# Patient Record
Sex: FEMALE | Race: WHITE | NOT HISPANIC OR LATINO | Employment: OTHER | URBAN - METROPOLITAN AREA
[De-identification: names, ages, dates, MRNs, and addresses within clinical notes are randomized per-mention and may not be internally consistent; named-entity substitution may affect disease eponyms.]

---

## 2017-02-06 ENCOUNTER — HOSPITAL ENCOUNTER (OUTPATIENT)
Dept: RADIOLOGY | Facility: MEDICAL CENTER | Age: 62
End: 2017-02-06
Attending: PHYSICAL MEDICINE & REHABILITATION
Payer: MEDICARE

## 2017-02-06 DIAGNOSIS — M25.512 LEFT SHOULDER PAIN, UNSPECIFIED CHRONICITY: ICD-10-CM

## 2017-02-06 PROCEDURE — 73221 MRI JOINT UPR EXTREM W/O DYE: CPT | Mod: LT

## 2017-02-07 ENCOUNTER — HOSPITAL ENCOUNTER (OUTPATIENT)
Dept: LAB | Facility: MEDICAL CENTER | Age: 62
End: 2017-02-07
Attending: FAMILY MEDICINE
Payer: MEDICARE

## 2017-02-07 DIAGNOSIS — R45.86 MOOD CHANGE: ICD-10-CM

## 2017-02-07 DIAGNOSIS — D72.829 ELEVATED WBC COUNT: ICD-10-CM

## 2017-02-07 DIAGNOSIS — R53.83 DECREASED ENERGY: ICD-10-CM

## 2017-02-07 LAB
BASOPHILS # BLD AUTO: 0.09 K/UL (ref 0–0.12)
BASOPHILS NFR BLD AUTO: 0.9 % (ref 0–1.8)
EOSINOPHIL # BLD: 0.27 K/UL (ref 0–0.51)
EOSINOPHIL NFR BLD AUTO: 2.8 % (ref 0–6.9)
ERYTHROCYTE [DISTWIDTH] IN BLOOD BY AUTOMATED COUNT: 43.7 FL (ref 35.9–50)
HCT VFR BLD AUTO: 40.7 % (ref 37–47)
HGB BLD-MCNC: 12.7 G/DL (ref 12–16)
IMM GRANULOCYTES # BLD AUTO: 0.02 K/UL (ref 0–0.11)
IMM GRANULOCYTES NFR BLD AUTO: 0.2 % (ref 0–0.9)
LYMPHOCYTES # BLD: 2.22 K/UL (ref 1–4.8)
LYMPHOCYTES NFR BLD AUTO: 23.2 % (ref 22–41)
MCH RBC QN AUTO: 25.8 PG (ref 27–33)
MCHC RBC AUTO-ENTMCNC: 31.2 G/DL (ref 33.6–35)
MCV RBC AUTO: 82.6 FL (ref 81.4–97.8)
MONOCYTES # BLD: 0.49 K/UL (ref 0–0.85)
MONOCYTES NFR BLD AUTO: 5.1 % (ref 0–13.4)
NEUTROPHILS # BLD: 6.47 K/UL (ref 2–7.15)
NEUTROPHILS NFR BLD AUTO: 67.8 % (ref 44–72)
NRBC # BLD AUTO: 0 K/UL
NRBC BLD-RTO: 0 /100 WBC
PLATELET # BLD AUTO: 367 K/UL (ref 164–446)
PMV BLD AUTO: 10.4 FL (ref 9–12.9)
RBC # BLD AUTO: 4.93 M/UL (ref 4.2–5.4)
T4 FREE SERPL-MCNC: 0.7 NG/DL (ref 0.53–1.43)
TSH SERPL DL<=0.005 MIU/L-ACNC: 1.62 UIU/ML (ref 0.3–3.7)
WBC # BLD AUTO: 9.6 K/UL (ref 4.8–10.8)

## 2017-02-07 PROCEDURE — 36415 COLL VENOUS BLD VENIPUNCTURE: CPT

## 2017-02-07 PROCEDURE — 85025 COMPLETE CBC W/AUTO DIFF WBC: CPT

## 2017-02-07 PROCEDURE — 84439 ASSAY OF FREE THYROXINE: CPT

## 2017-02-07 PROCEDURE — 84443 ASSAY THYROID STIM HORMONE: CPT

## 2017-02-09 ENCOUNTER — OFFICE VISIT (OUTPATIENT)
Dept: MEDICAL GROUP | Age: 62
End: 2017-02-09
Payer: MEDICARE

## 2017-02-09 VITALS
OXYGEN SATURATION: 96 % | DIASTOLIC BLOOD PRESSURE: 78 MMHG | SYSTOLIC BLOOD PRESSURE: 124 MMHG | TEMPERATURE: 98.2 F | WEIGHT: 147.8 LBS | HEART RATE: 93 BPM | HEIGHT: 67 IN | BODY MASS INDEX: 23.2 KG/M2

## 2017-02-09 DIAGNOSIS — E11.8 CONTROLLED TYPE 2 DIABETES MELLITUS WITH COMPLICATION, WITHOUT LONG-TERM CURRENT USE OF INSULIN (HCC): ICD-10-CM

## 2017-02-09 DIAGNOSIS — E78.2 MIXED HYPERLIPIDEMIA: ICD-10-CM

## 2017-02-09 DIAGNOSIS — G89.4 CHRONIC PAIN SYNDROME: ICD-10-CM

## 2017-02-09 DIAGNOSIS — F17.200 TOBACCO USE DISORDER: ICD-10-CM

## 2017-02-09 DIAGNOSIS — J44.9 CHRONIC OBSTRUCTIVE PULMONARY DISEASE, UNSPECIFIED COPD TYPE (HCC): ICD-10-CM

## 2017-02-09 DIAGNOSIS — I10 ESSENTIAL HYPERTENSION: ICD-10-CM

## 2017-02-09 LAB
HBA1C MFR BLD: 6.2 % (ref ?–5.8)
INT CON NEG: NEGATIVE
INT CON POS: POSITIVE

## 2017-02-09 PROCEDURE — 4004F PT TOBACCO SCREEN RCVD TLK: CPT | Performed by: FAMILY MEDICINE

## 2017-02-09 PROCEDURE — G8598 ASA/ANTIPLAT THER USED: HCPCS | Performed by: FAMILY MEDICINE

## 2017-02-09 PROCEDURE — 3014F SCREEN MAMMO DOC REV: CPT | Mod: 8P | Performed by: FAMILY MEDICINE

## 2017-02-09 PROCEDURE — G8420 CALC BMI NORM PARAMETERS: HCPCS | Performed by: FAMILY MEDICINE

## 2017-02-09 PROCEDURE — 83036 HEMOGLOBIN GLYCOSYLATED A1C: CPT | Performed by: FAMILY MEDICINE

## 2017-02-09 PROCEDURE — G8484 FLU IMMUNIZE NO ADMIN: HCPCS | Performed by: FAMILY MEDICINE

## 2017-02-09 PROCEDURE — 99214 OFFICE O/P EST MOD 30 MIN: CPT | Performed by: FAMILY MEDICINE

## 2017-02-09 PROCEDURE — 3017F COLORECTAL CA SCREEN DOC REV: CPT | Performed by: FAMILY MEDICINE

## 2017-02-09 PROCEDURE — 3044F HG A1C LEVEL LT 7.0%: CPT | Performed by: FAMILY MEDICINE

## 2017-02-09 PROCEDURE — G8432 DEP SCR NOT DOC, RNG: HCPCS | Performed by: FAMILY MEDICINE

## 2017-02-09 ASSESSMENT — PATIENT HEALTH QUESTIONNAIRE - PHQ9: CLINICAL INTERPRETATION OF PHQ2 SCORE: 3

## 2017-02-09 NOTE — PROGRESS NOTES
SUBJECTIVE:        Chief Complaint   Patient presents with   • Results     Blood work done on 02/07/2017   • Sinusitis   • Diabetes Mellitus       HPI:     Heidi Navas is a 61 y.o. female here for follow up of labs and blood work.     Diabetes- she is not checking her glucose regularly. Usually around 130s fasting, sometimes lower. She is not on medications currently. Appears no new blood work for glucose and lipids on recent labs. Will need new lab order.     Hyperlipidemia- not on medication therapy. Last labs stable. She will need new blood work.    Hypertension-has had issues with elevated blood pressure in the past but has been well-controlled off medication therapy. She was previously on lisinopril therapy.    States she was sick for a few weeks with cough and green mucous but is now better.     She will be having shoulder surgery.     Smoking- states wellbutrin did not help. She has cut down from a carton a week to  3 packs a week.    COPD- has been otherwise stable. Not on medication currently. No unusual shortness of breath or chest pains.    Chronic pain-multiple areas. She does see pain management for her chronic pain issues. Has been otherwise stable.    Diabetes Health Maintenance  On aspirin: on 325 mg daily  Foot exam: due- will be completed at next visit.   Eye exam: due- recommend routine annual screening.   Vaccinations:                Influenza: recommend annually-last done at [facility]; Pneumonia: PPSV 230 2013; Td/Adacel: 2014  Mammogram- she will have done after her shoulder surgery.      Ref. Range 2/7/2017 14:33   WBC Latest Ref Range: 4.8-10.8 K/uL 9.6   RBC Latest Ref Range: 4.20-5.40 M/uL 4.93   Hemoglobin Latest Ref Range: 12.0-16.0 g/dL 12.7   Hematocrit Latest Ref Range: 37.0-47.0 % 40.7   MCV Latest Ref Range: 81.4-97.8 fL 82.6   MCH Latest Ref Range: 27.0-33.0 pg 25.8 (L)   MCHC Latest Ref Range: 33.6-35.0 g/dL 31.2 (L)   RDW Latest Ref Range: 35.9-50.0 fL 43.7   Platelet Count  Latest Ref Range: 164-446 K/uL 367   MPV Latest Ref Range: 9.0-12.9 fL 10.4   Neutrophils-Polys Latest Ref Range: 44.00-72.00 % 67.80   Neutrophils (Absolute) Latest Ref Range: 2.00-7.15 K/uL 6.47   Lymphocytes Latest Ref Range: 22.00-41.00 % 23.20   Lymphs (Absolute) Latest Ref Range: 1.00-4.80 K/uL 2.22   Monocytes Latest Ref Range: 0.00-13.40 % 5.10   Monos (Absolute) Latest Ref Range: 0.00-0.85 K/uL 0.49   Eosinophils Latest Ref Range: 0.00-6.90 % 2.80   Eos (Absolute) Latest Ref Range: 0.00-0.51 K/uL 0.27   Basophils Latest Ref Range: 0.00-1.80 % 0.90   Baso (Absolute) Latest Ref Range: 0.00-0.12 K/uL 0.09   Immature Granulocytes Latest Ref Range: 0.00-0.90 % 0.20   Immature Granulocytes (abs) Latest Ref Range: 0.00-0.11 K/uL 0.02   Nucleated RBC Latest Units: /100 WBC 0.00   NRBC (Absolute) Latest Units: K/uL 0.00   TSH Latest Ref Range: 0.300-3.700 uIU/mL 1.620   Free T-4 Latest Ref Range: 0.53-1.43 ng/dL 0.70         ROS:  Denies any recent fevers or chills. No nausea or vomiting. No diarrhea. No chest pains or shortness of breath. No lower extremity edema.    Current Outpatient Prescriptions on File Prior to Visit   Medication Sig Dispense Refill   • aspirin (ASA) 325 MG TABS Take 325 mg by mouth every 6 hours as needed (3 tabs).     • ondansetron (ZOFRAN ODT) 4 MG TBDP Take 4 mg by mouth every 8 hours as needed for Nausea/Vomiting.     • morphine (MS IR) 15 MG tablet Take 15 mg by mouth every 12 hours.     • oxycodone immediate release (ROXICODONE) 10 MG immediate release tablet Take 1 Tab by mouth every 6 hours as needed (severe pain). 15 Tab 0   • meloxicam (MOBIC) 7.5 MG Tab TAKE ONE ABLET BY MOUTH ONCE DAILY (Patient not taking: Reported on 2/9/2017) 30 Tab 3   • buPROPion (WELLBUTRIN) 75 MG Tab Take 1 Tab by mouth 2 times a day. (Patient not taking: Reported on 2/9/2017) 60 Tab 3   • promethazine (PHENERGAN) 25 MG TABS Take 1 Tab by mouth every 6 hours as needed for Nausea/Vomiting. (Patient not  "taking: Reported on 2/9/2017) 30 Tab 1   • nicotine (NICODERM) 7 MG/24HR PT24 Apply 1 Patch to skin as directed every 24 hours. For tobacco dependence: 305.1 (Patient not taking: Reported on 8/26/2015) 30 Patch 1   • multivitamin (THERAGRAN) TABS Take 1 Tab by mouth every day. (Patient not taking: Reported on 2/9/2017) 30 Tab 0     No current facility-administered medications on file prior to visit.       Allergies   Allergen Reactions   • Aspartame Nausea     headache   • Augmentin Vomiting   • Latex Rash and Itching   • Lipitor [Atorvastatin Calcium] Swelling     Lip swelling   • Other Misc      Bug spray   • Saccharin Nausea     Nausea and headache   • Sucralose Nausea     Nausea and headache       Past Medical History   Diagnosis Date   • Pancreatitis    • Pap smear 4/2006   • Screening mammogram never   • History of colonoscopy   2005   • MI (myocardial infarction) (CMS-HCC) 4/29/2007   • Diverticulosis 5/10      colonoscopy   • S/P colonoscopy 5/2010     will need repeat in 5 years   • COPD (chronic obstructive pulmonary disease) (CMS-HCC)    • Arthritis      OA and  not RA per rheumatology   • Unspecified urinary incontinence    • Anemia    • Type II or unspecified type diabetes mellitus without mention of complication, uncontrolled 7/21/2009     oral meds   • Other specified disorder of intestines      constipation   • Dental disorder      dentures   • Pain      shoulders, neck   • Depression      depression   • Shingles    • Pulmonary nodule    • Renal stone    • Renal lesion    • Colon polyps 2015   • Convulsions (CMS-HCC) 7/21/2009      ICD-10 transition   • Symptomatic menopausal or female climacteric states 7/21/2009   • Hemorrhoid 7/29/2009   • Lymphocytosis (symptomatic) 7/21/2009   • Leukocytosis 3/30/2014       OBJECTIVE:   /78 mmHg  Pulse 93  Temp(Src) 36.8 °C (98.2 °F)  Ht 1.714 m (5' 7.48\")  Wt 67.042 kg (147 lb 12.8 oz)  BMI 22.82 kg/m2  SpO2 96%  LMP 01/01/2007  General: " Well-developed well-nourished female, no acute distress  Neck: supple, no lymphadenopathy- cervical or supraclavicular, no thyromegaly  Cardiovascular: regular rate and rhythm, no murmurs, gallops, rubs  Lungs: clear to auscultation bilaterally, no wheezes, crackles, or rhonchi  Abdomen: +bowel sounds, soft, nontender, nondistended, no rebound, no guarding, no hepatosplenomegaly  Extremities: no cyanosis, clubbing, edema  Skin: Warm and dry  Psych: appropriate mood and affect    POC hemoglobin A1c: 6.2%     ASSESSMENT/PLAN:    This is a 61-year-old female.    1. Controlled type 2 diabetes mellitus with complication, without long-term current use of insulin (CMS-[facility])- controlled. Not on medication therapy.  COMP METABOLIC PANEL    POCT  A1C   2. Mixed hyperlipidemia- stable. Needs new lab work. Not on medication therapy.  COMP METABOLIC PANEL    LIPID PROFILE   3. Essential hypertension-controlled. Not a medication therapy. Has been on medication therapy in past. Will further consider repeat trial of low-dose ACE inhibitor as tolerated in future.     4. Tobacco use disorder- has tried Wellbutrin which did not help. She has cut down on her tobacco use. Encouraged and counseled on smoking cessation.     5. Chronic obstructive pulmonary disease, unspecified COPD type (CMS-HCC)-stable. Not on medication therapy. Monitor.     6. Chronic pain syndrome -stable. Continue to follow up with pain specialist. Continue current medication.           Return in about 6 months (around 8/9/2017) for Long.

## 2017-02-09 NOTE — MR AVS SNAPSHOT
"        Heidi Santosne   2017 3:00 PM   Office Visit   MRN: [id number]    Department:  [address] [place] [address]   Dept Phone:  [phone]    Description:  Female : 1955   Provider:  Viviana Thorne M.D.           Reason for Visit     Results Blood work done on 2017    Sinusitis     Diabetes Mellitus           Allergies as of 2017     Allergen Noted Reactions    Aspartame 2014   Nausea    headache    Augmentin 2009   Vomiting    Latex 2014   Rash, Itching    Lipitor [Atorvastatin Calcium] 2012   Swelling    Lip swelling    Other Misc 2014       Bug spray    Saccharin 09/10/2014   Nausea    Nausea and headache    Sucralose 09/10/2014   Nausea    Nausea and headache      You were diagnosed with     Mixed hyperlipidemia   [272.2.ICD-9-CM]       Controlled type 2 diabetes mellitus with complication, without long-term current use of insulin (CMS-HCC)   [5217925]       Tobacco use disorder   [305.1.ICD-9-CM]       Essential hypertension   [4684274]       Chronic obstructive pulmonary disease, unspecified COPD type (CMS-HCC)   [2920304]         Vital Signs     Blood Pressure Pulse Temperature Height Weight Body Mass Index    124/78 mmHg 93 36.8 °C (98.2 °F) 1.714 m (5' 7.48\") 67.042 kg (147 lb 12.8 oz) 22.82 kg/m2    Oxygen Saturation Last Menstrual Period Smoking Status             96% 2007 Current Every Day Smoker         Basic Information     Date Of Birth Sex Race Ethnicity Preferred Language    1955 Female White Non- English      Your appointments     Aug 10, 2017  3:00 PM   Established Patient with Viviana Thorne M.D.   [address])    [address] [place] [id number] [phone]           You will be receiving a confirmation call a few days before your appointment from our automated call confirmation system.              Problem List              ICD-10-CM Priority Class Noted - Resolved    Swelling, mass, or " lump in head and neck R22.0, R22.1   7/21/2009 - Present    Tobacco use disorder F17.200   7/21/2009 - Present    Chronic ischemic heart disease I25.9   7/21/2009 - Present    Esophageal reflux K21.9   7/21/2009 - Present    Chronic airway obstruction, not elsewhere classified (CMS-HCC) J44.9   7/21/2009 - Present    Allergic rhinitis J30.9   7/21/2009 - Present    Type II or unspecified type diabetes mellitus without mention of complication, not stated as uncontrolled E11.9   7/21/2009 - Present    Mixed hyperlipidemia E78.2   7/21/2009 - Present    Other atopic dermatitis and related conditions L20.89   7/21/2009 - Present    Deficiency anemia D53.9   7/21/2009 - Present    Fibromyalgia M79.7   10/9/2009 - Present    Hypertension I10   3/22/2010 - Present    Vitamin D insufficiency E55.9   4/28/2011 - Present    Anxiety F41.9   1/18/2012 - Present    Chronic pain G89.29   2/21/2012 - Present    Arthritis M19.90   12/26/2013 - Present    HTN (hypertension) I10   3/29/2014 - Present    COPD (chronic obstructive pulmonary disease) (CMS-HCC) J44.9   3/30/2014 - Present    History of total hip arthroplasty Z96.649   7/22/2014 - Present    Brachial neuritis or radiculitis M54.12   9/10/2014 - Present    Diabetes mellitus, type 2 (CMS-HCC) E11.9   10/3/2014 - Present    Nausea & vomiting R11.2   7/13/2015 - Present    Opiate withdrawal (CMS-HCC) F11.23   7/13/2015 - Present    Chest pain R07.9   7/16/2015 - Present    Pulmonary nodule R91.1   12/1/2015 - Present    Renal stone N20.0   12/1/2015 - Present    Diverticulosis K57.90   12/1/2015 - Present    COLD (chronic obstructive lung disease) (CMS-HCC) J44.9   12/1/2015 - Present    CVD (cardiovascular disease) I25.10   12/4/2015 - Present      Health Maintenance        Date Due Completion Dates    PAP SMEAR 7/29/2012 7/29/2009    IMM ZOSTER VACCINE 4/29/2015 ---    MAMMOGRAM 7/1/2015 7/1/2014    RETINAL SCREENING 1/22/2016 1/22/2015    FASTING LIPID PROFILE 7/16/2016  7/16/2015, 12/3/2014, 6/7/2014, 3/30/2014, 5/16/2012, 10/29/2011, 4/21/2011, 12/7/2010, 6/17/2010, 4/9/2009, 4/29/2008    DIABETES MONOFILAMENT / LE EXAM 8/30/2016 8/30/2015, 6/9/2014 (Done)    Override on 6/9/2014: Done    IMM INFLUENZA (1) 9/1/2016 12/1/2015, 11/3/2014, 12/24/2013    A1C SCREENING 12/7/2016 6/7/2016, 7/16/2015, 12/3/2014, 6/7/2014, 3/30/2014, 12/24/2013, 5/16/2012, 12/7/2010, 6/17/2010, 4/9/2009, 4/29/2008    URINE ACR / MICROALBUMIN 6/7/2017 6/7/2016, 6/7/2014, 5/16/2012, 6/17/2010, 4/9/2009    SERUM CREATININE 6/7/2017 6/7/2016, 8/25/2015, 7/16/2015, 7/16/2015, 7/14/2015, 7/13/2015, 12/3/2014, 9/23/2014, 9/22/2014, 9/20/2014, 9/16/2014, 9/9/2014, 7/25/2014, 7/24/2014, 7/22/2014, 7/20/2014, 6/7/2014, 5/5/2014, 4/1/2014, 3/31/2014, 3/30/2014, 3/29/2014, 12/5/2013, 1/10/2012, 4/21/2011, 12/7/2010, 6/17/2010, 4/9/2009, 4/29/2008    IMM DTaP/Tdap/Td Vaccine (2 - Td) 6/23/2024 6/23/2014    COLONOSCOPY 11/13/2025 11/13/2015            Current Immunizations     Influenza Vaccine Adult HD 12/24/2013    Influenza Vaccine Quad Inj (Pf) 12/1/2015    Influenza Vaccine Quad Inj (Preserved) 11/3/2014    Pneumococcal Vaccine (UF)Historical Data 12/22/2008    Pneumococcal polysaccharide vaccine (PPSV-23) 12/24/2013    Tdap Vaccine 6/23/2014  2:32 PM      Below and/or attached are the medications your provider expects you to take. Review all of your home medications and newly ordered medications with your provider and/or pharmacist. Follow medication instructions as directed by your provider and/or pharmacist. Please keep your medication list with you and share with your provider. Update the information when medications are discontinued, doses are changed, or new medications (including over-the-counter products) are added; and carry medication information at all times in the event of emergency situations     Allergies:  ASPARTAME - Nausea     AUGMENTIN - Vomiting     LATEX - Rash,Itching     LIPITOR - Swelling      OTHER MISC - (reactions not documented)     SACCHARIN - Nausea     SUCRALOSE - Nausea               Medications  Valid as of: February 09, 2017 -  3:59 PM    Generic Name Brand Name Tablet Size Instructions for use    Aspirin (Tab)  MG Take 325 mg by mouth every 6 hours as needed (3 tabs).        BuPROPion HCl (Tab) WELLBUTRIN 75 MG Take 1 Tab by mouth 2 times a day.        Meloxicam (Tab) MOBIC 7.5 MG TAKE ONE ABLET BY MOUTH ONCE DAILY        Morphine Sulfate (Tab) MS IR 15 MG Take 15 mg by mouth every 12 hours.        Multiple Vitamin (Tab) THERAGRAN  Take 1 Tab by mouth every day.        Nicotine (PATCH 24 HR) NICODERM 7 MG/24HR Apply 1 Patch to skin as directed every 24 hours. For tobacco dependence: 305.1        Ondansetron (TABLET DISPERSIBLE) ZOFRAN ODT 4 MG Take 4 mg by mouth every 8 hours as needed for Nausea/Vomiting.        OxyCODONE HCl (Tab) ROXICODONE 10 MG Take 1 Tab by mouth every 6 hours as needed (severe pain).        Promethazine HCl (Tab) PHENERGAN 25 MG Take 1 Tab by mouth every 6 hours as needed for Nausea/Vomiting.        .                 Medicines prescribed today were sent to:     [facility] [address] [place] [id number]    Phone: [phone] Fax: [phone]    Open 24 Hours?: No      Medication refill instructions:       If your prescription bottle indicates you have medication refills left, it is not necessary to call your provider’s office. Please contact your pharmacy and they will refill your medication.    If your prescription bottle indicates you do not have any refills left, you may request refills at any time through one of the following ways: The online IQcard system (except Urgent Care), by calling your provider’s office, or by asking your pharmacy to contact your provider’s office with a refill request. Medication refills are processed only during regular business hours and may not be available until the  next business day. Your provider may request additional information or to have a follow-up visit with you prior to refilling your medication.   *Please Note: Medication refills are assigned a new Rx number when refilled electronically. Your pharmacy may indicate that no refills were authorized even though a new prescription for the same medication is available at the pharmacy. Please request the medicine by name with the pharmacy before contacting your provider for a refill.        Your To Do List     Future Labs/Procedures Complete By Expires    COMP METABOLIC PANEL  As directed 8/9/2017    LIPID PROFILE  As directed 8/9/2017      Other Notes About Your Plan     The patient is refusing mammogram due to cost.            GeekChicDaily Access Code: [email or web address]  Expires: 2/28/2017 10:20 AM    GeekChicDaily  A secure, online tool to manage your health information     [employer]’s GeekChicDaily® is a secure, online tool that connects you to your personalized health information from the privacy of your home -- day or night - making it very easy for you to manage your healthcare. Once the activation process is completed, you can even access your medical information using the GeekChicDaily ema, which is available for free in the Apple Ema store or Google Play store.     GeekChicDaily provides the following levels of access (as shown below):   My Chart Features   [facility] Doctor [facility]  Specialists [facility]  Urgent  Care Non-[facility]  Primary Care  Doctor   Email your healthcare team securely and privately 24/7 X X X    Manage appointments: schedule your next appointment; view details of past/upcoming appointments X      Request prescription refills. X      View recent personal medical records, including lab and immunizations X X X X   View health record, including health history, allergies, medications X X X X   Read reports about your outpatient visits, procedures, consult and ER notes X X X X   See your discharge summary, which is a  recap of your hospital and/or ER visit that includes your diagnosis, lab results, and care plan. X X       How to register for Pikhub:  1. Go to  [email or web address].  2. Click on the Sign Up Now box, which takes you to the New Member Sign Up page. You will need to provide the following information:  a. Enter your Pikhub Access Code exactly as it appears at the top of this page. (You will not need to use this code after you’ve completed the sign-up process. If you do not sign up before the expiration date, you must request a new code.)   b. Enter your date of birth.   c. Enter your home email address.   d. Click Submit, and follow the next screen’s instructions.  3. Create a Pikhub ID. This will be your Pikhub login ID and cannot be changed, so think of one that is secure and easy to remember.  4. Create a Pikhub password. You can change your password at any time.  5. Enter your Password Reset Question and Answer. This can be used at a later time if you forget your password.   6. Enter your e-mail address. This allows you to receive e-mail notifications when new information is available in Pikhub.  7. Click Sign Up. You can now view your health information.    For assistance activating your Pikhub account, call [phone]

## 2017-03-31 ENCOUNTER — PATIENT OUTREACH (OUTPATIENT)
Dept: HEALTH INFORMATION MANAGEMENT | Facility: OTHER | Age: 62
End: 2017-03-31

## 2017-03-31 DIAGNOSIS — M25.511 BILATERAL SHOULDER PAIN, UNSPECIFIED CHRONICITY: ICD-10-CM

## 2017-03-31 DIAGNOSIS — M25.512 BILATERAL SHOULDER PAIN, UNSPECIFIED CHRONICITY: ICD-10-CM

## 2017-03-31 RX ORDER — MELOXICAM 7.5 MG/1
TABLET ORAL
Qty: 30 TAB | Refills: 1 | Status: ON HOLD | OUTPATIENT
Start: 2017-03-31 | End: 2017-07-12

## 2017-03-31 NOTE — TELEPHONE ENCOUNTER
Was the patient seen in the last year in this department? Yes     Does patient have an active prescription for medications requested? No     Received Request Via: Patient     Pt states:  She knows she told you that she wasn't taking the Meloxicam any longer, but her shoulders are hurting again.

## 2017-04-14 NOTE — PROGRESS NOTES
Attempt #:2 -- Patient is having surgery on her shoulder soon and requested to hold off on this appointment until that is done.    Verify PCP: yes    Communication Preference Obtained: no     Review Care Team: yes    Annual Wellness Visit Scheduling  1. Scheduling Status:Not Scheduled. Patient states they have too many other visits    Care Gap Scheduling      Health Maintenance Due   Topic Date Due   • PFT SCREENING-FEV1 AND FEV/FVC RATIO / SPIROMETRY SHOULD BE PERFORMED ANNUALLY  NOT DISCUSSED DURING THIS CALL   • PAP SMEAR  PATIENT WILL CALL BACK TO SCHEDULE   • IMM ZOSTER VACCINE  PATIENT WILL CALL BACK TO SCHEDULE   • MAMMOGRAM  PATIENT WILL CALL BACK TO SCHEDULE   • RETINAL SCREENING  DR. BURGOS ADDED TO PT'S CHART, RECORDS REQUESTED.   • FASTING LIPID PROFILE  PATIENT WILL CALL BACK TO SCHEDULE   • DIABETES MONOFILAMENT / LE EXAM  PATIENT WILL CALL BACK TO SCHEDULE   • Annual Wellness Visit  DECLINED AT THIS TIME DUE TO UPCOMING SURGERY.         Rende[employer]oo Activation: PENDING  R[employer] Ema: no  Virtual Visits: no  Opt In to Text Messages: no

## 2017-07-06 DIAGNOSIS — Z01.812 PRE-OPERATIVE LABORATORY EXAMINATION: ICD-10-CM

## 2017-07-06 DIAGNOSIS — Z01.810 PRE-OPERATIVE CARDIOVASCULAR EXAMINATION: ICD-10-CM

## 2017-07-06 LAB
ANION GAP SERPL CALC-SCNC: 11 MMOL/L (ref 0–11.9)
BUN SERPL-MCNC: 12 MG/DL (ref 8–22)
CALCIUM SERPL-MCNC: 8.3 MG/DL (ref 8.5–10.5)
CHLORIDE SERPL-SCNC: 98 MMOL/L (ref 96–112)
CO2 SERPL-SCNC: 27 MMOL/L (ref 20–33)
CREAT SERPL-MCNC: 0.84 MG/DL (ref 0.5–1.4)
ERYTHROCYTE [DISTWIDTH] IN BLOOD BY AUTOMATED COUNT: 43.8 FL (ref 35.9–50)
GFR SERPL CREATININE-BSD FRML MDRD: >60 ML/MIN/1.73 M 2
GLUCOSE SERPL-MCNC: 114 MG/DL (ref 65–99)
HCT VFR BLD AUTO: 30.1 % (ref 37–47)
HGB BLD-MCNC: 9.8 G/DL (ref 12–16)
MCH RBC QN AUTO: 25.8 PG (ref 27–33)
MCHC RBC AUTO-ENTMCNC: 32.6 G/DL (ref 33.6–35)
MCV RBC AUTO: 79.2 FL (ref 81.4–97.8)
PLATELET # BLD AUTO: 331 K/UL (ref 164–446)
PMV BLD AUTO: 9.8 FL (ref 9–12.9)
POTASSIUM SERPL-SCNC: 3.5 MMOL/L (ref 3.6–5.5)
RBC # BLD AUTO: 3.8 M/UL (ref 4.2–5.4)
SCCMEC + MECA PNL NOSE NAA+PROBE: NEGATIVE
SCCMEC + MECA PNL NOSE NAA+PROBE: POSITIVE
SODIUM SERPL-SCNC: 136 MMOL/L (ref 135–145)
WBC # BLD AUTO: 10.1 K/UL (ref 4.8–10.8)

## 2017-07-06 PROCEDURE — 80048 BASIC METABOLIC PNL TOTAL CA: CPT

## 2017-07-06 PROCEDURE — 85027 COMPLETE CBC AUTOMATED: CPT

## 2017-07-06 PROCEDURE — 87641 MR-STAPH DNA AMP PROBE: CPT

## 2017-07-06 PROCEDURE — 36415 COLL VENOUS BLD VENIPUNCTURE: CPT

## 2017-07-06 PROCEDURE — 93005 ELECTROCARDIOGRAM TRACING: CPT

## 2017-07-06 PROCEDURE — 93010 ELECTROCARDIOGRAM REPORT: CPT | Performed by: INTERNAL MEDICINE

## 2017-07-06 PROCEDURE — 87640 STAPH A DNA AMP PROBE: CPT

## 2017-07-06 RX ORDER — DIPHENHYDRAMINE HCL 25 MG
25 TABLET ORAL EVERY 6 HOURS PRN
COMMUNITY
End: 2019-05-03

## 2017-07-07 LAB — EKG IMPRESSION: NORMAL

## 2017-07-12 ENCOUNTER — HOSPITAL ENCOUNTER (INPATIENT)
Facility: MEDICAL CENTER | Age: 62
LOS: 1 days | DRG: 483 | End: 2017-07-13
Attending: SURGERY | Admitting: SURGERY
Payer: MEDICARE

## 2017-07-12 ENCOUNTER — APPOINTMENT (OUTPATIENT)
Dept: RADIOLOGY | Facility: MEDICAL CENTER | Age: 62
DRG: 483 | End: 2017-07-12
Attending: SURGERY
Payer: MEDICARE

## 2017-07-12 PROBLEM — M19.012 ARTHRITIS OF LEFT SHOULDER REGION: Status: ACTIVE | Noted: 2017-07-12

## 2017-07-12 LAB — GLUCOSE BLD-MCNC: 112 MG/DL (ref 65–99)

## 2017-07-12 PROCEDURE — 700102 HCHG RX REV CODE 250 W/ 637 OVERRIDE(OP): Performed by: PHYSICIAN ASSISTANT

## 2017-07-12 PROCEDURE — 502240 HCHG MISC OR SUPPLY RC 0272: Performed by: SURGERY

## 2017-07-12 PROCEDURE — 501486 HCHG STRYKER IRRIG SET HC W/TUBING: Performed by: SURGERY

## 2017-07-12 PROCEDURE — 500088 HCHG BLADE, SAGITTAL: Performed by: SURGERY

## 2017-07-12 PROCEDURE — 160048 HCHG OR STATISTICAL LEVEL 1-5: Performed by: SURGERY

## 2017-07-12 PROCEDURE — 700101 HCHG RX REV CODE 250

## 2017-07-12 PROCEDURE — A9270 NON-COVERED ITEM OR SERVICE: HCPCS | Performed by: SURGERY

## 2017-07-12 PROCEDURE — 770006 HCHG ROOM/CARE - MED/SURG/GYN SEMI*

## 2017-07-12 PROCEDURE — 160022 HCHG BLOCK: Performed by: SURGERY

## 2017-07-12 PROCEDURE — 501487 HCHG STRYKER TIP: Performed by: SURGERY

## 2017-07-12 PROCEDURE — 73030 X-RAY EXAM OF SHOULDER: CPT | Mod: LT

## 2017-07-12 PROCEDURE — 501838 HCHG SUTURE GENERAL: Performed by: SURGERY

## 2017-07-12 PROCEDURE — A9270 NON-COVERED ITEM OR SERVICE: HCPCS

## 2017-07-12 PROCEDURE — 160029 HCHG SURGERY MINUTES - 1ST 30 MINS LEVEL 4: Performed by: SURGERY

## 2017-07-12 PROCEDURE — 502000 HCHG MISC OR IMPLANTS RC 0278: Performed by: SURGERY

## 2017-07-12 PROCEDURE — 502578 HCHG PACK, TOTAL HIP: Performed by: SURGERY

## 2017-07-12 PROCEDURE — 160041 HCHG SURGERY MINUTES - EA ADDL 1 MIN LEVEL 4: Performed by: SURGERY

## 2017-07-12 PROCEDURE — 160036 HCHG PACU - EA ADDL 30 MINS PHASE I: Performed by: SURGERY

## 2017-07-12 PROCEDURE — 82962 GLUCOSE BLOOD TEST: CPT

## 2017-07-12 PROCEDURE — 500367 HCHG DRAIN KIT, HEMOVAC: Performed by: SURGERY

## 2017-07-12 PROCEDURE — A9270 NON-COVERED ITEM OR SERVICE: HCPCS | Performed by: PHYSICIAN ASSISTANT

## 2017-07-12 PROCEDURE — 700102 HCHG RX REV CODE 250 W/ 637 OVERRIDE(OP): Performed by: SURGERY

## 2017-07-12 PROCEDURE — 160035 HCHG PACU - 1ST 60 MINS PHASE I: Performed by: SURGERY

## 2017-07-12 PROCEDURE — 160002 HCHG RECOVERY MINUTES (STAT): Performed by: SURGERY

## 2017-07-12 PROCEDURE — 0RRK00Z REPLACEMENT OF LEFT SHOULDER JOINT WITH REVERSE BALL AND SOCKET SYNTHETIC SUBSTITUTE, OPEN APPROACH: ICD-10-PCS | Performed by: SURGERY

## 2017-07-12 PROCEDURE — 160009 HCHG ANES TIME/MIN: Performed by: SURGERY

## 2017-07-12 PROCEDURE — 700111 HCHG RX REV CODE 636 W/ 250 OVERRIDE (IP)

## 2017-07-12 PROCEDURE — 700102 HCHG RX REV CODE 250 W/ 637 OVERRIDE(OP)

## 2017-07-12 DEVICE — IMPLANTABLE DEVICE: Type: IMPLANTABLE DEVICE | Status: FUNCTIONAL

## 2017-07-12 RX ORDER — LIDOCAINE HYDROCHLORIDE 10 MG/ML
0.5 INJECTION, SOLUTION INFILTRATION; PERINEURAL
Status: DISCONTINUED | OUTPATIENT
Start: 2017-07-12 | End: 2017-07-13 | Stop reason: HOSPADM

## 2017-07-12 RX ORDER — SODIUM CHLORIDE 9 MG/ML
INJECTION, SOLUTION INTRAVENOUS CONTINUOUS
Status: DISCONTINUED | OUTPATIENT
Start: 2017-07-12 | End: 2017-07-13 | Stop reason: HOSPADM

## 2017-07-12 RX ORDER — ACETAMINOPHEN 500 MG
1000 TABLET ORAL EVERY 6 HOURS
Status: DISCONTINUED | OUTPATIENT
Start: 2017-07-12 | End: 2017-07-13 | Stop reason: HOSPADM

## 2017-07-12 RX ORDER — ONDANSETRON 4 MG/1
4 TABLET, ORALLY DISINTEGRATING ORAL EVERY 8 HOURS PRN
Status: DISCONTINUED | OUTPATIENT
Start: 2017-07-12 | End: 2017-07-13 | Stop reason: HOSPADM

## 2017-07-12 RX ORDER — CELECOXIB 200 MG/1
200 CAPSULE ORAL 2 TIMES DAILY WITH MEALS
Status: DISCONTINUED | OUTPATIENT
Start: 2017-07-12 | End: 2017-07-13 | Stop reason: HOSPADM

## 2017-07-12 RX ORDER — MELOXICAM 7.5 MG/1
7.5 TABLET ORAL DAILY
Status: DISCONTINUED | OUTPATIENT
Start: 2017-07-12 | End: 2017-07-12

## 2017-07-12 RX ORDER — MELOXICAM 7.5 MG/1
7.5 TABLET ORAL DAILY
COMMUNITY
End: 2019-05-03

## 2017-07-12 RX ORDER — GABAPENTIN 300 MG/1
CAPSULE ORAL
Status: COMPLETED
Start: 2017-07-12 | End: 2017-07-12

## 2017-07-12 RX ORDER — PROMETHAZINE HYDROCHLORIDE 25 MG/1
25 TABLET ORAL EVERY 6 HOURS PRN
Status: DISCONTINUED | OUTPATIENT
Start: 2017-07-12 | End: 2017-07-13 | Stop reason: HOSPADM

## 2017-07-12 RX ORDER — DIPHENHYDRAMINE HCL 25 MG
25 TABLET ORAL EVERY 6 HOURS PRN
Status: DISCONTINUED | OUTPATIENT
Start: 2017-07-12 | End: 2017-07-13 | Stop reason: HOSPADM

## 2017-07-12 RX ORDER — OXYCODONE HYDROCHLORIDE 5 MG/1
5 TABLET ORAL
Status: DISCONTINUED | OUTPATIENT
Start: 2017-07-12 | End: 2017-07-13 | Stop reason: HOSPADM

## 2017-07-12 RX ORDER — OXYCODONE HYDROCHLORIDE 10 MG/1
10 TABLET ORAL
Status: DISCONTINUED | OUTPATIENT
Start: 2017-07-12 | End: 2017-07-13 | Stop reason: HOSPADM

## 2017-07-12 RX ORDER — CELECOXIB 200 MG/1
CAPSULE ORAL
Status: COMPLETED
Start: 2017-07-12 | End: 2017-07-12

## 2017-07-12 RX ORDER — LIDOCAINE HYDROCHLORIDE 10 MG/ML
INJECTION, SOLUTION INFILTRATION; PERINEURAL
Status: COMPLETED
Start: 2017-07-12 | End: 2017-07-12

## 2017-07-12 RX ORDER — MORPHINE SULFATE 30 MG/1
30 TABLET, FILM COATED, EXTENDED RELEASE ORAL EVERY 12 HOURS
Status: DISCONTINUED | OUTPATIENT
Start: 2017-07-12 | End: 2017-07-13 | Stop reason: HOSPADM

## 2017-07-12 RX ORDER — ASPIRIN 325 MG
975 TABLET ORAL EVERY 6 HOURS PRN
Status: DISCONTINUED | OUTPATIENT
Start: 2017-07-12 | End: 2017-07-12

## 2017-07-12 RX ORDER — LIDOCAINE AND PRILOCAINE 25; 25 MG/G; MG/G
1 CREAM TOPICAL
Status: DISCONTINUED | OUTPATIENT
Start: 2017-07-12 | End: 2017-07-13 | Stop reason: HOSPADM

## 2017-07-12 RX ORDER — ACETAMINOPHEN 500 MG
TABLET ORAL
Status: COMPLETED
Start: 2017-07-12 | End: 2017-07-12

## 2017-07-12 RX ORDER — VANCOMYCIN HYDROCHLORIDE 500 MG/10ML
1000 INJECTION, POWDER, LYOPHILIZED, FOR SOLUTION INTRAVENOUS
Status: COMPLETED | OUTPATIENT
Start: 2017-07-12 | End: 2017-07-12

## 2017-07-12 RX ADMIN — GABAPENTIN 300 MG: 300 CAPSULE ORAL at 13:30

## 2017-07-12 RX ADMIN — VANCOMYCIN HYDROCHLORIDE 1000 MG: 500 INJECTION, POWDER, LYOPHILIZED, FOR SOLUTION INTRAVENOUS at 13:29

## 2017-07-12 RX ADMIN — MORPHINE SULFATE 30 MG: 30 TABLET, EXTENDED RELEASE ORAL at 22:00

## 2017-07-12 RX ADMIN — CELECOXIB 400 MG: 200 CAPSULE ORAL at 13:30

## 2017-07-12 RX ADMIN — LIDOCAINE HYDROCHLORIDE: 10 INJECTION, SOLUTION INFILTRATION; PERINEURAL at 12:45

## 2017-07-12 RX ADMIN — ACETAMINOPHEN 1000 MG: 500 TABLET ORAL at 21:08

## 2017-07-12 RX ADMIN — THERA TABS 1 TABLET: TAB at 21:09

## 2017-07-12 RX ADMIN — SODIUM CHLORIDE: 9 INJECTION, SOLUTION INTRAVENOUS at 13:30

## 2017-07-12 RX ADMIN — CELECOXIB 200 MG: 200 CAPSULE ORAL at 21:09

## 2017-07-12 RX ADMIN — ACETAMINOPHEN 1000 MG: 500 TABLET, FILM COATED ORAL at 13:30

## 2017-07-12 ASSESSMENT — PAIN SCALES - GENERAL
PAINLEVEL_OUTOF10: 0
PAINLEVEL_OUTOF10: 4
PAINLEVEL_OUTOF10: 0

## 2017-07-12 NOTE — IP AVS SNAPSHOT
" Home Care Instructions                                                                                                                  Name:Heidi Navas  Medical Record Number:[id number]  CSN: [id number]    YOB: 1955   Age: 62 y.o.  Sex: female  HT:1.727 m (5' 7.99\") WT: 61.7 kg (136 lb 0.4 oz)          Admit Date: 7/12/2017     Discharge Date:   Today's Date: 7/13/2017  Attending Doctor:  KITTY Saldivar*                  Allergies:  Aspartame; Augmentin; Latex; Lipitor; Other misc; and Saccharin            Discharge Instructions       Discharge Instructions    Discharged to home by car with relative. Discharged via wheelchair, hospital escort: Yes.  Special equipment needed: Sling    Be sure to schedule a follow-up appointment with your primary care doctor or any specialists as instructed.     Discharge Plan:   Diet Plan: Discussed  Activity Level: Discussed  Smoking Cessation Offered: Patient Refused  Confirmed Follow up Appointment: Patient to Call and Schedule Appointment  Confirmed Symptoms Management: Discussed  Medication Reconciliation Updated: Yes  Influenza Vaccine Indication: Patient Refuses    I understand that a diet low in cholesterol, fat, and sodium is recommended for good health. Unless I have been given specific instructions below for another diet, I accept this instruction as my diet prescription.   Other diet: Regular as tolerated    Special Instructions: None    · Is patient discharged on Warfarin / Coumadin?   No     · Is patient Post Blood Transfusion?  No        Depression / Suicide Risk    As you are discharged from this [facility] facility, it is important to learn how to keep safe from harming yourself.    Recognize the warning signs:  · Abrupt changes in personality, positive or negative- including increase in energy   · Giving away possessions  · Change in eating patterns- significant weight changes-  positive or negative  · Change in sleeping patterns- " unable to sleep or sleeping all the time   · Unwillingness or inability to communicate  · Depression  · Unusual sadness, discouragement and loneliness  · Talk of wanting to die  · Neglect of personal appearance   · Rebelliousness- reckless behavior  · Withdrawal from people/activities they love  · Confusion- inability to concentrate     If you or a loved one observes any of these behaviors or has concerns about self-harm, here's what you can do:  · Talk about it- your feelings and reasons for harming yourself  · Remove any means that you might use to hurt yourself (examples: pills, rope, extension cords, firearm)  · Get professional help from the community (Mental Health, Substance Abuse, psychological counseling)  · Do not be alone:Call your Safe Contact- someone whom you trust who will be there for you.  · Call your local CRISIS HOTLINE 467-3332 or 904-808-6915  · Call your local Children's Mobile Crisis Response Team [place] (911) 144-2158 or www.Insider Pages  · Call the toll free National Suicide Prevention Hotlines   · National Suicide Prevention Lifeline 244-411-IWSH (2685)                    National Hope Line Network 800-SUICIDE (215-8556)    DISCHARGE INSTRUCTIONS:    Keep Sling on at all times until clinic followup except for gentle elbow motion and hygeine. Sedentary use only of hand, no shoulder motion until then. Okay to remove bandage in 4 days and cover incision with bandaids and get wet.     No driving while on narcotic pain medications. Contact auto-insurance carrier for clearance to begin driving again; wait at least 6 weeks.     Call MD or come to ER for any major concerns.    Shoulder Arthroscopy, Care After  Refer to this sheet in the next few weeks. These instructions provide you with information on caring for yourself after your procedure. Your health care provider may also give you more specific instructions. Your treatment has been planned according to current medical practices,  but problems sometimes occur. Call your health care provider if you have any problems or questions after your procedure.   WHAT TO EXPECT AFTER THE PROCEDURE  After your procedure, it is typical to have the following:  · Pain at the site of the surgical cuts (incisions).  · Stiffness in your shoulder. This should gradually decrease over time. Your health care provider may recommend physical therapy to help improve this.  · Nausea, vomiting, or constipation. These symptoms can result from taking pain medicine after surgery.  · Clear or red drainage from the incision sites. This is normal for a few days after surgery.  · Fatigue.  HOME CARE INSTRUCTIONS  · Take medicines only as directed by your health care provider.  · Use a sling as directed.  · There are many different ways to close and cover an incision, including stitches, skin glue, and adhesive strips. Follow your health care provider's instructions on:  · Incision care.  · Bandage (dressing) changes and removal.  · Incision closure removal.  · Apply ice to the injured area:  · Put ice in a plastic bag.  · Place a towel between your skin and the bag.  · Leave the ice on for 20 minutes, 2-3 times a day.  · If physical therapy and exercises are prescribed by your health care provider, do them as directed.  · Keep all follow-up visits as directed by your health care provider. This is important.  SEEK MEDICAL CARE IF:  You have a fever.  SEEK IMMEDIATE MEDICAL CARE IF:  · You have drainage, redness, swelling, or increasing pain at the incision site.  · You notice a bad smell coming from the incision site or dressing.  · Your incision site breaks open after your stitches or tape has been removed.     This information is not intended to replace advice given to you by your health care provider. Make sure you discuss any questions you have with your health care provider.     Document Released: 07/15/2015 Document Reviewed: 07/15/2015  [employer] Interactive Patient  "Education ©2016 [employer]    How to Use a Sling  A sling is a type of hanging bandage that is worn around your neck to protect an injured arm, shoulder, or other body part. You may need to wear a sling to keep you from moving (immobilize) the injured body part while it heals. Keeping the injured part of your body still reduces pain and speeds up healing. Your health care provider may recommend using a sling if you have:   · A broken arm.  · A broken collarbone.  · A shoulder injury.  · Surgery.  RISKS AND COMPLICATIONS  Wearing a sling the wrong way can:  · Make your injury worse.  · Cause stiffness or numbness.  · Affect blood circulation in your arm and hand. This can cause tingling or numbness in your fingers or hands.  HOW TO USE A SLING  The way that you should use a sling depends on your injury. It is important that you follow all of your health care provider's instructions for your injury. Also follow these general guidelines:  · Wear the sling so that your arm bends 90 degrees at the elbow. That is like a right angle or the shape of a capital letter \"L.\" The sling should also support your wrist and your hand.  · Try to avoid moving your arm.  · Do not lie down flat on your back while wearing a sling. Sleep in a recliner or use pillows to raise your upper body in bed.  · Do not twist, raise, or move your arm in a way that could make your injury worse.  · Do not lean on your arm while wearing a sling.  · Do not lift anything while wearing a sling.  SEEK MEDICAL CARE IF:  · You have bruising, swelling, or pain that is getting worse.  · Your pain medicine is not helping.  · You have a fever.  SEEK IMMEDIATE MEDICAL CARE IF:  · Your fingers are numb or tingling.  · Your fingers turn blue or feel cold to the touch.  · You cannot control the bleeding from your injury.  · You are short of breath.     This information is not intended to replace advice given to you by your health care provider. Make sure you " discuss any questions you have with your health care provider.     Document Released: 08/01/2005 Document Revised: 01/08/2016 Document Reviewed: 10/21/2015  Edvisor.io Interactive Patient Education ©2016 [employer]    Incision Care  An incision is when a surgeon cuts into your body. After surgery, the incision needs to be cared for properly to prevent infection.   HOW TO CARE FOR YOUR INCISION  · Take medicines only as directed by your health care provider.  · There are many different ways to close and cover an incision, including stitches, skin glue, and adhesive strips. Follow your health care provider's instructions on:  · Incision care.  · Bandage (dressing) changes and removal.  · Incision closure removal.  · Do not take baths, swim, or use a hot tub until your health care provider approves. You may shower as directed by your health care provider.  · Resume your normal diet and activities as directed.  · Use anti-itch medicine (such as an antihistamine) as directed by your health care provider. The incision may itch while it is healing. Do not pick or scratch at the incision.  · Drink enough fluid to keep your urine clear or pale yellow.  SEEK MEDICAL CARE IF:   · You have drainage, redness, swelling, or pain at your incision site.  · You have muscle aches, chills, or a general ill feeling.  · You notice a bad smell coming from the incision or dressing.  · Your incision edges separate after the sutures, staples, or skin adhesive strips have been removed.  · You have persistent nausea or vomiting.  · You have a fever.  · You are dizzy.  SEEK IMMEDIATE MEDICAL CARE IF:   · You have a rash.  · You faint.  · You have difficulty breathing.  MAKE SURE YOU:   · Understand these instructions.  · Will watch your condition.  · Will get help right away if you are not doing well or get worse.     This information is not intended to replace advice given to you by your health care provider. Make sure you discuss any  questions you have with your health care provider.     Document Released: 07/07/2006 Document Revised: 01/08/2016 Document Reviewed: 02/11/2015  Apieron Interactive Patient Education ©2016 [employer]    Pain Medicine Instructions  HOW CAN PAIN MEDICINE AFFECT ME?  You were given a prescription for pain medicine. This medicine may make you tired or drowsy and may affect your ability to think clearly. Pain medicine may also affect your ability to drive or perform certain physical activities. It may not be possible to make all of your pain go away, but you should be comfortable enough to move, breathe, and take care of yourself.  HOW OFTEN SHOULD I TAKE PAIN MEDICINE AND HOW MUCH SHOULD I TAKE?  · Take pain medicine only as directed by your health care provider and only as needed for pain.  · You do not need to take pain medicine if you are not having pain, unless directed by your health care provider.  · You can take less than the prescribed dose if you find that a smaller amount of medicine controls your pain.  WHAT RESTRICTIONS DO I HAVE WHILE TAKING PAIN MEDICINE?  Follow these instructions after you start taking pain medicine, while you are taking the medicine, and for 8 hours after you stop taking the medicine:  · Do not drive.  · Do not operate machinery.  · Do not operate power tools.  · Do not sign legal documents.  · Do not drink alcohol.  · Do not take sleeping pills.  · Do not supervise children by yourself.  · Do not participate in activities that require climbing or being in high places.  · Do not enter a body of water--such as a lake, river, ocean, spa, or swimming pool--without an adult nearby who can monitor and help you.  HOW CAN I KEEP OTHERS SAFE WHILE I AM TAKING PAIN MEDICINE?  · Store your pain medicine as directed by your health care provider. Make sure that it is placed where children and pets cannot reach it.  · Never share your pain medicine with anyone.  · Do not save any leftover pills.  If you have any leftover pain medicine, get rid of it or destroy it as directed by your health care provider.  WHAT ELSE DO I NEED TO KNOW ABOUT TAKING PAIN MEDICINE?  · Use a stool softener if you become constipated from your pain medicine. Increasing your intake of fruits and vegetables will also help with constipation.  · Write down the times when you take your pain medicine. Look at the times before you take your next dose of medicine. It is easy to become confused while on pain medicine. Recording the times helps you to avoid an overdose.  · If your pain is severe, do not try to treat it yourself by taking more pills than instructed on your prescription. Contact your health care provider for help.  · You may have been prescribed a pain medicine that contains acetaminophen. Do not take any other acetaminophen while taking this medicine. An overdose of acetaminophen can result in severe liver damage. Acetaminophen is found in many over-the-counter (OTC) and prescription medicines. If you are taking any medicines in addition to your pain medicine, check the active ingredients on those medicines to see if acetaminophen is listed.  WHEN SHOULD I CALL MY HEALTH CARE PROVIDER?  · Your medicine is not helping to make the pain go away.  · You vomit or have diarrhea shortly after taking the medicine.  · You develop new pain in areas that did not hurt before.  · You have an allergic reaction to your medicine. This may include:  · Itchiness.  · Swelling.  · Dizziness.  · Developing a new rash.  WHEN SHOULD I CALL 911 OR GO TO THE EMERGENCY ROOM?  · You feel dizzy or you faint.  · You are very confused or disoriented.  · You repeatedly vomit.  · Your skin or lips turn pale or bluish in color.  · You have shortness of breath or you are breathing much more slowly than usual.  · You have a severe allergic reaction to your medicine. This includes:  ¨ Developing tongue swelling.  ¨ Having difficulty breathing.     This  information is not intended to replace advice given to you by your health care provider. Make sure you discuss any questions you have with your health care provider.     Document Released: 03/26/2002 Document Revised: 05/03/2016 Document Reviewed: 10/22/2015  SEAT 4a Interactive Patient Education ©2016 Elsevier Inc.              Your appointments     Aug 10, 2017  3:00 PM   Established Patient with Viviana Thorne M.D.   [facility] ([facility])    [address]  [facility] [address] [phone]           You will be receiving a confirmation call a few days before your appointment from our automated call confirmation system.              Follow-up Information     1. Follow up with Viviana Thorne M.D. In 1 week.    Specialties:  Family Medicine, Sports Medicine    Why:  For follow up     Contact information    [address]  [place] [address] [phone]          2. Follow up with David Villalta M.D. In 10 days.    Specialty:  Orthopaedics    Why:  For follow up     Contact information    [address]  [address]  [place] [address]  [phone]           Discharge Medication Instructions:    Below are the medications your physician expects you to take upon discharge:    Review all your home medications and newly ordered medications with your doctor and/or pharmacist. Follow medication instructions as directed by your doctor and/or pharmacist.    Please keep your medication list with you and share with your physician.               Medication List      CHANGE how you take these medications        Instructions    Morning Afternoon Evening Bedtime    * oxycodone immediate release 10 MG immediate release tablet   What changed:  Another medication with the same name was added. Make sure you understand how and when to take each.   Last time this was given:  10 mg on 7/13/2017  1:45 PM   Commonly known as:  ROXICODONE        Take 1 Tab by mouth every 6 hours as needed (severe pain).   Dose:   10 mg                        * oxycodone immediate-release 5 MG Tabs   What changed:  You were already taking a medication with the same name, and this prescription was added. Make sure you understand how and when to take each.   Last time this was given:  10 mg on 7/13/2017  1:45 PM   Commonly known as:  ROXICODONE        Take 1-3 Tabs by mouth every 3 hours as needed (Moderate Pain (NRS Pain Scale 4-6; CPOT Pain Scale 3-5)).   Dose:  5-15 mg                        * Notice:  This list has 2 medication(s) that are the same as other medications prescribed for you. Read the directions carefully, and ask your doctor or other care provider to review them with you.      CONTINUE taking these medications        Instructions    Morning Afternoon Evening Bedtime    aspirin 325 MG Tabs   Commonly known as:  ASA        Take 975 mg by mouth every 6 hours as needed (3 tabs).   Dose:  975 mg                        diphenhydrAMINE 25 MG Tabs   Last time this was given:  25 mg on 7/13/2017  3:41 AM   Commonly known as:  BENADRYL        Take 25 mg by mouth every 6 hours as needed. Indications: Hayfever   Dose:  25 mg                        meloxicam 7.5 MG Tabs   Commonly known as:  MOBIC        Take 7.5 mg by mouth every day.   Dose:  7.5 mg                        morphine 15 MG tablet   Commonly known as:  MS IR        Take 15 mg by mouth every 12 hours.   Dose:  15 mg                        multivitamin Tabs   Last time this was given:  1 Tab on 7/13/2017  8:49 AM        Take 1 Tab by mouth every day.   Dose:  1 Tab                        mupirocin 2 % Oint   Commonly known as:  BACTROBAN        Apply 1 Application to affected area(s) Pre-Op Once. Pt started on 7/11/2017   Dose:  1 Application                        ondansetron 4 MG Tbdp   Commonly known as:  ZOFRAN ODT        Take 4 mg by mouth every 8 hours as needed for Nausea/Vomiting.   Dose:  4 mg                        promethazine 25 MG Tabs   Commonly known as:   PHENERGAN        Take 1 Tab by mouth every 6 hours as needed for Nausea/Vomiting.   Dose:  25 mg                             Where to Get Your Medications      Information about where to get these medications is not yet available     ! Ask your nurse or doctor about these medications    - oxycodone immediate-release 5 MG Tabs            Instructions           Diet / Nutrition:    Follow any diet instructions given to you by your doctor or the dietician, including how much salt (sodium) you are allowed each day.    If you are overweight, talk to your doctor about a weight reduction plan.    Activity:    Remain physically active following your doctor's instructions about exercise and activity.    Rest often.     Any time you become even a little tired or short of breath, SIT DOWN and rest.    Worsening Symptoms:    Report any of the following signs and symptoms to the doctor's office immediately:    *Pain of jaw, arm, or neck  *Chest pain not relieved by medication                               *Dizziness or loss of consciousness  *Difficulty breathing even when at rest   *More tired than usual                                       *Bleeding drainage or swelling of surgical site  *Swelling of feet, ankles, legs or stomach                 *Fever (>100ºF)  *Pink or blood tinged sputum  *Weight gain (3lbs/day or 5lbs /week)           *Shock from internal defibrillator (if applicable)  *Palpitations or irregular heartbeats                *Cool and/or numb extremities    Stroke Awareness    Common Risk Factors for Stroke include:    Age  Atrial Fibrillation  Carotid Artery Stenosis  Diabetes Mellitus  Excessive alcohol consumption  High blood pressure  Overweight   Physical inactivity  Smoking    Warning signs and symptoms of a stroke include:    *Sudden numbness or weakness of the face, arm or leg (especially on one side of the body).  *Sudden confusion, trouble speaking or understanding.  *Sudden trouble seeing in one or  both eyes.  *Sudden trouble walking, dizziness, loss of balance or coordination.Sudden severe headache with no known cause.    It is very important to get treatment quickly when a stroke occurs. If you experience any of the above warning signs, call 911 immediately.                   Disclaimer         Quit Smoking / Tobacco Use:    I understand the use of any tobacco products increases my chance of suffering from future heart disease or stroke and could cause other illnesses which may shorten my life. Quitting the use of tobacco products is the single most important thing I can do to improve my health. For further information on smoking / tobacco cessation call a Toll Free Quit Line at 1-155.411.2083 (*National Cancer [place]) or 1-480.646.3467 (American Lung Association) or you can access the web based program at www.lungNurien Software.org.    [place] Tobacco Users Help Line:  (416) 437-6284       Toll Free: 1-749.509.9257  Quit Tobacco Program [facility] (903)690-8976    Crisis Hotline:    [place] Crisis Hotline:  8-605-CMXQHAR or 1-633.228.9458    [place] Crisis Hotline:    1-106.412.9743 or 414-640-4523    Discharge Survey:   Thank you for choosing [facility]. We hope we did everything we could to make your hospital stay a pleasant one. You may be receiving a phone survey and we would appreciate your time and participation in answering the questions. Your input is very valuable to us in our efforts to improve our service to our patients and their families.        My signature on this form indicates that:    1. I have reviewed and understand the above information.  2. My questions regarding this information have been answered to my satisfaction.  3. I have formulated a plan with my discharge nurse to obtain my prescribed medications for home.                  Disclaimer         __________________________________                     __________       ________                       Patient Signature                                                  Date                    Time

## 2017-07-12 NOTE — IP AVS SNAPSHOT
7/13/2017    Heidi Navas  [address] [place] [address]    Dear Heidi:    Critical access hospital wants to ensure your discharge home is safe and you or your loved ones have had all of your questions answered regarding your care after you leave the hospital.    Below is a list of resources and contact information should you have any questions regarding your hospital stay, follow-up instructions, or active medical symptoms.    Questions or Concerns Regarding… Contact   Medical Questions Related to Your Discharge  (7 days a week, 8am-5pm) Contact a Nurse Care Coordinator   [phone]   Medical Questions Not Related to Your Discharge  (24 hours a day / 7 days a week)  Contact the Nurse Health Line   [phone]    Medications or Discharge Instructions Refer to your discharge packet   or contact your [facility] Primary Care Provider   [phone]   Follow-up Appointment(s) Schedule your appointment via [facility]   or contact Scheduling [phone]   Billing Review your statement via [employer]  or contact Billing [phone]   Medical Records Review your records via [employer]   or contact Medical Records [phone]     You may receive a telephone call within two days of discharge. This call is to make certain you understand your discharge instructions and have the opportunity to have any questions answered. You can also easily access your medical information, test results and upcoming appointments via the [employer] free online health management tool. You can learn more and sign up at [email or web address]. For assistance setting up your [employer] account, please call [phone].    Once again, we want to ensure your discharge home is safe and that you have a clear understanding of any next steps in your care. If you have any questions or concerns, please do not hesitate to contact us, we are here for you. Thank you for choosing [facility] for your healthcare needs.    Sincerely,    Your [facility] Healthcare Team

## 2017-07-12 NOTE — IP AVS SNAPSHOT
Alloptic Access Code: [email or web address]  Expires: 7/29/2017  4:10 AM    Alloptic  A secure, online tool to manage your health information     [employer] Alloptic® is a secure, online tool that connects you to your personalized health information from the privacy of your home -- day or night - making it very easy for you to manage your healthcare. Once the activation process is completed, you can even access your medical information using the Alloptic ema, which is available for free in the Apple Ema store or Google Play store.     Alloptic provides the following levels of access (as shown below):   My Chart Features   [facility] Primary Care Doctor [facility]  Specialists [facility]  Urgent  Care Non-[facility]  Primary Care  Doctor   Email your healthcare team securely and privately 24/7 X X X X   Manage appointments: schedule your next appointment; view details of past/upcoming appointments X      Request prescription refills. X      View recent personal medical records, including lab and immunizations X X X X   View health record, including health history, allergies, medications X X X X   Read reports about your outpatient visits, procedures, consult and ER notes X X X X   See your discharge summary, which is a recap of your hospital and/or ER visit that includes your diagnosis, lab results, and care plan. X X       How to register for Alloptic:  1. Go to  [email or web address].  2. Click on the Sign Up Now box, which takes you to the New Member Sign Up page. You will need to provide the following information:  a. Enter your Alloptic Access Code exactly as it appears at the top of this page. (You will not need to use this code after you’ve completed the sign-up process. If you do not sign up before the expiration date, you must request a new code.)   b. Enter your date of birth.   c. Enter your home email address.   d. Click Submit, and follow the next screen’s instructions.  3. Create a Alloptic ID. This will be your Alloptic  login ID and cannot be changed, so think of one that is secure and easy to remember.  4. Create a [email or web address] password. You can change your password at any time.  5. Enter your Password Reset Question and Answer. This can be used at a later time if you forget your password.   6. Enter your e-mail address. This allows you to receive e-mail notifications when new information is available in [email or web address].  7. Click Sign Up. You can now view your health information.    For assistance activating your [email or web address] account, call [phone]

## 2017-07-12 NOTE — IP AVS SNAPSHOT
" <p align=\"LEFT\"><IMG SRC=\"//[email or web address]$/[email or web address]\" alt=\"Image\" WIDTH=\"50%\" HEIGHT=\"200\" BORDER=\"\"></p>                   Name:Heidi Navas  Medical Record Number:[id number]  CSN: [id number]    YOB: 1955   Age: 62 y.o.  Sex: female  HT:1.727 m (5' 7.99\") WT: 61.7 kg (136 lb 0.4 oz)          Admit Date: 7/12/2017     Discharge Date:   Today's Date: 7/13/2017  Attending Doctor:  KITTY Saldivar*                  Allergies:  Aspartame; Augmentin; Latex; Lipitor; Other misc; and Saccharin          Your appointments     Aug 10, 2017  3:00 PM   Established Patient with Viviana Thorne M.D.   [address] [address] [phone]           You will be receiving a confirmation call a few days before your appointment from our automated call confirmation system.              Follow-up Information     1. Follow up with Viviana Thorne M.D. In 1 week.    Specialties:  Family Medicine, Sports Medicine    Why:  For follow up     Contact information    [address] [place] [address]   [address]  [facility] [address] [phone]          2. Follow up with David Villalta M.D. In 10 days.    Specialty:  Orthopaedics    Why:  For follow up     Contact information    [address]  [address]  [facility] [address] [phone]           Medication List      Take these Medications        Instructions    aspirin 325 MG Tabs   Commonly known as:  ASA    Take 975 mg by mouth every 6 hours as needed (3 tabs).   Dose:  975 mg       diphenhydrAMINE 25 MG Tabs   Commonly known as:  BENADRYL    Take 25 mg by mouth every 6 hours as needed. Indications: Hayfever   Dose:  25 mg       meloxicam 7.5 MG Tabs   Commonly known as:  MOBIC    Take 7.5 mg by mouth every day.   Dose:  7.5 mg       morphine 15 MG tablet   Commonly known as:  MS IR    Take 15 mg by mouth every 12 hours.   Dose:  15 mg       multivitamin Tabs    Take 1 Tab by mouth every day.   Dose:  1 Tab       mupirocin 2 % " Oint   Commonly known as:  BACTROBAN    Apply 1 Application to affected area(s) Pre-Op Once. Pt started on 7/11/2017   Dose:  1 Application       ondansetron 4 MG Tbdp   Commonly known as:  ZOFRAN ODT    Take 4 mg by mouth every 8 hours as needed for Nausea/Vomiting.   Dose:  4 mg       * oxycodone immediate release 10 MG immediate release tablet   What changed:  Another medication with the same name was added. Make sure you understand how and when to take each.   Commonly known as:  ROXICODONE    Take 1 Tab by mouth every 6 hours as needed (severe pain).   Dose:  10 mg       * oxycodone immediate-release 5 MG Tabs   What changed:  You were already taking a medication with the same name, and this prescription was added. Make sure you understand how and when to take each.   Commonly known as:  ROXICODONE    Take 1-3 Tabs by mouth every 3 hours as needed (Moderate Pain (NRS Pain Scale 4-6; CPOT Pain Scale 3-5)).   Dose:  5-15 mg       promethazine 25 MG Tabs   Commonly known as:  PHENERGAN    Take 1 Tab by mouth every 6 hours as needed for Nausea/Vomiting.   Dose:  25 mg       * Notice:  This list has 2 medication(s) that are the same as other medications prescribed for you. Read the directions carefully, and ask your doctor or other care provider to review them with you.

## 2017-07-13 VITALS
OXYGEN SATURATION: 96 % | WEIGHT: 136.02 LBS | BODY MASS INDEX: 20.62 KG/M2 | DIASTOLIC BLOOD PRESSURE: 71 MMHG | HEIGHT: 68 IN | SYSTOLIC BLOOD PRESSURE: 115 MMHG | RESPIRATION RATE: 17 BRPM | HEART RATE: 51 BPM | TEMPERATURE: 97.5 F

## 2017-07-13 PROCEDURE — 700102 HCHG RX REV CODE 250 W/ 637 OVERRIDE(OP): Performed by: SURGERY

## 2017-07-13 PROCEDURE — A9270 NON-COVERED ITEM OR SERVICE: HCPCS | Performed by: SURGERY

## 2017-07-13 PROCEDURE — A9270 NON-COVERED ITEM OR SERVICE: HCPCS | Performed by: PHYSICIAN ASSISTANT

## 2017-07-13 PROCEDURE — 700102 HCHG RX REV CODE 250 W/ 637 OVERRIDE(OP): Performed by: PHYSICIAN ASSISTANT

## 2017-07-13 RX ORDER — OXYCODONE HYDROCHLORIDE 5 MG/1
5-15 TABLET ORAL
Qty: 60 TAB | Refills: 0 | Status: SHIPPED | OUTPATIENT
Start: 2017-07-13 | End: 2019-05-03

## 2017-07-13 RX ADMIN — CELECOXIB 200 MG: 200 CAPSULE ORAL at 08:48

## 2017-07-13 RX ADMIN — DIPHENHYDRAMINE HCL 25 MG: 25 TABLET ORAL at 03:41

## 2017-07-13 RX ADMIN — MORPHINE SULFATE 30 MG: 30 TABLET, EXTENDED RELEASE ORAL at 08:49

## 2017-07-13 RX ADMIN — OXYCODONE HYDROCHLORIDE 5 MG: 5 TABLET ORAL at 10:36

## 2017-07-13 RX ADMIN — ACETAMINOPHEN 1000 MG: 500 TABLET ORAL at 11:44

## 2017-07-13 RX ADMIN — THERA TABS 1 TABLET: TAB at 08:49

## 2017-07-13 RX ADMIN — OXYCODONE HYDROCHLORIDE 10 MG: 10 TABLET ORAL at 13:45

## 2017-07-13 RX ADMIN — ACETAMINOPHEN 1000 MG: 500 TABLET ORAL at 06:20

## 2017-07-13 ASSESSMENT — PAIN SCALES - GENERAL
PAINLEVEL_OUTOF10: 8
PAINLEVEL_OUTOF10: 4
PAINLEVEL_OUTOF10: 5
PAINLEVEL_OUTOF10: 4
PAINLEVEL_OUTOF10: 5
PAINLEVEL_OUTOF10: ASSUMED PAIN PRESENT
PAINLEVEL_OUTOF10: 0

## 2017-07-13 ASSESSMENT — LIFESTYLE VARIABLES: EVER_SMOKED: YES

## 2017-07-13 NOTE — CARE PLAN
Problem: Pain Management  Goal: Pain level will decrease to patient’s comfort goal  Intervention: Follow pain managment plan developed in collaboration with patient and Interdisciplinary Team  Encouraged pt to call for pain medication as needed.   Pain assessment Q4H.      Problem: Respiratory:  Goal: Respiratory status will improve  Intervention: Educate and encourage coughing and deep breathing  Encouraged DB and C exercises with incentive spirometer Q1H while awake.

## 2017-07-13 NOTE — DISCHARGE PLANNING
Care Transition Team Assessment    Met with pt at bedside, pt states she will discharge home. Pt lives alone but has support that comes over throughout the week. Pt drives.    Information Source  Orientation : Oriented x 4  Information Given By: Patient  Informant's Name: Heidi Navas  Who is responsible for making decisions for patient? : Patient    Readmission Evaluation  Is this a readmission?: No    Elopement Risk  Legal Hold: No  Ambulatory or Self Mobile in Wheelchair: Yes  Disoriented: No  Psychiatric Symptoms: None  History of Wandering: No  Elopement this Admit: No  Vocalizing Wanting to Leave: No  Displays Behaviors, Body Language Wanting to Leave: No-Not at Risk for Elopement  Elopement Risk: Not at Risk for Elopement         Discharge Preparedness  What is your plan after discharge?: Home with help  What are your discharge supports?: Child, Other (comment) (Friend)  Prior Functional Level: Ambulatory, Drives Self, Independent with Activities of Daily Living, Independent with Medication Management  Difficulity with ADLs: None  Difficulity with IADLs: None    Functional Assesment  Prior Functional Level: Ambulatory, Drives Self, Independent with Activities of Daily Living, Independent with Medication Management    Finances  Financial Barriers to Discharge: No  Prescription Coverage: Yes (Pharmacy: [facility], [address])    Vision / Hearing Impairment  Right Eye Vision: Wears Glasses  Left Eye Vision: Wears Glasses    Values / Beliefs / Concerns  Spiritual Requests During Hospitalization: No    Advance Directive  Advance Directive?: None  Advance Directive offered?: AD Booklet refused    Domestic Abuse  Have you ever been the victim of abuse or violence?: No  Physical Abuse or Sexual Abuse: No  Verbal Abuse or Emotional Abuse: No  Possible Abuse Reported to:: Not Applicable    Psychological Assessment  History of Substance Abuse: None  History of Psychiatric Problems: No  Non-compliant with Treatment:  No  Newly Diagnosed Illness: No    Discharge Risks or Barriers  Discharge risks or barriers?: No    Anticipated Discharge Information  Anticipated discharge disposition: Home  Discharge Address: [address] [place] [address]  Discharge Contact Phone Number: [phone]

## 2017-07-13 NOTE — PROGRESS NOTES
Received pt awake sitting in chair at bedside. Alert and oriented x 3. VSS.   Lungs clear bilaterally. No SOB or cough.   Abdomen soft and non-distended. BS present x 4. Pt states passing flatus and denies nausea.   IVF infusing well.  Surgical incision to left should with dressing clean and intact. Hemovac insitu and charged draining small sanguinous fluid.   Pt states left shoulder pain 5/10.   Morning medication administered as per MAR. No voiced concerns at this time. Call bell in reach.

## 2017-07-13 NOTE — OR SURGEON
PreOp Diagnosis: left shoulder arthritis    PostOp Diagnosis: same    Procedure(s):  SHOULDER ARTHROPLASTY TOTAL- REVERSE - Wound Class: Clean with Drain    Surgeon(s):  David Villalta M.D.    Anesthesiologist/Type of Anesthesia:  Anesthesiologist: Mike Bear M.D./General    Surgical Staff:  Circulator: Sulma Hodges R.N.  Scrub Person: Fawad Saravia R.N.  First Assist: Raymond Castellanos PA-C    Specimens:  none    Estimated Blood Loss: 150ml    Findings: see dictation    Complications: none noted.         7/12/2017 5:27 PM David Villalta

## 2017-07-13 NOTE — PROGRESS NOTES
A&OX4. Explained the pt about the importance of using the call light when wanting to ambulate. Pt was educated on the use of IS at home.

## 2017-07-13 NOTE — PROGRESS NOTES
Pt arrived to T315-2 via gurney with transport tech @ 1910. VSS, surgical dressing to L shoulder CDI, hemovac present, compressed. Surgical nerve block in effect, numbness to entire L arm, pt has no c/o pain at this time. PIV to R BRITT Danielle. Pt oriented to call light, room and floor routine. Call light and belongings within reach, hourly rounding and post-op vitals in progress.    2 RN skin check performed with YOLIS Rojas. No breakdown identified.

## 2017-07-13 NOTE — PROGRESS NOTES
"Patient seen and examined  Pain tolerable; no new concerns per patient/RN    Blood pressure 115/71, pulse 51, temperature 36.4 °C (97.5 °F), resp. rate 17, height 1.727 m (5' 7.99\"), weight 61.7 kg (136 lb 0.4 oz), last menstrual period 01/01/2007, SpO2 96 %.          No acute distress  Dressing clean dry and intact  Neurovascularly intact    POD#1 L Reverse TSA, doing well.     Plan:  Drain out  D/c home today          "

## 2017-07-13 NOTE — PROGRESS NOTES
Discharge papers signed and given to pt. Prescription given to pt.   Teaching done regarding diet, activity, arthroscopy after care, incision care and follow up appointments. Pt going home with sling. Information provided.   No voiced concerns or questions.

## 2017-07-13 NOTE — RESPIRATORY CARE
COPD EDUCATION by COPD CLINICAL EDUCATOR  7/13/2017 at 7:02 AM by Annabelle Flores     Patient reviewed by COPD education team. Patient does not qualify for COPD program.

## 2017-07-13 NOTE — DISCHARGE INSTRUCTIONS
Discharge Instructions    Discharged to home by car with relative. Discharged via wheelchair, hospital escort: Yes.  Special equipment needed: Sling    Be sure to schedule a follow-up appointment with your primary care doctor or any specialists as instructed.     Discharge Plan:   Diet Plan: Discussed  Activity Level: Discussed  Smoking Cessation Offered: Patient Refused  Confirmed Follow up Appointment: Patient to Call and Schedule Appointment  Confirmed Symptoms Management: Discussed  Medication Reconciliation Updated: Yes  Influenza Vaccine Indication: Patient Refuses    I understand that a diet low in cholesterol, fat, and sodium is recommended for good health. Unless I have been given specific instructions below for another diet, I accept this instruction as my diet prescription.   Other diet: Regular as tolerated    Special Instructions: None    · Is patient discharged on Warfarin / Coumadin?   No     · Is patient Post Blood Transfusion?  No        Depression / Suicide Risk    As you are discharged from this [facility] facility, it is important to learn how to keep safe from harming yourself.    Recognize the warning signs:  · Abrupt changes in personality, positive or negative- including increase in energy   · Giving away possessions  · Change in eating patterns- significant weight changes-  positive or negative  · Change in sleeping patterns- unable to sleep or sleeping all the time   · Unwillingness or inability to communicate  · Depression  · Unusual sadness, discouragement and loneliness  · Talk of wanting to die  · Neglect of personal appearance   · Rebelliousness- reckless behavior  · Withdrawal from people/activities they love  · Confusion- inability to concentrate     If you or a loved one observes any of these behaviors or has concerns about self-harm, here's what you can do:  · Talk about it- your feelings and reasons for harming yourself  · Remove any means that you might use to hurt yourself  (examples: pills, rope, extension cords, firearm)  · Get professional help from the community (Mental Health, Substance Abuse, psychological counseling)  · Do not be alone:Call your Safe Contact- someone whom you trust who will be there for you.  · Call your local CRISIS HOTLINE 429-2002 or 034-646-0898  · Call your local Children's Mobile Crisis Response Team [place] (369) 270-6137 or www.[facility]  · Call the toll free National Suicide Prevention Hotlines   · National Suicide Prevention Lifeline 760-422-OWYU (1432)                    National Hope Line Network 800-SUICIDE (166-4397)    DISCHARGE INSTRUCTIONS:    Keep Sling on at all times until clinic followup except for gentle elbow motion and hygeine. Sedentary use only of hand, no shoulder motion until then. Okay to remove bandage in 4 days and cover incision with bandaids and get wet.     No driving while on narcotic pain medications. Contact auto-insurance carrier for clearance to begin driving again; wait at least 6 weeks.     Call MD or come to ER for any major concerns.    Shoulder Arthroscopy, Care After  Refer to this sheet in the next few weeks. These instructions provide you with information on caring for yourself after your procedure. Your health care provider may also give you more specific instructions. Your treatment has been planned according to current medical practices, but problems sometimes occur. Call your health care provider if you have any problems or questions after your procedure.   WHAT TO EXPECT AFTER THE PROCEDURE  After your procedure, it is typical to have the following:  · Pain at the site of the surgical cuts (incisions).  · Stiffness in your shoulder. This should gradually decrease over time. Your health care provider may recommend physical therapy to help improve this.  · Nausea, vomiting, or constipation. These symptoms can result from taking pain medicine after surgery.  · Clear or red drainage from the incision  sites. This is normal for a few days after surgery.  · Fatigue.  HOME CARE INSTRUCTIONS  · Take medicines only as directed by your health care provider.  · Use a sling as directed.  · There are many different ways to close and cover an incision, including stitches, skin glue, and adhesive strips. Follow your health care provider's instructions on:  · Incision care.  · Bandage (dressing) changes and removal.  · Incision closure removal.  · Apply ice to the injured area:  · Put ice in a plastic bag.  · Place a towel between your skin and the bag.  · Leave the ice on for 20 minutes, 2-3 times a day.  · If physical therapy and exercises are prescribed by your health care provider, do them as directed.  · Keep all follow-up visits as directed by your health care provider. This is important.  SEEK MEDICAL CARE IF:  You have a fever.  SEEK IMMEDIATE MEDICAL CARE IF:  · You have drainage, redness, swelling, or increasing pain at the incision site.  · You notice a bad smell coming from the incision site or dressing.  · Your incision site breaks open after your stitches or tape has been removed.     This information is not intended to replace advice given to you by your health care provider. Make sure you discuss any questions you have with your health care provider.     Document Released: 07/15/2015 Document Reviewed: 07/15/2015  [employer] Interactive Patient Education ©2016 [employer]    How to Use a Sling  A sling is a type of hanging bandage that is worn around your neck to protect an injured arm, shoulder, or other body part. You may need to wear a sling to keep you from moving (immobilize) the injured body part while it heals. Keeping the injured part of your body still reduces pain and speeds up healing. Your health care provider may recommend using a sling if you have:   · A broken arm.  · A broken collarbone.  · A shoulder injury.  · Surgery.  RISKS AND COMPLICATIONS  Wearing a sling the wrong way can:  · Make your  "injury worse.  · Cause stiffness or numbness.  · Affect blood circulation in your arm and hand. This can cause tingling or numbness in your fingers or hands.  HOW TO USE A SLING  The way that you should use a sling depends on your injury. It is important that you follow all of your health care provider's instructions for your injury. Also follow these general guidelines:  · Wear the sling so that your arm bends 90 degrees at the elbow. That is like a right angle or the shape of a capital letter \"L.\" The sling should also support your wrist and your hand.  · Try to avoid moving your arm.  · Do not lie down flat on your back while wearing a sling. Sleep in a recliner or use pillows to raise your upper body in bed.  · Do not twist, raise, or move your arm in a way that could make your injury worse.  · Do not lean on your arm while wearing a sling.  · Do not lift anything while wearing a sling.  SEEK MEDICAL CARE IF:  · You have bruising, swelling, or pain that is getting worse.  · Your pain medicine is not helping.  · You have a fever.  SEEK IMMEDIATE MEDICAL CARE IF:  · Your fingers are numb or tingling.  · Your fingers turn blue or feel cold to the touch.  · You cannot control the bleeding from your injury.  · You are short of breath.     This information is not intended to replace advice given to you by your health care provider. Make sure you discuss any questions you have with your health care provider.     Document Released: 08/01/2005 Document Revised: 01/08/2016 Document Reviewed: 10/21/2015  [employer] Interactive Patient Education ©2016 [employer]    Incision Care  An incision is when a surgeon cuts into your body. After surgery, the incision needs to be cared for properly to prevent infection.   HOW TO CARE FOR YOUR INCISION  · Take medicines only as directed by your health care provider.  · There are many different ways to close and cover an incision, including stitches, skin glue, and adhesive strips. " Follow your health care provider's instructions on:  · Incision care.  · Bandage (dressing) changes and removal.  · Incision closure removal.  · Do not take baths, swim, or use a hot tub until your health care provider approves. You may shower as directed by your health care provider.  · Resume your normal diet and activities as directed.  · Use anti-itch medicine (such as an antihistamine) as directed by your health care provider. The incision may itch while it is healing. Do not pick or scratch at the incision.  · Drink enough fluid to keep your urine clear or pale yellow.  SEEK MEDICAL CARE IF:   · You have drainage, redness, swelling, or pain at your incision site.  · You have muscle aches, chills, or a general ill feeling.  · You notice a bad smell coming from the incision or dressing.  · Your incision edges separate after the sutures, staples, or skin adhesive strips have been removed.  · You have persistent nausea or vomiting.  · You have a fever.  · You are dizzy.  SEEK IMMEDIATE MEDICAL CARE IF:   · You have a rash.  · You faint.  · You have difficulty breathing.  MAKE SURE YOU:   · Understand these instructions.  · Will watch your condition.  · Will get help right away if you are not doing well or get worse.     This information is not intended to replace advice given to you by your health care provider. Make sure you discuss any questions you have with your health care provider.     Document Released: 07/07/2006 Document Revised: 01/08/2016 Document Reviewed: 02/11/2015  [employer] Interactive Patient Education ©2016 [employer]    Pain Medicine Instructions  HOW CAN PAIN MEDICINE AFFECT ME?  You were given a prescription for pain medicine. This medicine may make you tired or drowsy and may affect your ability to think clearly. Pain medicine may also affect your ability to drive or perform certain physical activities. It may not be possible to make all of your pain go away, but you should be comfortable  enough to move, breathe, and take care of yourself.  HOW OFTEN SHOULD I TAKE PAIN MEDICINE AND HOW MUCH SHOULD I TAKE?  · Take pain medicine only as directed by your health care provider and only as needed for pain.  · You do not need to take pain medicine if you are not having pain, unless directed by your health care provider.  · You can take less than the prescribed dose if you find that a smaller amount of medicine controls your pain.  WHAT RESTRICTIONS DO I HAVE WHILE TAKING PAIN MEDICINE?  Follow these instructions after you start taking pain medicine, while you are taking the medicine, and for 8 hours after you stop taking the medicine:  · Do not drive.  · Do not operate machinery.  · Do not operate power tools.  · Do not sign legal documents.  · Do not drink alcohol.  · Do not take sleeping pills.  · Do not supervise children by yourself.  · Do not participate in activities that require climbing or being in high places.  · Do not enter a body of water--such as a lake, river, ocean, spa, or swimming pool--without an adult nearby who can monitor and help you.  HOW CAN I KEEP OTHERS SAFE WHILE I AM TAKING PAIN MEDICINE?  · Store your pain medicine as directed by your health care provider. Make sure that it is placed where children and pets cannot reach it.  · Never share your pain medicine with anyone.  · Do not save any leftover pills. If you have any leftover pain medicine, get rid of it or destroy it as directed by your health care provider.  WHAT ELSE DO I NEED TO KNOW ABOUT TAKING PAIN MEDICINE?  · Use a stool softener if you become constipated from your pain medicine. Increasing your intake of fruits and vegetables will also help with constipation.  · Write down the times when you take your pain medicine. Look at the times before you take your next dose of medicine. It is easy to become confused while on pain medicine. Recording the times helps you to avoid an overdose.  · If your pain is severe, do not  try to treat it yourself by taking more pills than instructed on your prescription. Contact your health care provider for help.  · You may have been prescribed a pain medicine that contains acetaminophen. Do not take any other acetaminophen while taking this medicine. An overdose of acetaminophen can result in severe liver damage. Acetaminophen is found in many over-the-counter (OTC) and prescription medicines. If you are taking any medicines in addition to your pain medicine, check the active ingredients on those medicines to see if acetaminophen is listed.  WHEN SHOULD I CALL MY HEALTH CARE PROVIDER?  · Your medicine is not helping to make the pain go away.  · You vomit or have diarrhea shortly after taking the medicine.  · You develop new pain in areas that did not hurt before.  · You have an allergic reaction to your medicine. This may include:  · Itchiness.  · Swelling.  · Dizziness.  · Developing a new rash.  WHEN SHOULD I CALL 911 OR GO TO THE EMERGENCY ROOM?  · You feel dizzy or you faint.  · You are very confused or disoriented.  · You repeatedly vomit.  · Your skin or lips turn pale or bluish in color.  · You have shortness of breath or you are breathing much more slowly than usual.  · You have a severe allergic reaction to your medicine. This includes:  ¨ Developing tongue swelling.  ¨ Having difficulty breathing.     This information is not intended to replace advice given to you by your health care provider. Make sure you discuss any questions you have with your health care provider.     Document Released: 03/26/2002 Document Revised: 05/03/2016 Document Reviewed: 10/22/2015  [employer] Interactive Patient Education ©2016 [employer]

## 2017-07-14 ENCOUNTER — PATIENT OUTREACH (OUTPATIENT)
Dept: HEALTH INFORMATION MANAGEMENT | Facility: OTHER | Age: 62
End: 2017-07-14

## 2017-08-08 ENCOUNTER — HOSPITAL ENCOUNTER (OUTPATIENT)
Dept: LAB | Facility: MEDICAL CENTER | Age: 62
End: 2017-08-08
Attending: FAMILY MEDICINE
Payer: MEDICARE

## 2017-08-08 LAB
ALBUMIN SERPL BCP-MCNC: 3.6 G/DL (ref 3.2–4.9)
ALBUMIN/GLOB SERPL: 0.9 G/DL
ALP SERPL-CCNC: 95 U/L (ref 30–99)
ALT SERPL-CCNC: 10 U/L (ref 2–50)
ANION GAP SERPL CALC-SCNC: 7 MMOL/L (ref 0–11.9)
AST SERPL-CCNC: 14 U/L (ref 12–45)
BILIRUB SERPL-MCNC: 0.2 MG/DL (ref 0.1–1.5)
BUN SERPL-MCNC: 8 MG/DL (ref 8–22)
CALCIUM SERPL-MCNC: 9.2 MG/DL (ref 8.5–10.5)
CHLORIDE SERPL-SCNC: 102 MMOL/L (ref 96–112)
CHOLEST SERPL-MCNC: 153 MG/DL (ref 100–199)
CO2 SERPL-SCNC: 27 MMOL/L (ref 20–33)
CREAT SERPL-MCNC: 0.77 MG/DL (ref 0.5–1.4)
GFR SERPL CREATININE-BSD FRML MDRD: >60 ML/MIN/1.73 M 2
GLOBULIN SER CALC-MCNC: 3.8 G/DL (ref 1.9–3.5)
GLUCOSE SERPL-MCNC: 96 MG/DL (ref 65–99)
HDLC SERPL-MCNC: 32 MG/DL
LDLC SERPL CALC-MCNC: 101 MG/DL
POTASSIUM SERPL-SCNC: 4.1 MMOL/L (ref 3.6–5.5)
PROT SERPL-MCNC: 7.4 G/DL (ref 6–8.2)
SODIUM SERPL-SCNC: 136 MMOL/L (ref 135–145)
TRIGL SERPL-MCNC: 102 MG/DL (ref 0–149)

## 2017-08-08 PROCEDURE — 36415 COLL VENOUS BLD VENIPUNCTURE: CPT

## 2017-08-08 PROCEDURE — 80061 LIPID PANEL: CPT

## 2017-08-08 PROCEDURE — 80053 COMPREHEN METABOLIC PANEL: CPT

## 2017-08-09 ENCOUNTER — TELEPHONE (OUTPATIENT)
Dept: MEDICAL GROUP | Age: 62
End: 2017-08-09

## 2017-08-09 NOTE — TELEPHONE ENCOUNTER
ESTABLISHED PATIENT PRE-VISIT PLANNING     Note: Patient will not be contacted if there is no indication to call.     1.  Reviewed notes from the last few office visits within the medical group: Yes    2.  If any orders were placed at last visit or intended to be done for this visit (i.e. 6 mos follow-up), do we have Results/Consult Notes?        •  Labs - Labs ordered, completed and results are in chart.       •  Imaging - Imaging was not ordered at last office visit.       •  Referrals - No referrals were ordered at last office visit.    3. Is this appointment scheduled as a Hospital Follow-Up? No    4.  Immunizations were updated in Epic using WebIZ?: Epic matches WebIZ       •  Web Iz Recommendations: FLU, TD and ZOSTAVAX (Shingles)    5.  Patient is due for the following Health Maintenance Topics:   Health Maintenance Due   Topic Date Due   • PFT SCREENING-FEV1 AND FEV/FVC RATIO / SPIROMETRY SHOULD BE PERFORMED ANNUALLY  04/29/1973   • PAP SMEAR  07/29/2012   • IMM ZOSTER VACCINE  04/29/2015   • MAMMOGRAM  07/01/2015   • RETINAL SCREENING  01/22/2016   • DIABETES MONOFILAMENT / LE EXAM  08/30/2016   • Annual Wellness Visit  12/01/2016   • URINE ACR / MICROALBUMIN  06/07/2017   • A1C SCREENING  08/09/2017           6.  Patient was NOT informed to arrive 15 min prior to their scheduled appointment and bring in their medication bottles.

## 2017-08-10 ENCOUNTER — OFFICE VISIT (OUTPATIENT)
Dept: MEDICAL GROUP | Age: 62
End: 2017-08-10
Payer: MEDICARE

## 2017-08-10 VITALS
OXYGEN SATURATION: 98 % | BODY MASS INDEX: 22.97 KG/M2 | HEART RATE: 77 BPM | HEIGHT: 68 IN | WEIGHT: 151.6 LBS | TEMPERATURE: 98.1 F | DIASTOLIC BLOOD PRESSURE: 68 MMHG | SYSTOLIC BLOOD PRESSURE: 120 MMHG

## 2017-08-10 DIAGNOSIS — F17.200 TOBACCO USE DISORDER: ICD-10-CM

## 2017-08-10 DIAGNOSIS — F51.01 PRIMARY INSOMNIA: ICD-10-CM

## 2017-08-10 DIAGNOSIS — G89.4 CHRONIC PAIN SYNDROME: ICD-10-CM

## 2017-08-10 DIAGNOSIS — M19.90 ARTHRITIS: ICD-10-CM

## 2017-08-10 DIAGNOSIS — E11.9 CONTROLLED TYPE 2 DIABETES MELLITUS WITHOUT COMPLICATION, WITHOUT LONG-TERM CURRENT USE OF INSULIN (HCC): ICD-10-CM

## 2017-08-10 DIAGNOSIS — E78.2 MIXED HYPERLIPIDEMIA: ICD-10-CM

## 2017-08-10 DIAGNOSIS — J44.9 CHRONIC OBSTRUCTIVE PULMONARY DISEASE, UNSPECIFIED COPD TYPE (HCC): ICD-10-CM

## 2017-08-10 DIAGNOSIS — I10 ESSENTIAL HYPERTENSION: ICD-10-CM

## 2017-08-10 LAB
HBA1C MFR BLD: 6.5 % (ref ?–5.8)
INT CON NEG: NEGATIVE
INT CON POS: POSITIVE

## 2017-08-10 PROCEDURE — 99214 OFFICE O/P EST MOD 30 MIN: CPT | Performed by: FAMILY MEDICINE

## 2017-08-10 PROCEDURE — 83036 HEMOGLOBIN GLYCOSYLATED A1C: CPT | Performed by: FAMILY MEDICINE

## 2017-08-10 RX ORDER — TRAZODONE HYDROCHLORIDE 50 MG/1
50 TABLET ORAL NIGHTLY PRN
Qty: 30 TAB | Refills: 1 | Status: SHIPPED | OUTPATIENT
Start: 2017-08-10 | End: 2017-12-13 | Stop reason: SDUPTHER

## 2017-08-10 NOTE — PROGRESS NOTES
SUBJECTIVE:      Chief Complaint   Patient presents with   • Insomnia   • Surgery     follow up       HPI:     Heidi Navas is a 62 y.o. female here for insomnia.     Insomnia- sometimes trouble with sleeping. Was on ambien in past.   Recently has surgery- left shoulder surgery July 12, Dr. Horton. Had shoulder arthroplasty.     Diabetes- she is not checking her glucose regularly. Glucose is usually around 130s fasting, sometimes lower. She is not on medications currently. Was on medication in past.  Appears no new blood work for glucose and lipids on recent labs. Will need new lab orders.     Hyperlipidemia- not on medication therapy currently.     Hypertension-occasionally checks BP.     Smoking- does not desire to quit. Has cut down.     COPD- has been otherwise stable. Not on medication currently. No unusual shortness of breath or chest pains.    Chronic pain-multiple areas of arhtiritis. Chronic pain management through pain specialist. She is on MSIR 15 mg twice daily and oxycodone as needed.       Diabetes Health Maintenance  On aspirin: on 325 mg daily  Foot exam: due   Eye exam: due- she will plan to complete at next visit.   Vaccinations:                Influenza: recommend annuallyt; Pneumonia: PPSV 23- 2013; Td/Adacel: 2014  Mammogram- she will have done after her shoulder surgery.   She is due for her gynecological visit and will schedule. Previously unable to complete due to severe hip OA and then had surgery.       ROS:  Denies any recent fevers or chills. No nausea or vomiting. No diarrhea. No chest pains or shortness of breath. No lower extremity edema.    Current Outpatient Prescriptions on File Prior to Visit   Medication Sig Dispense Refill   • oxycodone immediate-release (ROXICODONE) 5 MG Tab Take 1-3 Tabs by mouth every 3 hours as needed (Moderate Pain (NRS Pain Scale 4-6; CPOT Pain Scale 3-5)). 60 Tab 0   • meloxicam (MOBIC) 7.5 MG Tab Take 7.5 mg by mouth every day.     •  mupirocin (BACTROBAN) 2 % Ointment Apply 1 Application to affected area(s) Pre-Op Once. Pt started on 7/11/2017     • diphenhydrAMINE (BENADRYL) 25 MG Tab Take 25 mg by mouth every 6 hours as needed. Indications: Hayfever     • aspirin (ASA) 325 MG TABS Take 975 mg by mouth every 6 hours as needed (3 tabs).     • ondansetron (ZOFRAN ODT) 4 MG TBDP Take 4 mg by mouth every 8 hours as needed for Nausea/Vomiting.     • promethazine (PHENERGAN) 25 MG TABS Take 1 Tab by mouth every 6 hours as needed for Nausea/Vomiting. 30 Tab 1   • morphine (MS IR) 15 MG tablet Take 15 mg by mouth every 12 hours.     • oxycodone immediate release (ROXICODONE) 10 MG immediate release tablet Take 1 Tab by mouth every 6 hours as needed (severe pain). 15 Tab 0   • multivitamin (THERAGRAN) TABS Take 1 Tab by mouth every day. 30 Tab 0     No current facility-administered medications on file prior to visit.       Allergies   Allergen Reactions   • Aspartame Nausea     headache   • Augmentin Vomiting   • Latex Rash and Itching   • Lipitor [Atorvastatin Calcium] Swelling     Lip swelling   • Other Misc Vomiting     Bug spray   • Saccharin Nausea     Nausea and headache       Patient Active Problem List    Diagnosis Date Noted   • Arthritis of left shoulder region 07/12/2017   • CVD (cardiovascular disease) 12/04/2015   • Pulmonary nodule 12/01/2015   • Renal stone 12/01/2015   • Diverticulosis 12/01/2015   • Chest pain 07/16/2015   • Nausea & vomiting 07/13/2015   • Opiate withdrawal (CMS-HCC) 07/13/2015   • Diabetes mellitus, type 2 (CMS-HCC) 10/03/2014   • Brachial neuritis or radiculitis 09/10/2014   • History of total hip arthroplasty 07/22/2014   • COPD (chronic obstructive pulmonary disease) (CMS-HCC) 03/30/2014   • Arthritis 12/26/2013   • Chronic pain 02/21/2012   • Anxiety 01/18/2012   • Vitamin D insufficiency 04/28/2011   • Hypertension 03/22/2010   • Fibromyalgia 10/09/2009   • Swelling, mass, or lump in head and neck 07/21/2009   •  "Tobacco use disorder 07/21/2009   • Chronic ischemic heart disease 07/21/2009   • Esophageal reflux 07/21/2009   • Chronic airway obstruction, not elsewhere classified 07/21/2009   • Allergic rhinitis 07/21/2009   • Type II or unspecified type diabetes mellitus without mention of complication, not stated as uncontrolled 07/21/2009   • Mixed hyperlipidemia 07/21/2009   • Other atopic dermatitis and related conditions 07/21/2009   • Deficiency anemia 07/21/2009       Past Medical History   Diagnosis Date   • Pancreatitis    • Pap smear 4/2006   • Screening mammogram never   • History of colonoscopy   2005   • MI (myocardial infarction) (CMS-HCC) 4/29/2007   • Diverticulosis 5/10      colonoscopy   • S/P colonoscopy 5/2010     will need repeat in 5 years   • COPD (chronic obstructive pulmonary disease) (CMS-HCC)    • Arthritis      OA and  not RA per rheumatology   • Unspecified urinary incontinence    • Anemia    • Other specified disorder of intestines      constipation   • Shingles    • Pulmonary nodule    • Renal stone    • Renal lesion    • Colon polyps 2015   • Convulsions (CMS-HCC) 7/21/2009      ICD-10 transition   • Symptomatic menopausal or female climacteric states 7/21/2009   • Hemorrhoid 7/29/2009   • Lymphocytosis (symptomatic) 7/21/2009   • Leukocytosis 3/30/2014   • Bronchitis 2008   • Pneumonia 2013   • Pain      shoulders, neck, lower back   • Depression      depression, anxiety    • Breath shortness      oxygen PRN    • Type II or unspecified type diabetes mellitus without mention of complication, uncontrolled 7/21/2009     diet controlled    • Seizure (CMS-HCC)      x1 in 2008   • Dental disorder      full dentures         OBJECTIVE:   /68 mmHg  Pulse 77  Temp(Src) 36.7 °C (98.1 °F)  Ht 1.715 m (5' 7.5\")  Wt 68.765 kg (151 lb 9.6 oz)  BMI 23.38 kg/m2  SpO2 98%  LMP 01/01/2007  General: Well-developed well-nourished female, no acute distress  Neck: supple, no lymphadenopathy- cervical or " supraclavicular, no thyromegaly  Cardiovascular: regular rate and rhythm, no murmurs, gallops, rubs  Lungs: clear to auscultation bilaterally, no wheezes, crackles, or rhonchi  Abdomen: +bowel sounds, soft, nontender, nondistended, no rebound, no guarding, no hepatosplenomegaly  Extremities: no cyanosis, clubbing, edema. Left shoulder in sling.   Skin: Warm and dry  Psych: appropriate mood and affect  Diabetic foot exam: 2+ pedal pulses, no lesions noted, sensation intact with 10 out of 10 monofilament test.    POC: hgba1c: 6.5 %     Ref. Range 8/8/2017 13:56   Sodium Latest Ref Range: 135-145 mmol/L 136   Potassium Latest Ref Range: 3.6-5.5 mmol/L 4.1   Chloride Latest Ref Range:  mmol/L 102   Co2 Latest Ref Range: 20-33 mmol/L 27   Anion Gap Latest Ref Range: 0.0-11.9  7.0   Glucose Latest Ref Range: 65-99 mg/dL 96   Bun Latest Ref Range: 8-22 mg/dL 8   Creatinine Latest Ref Range: 0.50-1.40 mg/dL 0.77   GFR If  Latest Ref Range: >60 mL/min/1.73 m 2 >60   GFR If Non  Latest Ref Range: >60 mL/min/1.73 m 2 >60   Calcium Latest Ref Range: 8.5-10.5 mg/dL 9.2   AST(SGOT) Latest Ref Range: 12-45 U/L 14   ALT(SGPT) Latest Ref Range: 2-50 U/L 10   Alkaline Phosphatase Latest Ref Range: 30-99 U/L 95   Total Bilirubin Latest Ref Range: 0.1-1.5 mg/dL 0.2   Albumin Latest Ref Range: 3.2-4.9 g/dL 3.6   Total Protein Latest Ref Range: 6.0-8.2 g/dL 7.4   Globulin Latest Ref Range: 1.9-3.5 g/dL 3.8 (H)   A-G Ratio Latest Units: g/dL 0.9   Cholesterol,Tot Latest Ref Range: 100-199 mg/dL 153   Triglycerides Latest Ref Range: 0-149 mg/dL 102   HDL Latest Ref Range: >=40 mg/dL 32 (A)   LDL Latest Ref Range: <100 mg/dL 101 (H)       ASSESSMENT/PLAN:    62 y.o.female with multiple medical issues.     1. Primary insomnia  -Sleep hygiene. trazodone (DESYREL) 50 MG Tab   2. Controlled type 2 diabetes mellitus without complication, without long-term current use of insulin (CMS-[facility])-  diet controlled.  Stable. Continue diabetic diet and routine exercise as tolerated.  POCT  A1C    MICROALBUMIN CREAT RATIO URINE    Diabetic Monofilament Lower Extremity Exam   3. Mixed hyperlipidemia- stable. Low-cholesterol, high-fiber diet. Monitor.     4. Essential hypertension-controlled. Had prior medications which she has discontinued. Has been on lisinopril past.     5. Chronic obstructive pulmonary disease, unspecified COPD type (CMS-HCC)-stable. Currently not on any inhaled medication therapy.     6. Tobacco use disorder-recommend smoking cessation.     7. Chronic pain syndrome-stable. Followed by pain specialist.     8. Arthritis -stable as above.         Return in about 2 months (around 10/10/2017) for Long- gyn.    This medical record contains text that has been entered with the assistance of computer voice recognition and dictation software.  Therefore, it may contain unintended errors in text, spelling, punctuation, or grammar.

## 2017-08-10 NOTE — MR AVS SNAPSHOT
"        Heidi Santosne   8/10/2017 3:00 PM   Office Visit   MRN: [id number]    Department:  [address] [place] [address]   Dept Phone:  [phone]    Description:  Female : 1955   Provider:  Viviana Thorne M.D.           Reason for Visit     Insomnia     Surgery follow up      Allergies as of 8/10/2017     Allergen Noted Reactions    Aspartame 2014   Nausea    headache    Augmentin 2009   Vomiting    Latex 2014   Rash, Itching    Lipitor [Atorvastatin Calcium] 2012   Swelling    Lip swelling    Other Misc 2014   Vomiting    Bug spray    Saccharin 09/10/2014   Nausea    Nausea and headache      You were diagnosed with     Primary insomnia   [303214]       Controlled type 2 diabetes mellitus without complication, without long-term current use of insulin (CMS-[facility])   [8069622]         Vital Signs     Blood Pressure Pulse Temperature Height Weight Body Mass Index    120/68 mmHg 77 36.7 °C (98.1 °F) 1.715 m (5' 7.5\") 68.765 kg (151 lb 9.6 oz) 23.38 kg/m2    Oxygen Saturation Last Menstrual Period Smoking Status             98% 2007 Current Every Day Smoker         Basic Information     Date Of Birth Sex Race Ethnicity Preferred Language    1955 Female White Non- English      Your appointments     Oct 12, 2017  3:40 PM   Established Patient with Viviana Thorne M.D.   [address] [id number] [phone]           You will be receiving a confirmation call a few days before your appointment from our automated call confirmation system.              Problem List              ICD-10-CM Priority Class Noted - Resolved    Swelling, mass, or lump in head and neck R22.0, R22.1   2009 - Present    Tobacco use disorder F17.200   2009 - Present    Chronic ischemic heart disease I25.9   2009 - Present    Esophageal reflux K21.9   2009 - Present    Chronic airway obstruction, not elsewhere classified " J44.9   7/21/2009 - Present    Allergic rhinitis J30.9   7/21/2009 - Present    Type II or unspecified type diabetes mellitus without mention of complication, not stated as uncontrolled E11.9   7/21/2009 - Present    Mixed hyperlipidemia E78.2   7/21/2009 - Present    Other atopic dermatitis and related conditions L20.89   7/21/2009 - Present    Deficiency anemia D53.9   7/21/2009 - Present    Fibromyalgia M79.7   10/9/2009 - Present    Hypertension I10   3/22/2010 - Present    Vitamin D insufficiency E55.9   4/28/2011 - Present    Anxiety F41.9   1/18/2012 - Present    Chronic pain G89.29   2/21/2012 - Present    Arthritis M19.90   12/26/2013 - Present    COPD (chronic obstructive pulmonary disease) (CMS-HCC) J44.9   3/30/2014 - Present    History of total hip arthroplasty Z96.649   7/22/2014 - Present    Brachial neuritis or radiculitis M54.12   9/10/2014 - Present    Diabetes mellitus, type 2 (CMS-HCC) E11.9   10/3/2014 - Present    Nausea & vomiting R11.2   7/13/2015 - Present    Opiate withdrawal (CMS-HCC) F11.23   7/13/2015 - Present    Chest pain R07.9   7/16/2015 - Present    Pulmonary nodule R91.1   12/1/2015 - Present    Renal stone N20.0   12/1/2015 - Present    Diverticulosis K57.90   12/1/2015 - Present    COLD (chronic obstructive lung disease) (CMS-HCC) J44.9   12/1/2015 - Present    CVD (cardiovascular disease) I25.10   12/4/2015 - Present    Arthritis of left shoulder region M19.012   7/12/2017 - Present      Health Maintenance        Date Due Completion Dates    PAP SMEAR 7/29/2012 7/29/2009    IMM ZOSTER VACCINE 4/29/2015 ---    MAMMOGRAM 7/1/2015 7/1/2014    RETINAL SCREENING 1/22/2016 1/22/2015    DIABETES MONOFILAMENT / LE EXAM 8/30/2016 8/30/2015, 6/9/2014 (Done)    Override on 6/9/2014: Done    URINE ACR / MICROALBUMIN 6/7/2017 6/7/2016, 6/7/2014, 5/16/2012, 6/17/2010, 4/9/2009    A1C SCREENING 8/9/2017 2/9/2017, 6/7/2016, 7/16/2015, 12/3/2014, 6/7/2014, 3/30/2014, 12/24/2013, 5/16/2012,  12/7/2010, 6/17/2010, 4/9/2009, 4/29/2008    IMM INFLUENZA (1) 9/1/2017 12/1/2015, 11/3/2014, 12/24/2013    FASTING LIPID PROFILE 8/8/2018 8/8/2017, 7/16/2015, 12/3/2014, 6/7/2014, 3/30/2014, 5/16/2012, 10/29/2011, 4/21/2011, 12/7/2010, 6/17/2010, 4/9/2009, 4/29/2008    SERUM CREATININE 8/8/2018 8/8/2017, 7/6/2017, 6/7/2016, 8/25/2015, 7/16/2015, 7/16/2015, 7/14/2015, 7/13/2015, 12/3/2014, 9/23/2014, 9/22/2014, 9/20/2014, 9/16/2014, 9/9/2014, 7/25/2014, 7/24/2014, 7/22/2014, 7/20/2014, 6/7/2014, 5/5/2014, 4/1/2014, 3/31/2014, 3/30/2014, 3/29/2014, 12/5/2013, 1/10/2012, 4/21/2011, 12/7/2010, 6/17/2010, 4/9/2009, 4/29/2008    IMM DTaP/Tdap/Td Vaccine (2 - Td) 6/23/2024 6/23/2014    COLONOSCOPY 11/13/2025 11/13/2015            Results     POCT  A1C      Component    Glycohemoglobin    6.5    Internal Control Negative    Negative    Internal Control Positive    Positive                        Current Immunizations     Influenza Vaccine Adult HD 12/24/2013    Influenza Vaccine Quad Inj (Pf) 12/1/2015    Influenza Vaccine Quad Inj (Preserved) 11/3/2014    Pneumococcal Vaccine (UF)Historical Data 12/22/2008    Pneumococcal polysaccharide vaccine (PPSV-23) 12/24/2013    Tdap Vaccine 6/23/2014  2:32 PM      Below and/or attached are the medications your provider expects you to take. Review all of your home medications and newly ordered medications with your provider and/or pharmacist. Follow medication instructions as directed by your provider and/or pharmacist. Please keep your medication list with you and share with your provider. Update the information when medications are discontinued, doses are changed, or new medications (including over-the-counter products) are added; and carry medication information at all times in the event of emergency situations     Allergies:  ASPARTAME - Nausea     AUGMENTIN - Vomiting     LATEX - Rash,Itching     LIPITOR - Swelling     OTHER MISC - Vomiting     SACCHARIN - Nausea                  Medications  Valid as of: August 10, 2017 -  3:48 PM    Generic Name Brand Name Tablet Size Instructions for use    Aspirin (Tab)  MG Take 975 mg by mouth every 6 hours as needed (3 tabs).        DiphenhydrAMINE HCl (Tab) BENADRYL 25 MG Take 25 mg by mouth every 6 hours as needed. Indications: Hayfever        Meloxicam (Tab) MOBIC 7.5 MG Take 7.5 mg by mouth every day.        Morphine Sulfate (Tab) MS IR 15 MG Take 15 mg by mouth every 12 hours.        Multiple Vitamin (Tab) THERAGRAN  Take 1 Tab by mouth every day.        Mupirocin (Ointment) BACTROBAN 2 % Apply 1 Application to affected area(s) Pre-Op Once. Pt started on 7/11/2017        Ondansetron (TABLET DISPERSIBLE) ZOFRAN ODT 4 MG Take 4 mg by mouth every 8 hours as needed for Nausea/Vomiting.        OxyCODONE HCl (Tab) ROXICODONE 10 MG Take 1 Tab by mouth every 6 hours as needed (severe pain).        OxyCODONE HCl (Tab) ROXICODONE 5 MG Take 1-3 Tabs by mouth every 3 hours as needed (Moderate Pain (NRS Pain Scale 4-6; CPOT Pain Scale 3-5)).        Promethazine HCl (Tab) PHENERGAN 25 MG Take 1 Tab by mouth every 6 hours as needed for Nausea/Vomiting.        TraZODone HCl (Tab) DESYREL 50 MG Take 1 Tab by mouth at bedtime as needed for Sleep.        .                 Medicines prescribed today were sent to:     [facility] [address] [place] [id number]    Phone: [phone] Fax: [phone]    Open 24 Hours?: No      Medication refill instructions:       If your prescription bottle indicates you have medication refills left, it is not necessary to call your provider’s office. Please contact your pharmacy and they will refill your medication.    If your prescription bottle indicates you do not have any refills left, you may request refills at any time through one of the following ways: The online [employer] system (except Urgent Care), by calling your provider’s office, or by asking your  pharmacy to contact your provider’s office with a refill request. Medication refills are processed only during regular business hours and may not be available until the next business day. Your provider may request additional information or to have a follow-up visit with you prior to refilling your medication.   *Please Note: Medication refills are assigned a new Rx number when refilled electronically. Your pharmacy may indicate that no refills were authorized even though a new prescription for the same medication is available at the pharmacy. Please request the medicine by name with the pharmacy before contacting your provider for a refill.        Your To Do List     Future Labs/Procedures Complete By Expires    MICROALBUMIN CREAT RATIO URINE  As directed 2/8/2018      Other Notes About Your Plan     The patient is refusing mammogram due to cost.            MyChart Status: Patient Declined        Quit Tobacco Information     Do you want to quit using tobacco?    Quitting tobacco decreases risks of cancer, heart and lung disease, increases life expectancy, improves sense of taste and smell, and increases spending money, among other benefits.    If you are thinking about quitting, we can help.  • [facility] Quit Tobacco Program: [phone]  o Program occurs weekly for four weeks and includes pharmacist consultation on products to support quitting smoking or chewing tobacco. A provider referral is needed for pharmacist consultation.  • Tobacco Users Help Hotline: 2-520-QUIT-NOW (056-1106) or https://[place].quitlogix.org/  o Free, confidential telephone and online coaching for [place] residents. Sessions are designed on a schedule that is convenient for you. Eligible clients receive free nicotine replacement therapy.  • Nationally: www.smokefree.gov  o Information and professional assistance to support both immediate and long-term needs as you become, and remain, a non-smoker. Smokefree.gov allows you to choose the help  that best fits your needs.

## 2017-08-17 RX ORDER — MELOXICAM 7.5 MG/1
TABLET ORAL
Qty: 30 TAB | Refills: 0 | Status: SHIPPED | OUTPATIENT
Start: 2017-08-17 | End: 2019-05-03

## 2017-08-21 ENCOUNTER — PATIENT OUTREACH (OUTPATIENT)
Dept: HEALTH INFORMATION MANAGEMENT | Facility: OTHER | Age: 62
End: 2017-08-21

## 2017-08-21 NOTE — PROGRESS NOTES
Patient Heidi Navas discharged on 7/13/2017. [facility] Patient Advocate assisted with multiple discharge needs including 2 appointments, 1 follow-up with her surgeon and 1 primary care physician follow-up. [facility] confirmed the patient received her arm sling post-discharge. The patient has one more follow-up with her surgeon Dr. Villalta at [facility] scheduled for 8/22/2017, which the plans to keep.

## 2017-10-09 ENCOUNTER — TELEPHONE (OUTPATIENT)
Dept: MEDICAL GROUP | Age: 62
End: 2017-10-09

## 2017-10-09 DIAGNOSIS — Z11.59 NEED FOR HEPATITIS C SCREENING TEST: ICD-10-CM

## 2017-10-10 NOTE — TELEPHONE ENCOUNTER
Phone Number Called: [phone] (home)     Message: Pt has rescheduled her appointment that was this Thursday. But she would like a Hep C blood test ordered before her next appointment on 11/8/17.      Left Message for patient to call back: no

## 2017-10-10 NOTE — TELEPHONE ENCOUNTER
Phone Number Called: [phone] (home)     Message: Pt informed that lab was ordered.    Left Message for patient to call back: yes

## 2017-11-08 ENCOUNTER — HOSPITAL ENCOUNTER (OUTPATIENT)
Facility: MEDICAL CENTER | Age: 62
End: 2017-11-08
Attending: FAMILY MEDICINE
Payer: MEDICARE

## 2017-11-08 ENCOUNTER — OFFICE VISIT (OUTPATIENT)
Dept: MEDICAL GROUP | Age: 62
End: 2017-11-08
Payer: MEDICARE

## 2017-11-08 VITALS
HEIGHT: 68 IN | BODY MASS INDEX: 22.31 KG/M2 | DIASTOLIC BLOOD PRESSURE: 82 MMHG | WEIGHT: 147.2 LBS | TEMPERATURE: 98.3 F | OXYGEN SATURATION: 96 % | HEART RATE: 100 BPM | SYSTOLIC BLOOD PRESSURE: 120 MMHG

## 2017-11-08 DIAGNOSIS — Z12.39 SCREENING FOR BREAST CANCER: ICD-10-CM

## 2017-11-08 DIAGNOSIS — I10 ESSENTIAL HYPERTENSION: ICD-10-CM

## 2017-11-08 DIAGNOSIS — Z11.59 NEED FOR HEPATITIS C SCREENING TEST: ICD-10-CM

## 2017-11-08 DIAGNOSIS — E11.9 CONTROLLED TYPE 2 DIABETES MELLITUS WITHOUT COMPLICATION, WITHOUT LONG-TERM CURRENT USE OF INSULIN (HCC): ICD-10-CM

## 2017-11-08 DIAGNOSIS — L81.9 HYPOPIGMENTATION: ICD-10-CM

## 2017-11-08 DIAGNOSIS — N90.9: ICD-10-CM

## 2017-11-08 DIAGNOSIS — Z23 NEED FOR INFLUENZA VACCINATION: ICD-10-CM

## 2017-11-08 DIAGNOSIS — R30.0 DYSURIA: ICD-10-CM

## 2017-11-08 DIAGNOSIS — Z78.0 POSTMENOPAUSAL: ICD-10-CM

## 2017-11-08 DIAGNOSIS — Z12.4 SCREENING FOR CERVICAL CANCER: ICD-10-CM

## 2017-11-08 DIAGNOSIS — Z01.419 WELL WOMAN EXAM WITH ROUTINE GYNECOLOGICAL EXAM: ICD-10-CM

## 2017-11-08 DIAGNOSIS — J44.9 CHRONIC OBSTRUCTIVE PULMONARY DISEASE, UNSPECIFIED COPD TYPE (HCC): ICD-10-CM

## 2017-11-08 DIAGNOSIS — E78.2 MIXED HYPERLIPIDEMIA: ICD-10-CM

## 2017-11-08 PROCEDURE — 92250 FUNDUS PHOTOGRAPHY W/I&R: CPT | Mod: TC | Performed by: FAMILY MEDICINE

## 2017-11-08 PROCEDURE — 88175 CYTOPATH C/V AUTO FLUID REDO: CPT

## 2017-11-08 PROCEDURE — G0101 CA SCREEN;PELVIC/BREAST EXAM: HCPCS | Mod: 25 | Performed by: FAMILY MEDICINE

## 2017-11-08 PROCEDURE — 87624 HPV HI-RISK TYP POOLED RSLT: CPT

## 2017-11-08 RX ORDER — SULFAMETHOXAZOLE AND TRIMETHOPRIM 800; 160 MG/1; MG/1
1 TABLET ORAL 2 TIMES DAILY
Qty: 14 TAB | Refills: 0 | Status: SHIPPED
Start: 2017-11-08 | End: 2019-05-03

## 2017-11-08 RX ORDER — SULFAMETHOXAZOLE AND TRIMETHOPRIM 800; 160 MG/1; MG/1
1 TABLET ORAL 2 TIMES DAILY
Qty: 4 TAB | Refills: 0 | Status: SHIPPED
Start: 2017-11-08 | End: 2017-11-08 | Stop reason: SDUPTHER

## 2017-11-09 ENCOUNTER — HOSPITAL ENCOUNTER (OUTPATIENT)
Facility: MEDICAL CENTER | Age: 62
End: 2017-11-09
Attending: FAMILY MEDICINE
Payer: MEDICARE

## 2017-11-09 DIAGNOSIS — R30.0 DYSURIA: ICD-10-CM

## 2017-11-09 DIAGNOSIS — Z12.4 SCREENING FOR CERVICAL CANCER: ICD-10-CM

## 2017-11-09 LAB
APPEARANCE UR: CLEAR
BACTERIA #/AREA URNS HPF: NEGATIVE /HPF
BILIRUB UR QL STRIP.AUTO: NEGATIVE
COLOR UR: ABNORMAL
CULTURE IF INDICATED INDCX: YES UA CULTURE
EPI CELLS #/AREA URNS HPF: NORMAL /HPF
GLUCOSE UR STRIP.AUTO-MCNC: NEGATIVE MG/DL
HYALINE CASTS #/AREA URNS LPF: NORMAL /LPF
KETONES UR STRIP.AUTO-MCNC: NEGATIVE MG/DL
LEUKOCYTE ESTERASE UR QL STRIP.AUTO: NEGATIVE
MICRO URNS: ABNORMAL
NITRITE UR QL STRIP.AUTO: POSITIVE
PH UR STRIP.AUTO: 6 [PH]
PROT UR QL STRIP: 30 MG/DL
RBC # URNS HPF: NORMAL /HPF
RBC UR QL AUTO: ABNORMAL
SP GR UR STRIP.AUTO: 1.02
UROBILINOGEN UR STRIP.AUTO-MCNC: 1 MG/DL
WBC #/AREA URNS HPF: NORMAL /HPF

## 2017-11-09 PROCEDURE — 81001 URINALYSIS AUTO W/SCOPE: CPT

## 2017-11-09 PROCEDURE — 87086 URINE CULTURE/COLONY COUNT: CPT

## 2017-11-09 PROCEDURE — G0008 ADMIN INFLUENZA VIRUS VAC: HCPCS | Performed by: FAMILY MEDICINE

## 2017-11-09 PROCEDURE — 90686 IIV4 VACC NO PRSV 0.5 ML IM: CPT | Performed by: FAMILY MEDICINE

## 2017-11-09 NOTE — PROGRESS NOTES
Chief Complaint   Patient presents with   • Gynecologic Exam   • Dysuria     X 1 week       <SUBJECTIVE>  Heidi Navas is a 62 y.o. female for routine annual evaluation.   Patient's last menstrual period was 2007. postmenopausal     Obstetric History       T0      L0     SAB0   TAB0   Ectopic0   Molar0   Multiple0   Live Births0      No abnormal bleeding or discharge.   47 yo menopause.   Her child's father had hepatis C as he used IV drugs, thus she would like to get checked.   Diabetes- has been stable. Will need future labs.   COPD- has been stable. Not on medication therapy.   Hypertension- BP has been stable. Not on medication therapy.   Hyperlipidemia- stable, not on medication therapy.   Notes have some dysuria, no blood in urine. No fevers/chills. No back pain.     Health Maintenance:  Last pap: 15 years ago, negative. Was unable to get gyn exam due to severe OA in her hips.   History of abnormal pap: negative  Diet: healthy  Calcium: none  Exercise: not regular- conditioning exercise  Immunizations: Tdap-; zostavax- will due in future, had shingles in past;  Flu vaccine - due, shingles vaccine- due, reviewed recommendations  Mammogram: due   Colonoscopy: 2015  Dexa Scan: due  Eye exam: due      Current Outpatient Prescriptions   Medication Sig Dispense Refill   • sulfamethoxazole-trimethoprim (BACTRIM DS) 800-160 MG tablet Take 1 Tab by mouth 2 times a day. 14 Tab 0   • meloxicam (MOBIC) 7.5 MG Tab TAKE ONE TABLET BY MOUTH ONCE DAILY 30 Tab 0   • trazodone (DESYREL) 50 MG Tab Take 1 Tab by mouth at bedtime as needed for Sleep. 30 Tab 1   • diphenhydrAMINE (BENADRYL) 25 MG Tab Take 25 mg by mouth every 6 hours as needed. Indications: Hayfever     • aspirin (ASA) 325 MG TABS Take 975 mg by mouth every 6 hours as needed (3 tabs).     • ondansetron (ZOFRAN ODT) 4 MG TBDP Take 4 mg by mouth every 8 hours as needed for Nausea/Vomiting.     • promethazine (PHENERGAN) 25 MG TABS  Take 1 Tab by mouth every 6 hours as needed for Nausea/Vomiting. 30 Tab 1   • morphine (MS IR) 15 MG tablet Take 15 mg by mouth every 12 hours.     • oxycodone immediate release (ROXICODONE) 10 MG immediate release tablet Take 1 Tab by mouth every 6 hours as needed (severe pain). 15 Tab 0   • multivitamin (THERAGRAN) TABS Take 1 Tab by mouth every day. 30 Tab 0   • oxycodone immediate-release (ROXICODONE) 5 MG Tab Take 1-3 Tabs by mouth every 3 hours as needed (Moderate Pain (NRS Pain Scale 4-6; CPOT Pain Scale 3-5)). (Patient not taking: Reported on 11/8/2017) 60 Tab 0   • meloxicam (MOBIC) 7.5 MG Tab Take 7.5 mg by mouth every day.     • mupirocin (BACTROBAN) 2 % Ointment Apply 1 Application to affected area(s) Pre-Op Once. Pt started on 7/11/2017       No current facility-administered medications for this visit.      Drug allergies: Aspartame; Augmentin; Latex; Lipitor [atorvastatin calcium]; Other misc; and Saccharin    Past Medical History:   Diagnosis Date   • Anemia    • Arthritis     OA and  not RA per rheumatology   • Breath shortness     oxygen PRN    • Bronchitis 2008   • Colon polyps 2015   • Convulsions (CMS-[facility]) 7/21/2009     ICD-10 transition   • COPD (chronic obstructive pulmonary disease) (CMS-[facility])    • Dental disorder     full dentures   • Depression     depression, anxiety    • Diverticulosis 5/10     colonoscopy   • Hemorrhoid 7/29/2009   • History of colonoscopy   2005   • Leukocytosis 3/30/2014   • Lymphocytosis (symptomatic) 7/21/2009   • MI (myocardial infarction) 4/29/2007   • Other specified disorder of intestines     constipation   • Pain     shoulders, neck, lower back   • Pancreatitis    • Pap smear 4/2006   • Pneumonia 2013   • Pulmonary nodule    • Renal lesion    • Renal stone    • S/P colonoscopy 5/2010    will need repeat in 5 years   • Screening mammogram never   • Seizure (CMS-[facility])     x1 in 2008   • Shingles    • Symptomatic menopausal or female climacteric states 7/21/2009   •  "Type II or unspecified type diabetes mellitus without mention of complication, uncontrolled 7/21/2009    diet controlled    • Unspecified urinary incontinence        Past Surgical History:   Procedure Laterality Date   • SHOULDER ARTHROPLASTY TOTAL Left 7/12/2017    Procedure: SHOULDER ARTHROPLASTY TOTAL- REVERSE;  Surgeon: David Villalta M.D.;  Location: SURGERY [facility];  Service:    • CERVICAL FUSION POSTERIOR  9/10/2014    Performed by Abhilash Bishop M.D. at SURGERY [facility]   • HIP ARTHROPLASTY MIS TOTAL  7/16/14    rt and lt   • OTHER CARDIAC SURGERY  2007    angioplasty   • GYN SURGERY  1992    c section   • EYE SURGERY  laser   • OTHER ORTHOPEDIC SURGERY     • TONSILLECTOMY         Family History   Problem Relation Age of Onset   • Diabetes Mother    • Cancer Mother      bladder cancer   • Diabetes Brother    • Cancer Brother      bladder cancer   • Diabetes Brother    • Cancer Sister      bladder cancer       Social History     Social History   • Marital status: Single     Spouse name: N/A   • Number of children: N/A   • Years of education: N/A     Occupational History   • disability for OA [employer]     Social History Main Topics   • Smoking status: Current Every Day Smoker     Packs/day: 0.25     Years: 50.00     Types: Cigarettes   • Smokeless tobacco: Never Used   • Alcohol use No   • Drug use:       Comment: MARIJUANA- 2 times per week    • Sexual activity: No     Other Topics Concern   • Not on file     Social History Narrative    Has care provider.          ROS:  No fevers/chills. No TIA's or unusual headaches, no dysphagia. No prolonged cough. No dyspnea or chest pain on exertion.  No abdominal pain, change in bowel habits, black or bloody stools.  On self exam, has noted no new or enlarging breast lumps, nipple discharge or breast pain. Gyn: No abnormal discharge or bleeding.       <OBJECTIVE>  /82   Pulse 100   Temp 36.8 °C (98.3 °F)   Ht 1.715 m (5' 7.5\")   Wt 66.8 kg " (147 lb 3.2 oz)   LMP 01/01/2007   SpO2 96%   BMI 22.71 kg/m²   HEAD AND NECK:  Ears normal.  Throat, oral cavity and tongue normal.  Neck supple. No adenopathy or masses in the neck or supraclavicular regions.  No carotid bruits. No thyromegaly.  NEURO: Cranial nerves are normal. Neck supple. DTR's normal and symmetric.  CHEST:  Clear, good air entry, no wheezes, rhonchi or rales.  HEART:  S1 and S2 normal, no murmurs, clicks, gallops or rubs. Regular rate and rhythm.  No edema.  ABDOMEN:  Soft without tenderness, guarding, mass or organomegaly. No CVA tenderness or inguinal adenopathy.  EXTREMITIES:  Extremities, reflexes and peripheral pulses are normal.  SKIN:  No rashes or suspicious skin lesions noted.  BREAST EXAM: Inspection negative. No nipple discharge or bleeding. No masses or nodularity palpable. No axillary PARTHA.   PELVIC EXAM: External genitalia- vulvar region with hypopigmentation of skin noted throughout, otherwise vagina normal. Normal appearing cervix. No abnormal discharge. No cervical motion tenderness. Bimanual exam without adnexal masses or tenderness to palpation.   Rectal Exam: normal sphincter tone, no masses. Stool guaiac negative.       Urine dip: trace protein, moderate blood.       ASSESSMENT/PLAN:    62 year old female.     1. Well woman exam with routine gynecological exam   -Discussed calcium intake, healthy diet, and routine exercise.     2. Screening for cervical cancer  THINPREP PAP WITH HPV   3. Screening for breast cancer -obtain mammogram ordered.     4. Postmenopausal - obtain dexa.  DS-BONE DENSITY STUDY (DEXA)   5. Controlled type 2 diabetes mellitus without complication, without long-term current use of insulin (CMS-[facility]) - diet controlled, not on medication therapy. Diabetic diet, routine exercise. Monitor labs.  MICROALBUMIN CREAT RATIO URINE    LIPID PROFILE    COMP METABOLIC PANEL    POCT Retinal Eye Exam    HEMOGLOBIN A1C   6. Chronic obstructive pulmonary disease,  unspecified COPD type (CMS-HCC)- stable. Not on medication therapy. Obtain PFT.   PULMONARY FUNCTION TESTS Test requested: Complete Pulmonary Function Test   7. Essential hypertension- stable, not on medication therapy. Monitor.     8. Mixed hyperlipidemia - stable. Not on medication therapy. Monitor.     9. Dysuria- possible UTI, will send urine to lab.  sulfamethoxazole-trimethoprim (BACTRIM DS) 800-160 MG tablet    URINALYSIS,CULTURE IF INDICATED       10. Vulvar disease- hypopigmentation of vulvar region noted.   REFERRAL TO GYNECOLOGY   11. Hypopigmentation- as above.   REFERRAL TO GYNECOLOGY   12. Need for hepatitis C screening test  HEP C VIRUS ANTIBODY   13. Need for influenza vaccination  Flu Quad Inj >3 Year Pre-Filled PF     Return in about 3 months (around 2/8/2018).   Follow up for routine annual exam.

## 2017-11-10 LAB
CYTOLOGY REG CYTOL: NORMAL
HPV HR 12 DNA CVX QL NAA+PROBE: NEGATIVE
HPV16 DNA SPEC QL NAA+PROBE: NEGATIVE
HPV18 DNA SPEC QL NAA+PROBE: NEGATIVE
SPECIMEN SOURCE: NORMAL

## 2017-11-11 LAB
BACTERIA UR CULT: NORMAL
SIGNIFICANT IND 70042: NORMAL
SOURCE SOURCE: NORMAL

## 2017-11-20 DIAGNOSIS — R82.90 ABNORMAL URINE: ICD-10-CM

## 2017-11-21 ENCOUNTER — TELEPHONE (OUTPATIENT)
Dept: MEDICAL GROUP | Age: 62
End: 2017-11-21

## 2017-11-21 NOTE — TELEPHONE ENCOUNTER
----- Message from Viviana Thorne M.D. sent at 11/20/2017  9:01 AM PST -----  Please advise final urine culture was negative for infection. I would recommend pt repeat urine in a couple weeks, lab order in system.

## 2017-11-21 NOTE — TELEPHONE ENCOUNTER
----- Message from Viviana Thorne M.D. sent at 11/20/2017  2:35 PM PST -----  Please let pt know pap smear was negative, high risk hpv was negative. Recommend consider repeat routine pap in 3-5 years.

## 2017-11-21 NOTE — TELEPHONE ENCOUNTER
Phone Number Called: [phone] (home)     Message: Left message for the patient to call us back regarding the note below.    Left Message for patient to call back: yes

## 2017-11-28 NOTE — TELEPHONE ENCOUNTER
Phone Number Called: [phone] (home)     Message: called pt notified of messages below.     Left Message for patient to call back: no

## 2017-12-07 ENCOUNTER — APPOINTMENT (OUTPATIENT)
Dept: RADIOLOGY | Facility: MEDICAL CENTER | Age: 62
End: 2017-12-07
Attending: FAMILY MEDICINE
Payer: MEDICARE

## 2017-12-13 DIAGNOSIS — F51.01 PRIMARY INSOMNIA: ICD-10-CM

## 2017-12-14 RX ORDER — TRAZODONE HYDROCHLORIDE 50 MG/1
TABLET ORAL
Qty: 30 TAB | Refills: 0 | Status: SHIPPED
Start: 2017-12-14 | End: 2018-02-12

## 2018-10-01 DIAGNOSIS — F51.01 PRIMARY INSOMNIA: ICD-10-CM

## 2018-10-04 RX ORDER — TRAZODONE HYDROCHLORIDE 50 MG/1
TABLET ORAL
Refills: 0 | OUTPATIENT
Start: 2018-10-04

## 2018-10-04 NOTE — TELEPHONE ENCOUNTER
Spoke to pt to notify her she is due for follow up. Unsure if she's going to join Dr. Thorne at [facility]. Would like information mailed to her home address. Pt states she has enough trazodone for now. Advised pt to call to schedule appointment with new provider soon if she chooses not to follow Dr. Thorne. Notified [facility] pharmacy of denial.

## 2019-05-03 ENCOUNTER — OFFICE VISIT (OUTPATIENT)
Dept: MEDICAL GROUP | Facility: IMAGING CENTER | Age: 64
End: 2019-05-03
Payer: MEDICARE

## 2019-05-03 VITALS
SYSTOLIC BLOOD PRESSURE: 140 MMHG | BODY MASS INDEX: 27.12 KG/M2 | HEIGHT: 67 IN | TEMPERATURE: 98.1 F | WEIGHT: 172.8 LBS | DIASTOLIC BLOOD PRESSURE: 80 MMHG | HEART RATE: 99 BPM | OXYGEN SATURATION: 97 % | RESPIRATION RATE: 16 BRPM

## 2019-05-03 DIAGNOSIS — J44.9 CHRONIC OBSTRUCTIVE PULMONARY DISEASE, UNSPECIFIED COPD TYPE (HCC): ICD-10-CM

## 2019-05-03 DIAGNOSIS — E78.2 MIXED HYPERLIPIDEMIA: ICD-10-CM

## 2019-05-03 DIAGNOSIS — Z12.11 SCREEN FOR COLON CANCER: ICD-10-CM

## 2019-05-03 DIAGNOSIS — Z12.39 SCREENING FOR BREAST CANCER: ICD-10-CM

## 2019-05-03 DIAGNOSIS — R45.89 FEELING ANXIOUS: ICD-10-CM

## 2019-05-03 DIAGNOSIS — Z78.0 POSTMENOPAUSAL: ICD-10-CM

## 2019-05-03 DIAGNOSIS — M19.90 ARTHRITIS: ICD-10-CM

## 2019-05-03 DIAGNOSIS — D64.9 ANEMIA, UNSPECIFIED TYPE: ICD-10-CM

## 2019-05-03 DIAGNOSIS — G89.4 CHRONIC PAIN SYNDROME: ICD-10-CM

## 2019-05-03 DIAGNOSIS — E11.9 CONTROLLED TYPE 2 DIABETES MELLITUS WITHOUT COMPLICATION, WITHOUT LONG-TERM CURRENT USE OF INSULIN (HCC): ICD-10-CM

## 2019-05-03 DIAGNOSIS — I10 ESSENTIAL HYPERTENSION: ICD-10-CM

## 2019-05-03 PROCEDURE — 99215 OFFICE O/P EST HI 40 MIN: CPT | Performed by: FAMILY MEDICINE

## 2019-05-03 RX ORDER — IBUPROFEN 800 MG/1
TABLET ORAL
Refills: 1 | COMMUNITY
Start: 2019-04-09 | End: 2020-01-03

## 2019-05-03 RX ORDER — PREDNISONE 10 MG/1
TABLET ORAL
Refills: 0 | COMMUNITY
Start: 2019-04-26 | End: 2020-01-03

## 2019-05-03 RX ORDER — TIZANIDINE 2 MG/1
2 TABLET ORAL 3 TIMES DAILY PRN
Refills: 1 | COMMUNITY
End: 2020-01-23

## 2019-05-03 RX ORDER — TRAMADOL HYDROCHLORIDE 50 MG/1
50 TABLET ORAL
Refills: 0 | COMMUNITY
Start: 2019-04-26 | End: 2020-01-03

## 2019-05-03 ASSESSMENT — PATIENT HEALTH QUESTIONNAIRE - PHQ9
SUM OF ALL RESPONSES TO PHQ QUESTIONS 1-9: 14
5. POOR APPETITE OR OVEREATING: 0 - NOT AT ALL
CLINICAL INTERPRETATION OF PHQ2 SCORE: 6

## 2019-05-03 ASSESSMENT — PAIN SCALES - GENERAL: PAINLEVEL: 5=MODERATE PAIN

## 2019-05-03 NOTE — PROGRESS NOTES
Chief Complaint   Patient presents with   • Establish Care   • Results     would like to go over recent labs from [facility]    • Shoulder Pain     right    • Insomnia       HPI:  64 y.o. female with DM type 2, hypertension, hyperlipidemia, copd, arthritis and chronic pain last seen in 2017. She is here with her caretaker.     Diabetes mellitus type 2- not on medication therapy.   Notes having some blood work per [facility], followed by the pain specialist there.   Shoulder replacement July 2017. State he has numbness along C8.  Left shoulder replaced by Dr. Gonzales and feels neck is tilted to right.   Currently with right shoulder pain- but does not want to go through replacement surgery.   Had shoulder injection which helped. Pain is 5/10.   Has been on corticosteroid pills for pain.   Hx of bilateral hip replacement.   States her blood work completed after injection therapy.   Blood work completed 416/19/2019- at [facility], see media.     Anemia- low H/H, 9.3/30.3 on recent lab work. Notes she has hemorrhoids. Anemia noted after last surgery 2017.   WBC slightly elevated 10.9, slightly elevated absolute neutrophils.   Glucose 129, ESR 63, RF 14, CRP 51.9.  Glucose tends to be around 120-150s.     Mixed hyperlipidemia- not on medication therapy. Needs lab work completed.     Hypertension - stable, not on medication therapy.   Occasionally feels anxious.     COPD- not on medication therapy. Stable.     Health Maintenance  Last pap: 11/2017-negative, hpv negative  Immunizations: Td/Tdap 2014;  Influenza vaccine -recommend complete annually; Shingles vaccine- recommend complete; Hepatitis B vaccination- recommend complete.  PCV 13 2013; PPSV 23 2013  Mammogram: due  Colonoscopy: 2015-polyp- will never have another one per patient.   Dexa Scan: recommend complete  Eye exam: due- has an eye doctor     Lifestyle:  Diet: varied  Supplements: multivitamin  Exercise: no regular exercise, may do PT for legs  Activities:  color, sew  Stressors: pain issues  Social Support: lives alone, but has regular caretaker      Review of Systems   Constitutional: Negative for fever, chills and malaise/fatigue.   HENT: Negative for congestion.    Eyes: Negative for pain or vision changes.   Respiratory: Negative for cough and shortness of breath.    Cardiovascular: Negative for leg swelling. No chest pain.   Gastrointestinal: Negative for nausea, vomiting, abdominal pain and diarrhea.   Genitourinary: Negative for dysuria and hematuria.   Skin: Negative for rash.   Neurological: Negative for dizziness, focal weakness and headaches.   Endo/Heme/Allergies: Does not bruise/bleed easily.   Psychiatric/Behavioral: Negative for depression.        Current Outpatient Prescriptions:   •  ibuprofen (MOTRIN) 800 MG Tab, , Disp: , Rfl: 1  •  PredniSONE 10 MG (21) Tablet Therapy Pack, , Disp: , Rfl: 0  •  tizanidine (ZANAFLEX) 2 MG tablet, , Disp: , Rfl: 1  •  tramadol (ULTRAM) 50 MG Tab, Take 50 mg by mouth., Disp: , Rfl: 0  •  aspirin (ASA) 325 MG TABS, Take 975 mg by mouth every 6 hours as needed (3 tabs)., Disp: , Rfl:   •  multivitamin (THERAGRAN) TABS, Take 1 Tab by mouth every day., Disp: 30 Tab, Rfl: 0  •  traZODone (DESYREL) 50 MG Tab, TAKE ONE TABLET BY MOUTH AT BEDTIME AS NEEDED FOR SLEEP (Patient not taking: Reported on 5/3/2019), Disp: 90 Tab, Rfl: 0    Allergies   Allergen Reactions   • Aspartame Nausea     headache   • Augmentin Vomiting   • Latex Rash and Itching   • Lipitor [Atorvastatin Calcium] Swelling     Lip swelling   • Other Misc Vomiting     Bug spray   • Saccharin Nausea     Nausea and headache       Past Medical History:   Diagnosis Date   • Anemia    • Arthritis     OA and  not RA per rheumatology   • Breath shortness     oxygen PRN    • Bronchitis 2008   • Colon polyps 2015   • Convulsions (HCC) 7/21/2009     ICD-10 transition   • COPD (chronic obstructive pulmonary disease) ([facility])    • Dental disorder     full dentures   •  Depression     depression, anxiety    • Diverticulosis 5/10     colonoscopy   • Hemorrhoid 7/29/2009   • History of colonoscopy   2005   • Leukocytosis 3/30/2014   • Lymphocytosis (symptomatic) 7/21/2009   • MI (myocardial infarction) (HCC) 4/29/2007   • Other specified disorder of intestines     constipation   • Pain     shoulders, neck, lower back   • Pancreatitis    • Pap smear 4/2006   • Pneumonia 2013   • Pulmonary nodule    • Renal lesion    • Renal stone    • S/P colonoscopy 5/2010    will need repeat in 5 years   • Screening mammogram never   • Seizure (HCC)     x1 in 2008   • Shingles    • Symptomatic menopausal or female climacteric states 7/21/2009   • Type II or unspecified type diabetes mellitus without mention of complication, uncontrolled 7/21/2009    diet controlled    • Unspecified urinary incontinence        Past Surgical History:   Procedure Laterality Date   • SHOULDER ARTHROPLASTY TOTAL Left 7/12/2017    Procedure: SHOULDER ARTHROPLASTY TOTAL- REVERSE;  Surgeon: David Villalta M.D.;  Location: [facility];  Service:    • CERVICAL FUSION POSTERIOR  9/10/2014    Performed by Abhilash Bishop M.D. at SUR[facility]ERY [facility]   • HIP ARTHROPLASTY MIS TOTAL  7/16/14    rt and lt   • OTHER CARDIAC SURGERY  2007    angioplasty   • GYN SURGERY  1992    c section   • EYE SURGERY  laser   • OTHER ORTHOPEDIC SURGERY     • TONSILLECTOMY         Family History   Problem Relation Age of Onset   • Diabetes Mother    • Cancer Mother         bladder cancer   • Diabetes Brother    • Cancer Brother         bladder cancer   • Diabetes Brother    • Cancer Sister         bladder cancer       Social History     Social History   • Marital status: Single     Spouse name: N/A   • Number of children: N/A   • Years of education: N/A     Occupational History   • disability for OA [employer]     Social History Main Topics   • Smoking status: Current Every Day Smoker     Packs/day: 0.25     Years: 50.00     Types:  "Cigarettes   • Smokeless tobacco: Never Used   • Alcohol use No   • Drug use: Yes      Comment: MARIJUANA- 2 times per week    • Sexual activity: No     Other Topics Concern   • Not on file     Social History Narrative    Has care provider.          PHYSICAL EXAM:  /80 (BP Location: Left arm, Patient Position: Sitting, BP Cuff Size: Adult)   Pulse 99   Temp 36.7 °C (98.1 °F) (Temporal)   Resp 16   Ht 1.702 m (5' 7\")   Wt 78.4 kg (172 lb 12.8 oz)   LMP 01/01/2007   SpO2 97%   BMI 27.06 kg/m²   Constitutional: She appears well-developed and well-nourished. She appears not diaphoretic. No distress.   HENT: Right Ear: External ear normal. Left Ear: External ear normal. Tympanic membranes clear and intact.   Nose: Nose normal.   Mouth/Throat: Oropharynx is clear and moist. No oropharyngeal exudate.     Eyes: Conjunctivae and extraocular motions are normal. Pupils are equal, round, and reactive to light. No scleral icterus.   Neck: some limited range of motion. Neck supple. No thyromegaly present. Head appears to lean to right.   Cardiovascular: Normal rate, regular rhythm, normal heart sounds and intact distal pulses.  Exam reveals no gallop and no friction rub.  No murmur heard. No carotid bruits.   Pulmonary/Chest: Effort normal and breath sounds normal. No respiratory distress. She has no wheezes. She has no rales.   Abdominal: Soft. Bowel sounds are normal. She exhibits no distension and no mass. No tenderness. She has no rebound and no guarding.   Lymphadenopathy:  She has no cervical adenopathy.   Neurological: She is alert. She has normal reflexes. No cranial nerve deficit. She exhibits normal muscle tone.   Skin: Skin is warm and dry. No rash noted. She is not diaphoretic. No erythema.   Psychiatric: She has a normal mood and affect. Her behavior is normal.   Musculoskeletal: She exhibits no edema. Full strength throughout. 2+ DTR throughout. Some limited ROM of joints. "       ASSESSMENT/PLAN:    This is a 64 y.o. Female with diabetes mellitus type 2, hyperlipidemia, hypertension, anemia, chronic pain and arthritis.     1. Controlled type 2 diabetes mellitus without complication, without long-term current use of insulin (HCC) - stable, not on medication therapy. Monitor. Obtain labs. Recommend diabetic diet and routine exercise.  Lipid Profile    MICROALB/CREAT RATIO RAND. UR    HEMOGLOBIN A1C    Blood Glucose   2. Mixed hyperlipidemia- not on medication therapy. Obtain labs.   Lipid  TSH WITH REFLEX TO FT4   3. Essential hypertension-stable, not on medication therapy. Monitor.      4. Anemia, unspecified type- hx of prior surgery and shoulder replacement which may have contributed.   CBC WITH DIFFERENTIAL    IRON/TOTAL IRON BIND    FERRITIN   5. Chronic obstructive pulmonary disease, unspecified COPD type (HCC)- stable, not on medication therapy. Monitor.  Consider further PFT.     6. Postmenopausal   -Recommend complete dexa scan.  DS-BONE DENSITY STUDY (DEXA)   7. Feeling anxious- intermittent.   -Recommend consider magnesium supplements, breathing exercises and meditation therapy.       8. Screening for breast cancer  MA-SCREEN MAMMO W/CAD-BILAT   9. Screen for colon cancer  OCCULT BLOOD FECES IMMUNOASSAY   10.    Chronic pain syndrome-stable.            -Continue current medication and routine follow up with pain specialist. Recommend regular exercise therapy as tolerated. May benefit from anti- inflammatory diet. Consider acupuncture therapy to supplement current therapy. Monitor.    11.    Arthritis- as above.   Return in about 1 month (around 6/3/2019).      This medical record contains text that has been entered with the assistance of computer voice recognition and dictation software.  Therefore, it may contain unintended errors in text, spelling, punctuation, or grammar.       > 40 minutes face to face time spent with this patient of which > 30  minutes spent on counseling  and coordination of care as above, excluding any time for procedures.

## 2019-05-19 ENCOUNTER — TELEPHONE (OUTPATIENT)
Dept: MEDICAL GROUP | Facility: IMAGING CENTER | Age: 64
End: 2019-05-19

## 2019-05-19 DIAGNOSIS — N28.9 RENAL LESION: ICD-10-CM

## 2019-05-19 DIAGNOSIS — R91.1 PULMONARY NODULE: ICD-10-CM

## 2019-05-19 PROBLEM — I51.7 LVH (LEFT VENTRICULAR HYPERTROPHY): Status: ACTIVE | Noted: 2019-05-19

## 2019-05-19 PROBLEM — I70.90 ATHEROSCLEROSIS: Status: ACTIVE | Noted: 2019-05-19

## 2019-05-19 PROBLEM — D64.9 ANEMIA: Status: ACTIVE | Noted: 2019-05-19

## 2019-05-19 NOTE — TELEPHONE ENCOUNTER
Please let pt know that further review of her chart notes prior kidney lesion and pulmonary nodule on prior imaging during a hospital stay.   Please see if patient has had any repeat imagine in the last couple years for abdomina/renal CT or MRI and chest CT.   If not, please see if she is agreeable with having repeat imaging completed per recommendations.

## 2019-10-16 ENCOUNTER — HOSPITAL ENCOUNTER (OUTPATIENT)
Dept: LAB | Facility: MEDICAL CENTER | Age: 64
End: 2019-10-16
Attending: INTERNAL MEDICINE
Payer: MEDICARE

## 2019-10-16 ENCOUNTER — HOSPITAL ENCOUNTER (OUTPATIENT)
Dept: LAB | Facility: MEDICAL CENTER | Age: 64
End: 2019-10-16
Attending: FAMILY MEDICINE
Payer: MEDICARE

## 2019-10-16 DIAGNOSIS — E78.2 MIXED HYPERLIPIDEMIA: ICD-10-CM

## 2019-10-16 DIAGNOSIS — E11.9 CONTROLLED TYPE 2 DIABETES MELLITUS WITHOUT COMPLICATION, WITHOUT LONG-TERM CURRENT USE OF INSULIN (HCC): ICD-10-CM

## 2019-10-16 DIAGNOSIS — D64.9 ANEMIA, UNSPECIFIED TYPE: ICD-10-CM

## 2019-10-16 LAB
ALBUMIN SERPL BCP-MCNC: 4 G/DL (ref 3.2–4.9)
ALT SERPL-CCNC: 7 U/L (ref 2–50)
ANISOCYTOSIS BLD QL SMEAR: ABNORMAL
ANISOCYTOSIS BLD QL SMEAR: ABNORMAL
APPEARANCE UR: CLEAR
AST SERPL-CCNC: 11 U/L (ref 12–45)
BASOPHILS # BLD AUTO: 0.7 % (ref 0–1.8)
BASOPHILS # BLD AUTO: 0.8 % (ref 0–1.8)
BASOPHILS # BLD: 0.09 K/UL (ref 0–0.12)
BASOPHILS # BLD: 0.1 K/UL (ref 0–0.12)
BILIRUB UR QL STRIP.AUTO: NEGATIVE
CHOLEST SERPL-MCNC: 142 MG/DL (ref 100–199)
COLOR UR: YELLOW
COMMENT 1642: NORMAL
COMMENT 1642: NORMAL
CREAT SERPL-MCNC: 0.9 MG/DL (ref 0.5–1.4)
CREAT UR-MCNC: 61.8 MG/DL
CREAT UR-MCNC: 61.9 MG/DL
CRP SERPL HS-MCNC: 11.42 MG/DL (ref 0–0.75)
EOSINOPHIL # BLD AUTO: 0.18 K/UL (ref 0–0.51)
EOSINOPHIL # BLD AUTO: 0.18 K/UL (ref 0–0.51)
EOSINOPHIL NFR BLD: 1.4 % (ref 0–6.9)
EOSINOPHIL NFR BLD: 1.4 % (ref 0–6.9)
ERYTHROCYTE [DISTWIDTH] IN BLOOD BY AUTOMATED COUNT: 49.3 FL (ref 35.9–50)
ERYTHROCYTE [DISTWIDTH] IN BLOOD BY AUTOMATED COUNT: 50.4 FL (ref 35.9–50)
ERYTHROCYTE [SEDIMENTATION RATE] IN BLOOD BY WESTERGREN METHOD: 104 MM/HOUR (ref 0–30)
FERRITIN SERPL-MCNC: 24.7 NG/ML (ref 10–291)
GLUCOSE SERPL-MCNC: 81 MG/DL (ref 65–99)
GLUCOSE UR STRIP.AUTO-MCNC: NEGATIVE MG/DL
HCT VFR BLD AUTO: 30.7 % (ref 37–47)
HCT VFR BLD AUTO: 31.8 % (ref 37–47)
HDLC SERPL-MCNC: 46 MG/DL
HGB BLD-MCNC: 8.6 G/DL (ref 12–16)
HGB BLD-MCNC: 8.6 G/DL (ref 12–16)
HYPOCHROMIA BLD QL SMEAR: ABNORMAL
HYPOCHROMIA BLD QL SMEAR: ABNORMAL
IMM GRANULOCYTES # BLD AUTO: 0.04 K/UL (ref 0–0.11)
IMM GRANULOCYTES # BLD AUTO: 0.06 K/UL (ref 0–0.11)
IMM GRANULOCYTES NFR BLD AUTO: 0.3 % (ref 0–0.9)
IMM GRANULOCYTES NFR BLD AUTO: 0.5 % (ref 0–0.9)
IRON SATN MFR SERPL: 2 % (ref 15–55)
IRON SERPL-MCNC: 10 UG/DL (ref 40–170)
KETONES UR STRIP.AUTO-MCNC: NEGATIVE MG/DL
LDLC SERPL CALC-MCNC: 77 MG/DL
LEUKOCYTE ESTERASE UR QL STRIP.AUTO: NEGATIVE
LG PLATELETS BLD QL SMEAR: NORMAL
LYMPHOCYTES # BLD AUTO: 2.33 K/UL (ref 1–4.8)
LYMPHOCYTES # BLD AUTO: 2.39 K/UL (ref 1–4.8)
LYMPHOCYTES NFR BLD: 18 % (ref 22–41)
LYMPHOCYTES NFR BLD: 18.2 % (ref 22–41)
MCH RBC QN AUTO: 19.3 PG (ref 27–33)
MCH RBC QN AUTO: 19.8 PG (ref 27–33)
MCHC RBC AUTO-ENTMCNC: 27 G/DL (ref 33.6–35)
MCHC RBC AUTO-ENTMCNC: 28 G/DL (ref 33.6–35)
MCV RBC AUTO: 70.6 FL (ref 81.4–97.8)
MCV RBC AUTO: 71.3 FL (ref 81.4–97.8)
MICRO URNS: NORMAL
MICROALBUMIN UR-MCNC: 2.5 MG/DL
MICROALBUMIN/CREAT UR: 40 MG/G (ref 0–30)
MICROCYTES BLD QL SMEAR: ABNORMAL
MICROCYTES BLD QL SMEAR: ABNORMAL
MONOCYTES # BLD AUTO: 0.38 K/UL (ref 0–0.85)
MONOCYTES # BLD AUTO: 0.42 K/UL (ref 0–0.85)
MONOCYTES NFR BLD AUTO: 2.9 % (ref 0–13.4)
MONOCYTES NFR BLD AUTO: 3.2 % (ref 0–13.4)
MORPHOLOGY BLD-IMP: NORMAL
MORPHOLOGY BLD-IMP: NORMAL
NEUTROPHILS # BLD AUTO: 10.03 K/UL (ref 2–7.15)
NEUTROPHILS # BLD AUTO: 9.9 K/UL (ref 2–7.15)
NEUTROPHILS NFR BLD: 76.2 % (ref 44–72)
NEUTROPHILS NFR BLD: 76.4 % (ref 44–72)
NITRITE UR QL STRIP.AUTO: NEGATIVE
NRBC # BLD AUTO: 0 K/UL
NRBC # BLD AUTO: 0 K/UL
NRBC BLD-RTO: 0 /100 WBC
NRBC BLD-RTO: 0 /100 WBC
PH UR STRIP.AUTO: 7 [PH] (ref 5–8)
PLATELET # BLD AUTO: 646 K/UL (ref 164–446)
PLATELET # BLD AUTO: 660 K/UL (ref 164–446)
PLATELET BLD QL SMEAR: NORMAL
PLATELET BLD QL SMEAR: NORMAL
PMV BLD AUTO: 9.6 FL (ref 9–12.9)
PMV BLD AUTO: 9.7 FL (ref 9–12.9)
POLYCHROMASIA BLD QL SMEAR: NORMAL
POLYCHROMASIA BLD QL SMEAR: NORMAL
PROT UR QL STRIP: NEGATIVE MG/DL
PROT UR-MCNC: 11.7 MG/DL (ref 0–15)
PROT/CREAT UR: 189 MG/G (ref 10–107)
RBC # BLD AUTO: 4.35 M/UL (ref 4.2–5.4)
RBC # BLD AUTO: 4.46 M/UL (ref 4.2–5.4)
RBC BLD AUTO: PRESENT
RBC BLD AUTO: PRESENT
RBC UR QL AUTO: NEGATIVE
RHEUMATOID FACT SER IA-ACNC: 18 IU/ML (ref 0–14)
SP GR UR STRIP.AUTO: 1.01
TARGETS BLD QL SMEAR: NORMAL
TIBC SERPL-MCNC: 462 UG/DL (ref 250–450)
TRIGL SERPL-MCNC: 93 MG/DL (ref 0–149)
TSH SERPL DL<=0.005 MIU/L-ACNC: 1.97 UIU/ML (ref 0.38–5.33)
URATE SERPL-MCNC: 6.7 MG/DL (ref 1.9–8.2)
UROBILINOGEN UR STRIP.AUTO-MCNC: 1 MG/DL
WBC # BLD AUTO: 13 K/UL (ref 4.8–10.8)
WBC # BLD AUTO: 13.1 K/UL (ref 4.8–10.8)

## 2019-10-16 PROCEDURE — 86038 ANTINUCLEAR ANTIBODIES: CPT

## 2019-10-16 PROCEDURE — 85025 COMPLETE CBC W/AUTO DIFF WBC: CPT

## 2019-10-16 PROCEDURE — 86431 RHEUMATOID FACTOR QUANT: CPT

## 2019-10-16 PROCEDURE — 82164 ANGIOTENSIN I ENZYME TEST: CPT

## 2019-10-16 PROCEDURE — 86200 CCP ANTIBODY: CPT

## 2019-10-16 PROCEDURE — 82728 ASSAY OF FERRITIN: CPT

## 2019-10-16 PROCEDURE — 83550 IRON BINDING TEST: CPT

## 2019-10-16 PROCEDURE — 82565 ASSAY OF CREATININE: CPT

## 2019-10-16 PROCEDURE — 84443 ASSAY THYROID STIM HORMONE: CPT

## 2019-10-16 PROCEDURE — 86140 C-REACTIVE PROTEIN: CPT

## 2019-10-16 PROCEDURE — 84450 TRANSFERASE (AST) (SGOT): CPT

## 2019-10-16 PROCEDURE — 85025 COMPLETE CBC W/AUTO DIFF WBC: CPT | Mod: 91

## 2019-10-16 PROCEDURE — 80074 ACUTE HEPATITIS PANEL: CPT | Mod: GA

## 2019-10-16 PROCEDURE — 84460 ALANINE AMINO (ALT) (SGPT): CPT

## 2019-10-16 PROCEDURE — 82043 UR ALBUMIN QUANTITATIVE: CPT

## 2019-10-16 PROCEDURE — 81003 URINALYSIS AUTO W/O SCOPE: CPT

## 2019-10-16 PROCEDURE — 36415 COLL VENOUS BLD VENIPUNCTURE: CPT

## 2019-10-16 PROCEDURE — 86235 NUCLEAR ANTIGEN ANTIBODY: CPT | Mod: 91

## 2019-10-16 PROCEDURE — 84550 ASSAY OF BLOOD/URIC ACID: CPT

## 2019-10-16 PROCEDURE — 80061 LIPID PANEL: CPT

## 2019-10-16 PROCEDURE — 82570 ASSAY OF URINE CREATININE: CPT

## 2019-10-16 PROCEDURE — 82040 ASSAY OF SERUM ALBUMIN: CPT

## 2019-10-16 PROCEDURE — 86480 TB TEST CELL IMMUN MEASURE: CPT

## 2019-10-16 PROCEDURE — 82947 ASSAY GLUCOSE BLOOD QUANT: CPT

## 2019-10-16 PROCEDURE — 85652 RBC SED RATE AUTOMATED: CPT

## 2019-10-16 PROCEDURE — 86812 HLA TYPING A B OR C: CPT | Mod: GA

## 2019-10-16 PROCEDURE — 83036 HEMOGLOBIN GLYCOSYLATED A1C: CPT | Mod: GA

## 2019-10-16 PROCEDURE — 83540 ASSAY OF IRON: CPT

## 2019-10-17 ENCOUNTER — HOSPITAL ENCOUNTER (OUTPATIENT)
Facility: MEDICAL CENTER | Age: 64
End: 2019-10-17
Attending: FAMILY MEDICINE
Payer: MEDICARE

## 2019-10-17 LAB
EST. AVERAGE GLUCOSE BLD GHB EST-MCNC: 163 MG/DL
HBA1C MFR BLD: 7.3 % (ref 0–5.6)

## 2019-10-17 PROCEDURE — 82274 ASSAY TEST FOR BLOOD FECAL: CPT

## 2019-10-18 LAB
ACE SERPL-CCNC: 38 U/L (ref 9–67)
CCP IGG SERPL-ACNC: 3 UNITS (ref 0–19)
ENA SS-B IGG SER IA-ACNC: 1 AU/ML (ref 0–40)
GAMMA INTERFERON BACKGROUND BLD IA-ACNC: 0.04 IU/ML
HAV IGM SERPL QL IA: NEGATIVE
HBV CORE IGM SER QL: NEGATIVE
HBV SURFACE AG SER QL: NEGATIVE
HCV AB SER QL: NEGATIVE
HLA-B27 QL FC: NEGATIVE
M TB IFN-G BLD-IMP: NEGATIVE
M TB IFN-G CD4+ BCKGRND COR BLD-ACNC: -0.01 IU/ML
MITOGEN IGNF BCKGRD COR BLD-ACNC: >10 IU/ML
NUCLEAR IGG SER QL IA: NORMAL
QFT TB2 - NIL TBQ2: -0.01 IU/ML
SSA52 R0ENA AB IGG Q0420: 1 AU/ML (ref 0–40)
SSA60 R0ENA AB IGG Q0419: 0 AU/ML (ref 0–40)

## 2019-10-22 DIAGNOSIS — Z12.11 SCREEN FOR COLON CANCER: ICD-10-CM

## 2019-10-24 ENCOUNTER — TELEPHONE (OUTPATIENT)
Dept: MEDICAL GROUP | Facility: IMAGING CENTER | Age: 64
End: 2019-10-24

## 2019-10-24 LAB — HEMOCCULT STL QL IA: NEGATIVE

## 2019-10-24 NOTE — TELEPHONE ENCOUNTER
----- Message from Viviana Thorne M.D. sent at 10/21/2019  3:51 PM PDT -----  Please advise pt to schedule visit to review labs.

## 2020-01-03 ENCOUNTER — HOSPITAL ENCOUNTER (INPATIENT)
Facility: MEDICAL CENTER | Age: 65
LOS: 7 days | DRG: 377 | End: 2020-01-10
Attending: EMERGENCY MEDICINE | Admitting: INTERNAL MEDICINE
Payer: MEDICARE

## 2020-01-03 DIAGNOSIS — G89.29 OTHER CHRONIC PAIN: ICD-10-CM

## 2020-01-03 DIAGNOSIS — K92.2 GASTROINTESTINAL HEMORRHAGE, UNSPECIFIED GASTROINTESTINAL HEMORRHAGE TYPE: ICD-10-CM

## 2020-01-03 DIAGNOSIS — D64.9 ANEMIA, UNSPECIFIED TYPE: ICD-10-CM

## 2020-01-03 PROBLEM — J96.21 ACUTE ON CHRONIC RESPIRATORY FAILURE WITH HYPOXIA (HCC): Status: ACTIVE | Noted: 2020-01-03

## 2020-01-03 LAB
ABO GROUP BLD: NORMAL
ALBUMIN SERPL BCP-MCNC: 2.9 G/DL (ref 3.2–4.9)
ALBUMIN/GLOB SERPL: 0.9 G/DL
ALP SERPL-CCNC: 73 U/L (ref 30–99)
ALT SERPL-CCNC: 6 U/L (ref 2–50)
ANION GAP SERPL CALC-SCNC: 15 MMOL/L (ref 0–11.9)
ANISOCYTOSIS BLD QL SMEAR: ABNORMAL
AST SERPL-CCNC: 11 U/L (ref 12–45)
BARCODED ABORH UBTYP: 1700
BARCODED ABORH UBTYP: 2800
BARCODED ABORH UBTYP: 8400
BARCODED PRD CODE UBPRD: NORMAL
BARCODED UNIT NUM UBUNT: NORMAL
BASOPHILS # BLD AUTO: 0.1 % (ref 0–1.8)
BASOPHILS # BLD: 0.01 K/UL (ref 0–0.12)
BILIRUB SERPL-MCNC: 0.2 MG/DL (ref 0.1–1.5)
BLD GP AB SCN SERPL QL: NORMAL
BUN SERPL-MCNC: 15 MG/DL (ref 8–22)
CALCIUM SERPL-MCNC: 7.8 MG/DL (ref 8.5–10.5)
CHLORIDE SERPL-SCNC: 109 MMOL/L (ref 96–112)
CO2 SERPL-SCNC: 16 MMOL/L (ref 20–33)
COMMENT 1642: NORMAL
COMPONENT R 8504R: NORMAL
CREAT SERPL-MCNC: 0.86 MG/DL (ref 0.5–1.4)
EKG IMPRESSION: NORMAL
EOSINOPHIL # BLD AUTO: 0.19 K/UL (ref 0–0.51)
EOSINOPHIL NFR BLD: 2.1 % (ref 0–6.9)
ERYTHROCYTE [DISTWIDTH] IN BLOOD BY AUTOMATED COUNT: 46.3 FL (ref 35.9–50)
GLOBULIN SER CALC-MCNC: 3.2 G/DL (ref 1.9–3.5)
GLUCOSE SERPL-MCNC: 172 MG/DL (ref 65–99)
HCT VFR BLD AUTO: 12.9 % (ref 37–47)
HCT VFR BLD AUTO: 15 % (ref 37–47)
HGB BLD-MCNC: 3.7 G/DL (ref 12–16)
HGB BLD-MCNC: 3.7 G/DL (ref 12–16)
HGB BLD-MCNC: 4.5 G/DL (ref 12–16)
HYPOCHROMIA BLD QL SMEAR: ABNORMAL
IMM GRANULOCYTES # BLD AUTO: 0.05 K/UL (ref 0–0.11)
IMM GRANULOCYTES NFR BLD AUTO: 0.6 % (ref 0–0.9)
LIPASE SERPL-CCNC: 101 U/L (ref 11–82)
LYMPHOCYTES # BLD AUTO: 2.25 K/UL (ref 1–4.8)
LYMPHOCYTES NFR BLD: 25.1 % (ref 22–41)
MCH RBC QN AUTO: 18.2 PG (ref 27–33)
MCHC RBC AUTO-ENTMCNC: 27.7 G/DL (ref 33.6–35)
MCV RBC AUTO: 65.7 FL (ref 81.4–97.8)
MICROCYTES BLD QL SMEAR: ABNORMAL
MONOCYTES # BLD AUTO: 0.33 K/UL (ref 0–0.85)
MONOCYTES NFR BLD AUTO: 3.7 % (ref 0–13.4)
MORPHOLOGY BLD-IMP: NORMAL
NEUTROPHILS # BLD AUTO: 6.12 K/UL (ref 2–7.15)
NEUTROPHILS NFR BLD: 68.4 % (ref 44–72)
NRBC # BLD AUTO: 0.03 K/UL
NRBC BLD-RTO: 0.3 /100 WBC
PLATELET # BLD AUTO: 573 K/UL (ref 164–446)
PLATELET BLD QL SMEAR: NORMAL
PMV BLD AUTO: 8.9 FL (ref 9–12.9)
POTASSIUM SERPL-SCNC: 3.1 MMOL/L (ref 3.6–5.5)
PRODUCT TYPE UPROD: NORMAL
PROT SERPL-MCNC: 6.1 G/DL (ref 6–8.2)
RBC # BLD AUTO: 1.98 M/UL (ref 4.2–5.4)
RBC BLD AUTO: PRESENT
RH BLD: NORMAL
SODIUM SERPL-SCNC: 140 MMOL/L (ref 135–145)
UNIT STATUS USTAT: NORMAL
WBC # BLD AUTO: 9 K/UL (ref 4.8–10.8)

## 2020-01-03 PROCEDURE — A9270 NON-COVERED ITEM OR SERVICE: HCPCS | Performed by: INTERNAL MEDICINE

## 2020-01-03 PROCEDURE — C9113 INJ PANTOPRAZOLE SODIUM, VIA: HCPCS | Performed by: EMERGENCY MEDICINE

## 2020-01-03 PROCEDURE — 700105 HCHG RX REV CODE 258: Performed by: EMERGENCY MEDICINE

## 2020-01-03 PROCEDURE — 99406 BEHAV CHNG SMOKING 3-10 MIN: CPT | Performed by: INTERNAL MEDICINE

## 2020-01-03 PROCEDURE — A9270 NON-COVERED ITEM OR SERVICE: HCPCS | Performed by: EMERGENCY MEDICINE

## 2020-01-03 PROCEDURE — 86850 RBC ANTIBODY SCREEN: CPT

## 2020-01-03 PROCEDURE — 96375 TX/PRO/DX INJ NEW DRUG ADDON: CPT

## 2020-01-03 PROCEDURE — 85014 HEMATOCRIT: CPT

## 2020-01-03 PROCEDURE — 304561 HCHG STAT O2

## 2020-01-03 PROCEDURE — 96365 THER/PROPH/DIAG IV INF INIT: CPT

## 2020-01-03 PROCEDURE — 770020 HCHG ROOM/CARE - TELE (206)

## 2020-01-03 PROCEDURE — P9016 RBC LEUKOCYTES REDUCED: HCPCS

## 2020-01-03 PROCEDURE — 93005 ELECTROCARDIOGRAM TRACING: CPT | Performed by: EMERGENCY MEDICINE

## 2020-01-03 PROCEDURE — 86923 COMPATIBILITY TEST ELECTRIC: CPT

## 2020-01-03 PROCEDURE — 86901 BLOOD TYPING SEROLOGIC RH(D): CPT

## 2020-01-03 PROCEDURE — 99285 EMERGENCY DEPT VISIT HI MDM: CPT

## 2020-01-03 PROCEDURE — 700111 HCHG RX REV CODE 636 W/ 250 OVERRIDE (IP): Performed by: INTERNAL MEDICINE

## 2020-01-03 PROCEDURE — 99223 1ST HOSP IP/OBS HIGH 75: CPT | Mod: AI,25 | Performed by: INTERNAL MEDICINE

## 2020-01-03 PROCEDURE — 85018 HEMOGLOBIN: CPT | Mod: 91

## 2020-01-03 PROCEDURE — 86900 BLOOD TYPING SEROLOGIC ABO: CPT

## 2020-01-03 PROCEDURE — 700102 HCHG RX REV CODE 250 W/ 637 OVERRIDE(OP): Performed by: EMERGENCY MEDICINE

## 2020-01-03 PROCEDURE — 80053 COMPREHEN METABOLIC PANEL: CPT

## 2020-01-03 PROCEDURE — 700111 HCHG RX REV CODE 636 W/ 250 OVERRIDE (IP): Performed by: EMERGENCY MEDICINE

## 2020-01-03 PROCEDURE — 30233N1 TRANSFUSION OF NONAUTOLOGOUS RED BLOOD CELLS INTO PERIPHERAL VEIN, PERCUTANEOUS APPROACH: ICD-10-PCS | Performed by: EMERGENCY MEDICINE

## 2020-01-03 PROCEDURE — 93005 ELECTROCARDIOGRAM TRACING: CPT

## 2020-01-03 PROCEDURE — 36430 TRANSFUSION BLD/BLD COMPNT: CPT

## 2020-01-03 PROCEDURE — 700105 HCHG RX REV CODE 258: Performed by: INTERNAL MEDICINE

## 2020-01-03 PROCEDURE — 83690 ASSAY OF LIPASE: CPT

## 2020-01-03 PROCEDURE — C9113 INJ PANTOPRAZOLE SODIUM, VIA: HCPCS | Performed by: INTERNAL MEDICINE

## 2020-01-03 PROCEDURE — 85025 COMPLETE CBC W/AUTO DIFF WBC: CPT

## 2020-01-03 PROCEDURE — 96366 THER/PROPH/DIAG IV INF ADDON: CPT

## 2020-01-03 PROCEDURE — 700102 HCHG RX REV CODE 250 W/ 637 OVERRIDE(OP): Performed by: INTERNAL MEDICINE

## 2020-01-03 RX ORDER — PROCHLORPERAZINE EDISYLATE 5 MG/ML
5-10 INJECTION INTRAMUSCULAR; INTRAVENOUS EVERY 4 HOURS PRN
Status: DISCONTINUED | OUTPATIENT
Start: 2020-01-03 | End: 2020-01-10 | Stop reason: HOSPADM

## 2020-01-03 RX ORDER — MORPHINE SULFATE 4 MG/ML
4 INJECTION, SOLUTION INTRAMUSCULAR; INTRAVENOUS
Status: DISCONTINUED | OUTPATIENT
Start: 2020-01-03 | End: 2020-01-06

## 2020-01-03 RX ORDER — IBUPROFEN 200 MG
600-800 TABLET ORAL EVERY 6 HOURS PRN
Status: ON HOLD | COMMUNITY
End: 2020-01-10

## 2020-01-03 RX ORDER — DIPHENHYDRAMINE HCL 25 MG
25 TABLET ORAL 2 TIMES DAILY PRN
COMMUNITY
End: 2022-06-04

## 2020-01-03 RX ORDER — POTASSIUM CHLORIDE 20 MEQ/1
40 TABLET, EXTENDED RELEASE ORAL ONCE
Status: COMPLETED | OUTPATIENT
Start: 2020-01-03 | End: 2020-01-03

## 2020-01-03 RX ORDER — ACETAMINOPHEN 325 MG/1
650 TABLET ORAL EVERY 6 HOURS PRN
Status: DISCONTINUED | OUTPATIENT
Start: 2020-01-03 | End: 2020-01-10 | Stop reason: HOSPADM

## 2020-01-03 RX ORDER — OXYCODONE HYDROCHLORIDE 10 MG/1
10 TABLET ORAL
Status: DISCONTINUED | OUTPATIENT
Start: 2020-01-03 | End: 2020-01-06

## 2020-01-03 RX ORDER — OXYCODONE HYDROCHLORIDE 5 MG/1
5 TABLET ORAL
Status: DISCONTINUED | OUTPATIENT
Start: 2020-01-03 | End: 2020-01-06

## 2020-01-03 RX ORDER — SODIUM CHLORIDE, SODIUM LACTATE, POTASSIUM CHLORIDE, CALCIUM CHLORIDE 600; 310; 30; 20 MG/100ML; MG/100ML; MG/100ML; MG/100ML
1000 INJECTION, SOLUTION INTRAVENOUS ONCE
Status: COMPLETED | OUTPATIENT
Start: 2020-01-03 | End: 2020-01-03

## 2020-01-03 RX ORDER — BISACODYL 10 MG
10 SUPPOSITORY, RECTAL RECTAL
Status: DISCONTINUED | OUTPATIENT
Start: 2020-01-03 | End: 2020-01-04

## 2020-01-03 RX ORDER — ACETAMINOPHEN 325 MG/1
650 TABLET ORAL ONCE
Status: COMPLETED | OUTPATIENT
Start: 2020-01-03 | End: 2020-01-03

## 2020-01-03 RX ORDER — SODIUM CHLORIDE AND POTASSIUM CHLORIDE 150; 900 MG/100ML; MG/100ML
INJECTION, SOLUTION INTRAVENOUS CONTINUOUS
Status: DISCONTINUED | OUTPATIENT
Start: 2020-01-03 | End: 2020-01-04

## 2020-01-03 RX ORDER — ONDANSETRON 2 MG/ML
4 INJECTION INTRAMUSCULAR; INTRAVENOUS EVERY 4 HOURS PRN
Status: DISCONTINUED | OUTPATIENT
Start: 2020-01-03 | End: 2020-01-10 | Stop reason: HOSPADM

## 2020-01-03 RX ORDER — ASPIRIN 325 MG
975 TABLET ORAL EVERY 6 HOURS PRN
COMMUNITY
End: 2020-01-16

## 2020-01-03 RX ORDER — POLYETHYLENE GLYCOL 3350 17 G/17G
1 POWDER, FOR SOLUTION ORAL
Status: DISCONTINUED | OUTPATIENT
Start: 2020-01-03 | End: 2020-01-04

## 2020-01-03 RX ORDER — SODIUM CHLORIDE 9 MG/ML
INJECTION, SOLUTION INTRAVENOUS CONTINUOUS
Status: DISPENSED | OUTPATIENT
Start: 2020-01-03 | End: 2020-01-04

## 2020-01-03 RX ORDER — ONDANSETRON 4 MG/1
4 TABLET, ORALLY DISINTEGRATING ORAL EVERY 4 HOURS PRN
Status: DISCONTINUED | OUTPATIENT
Start: 2020-01-03 | End: 2020-01-10 | Stop reason: HOSPADM

## 2020-01-03 RX ORDER — ONDANSETRON 4 MG/1
4 TABLET, ORALLY DISINTEGRATING ORAL EVERY 6 HOURS PRN
COMMUNITY
End: 2020-01-16 | Stop reason: SDUPTHER

## 2020-01-03 RX ORDER — AMOXICILLIN 250 MG
2 CAPSULE ORAL 2 TIMES DAILY
Status: DISCONTINUED | OUTPATIENT
Start: 2020-01-03 | End: 2020-01-04

## 2020-01-03 RX ORDER — PROMETHAZINE HYDROCHLORIDE 25 MG/1
12.5-25 TABLET ORAL EVERY 4 HOURS PRN
Status: DISCONTINUED | OUTPATIENT
Start: 2020-01-03 | End: 2020-01-10 | Stop reason: HOSPADM

## 2020-01-03 RX ORDER — IPRATROPIUM BROMIDE AND ALBUTEROL SULFATE 2.5; .5 MG/3ML; MG/3ML
3 SOLUTION RESPIRATORY (INHALATION)
Status: DISCONTINUED | OUTPATIENT
Start: 2020-01-03 | End: 2020-01-10 | Stop reason: HOSPADM

## 2020-01-03 RX ORDER — PANTOPRAZOLE SODIUM 40 MG/10ML
80 INJECTION, POWDER, LYOPHILIZED, FOR SOLUTION INTRAVENOUS ONCE
Status: COMPLETED | OUTPATIENT
Start: 2020-01-03 | End: 2020-01-03

## 2020-01-03 RX ORDER — PROMETHAZINE HYDROCHLORIDE 25 MG/1
12.5-25 SUPPOSITORY RECTAL EVERY 4 HOURS PRN
Status: DISCONTINUED | OUTPATIENT
Start: 2020-01-03 | End: 2020-01-10 | Stop reason: HOSPADM

## 2020-01-03 RX ADMIN — OXYCODONE HYDROCHLORIDE 10 MG: 10 TABLET ORAL at 21:13

## 2020-01-03 RX ADMIN — ONDANSETRON 4 MG: 2 INJECTION INTRAMUSCULAR; INTRAVENOUS at 21:31

## 2020-01-03 RX ADMIN — SODIUM CHLORIDE, POTASSIUM CHLORIDE, SODIUM LACTATE AND CALCIUM CHLORIDE 1000 ML: 600; 310; 30; 20 INJECTION, SOLUTION INTRAVENOUS at 15:00

## 2020-01-03 RX ADMIN — PANTOPRAZOLE SODIUM 80 MG: 40 INJECTION, POWDER, LYOPHILIZED, FOR SOLUTION INTRAVENOUS at 17:09

## 2020-01-03 RX ADMIN — ACETAMINOPHEN 650 MG: 325 TABLET, FILM COATED ORAL at 16:29

## 2020-01-03 RX ADMIN — SODIUM CHLORIDE 8 MG/HR: 9 INJECTION, SOLUTION INTRAVENOUS at 17:40

## 2020-01-03 RX ADMIN — POTASSIUM CHLORIDE 40 MEQ: 1500 TABLET, EXTENDED RELEASE ORAL at 16:29

## 2020-01-03 ASSESSMENT — COPD QUESTIONNAIRES
DO YOU EVER COUGH UP ANY MUCUS OR PHLEGM?: YES, A FEW DAYS A WEEK OR MONTH
DURING THE PAST 4 WEEKS HOW MUCH DID YOU FEEL SHORT OF BREATH: NONE/LITTLE OF THE TIME
COPD SCREENING SCORE: 6
HAVE YOU SMOKED AT LEAST 100 CIGARETTES IN YOUR ENTIRE LIFE: YES

## 2020-01-03 ASSESSMENT — ENCOUNTER SYMPTOMS
BRUISES/BLEEDS EASILY: 0
PHOTOPHOBIA: 0
FEVER: 0
WEIGHT LOSS: 0
VOMITING: 1
COUGH: 0
PALPITATIONS: 0
TREMORS: 0
DOUBLE VISION: 0
CHILLS: 0
HEMOPTYSIS: 0
NECK PAIN: 0
ORTHOPNEA: 0
SPUTUM PRODUCTION: 0
POLYDIPSIA: 0
BLOOD IN STOOL: 1
HEARTBURN: 0
FOCAL WEAKNESS: 0
BLURRED VISION: 0
DIARRHEA: 1
NAUSEA: 1
HALLUCINATIONS: 0
FLANK PAIN: 0
ABDOMINAL PAIN: 0
BACK PAIN: 1
HEADACHES: 0
NERVOUS/ANXIOUS: 0
SPEECH CHANGE: 0

## 2020-01-03 ASSESSMENT — LIFESTYLE VARIABLES
SUBSTANCE_ABUSE: 0
EVER_SMOKED: YES

## 2020-01-03 NOTE — H&P
Hospital Medicine History & Physical Note    Date of Service  1/3/2020    Primary Care Physician  Viviana Thorne M.D.    Consultants  Gastroenterology    Code Status  Full code    Chief Complaint  diarrhea with blood    History of Presenting Illness  64 y.o. female with history of COPD with chronic hypoxia, oxygen dependent, pancreatitis, osteoarthritis, chronic pain, history of cervical spine fusion who presented 1/3/2020 with complaints of diarrhea associated with rectal bleeding, nausea and vomiting in the last 2 weeks.  She denies abdominal pain to me.  Patient has been chronically taking ibuprofen for chronic pain, specifically for shoulder pain.  She reportedly had colonoscopy about 7 years ago which showed some polyps.  Upon evaluation patient found to have hemoglobin only 3.7.  RBC transfusion ordered and GI consulted.    Review of Systems  Review of Systems   Constitutional: Negative for chills, fever and weight loss.   HENT: Negative for ear pain, hearing loss and tinnitus.    Eyes: Negative for blurred vision, double vision and photophobia.   Respiratory: Negative for cough, hemoptysis and sputum production.    Cardiovascular: Negative for chest pain, palpitations and orthopnea.   Gastrointestinal: Positive for blood in stool, diarrhea, melena, nausea and vomiting. Negative for abdominal pain and heartburn.   Genitourinary: Negative for dysuria, flank pain, frequency and hematuria.   Musculoskeletal: Positive for back pain and joint pain. Negative for neck pain.   Skin: Negative for itching and rash.   Neurological: Negative for tremors, speech change, focal weakness and headaches.   Endo/Heme/Allergies: Negative for environmental allergies and polydipsia. Does not bruise/bleed easily.   Psychiatric/Behavioral: Negative for hallucinations and substance abuse. The patient is not nervous/anxious.        Past Medical History   has a past medical history of Anemia, Arthritis, Breath shortness, Bronchitis  (2008), Colon polyps (2015), Convulsions (HCC) (7/21/2009), COPD (chronic obstructive pulmonary disease) (HCC), Dental disorder, Depression, Diverticulosis (5/10 ), Hemorrhoid (7/29/2009), History of colonoscopy (  2005), Leukocytosis (3/30/2014), Lymphocytosis (symptomatic) (7/21/2009), MI (myocardial infarction) ([facility]) (4/29/2007), Other specified disorder of intestines, Pain, Pancreatitis, Pap smear (4/2006), Pneumonia (2013), Pulmonary nodule, Renal lesion, Renal stone, S/P colonoscopy (5/2010), Screening mammogram (never), Seizure (HCC), Shingles, Symptomatic menopausal or female climacteric states (7/21/2009), Type II or unspecified type diabetes mellitus without mention of complication, uncontrolled (7/21/2009), and Unspecified urinary incontinence.    Surgical History   has a past surgical history that includes tonsillectomy; eye surgery (laser); other orthopedic surgery; hip arthroplasty mis total (7/16/14); cervical fusion posterior (9/10/2014); gyn surgery (1992); other cardiac surgery (2007); and shoulder arthroplasty total (Left, 7/12/2017).     Family History  Cancer in her brother, mother, and sister; Diabetes in her brother, brother, and mother.     Social History  She smokes half pack a day according to her. She has a 12.50 pack-year smoking history. She has never used smokeless tobacco. She reports current drug use. She reports that she does not drink alcohol.    Allergies  Allergies   Allergen Reactions   • Aspartame Nausea     headache   • Augmentin Vomiting   • Latex Rash and Itching   • Lipitor [Atorvastatin Calcium] Swelling     Lip swelling   • Other Misc Vomiting     Bug spray   • Saccharin Nausea     Nausea and headache       Medications  Prior to Admission Medications   Prescriptions Last Dose Informant Patient Reported? Taking?   PredniSONE 10 MG (21) Tablet Therapy Pack   Yes No   aspirin (ASA) 325 MG TABS  Patient Yes No   Sig: Take 975 mg by mouth every 6 hours as needed (3 tabs).    ibuprofen (MOTRIN) 800 MG Tab   Yes No   multivitamin (THERAGRAN) TABS  Patient No No   Sig: Take 1 Tab by mouth every day.   tizanidine (ZANAFLEX) 2 MG tablet   Yes No   traZODone (DESYREL) 50 MG Tab   No No   Sig: TAKE ONE TABLET BY MOUTH AT BEDTIME AS NEEDED FOR SLEEP   Patient not taking: Reported on 5/3/2019   tramadol (ULTRAM) 50 MG Tab   Yes No   Sig: Take 50 mg by mouth.      Facility-Administered Medications: None       Physical Exam  Temp:  [36.1 °C (97 °F)] 36.1 °C (97 °F)  Pulse:  [102] 102  Resp:  [27] 27  BP: (127)/(69) 127/69  SpO2:  [95 %] 95 %    Physical Exam  Vitals signs and nursing note reviewed.   Constitutional:       General: She is not in acute distress.     Appearance: Normal appearance. She is ill-appearing.   HENT:      Head: Normocephalic and atraumatic.      Nose: Nose normal.      Mouth/Throat:      Mouth: Mucous membranes are moist.   Eyes:      Extraocular Movements: Extraocular movements intact.      Pupils: Pupils are equal, round, and reactive to light.   Neck:      Musculoskeletal: Normal range of motion and neck supple.   Cardiovascular:      Rate and Rhythm: Normal rate and regular rhythm.   Pulmonary:      Effort: Pulmonary effort is normal.      Breath sounds: Normal breath sounds.   Abdominal:      General: Abdomen is flat. There is no distension.      Tenderness: There is no tenderness.   Musculoskeletal: Normal range of motion.         General: No swelling or deformity.   Skin:     General: Skin is warm and dry.      Coloration: Skin is pale.   Neurological:      General: No focal deficit present.      Mental Status: She is alert and oriented to person, place, and time.   Psychiatric:         Mood and Affect: Mood normal.         Behavior: Behavior normal.         Laboratory:  Recent Labs     01/03/20  1411   WBC 9.0   RBC 1.98*   HEMOGLOBIN 3.7*   HEMATOCRIT 12.9*   MCV 65.7*   MCH 18.2*   MCHC 27.7*   RDW 46.3   PLATELETCT 573*   MPV 8.9*     Recent Labs      01/03/20  1411   SODIUM 140   POTASSIUM 3.1*   CHLORIDE 109   CO2 16*   GLUCOSE 172*   BUN 15   CREATININE 0.86   CALCIUM 7.8*     Recent Labs     01/03/20  1411   ALTSGPT 6   ASTSGOT 11*   ALKPHOSPHAT 73   TBILIRUBIN 0.2   LIPASE 101*   GLUCOSE 172*         No results for input(s): NTPROBNP in the last 72 hours.      No results for input(s): TROPONINT in the last 72 hours.    Urinalysis:    No results found     Imaging:  No orders to display         Assessment/Plan:  I anticipate this patient will require at least two midnights for appropriate medical management, necessitating inpatient admission.    GI bleed  Assessment & Plan  Unclear source of bleeding.  GI Bleed  - Admit patient and monitor carefully  -GI consulted and plan for endoscopy  - Continue Protonix drip  - Trend hemoglobins  -RBC transfusion for hemoglobin below 7      Acute on chronic respiratory failure with hypoxia (HCC)  Assessment & Plan  COPD and profound anemia  RBC transfusion  Supplemental oxygen  RT protocol    Arthritis of left shoulder region- (present on admission)  Assessment & Plan  Tylenol, oxycodone as needed  Avoid NSAID    Diabetes mellitus, type 2 (HCC)- (present on admission)  Assessment & Plan  Most recent A1c was 7.3   3 months ago  Patient is not on medications for diabetes.  We will start sliding scale    COPD (chronic obstructive pulmonary disease) (HCC)- (present on admission)  Assessment & Plan  RT protocol  Continue supplemental oxygen, DuoNeb as needed  Smoking cessation counseling    Tobacco use disorder- (present on admission)  Assessment & Plan  Spent approx 5 mins on Tobacco cessation education . Discussed options of nicotine patch, medical treatment with wellbutrin and chantix. Discussed the benefits of quitting smoking and risks of continued smoking including cardiovascular disease, cancer and COPD.   Code 14515        VTE prophylaxis: SCD

## 2020-01-03 NOTE — ED NOTES
Jemma from Lab called with critical result of Hgb at 3.7 and Hct at 12.9. Critical lab result read back to Jemma.   Dr. Cai notified of critical lab result at 9105.  Critical lab result read back by Dr. Cai.

## 2020-01-03 NOTE — ED PROVIDER NOTES
ED Provider Note    Scribed for Ligia Cai M.D. by Jeanne Flores. 1/3/2020, 2:30 PM.    Primary care provider: Viviana Thorne M.D.  Means of arrival: EMS   History obtained from: Patient   History limited by: None     CHIEF COMPLAINT  Chief Complaint   Patient presents with   • N/V   • Rectal Bleeding       HPI  Heidi Navas is a 64 y.o. female who presents to the Emergency Department for diarrhea onset 2 weeks ago. She reports associated abdominal pain, chills, rectal bleeding, nausea, and vomiting. The patient says that she has been experiencing all of her symptoms for 2 weeks.  She thought she had the flu but denies any fevers.  She has diffuse abdominal pain.  No sick contacts.  She states that her symptoms had somewhat resolved yesterday, but that today they had worsened which prompted her to call EMS.  Patient also reports that she is quite short of breath whenever she does any minimal exertion.  She denies any chest pain.  Patient denies fever.       REVIEW OF SYSTEMS  Pertinent positives include diarrhea, abdominal pain, rectal bleeding, chills, nausea, and vomiting. Pertinent negatives include no Fever.  All other systems reviewed and negative.    PAST MEDICAL HISTORY   has a past medical history of Anemia, Arthritis, Breath shortness, Bronchitis (2008), Colon polyps (2015), Convulsions ([facility]) (7/21/2009), COPD (chronic obstructive pulmonary disease) (HCC), Dental disorder, Depression, Diverticulosis (5/10 ), Hemorrhoid (7/29/2009), History of colonoscopy (  2005), Leukocytosis (3/30/2014), Lymphocytosis (symptomatic) (7/21/2009), MI (myocardial infarction) (HCC) (4/29/2007), Other specified disorder of intestines, Pain, Pancreatitis, Pap smear (4/2006), Pneumonia (2013), Pulmonary nodule, Renal lesion, Renal stone, S/P colonoscopy (5/2010), Screening mammogram (never), Seizure (HCC), Shingles, Symptomatic menopausal or female climacteric states (7/21/2009), Type II or unspecified type  "diabetes mellitus without mention of complication, uncontrolled (7/21/2009), and Unspecified urinary incontinence.    SURGICAL HISTORY   has a past surgical history that includes tonsillectomy; eye surgery (laser); other orthopedic surgery; hip arthroplasty mis total (7/16/14); cervical fusion posterior (9/10/2014); gyn surgery (1992); other cardiac surgery (2007); and shoulder arthroplasty total (Left, 7/12/2017).    SOCIAL HISTORY  Social History     Tobacco Use   • Smoking status: Current Every Day Smoker     Packs/day: 0.25     Years: 50.00     Pack years: 12.50     Types: Cigarettes   • Smokeless tobacco: Never Used   Substance Use Topics   • Alcohol use: No     Alcohol/week: 0.0 oz   • Drug use: Yes     Comment: MARIJUANA- 2 times per week       Social History     Substance and Sexual Activity   Drug Use Yes    Comment: MARIJUANA- 2 times per week        FAMILY HISTORY  Family History   Problem Relation Age of Onset   • Diabetes Mother    • Cancer Mother         bladder cancer   • Diabetes Brother    • Cancer Brother         bladder cancer   • Diabetes Brother    • Cancer Sister         bladder cancer       CURRENT MEDICATIONS  Home Medications    **Home medications have not yet been reviewed for this encounter**         ALLERGIES  Allergies   Allergen Reactions   • Aspartame Nausea     headache   • Augmentin Vomiting   • Latex Rash and Itching   • Lipitor [Atorvastatin Calcium] Swelling     Lip swelling   • Other Misc Vomiting     Bug spray   • Saccharin Nausea     Nausea and headache       PHYSICAL EXAM  VITAL SIGNS: /69   Pulse (!) 102   Temp 36.1 °C (97 °F) (Temporal)   Resp (!) 27   Ht 1.727 m (5' 8\")   Wt 77.1 kg (170 lb)   LMP 01/01/2007   SpO2 95%   BMI 25.85 kg/m²   Constitutional: Alert in no apparent distress.  HENT: No signs of trauma, Bilateral external ears normal, Nose normal.   Eyes: Pupils are equal and reactive, Conjunctiva pale, Non-icteric.   Neck: Normal range of motion, No " tenderness, Supple, No stridor.   Cardiovascular: Regular rate and rhythm, no murmurs.   Thorax & Lungs: Normal breath sounds, No respiratory distress, No wheezing, No chest tenderness.   Abdomen: Bowel sounds normal, Soft, No tenderness, No masses, No peritoneal signs.  Skin: Warm, Dry, No erythema, No rash.   Musculoskeletal:  No major deformities noted.  Neurologic: Alert, moving all extremities without difficulty, no focal deficits.  Rectal: Dark stool Hemoccult positive    LABS  Labs Reviewed   CBC WITH DIFFERENTIAL - Abnormal; Notable for the following components:       Result Value    RBC 1.98 (*)     Hemoglobin 3.7 (*)     Hematocrit 12.9 (*)     MCV 65.7 (*)     MCH 18.2 (*)     MCHC 27.7 (*)     Platelet Count 573 (*)     MPV 8.9 (*)     All other components within normal limits   COMP METABOLIC PANEL - Abnormal; Notable for the following components:    Potassium 3.1 (*)     Co2 16 (*)     Anion Gap 15.0 (*)     Glucose 172 (*)     Calcium 7.8 (*)     AST(SGOT) 11 (*)     Albumin 2.9 (*)     All other components within normal limits   LIPASE - Abnormal; Notable for the following components:    Lipase 101 (*)     All other components within normal limits   ESTIMATED GFR   PERIPHERAL SMEAR REVIEW   PLATELET ESTIMATE   MORPHOLOGY   DIFFERENTIAL COMMENT   COD (ADULT)   TRANSFUSE RED BLOOD CELLS-NURSING COMMUNICATION (UNITS)     All labs reviewed by me.    EKG  12 Lead EKG interpreted by me to show:  Normal sinus rhythm  Rate 97  Axis: Normal  Intervals: Normal  Normal T waves  A little ST depression inferiorly  My impression of this EK-3 AVF and V5-V6 slight half mm ST depression     COURSE & MEDICAL DECISION MAKING  Pertinent Labs & Imaging studies reviewed. (See chart for details)    Differential diagnoses include but are not limited to: Electrolyte abnormality anemia    2:30 PM - Patient seen and examined at bedside. Discussed plan of care with the patient which includes blood work. Gave the patient the  opportunity to ask any questions and she is agreeable to plan of care. Ordered estimated GFR, peripheral smear review, platelet estimate, morphology, differential comment, CBC with differential, CMP, and lipase to evaluate her symptoms.     3:32 PM - Paged Hospitalist.    3:44 PM - Paged GI.     3:44 PM - I discussed the patient's case and the above findings with Dr. Wilder (GI) who recommended PP drip and will consult.    3:45 PM - I discussed the patient's case and the above findings with Dr. Strong (hospitalist) who agrees to admission. Patient's care was transferred at this time.      Decision Making:  This is a 64 y.o. year old female who presents with vomiting and diarrhea for the last 2 weeks.  She is very weak and feels short of breath with any exertion.  Patient's labs are concerning as she has a significantly low hemoglobin at 3.7.  Rectal exam was performed and is Hemoccult positive.  On further questioning the patient reports taking NSAIDs for chronic shoulder pain.  She also reports getting street drugs to help deal with the pain as she was told she could not get any pain medications because her urine drug screen came back with methamphetamines.  She denies using methamphetamines but says this is because people around her smoke methamphetamine.  I suspect that she has an upper GI source given her NSAID use.  She was initially hypotensive but responded well to fluids.  She is not tachycardic she is alert and oriented.  She will be started on a PPI drip.  I initially spoke to GI consultants however the patient is ultimately a digestive health patient and they were consulted.  Patient will be hospitalized in guarded condition for GI bleed.    FINAL IMPRESSION  1. Gastrointestinal hemorrhage, unspecified gastrointestinal hemorrhage type    2. Anemia, unspecified type                This dictation has been created using voice recognition software and/or scribes. The accuracy of the dictation is  limited by the abilities of the software and the expertise of the scribes. I expect there may be some errors of grammar and possibly content. I made every attempt to manually correct the errors within my dictation. However, errors related to voice recognition software and/or scribes may still exist and should be interpreted within the appropriate context.     I, Jeanne Flores (Rogelioibsanthosh), am scribing for, and in the presence of, Ligia Cai M.D..    Electronically signed by: Jeanne Flores (Eddie), 1/3/2020    I, Ligia Cai M.D. personally performed the services described in this documentation, as scribed by Jeanne Flores in my presence, and it is both accurate and complete. E    The note accurately reflects work and decisions made by me.  Ligia Cai  1/3/2020  8:55 PM

## 2020-01-03 NOTE — ED TRIAGE NOTES
Ems called for n/v with diarrhea for past week.  Pt states she ran out of her zofran and has a hx of pancreatitis and did not want her vomiting to exacerbate that.  Pt noticed bright red while wiping after bm.  Hx of hemorrhoids.  Chronic pain to right shoulder.  Albuterol and duoneb given enroute.  Hx of copd.

## 2020-01-04 ENCOUNTER — ANESTHESIA (OUTPATIENT)
Dept: SURGERY | Facility: MEDICAL CENTER | Age: 65
DRG: 377 | End: 2020-01-04
Payer: MEDICARE

## 2020-01-04 ENCOUNTER — ANESTHESIA EVENT (OUTPATIENT)
Dept: SURGERY | Facility: MEDICAL CENTER | Age: 65
DRG: 377 | End: 2020-01-04
Payer: MEDICARE

## 2020-01-04 LAB
ANION GAP SERPL CALC-SCNC: 9 MMOL/L (ref 0–11.9)
BASOPHILS # BLD AUTO: 0.6 % (ref 0–1.8)
BASOPHILS # BLD: 0.05 K/UL (ref 0–0.12)
BUN SERPL-MCNC: 11 MG/DL (ref 8–22)
CALCIUM SERPL-MCNC: 8 MG/DL (ref 8.5–10.5)
CHLORIDE SERPL-SCNC: 113 MMOL/L (ref 96–112)
CO2 SERPL-SCNC: 20 MMOL/L (ref 20–33)
CREAT SERPL-MCNC: 0.78 MG/DL (ref 0.5–1.4)
EOSINOPHIL # BLD AUTO: 0.21 K/UL (ref 0–0.51)
EOSINOPHIL NFR BLD: 2.5 % (ref 0–6.9)
ERYTHROCYTE [DISTWIDTH] IN BLOOD BY AUTOMATED COUNT: 61.6 FL (ref 35.9–50)
GLUCOSE SERPL-MCNC: 141 MG/DL (ref 65–99)
HCT VFR BLD AUTO: 18.4 % (ref 37–47)
HGB BLD-MCNC: 5.6 G/DL (ref 12–16)
HGB BLD-MCNC: 7.6 G/DL (ref 12–16)
HGB BLD-MCNC: 7.7 G/DL (ref 12–16)
IMM GRANULOCYTES # BLD AUTO: 0.05 K/UL (ref 0–0.11)
IMM GRANULOCYTES NFR BLD AUTO: 0.6 % (ref 0–0.9)
IRON SATN MFR SERPL: 100 % (ref 15–55)
IRON SERPL-MCNC: 343 UG/DL (ref 40–170)
LYMPHOCYTES # BLD AUTO: 2 K/UL (ref 1–4.8)
LYMPHOCYTES NFR BLD: 23.4 % (ref 22–41)
MCH RBC QN AUTO: 22.3 PG (ref 27–33)
MCHC RBC AUTO-ENTMCNC: 30.4 G/DL (ref 33.6–35)
MCV RBC AUTO: 73.3 FL (ref 81.4–97.8)
MONOCYTES # BLD AUTO: 0.54 K/UL (ref 0–0.85)
MONOCYTES NFR BLD AUTO: 6.3 % (ref 0–13.4)
NEUTROPHILS # BLD AUTO: 5.7 K/UL (ref 2–7.15)
NEUTROPHILS NFR BLD: 66.6 % (ref 44–72)
NRBC # BLD AUTO: 0.06 K/UL
NRBC BLD-RTO: 0.7 /100 WBC
PLATELET # BLD AUTO: 518 K/UL (ref 164–446)
PMV BLD AUTO: 9.4 FL (ref 9–12.9)
POTASSIUM SERPL-SCNC: 3.9 MMOL/L (ref 3.6–5.5)
RBC # BLD AUTO: 2.51 M/UL (ref 4.2–5.4)
SODIUM SERPL-SCNC: 142 MMOL/L (ref 135–145)
TIBC SERPL-MCNC: 332 UG/DL (ref 250–450)
WBC # BLD AUTO: 8.6 K/UL (ref 4.8–10.8)

## 2020-01-04 PROCEDURE — 80048 BASIC METABOLIC PNL TOTAL CA: CPT

## 2020-01-04 PROCEDURE — 700105 HCHG RX REV CODE 258: Performed by: EMERGENCY MEDICINE

## 2020-01-04 PROCEDURE — P9016 RBC LEUKOCYTES REDUCED: HCPCS

## 2020-01-04 PROCEDURE — 83540 ASSAY OF IRON: CPT

## 2020-01-04 PROCEDURE — 160035 HCHG PACU - 1ST 60 MINS PHASE I: Performed by: INTERNAL MEDICINE

## 2020-01-04 PROCEDURE — 700102 HCHG RX REV CODE 250 W/ 637 OVERRIDE(OP): Performed by: INTERNAL MEDICINE

## 2020-01-04 PROCEDURE — 0DJ08ZZ INSPECTION OF UPPER INTESTINAL TRACT, VIA NATURAL OR ARTIFICIAL OPENING ENDOSCOPIC: ICD-10-PCS | Performed by: INTERNAL MEDICINE

## 2020-01-04 PROCEDURE — 86923 COMPATIBILITY TEST ELECTRIC: CPT

## 2020-01-04 PROCEDURE — 770020 HCHG ROOM/CARE - TELE (206)

## 2020-01-04 PROCEDURE — 700111 HCHG RX REV CODE 636 W/ 250 OVERRIDE (IP): Performed by: INTERNAL MEDICINE

## 2020-01-04 PROCEDURE — 85025 COMPLETE CBC W/AUTO DIFF WBC: CPT

## 2020-01-04 PROCEDURE — 96366 THER/PROPH/DIAG IV INF ADDON: CPT

## 2020-01-04 PROCEDURE — A9270 NON-COVERED ITEM OR SERVICE: HCPCS | Performed by: INTERNAL MEDICINE

## 2020-01-04 PROCEDURE — 36430 TRANSFUSION BLD/BLD COMPNT: CPT

## 2020-01-04 PROCEDURE — 85018 HEMOGLOBIN: CPT

## 2020-01-04 PROCEDURE — 160202 HCHG ENDO MINUTES - 1ST 30 MINS LEVEL 3: Performed by: INTERNAL MEDICINE

## 2020-01-04 PROCEDURE — 160009 HCHG ANES TIME/MIN: Performed by: INTERNAL MEDICINE

## 2020-01-04 PROCEDURE — 83550 IRON BINDING TEST: CPT

## 2020-01-04 PROCEDURE — C9113 INJ PANTOPRAZOLE SODIUM, VIA: HCPCS | Performed by: INTERNAL MEDICINE

## 2020-01-04 PROCEDURE — 700111 HCHG RX REV CODE 636 W/ 250 OVERRIDE (IP): Performed by: ANESTHESIOLOGY

## 2020-01-04 PROCEDURE — 36415 COLL VENOUS BLD VENIPUNCTURE: CPT

## 2020-01-04 PROCEDURE — 160048 HCHG OR STATISTICAL LEVEL 1-5: Performed by: INTERNAL MEDICINE

## 2020-01-04 PROCEDURE — 99232 SBSQ HOSP IP/OBS MODERATE 35: CPT | Performed by: INTERNAL MEDICINE

## 2020-01-04 PROCEDURE — 160002 HCHG RECOVERY MINUTES (STAT): Performed by: INTERNAL MEDICINE

## 2020-01-04 PROCEDURE — 700105 HCHG RX REV CODE 258: Performed by: INTERNAL MEDICINE

## 2020-01-04 PROCEDURE — 700101 HCHG RX REV CODE 250: Performed by: INTERNAL MEDICINE

## 2020-01-04 PROCEDURE — 700101 HCHG RX REV CODE 250: Performed by: ANESTHESIOLOGY

## 2020-01-04 PROCEDURE — 500066 HCHG BITE BLOCK, ECT: Performed by: INTERNAL MEDICINE

## 2020-01-04 RX ORDER — OMEPRAZOLE 20 MG/1
20 CAPSULE, DELAYED RELEASE ORAL DAILY
Status: DISCONTINUED | OUTPATIENT
Start: 2020-01-04 | End: 2020-01-10 | Stop reason: HOSPADM

## 2020-01-04 RX ORDER — DEXMEDETOMIDINE HYDROCHLORIDE 100 UG/ML
INJECTION, SOLUTION INTRAVENOUS PRN
Status: DISCONTINUED | OUTPATIENT
Start: 2020-01-04 | End: 2020-01-04 | Stop reason: SURG

## 2020-01-04 RX ADMIN — POTASSIUM CHLORIDE AND SODIUM CHLORIDE: 900; 150 INJECTION, SOLUTION INTRAVENOUS at 02:35

## 2020-01-04 RX ADMIN — SODIUM CHLORIDE 8 MG/HR: 9 INJECTION, SOLUTION INTRAVENOUS at 04:21

## 2020-01-04 RX ADMIN — OXYCODONE HYDROCHLORIDE 10 MG: 10 TABLET ORAL at 19:49

## 2020-01-04 RX ADMIN — PROPOFOL 80 MG: 10 INJECTION, EMULSION INTRAVENOUS at 13:25

## 2020-01-04 RX ADMIN — OXYCODONE HYDROCHLORIDE 10 MG: 10 TABLET ORAL at 08:16

## 2020-01-04 RX ADMIN — OXYCODONE HYDROCHLORIDE 10 MG: 10 TABLET ORAL at 02:35

## 2020-01-04 RX ADMIN — OXYCODONE HYDROCHLORIDE 10 MG: 10 TABLET ORAL at 16:51

## 2020-01-04 RX ADMIN — ONDANSETRON 4 MG: 4 TABLET, ORALLY DISINTEGRATING ORAL at 19:47

## 2020-01-04 RX ADMIN — ONDANSETRON 4 MG: 2 INJECTION INTRAMUSCULAR; INTRAVENOUS at 02:35

## 2020-01-04 RX ADMIN — SODIUM CHLORIDE: 9 INJECTION, SOLUTION INTRAVENOUS at 02:42

## 2020-01-04 RX ADMIN — PROCHLORPERAZINE EDISYLATE 10 MG: 5 INJECTION INTRAMUSCULAR; INTRAVENOUS at 08:17

## 2020-01-04 RX ADMIN — OMEPRAZOLE 20 MG: 20 CAPSULE, DELAYED RELEASE ORAL at 16:51

## 2020-01-04 RX ADMIN — DEXMEDETOMIDINE HYDROCHLORIDE 40 MCG: 100 INJECTION, SOLUTION INTRAVENOUS at 13:25

## 2020-01-04 RX ADMIN — GLYCOPYRROLATE 0.2 MG: 0.2 INJECTION INTRAMUSCULAR; INTRAVENOUS at 13:19

## 2020-01-04 ASSESSMENT — COGNITIVE AND FUNCTIONAL STATUS - GENERAL
PERSONAL GROOMING: A LITTLE
HELP NEEDED FOR BATHING: A LITTLE
STANDING UP FROM CHAIR USING ARMS: A LITTLE
SUGGESTED CMS G CODE MODIFIER MOBILITY: CJ
DAILY ACTIVITIY SCORE: 20
TOILETING: A LITTLE
DRESSING REGULAR UPPER BODY CLOTHING: A LITTLE
CLIMB 3 TO 5 STEPS WITH RAILING: A LITTLE
WALKING IN HOSPITAL ROOM: A LITTLE
MOBILITY SCORE: 21
SUGGESTED CMS G CODE MODIFIER DAILY ACTIVITY: CJ

## 2020-01-04 ASSESSMENT — LIFESTYLE VARIABLES
HAVE YOU EVER FELT YOU SHOULD CUT DOWN ON YOUR DRINKING: NO
HAVE PEOPLE ANNOYED YOU BY CRITICIZING YOUR DRINKING: NO
TOTAL SCORE: 0
EVER FELT BAD OR GUILTY ABOUT YOUR DRINKING: NO
ON A TYPICAL DAY WHEN YOU DRINK ALCOHOL HOW MANY DRINKS DO YOU HAVE: 0
CONSUMPTION TOTAL: NEGATIVE
EVER HAD A DRINK FIRST THING IN THE MORNING TO STEADY YOUR NERVES TO GET RID OF A HANGOVER: NO
DOES PATIENT WANT TO STOP DRINKING: NO
TOTAL SCORE: 0
AVERAGE NUMBER OF DAYS PER WEEK YOU HAVE A DRINK CONTAINING ALCOHOL: 0
HOW MANY TIMES IN THE PAST YEAR HAVE YOU HAD 5 OR MORE DRINKS IN A DAY: 0
TOTAL SCORE: 0
EVER_SMOKED: YES
ALCOHOL_USE: NO

## 2020-01-04 ASSESSMENT — ENCOUNTER SYMPTOMS
HEADACHES: 1
DIZZINESS: 0
VOMITING: 0
BLOOD IN STOOL: 0
PALPITATIONS: 0
ABDOMINAL PAIN: 0
NAUSEA: 0
FEVER: 0
CHILLS: 0

## 2020-01-04 ASSESSMENT — PAIN SCALES - GENERAL: PAIN_LEVEL: 0

## 2020-01-04 ASSESSMENT — PATIENT HEALTH QUESTIONNAIRE - PHQ9
2. FEELING DOWN, DEPRESSED, IRRITABLE, OR HOPELESS: NOT AT ALL
1. LITTLE INTEREST OR PLEASURE IN DOING THINGS: NOT AT ALL
SUM OF ALL RESPONSES TO PHQ9 QUESTIONS 1 AND 2: 0

## 2020-01-04 NOTE — OR SURGEON
Immediate Post OP Note    PreOp Diagnosis: Anemia    PostOp Diagnosis: 1. Gr A esophagitis 2. Duodenitis    Procedure(s):  GASTROSCOPY - Wound Class: Clean Contaminated    Surgeon(s):  Polo Andujar D.O.    Anesthesiologist/Type of Anesthesia:  Anesthesiologist: Mariano Rodriguez M.D./LETICIA    Surgical Staff:  Circulator: Lia Tracy  Endoscopy Technician: Maggie Adler    Specimens removed if any:  * No specimens in log *    Estimated Blood Loss: none    Findings: above    Complications: none    Rec: Oral PPI           Will discuss further inpatient/outpatient work up with patient        1/4/2020 1:51 PM Polo Andujar D.O.

## 2020-01-04 NOTE — ANESTHESIA QCDR
2019 [facility] Clinical Data Registry (for Quality Improvement)     Postoperative nausea/vomiting risk protocol (Adult = 18 yrs and Pediatric 3-17 yrs)- (430 and 463)  General inhalation anesthetic (NOT TIVA) with PONV risk factors: No  Provision of anti-emetic therapy with at least 2 different classes of agents: N/A  Patient DID NOT receive anti-emetic therapy and reason is documented in Medical Record: N/A    Multimodal Pain Management- (477)  Non-emergent surgery AND patient age >= 18: No  Use of Multimodal Pain Management, two or more drugs and/or interventions, NOT including systemic opioids:   Exception: Documented allergy to multiple classes of analgesics:     Smoking Abstinence (404)  Patient is current smoker (cigarette, pipe, e-cig, marijuanna): Yes  Elective Surgery: No  Abstinence instructions provided prior to day of surgery:   Patient abstained from smoking on day of surgery:     Pre-Op Beta-Blocker in Isolated CABG (44)  Isolated CABG AND patient age >= 18: No  Beta-blocker admin within 24 hours of surgical incision:   Exception:of medical reason(s) for not administering beta blocker within 24 hours prior to surgical incision (e.g., not  indicated,other medical reason):     PACU assessment of acute postoperative pain prior to Anesthesia Care End- Applies to Patients Age = 18- (ABG7)  Initial PACU pain score is which of the following: < 7/10  Patient unable to report pain score: N/A    Post-anesthetic transfer of care checklist/protocol to PACU/ICU- (426 and 427)  Upon conclusion of case, patient transferred to which of the following locations: PACU/Non-ICU  Use of transfer checklist/protocol: Yes  Exclusion: Service Performed in Patient Hospital Room (and thus did not require transfer): N/A  Unplanned admission to ICU related to anesthesia service up through end of PACU care- (MD51)  Unplanned admission to ICU (not initially anticipated at anesthesia start time): No

## 2020-01-04 NOTE — ED NOTES
Bedside report given to Jerome LAGOS. All questions answered. All belongings with pt. [place]

## 2020-01-04 NOTE — RESPIRATORY CARE
COPD EDUCATION by COPD CLINICAL EDUCATOR  1/4/2020 at 10:04 AM by Deborah Alvarado     Patient interviewed by COPD education team. Patient refused COPD program at this time.

## 2020-01-04 NOTE — ASSESSMENT & PLAN NOTE
-EGD shows LA Grade A esophagitis and duodenitis  -Colonoscopy: though her evaluation was limited by her poor prep. Slight proctitis appreciated in the rectum.   - Continue further IBD/ischemic colitis  -Transfusion threshold is less than 7  -oral PPI daily  -daily CBC  -GI on board: May patient need CT angios or wireless capsule  -Mesalamine per GI

## 2020-01-04 NOTE — ANESTHESIA POSTPROCEDURE EVALUATION
Patient: Heidi Navas    Procedure Summary     Date:  01/04/20 Room / Location:  [facility] OR 09 / SURGERY [facility]    Anesthesia Start:  1319 Anesthesia Stop:  1339    Procedure:  GASTROSCOPY (N/A ) Diagnosis:  (esophagitis,duodenitis)    Surgeon:  Polo Andujar D.O. Responsible Provider:  Mariano Rodriguez M.D.    Anesthesia Type:  general ASA Status:  3          Final Anesthesia Type: general  Last vitals  BP   Blood Pressure: 146/83    Temp   36.1 °C (97 °F)    Pulse   Pulse: 88   Resp   16    SpO2   95 %      Anesthesia Post Evaluation    Patient location during evaluation: PACU  Patient participation: complete - patient participated  Level of consciousness: awake and alert  Pain score: 0    Airway patency: patent  Anesthetic complications: no  Cardiovascular status: hemodynamically stable  Respiratory status: acceptable  Hydration status: euvolemic    PONV: none           Nurse Pain Score: 7 (NPRS)

## 2020-01-04 NOTE — PROGRESS NOTES
Pt escorted down to [facility] Pre-op for EGD. Tele box removed and placed at bedside table; monitor room notified.

## 2020-01-04 NOTE — PROGRESS NOTES
Assumed care of patient at bedside report from NOC RN. Updated on POC. Patient currently A & O x 4; on 0.5 L O2 nasal cannula; up standby assist; without complaints of acute pain. Call light within reach. Whiteboard updated. Fall precautions in place. Bed locked and in lowest position. All questions answered. No other needs indicated at this time.

## 2020-01-04 NOTE — ED NOTES
Critical hemoglobin 5.6 notified by Jensen in lab. MD updated, new order for 1 additional unit of blood placed. To be ran on the floor.

## 2020-01-04 NOTE — ED NOTES
Valdemar from Lab called with critical result of hgb 4.5 and hct 15.0 at 2120. Critical lab result read back to Valdemar.   Dr. Art paged at 2125 and notified at 2127 of critical lab result.  Critical lab result read back by Dr. Art. Primary RN notified at 2126, orders received by primary RN.

## 2020-01-04 NOTE — CARE PLAN
Problem: Communication  Goal: The ability to communicate needs accurately and effectively will improve  Outcome: PROGRESSING AS EXPECTED     Problem: Safety  Goal: Will remain free from injury  Outcome: PROGRESSING AS EXPECTED  Goal: Will remain free from falls  Outcome: PROGRESSING AS EXPECTED     Problem: Venous Thromboembolism (VTW)/Deep Vein Thrombosis (DVT) Prevention:  Goal: Patient will participate in Venous Thrombosis (VTE)/Deep Vein Thrombosis (DVT)Prevention Measures  Outcome: PROGRESSING AS EXPECTED     Problem: Bowel/Gastric:  Goal: Normal bowel function is maintained or improved  Outcome: PROGRESSING SLOWER THAN EXPECTED  Goal: Will not experience complications related to bowel motility  Outcome: PROGRESSING SLOWER THAN EXPECTED     Problem: Knowledge Deficit  Goal: Knowledge of disease process/condition, treatment plan, diagnostic tests, and medications will improve  Outcome: PROGRESSING AS EXPECTED  Goal: Knowledge of the prescribed therapeutic regimen will improve  Outcome: PROGRESSING AS EXPECTED

## 2020-01-04 NOTE — CONSULTS
DATE OF SERVICE:  01/03/2020    GASTROINTESTINAL CONSULTATION    REQUESTING PHYSICIAN:  Stevie Strong MD    REASON FOR CONSULTATION:  GI bleed.    HISTORY OF PRESENT ILLNESS:  The patient is a 64-year-old female who presents   to the emergency room today via ambulance with complaint of weakness and   prolonged diarrhea.    She states that for the past 2 weeks, she has felt generally unwell with   myalgias, nausea, and diarrhea.  She notes the stools have been tarry black in   nature.  She denies any bright red rectal bleeding.  She has had no abdominal   pain, but her appetite has been diminished.    She admits to taking nonsteroidal anti-inflammatory medications regularly for   her shoulder pain.    The patient has undergone prior endoscopy and colonoscopy at [facility].  She believes her last procedures were in 2015.  She has a history   of colon polyps, but does not recall the results of her upper endoscopy.    Additionally, she has a history of pancreatitis, likely related to alcohol   consumption.  She states she no longer consumes alcohol.    REVIEW OF SYSTEMS:  PSYCHIATRIC:  Positive for history of depression.  MUSCULOSKELETAL:  Positive for back and shoulder pain.  DERMATOLOGIC:  No rash or pruritus.  GASTROINTESTINAL:  As above.  CARDIOVASCULAR:  Positive for dyspnea with exertion.  PULMONARY:  No cough or wheezing.  ALLERGY AND IMMUNOLOGY:  No hay fever.  ENDOCRINE:  Denies polyuria or polydipsia.  CONSTITUTIONAL:  Positive for fatigue.  GENITOURINARY:  Denies dysuria or hematuria.    PAST MEDICAL HISTORY:  Colon polyps, anemia, arthritis, chronic back pain,   COPD, diverticulosis, coronary artery disease, pancreatitis, nephrolithiasis,   and shingles.    PAST SURGICAL HISTORY:  Endoscopy, colonoscopy, tonsillectomy, shoulder   surgery, hip arthroplasty, and eye surgery.    OUTPATIENT MEDICATIONS:  Aspirin, Motrin, multivitamin, Zanaflex, trazodone,   and tramadol.    ALLERGIES:   ASPARTAME, Augmentin, LASIX, AND LIPITOR.    SOCIAL HISTORY:  She lives alone.  She has an assistant to help her with daily   activities.  She does smoke cigarettes.  She denies alcohol use.  She has   used methamphetamine in the past.    FAMILY HISTORY:  Noncontributory.    PHYSICAL EXAMINATION:  VITAL SIGNS:  Temperature is 37.1, pulse 96, respirations 17, /48, O2   sat 100%, 3 L.  GENERAL APPEARANCE:  Obese female.  She is alert and oriented x3.  HEENT:  Sclerae are anicteric.  Oropharynx moist.  NECK:  Supple.  No adenopathy.  HEART:  Regular rate and rhythm.  LUNGS:  Show scattered rhonchi.  ABDOMEN:  Obese, soft, nontender with positive bowel sounds.  There is no   guarding, rebound, or rigidity.  EXTREMITIES:  Warm and dry without clubbing, cyanosis, or edema.    LABORATORY DATA:  White count 9, ____ 12, MCV 65, platelet count 573,000.    Sodium 140, potassium 3.1, chloride 109, bicarbonate 16, BUN 15, creatinine   0.8, AST 11, ALT 6, alkaline phosphatase 73, bilirubin 0.2.  Lipase 101.    ASSESSMENT AND PLAN:  1.  A 64-year-old female with profound anemia.  This is likely chronic in   nature related to gastrointestinal blood loss.  She is at risk for upper   gastrointestinal bleeding related to nonsteroidal anti-inflammatory   drug-induced ulcer disease.  PLAN:  The patient will be admitted to the   telemetry floor.  She will receive 3 units packed red blood cell transfusions   initially.  Hematocrit should be monitored serially.  Would start empiric   therapy with IV proton pump inhibitor infusion.  We will proceed with upper   endoscopy tomorrow.  Informed consent was obtained after explanation of risks,   benefits, and alternatives.  She may need further evaluation.  If cause of   her anemia is not diagnosed on endoscopy, she may need further evaluation with   colonoscopy and capsule endoscopy.  2.  Metabolic acidosis, likely related to poor perfusion related to her   profound anemia.  3.   Obesity.  4.  Chronic pain.  5.  History of colon polyps.       ____________________________________     DO BENNY CALVIN / MARCELINA    DD:  01/03/2020 17:45:55  DT:  01/03/2020 18:09:32    D#:  [id number]  Job#:  [id number]

## 2020-01-04 NOTE — CARE PLAN
Problem: Safety  Goal: Will remain free from injury  Outcome: PROGRESSING AS EXPECTED  Goal: Will remain free from falls  Outcome: PROGRESSING AS EXPECTED     Problem: Venous Thromboembolism (VTW)/Deep Vein Thrombosis (DVT) Prevention:  Goal: Patient will participate in Venous Thrombosis (VTE)/Deep Vein Thrombosis (DVT)Prevention Measures  Outcome: PROGRESSING AS EXPECTED     Problem: Bowel/Gastric:  Goal: Normal bowel function is maintained or improved  Outcome: PROGRESSING AS EXPECTED  Note:   No BM since admit, plan for EGD this morning  Goal: Will not experience complications related to bowel motility  Outcome: PROGRESSING AS EXPECTED     Problem: Knowledge Deficit  Goal: Knowledge of disease process/condition, treatment plan, diagnostic tests, and medications will improve  Outcome: PROGRESSING AS EXPECTED  Goal: Knowledge of the prescribed therapeutic regimen will improve  Outcome: PROGRESSING AS EXPECTED     Problem: Pain Management  Goal: Pain level will decrease to patient's comfort goal  Outcome: PROGRESSING AS EXPECTED  Note:   Chronic shoulder pain controlled with PRN oxycodone     Problem: Psychosocial Needs:  Goal: Level of anxiety will decrease  Outcome: PROGRESSING AS EXPECTED  Flowsheets (Taken 1/4/2020 0511)  Patient Behaviors: Anxious; Irritable  Note:   Pt anxious and irritable at times. Emotional support provided     Problem: Infection  Goal: Will remain free from infection  Outcome: MET     Problem: Discharge Barriers/Planning  Goal: Patient's continuum of care needs will be met  Note:   Third bag PRBC infusing. Plan for EGD this morning. Pt symptoms improving

## 2020-01-04 NOTE — ED NOTES
Med Rec Updated and Complete per Pt at bedside  Allergies Reviewed  No PO ABX Last 14 days.    Pt reports taking 975mg of ASA every 6 hours as needed for pain as well as 600-800mg Advil every 6 hours as needed for pain. She says she takes them for her back.

## 2020-01-04 NOTE — ASSESSMENT & PLAN NOTE
Most recent A1c was 7.3   3 months ago, now 6.3  Patient is not on medications for diabetes.  We will start sliding scale, sugars remain well controlled at this time

## 2020-01-04 NOTE — ANESTHESIA PREPROCEDURE EVALUATION
Relevant Problems   PULMONARY   (+) COPD (chronic obstructive pulmonary disease) (HCC)   (+) Chronic airway obstruction, not elsewhere classified      CARDIAC   (+) Atherosclerosis   (+) Hypertension      GI   (+) Esophageal reflux         (+) Renal stone      ENDO   (+) Controlled type 2 diabetes mellitus without complication, without long-term current use of insulin (HCC)   (+) Diabetes mellitus, type 2 (HCC)      Other   (+) Arthritis   (+) Arthritis of left shoulder region       Physical Exam    Airway   Mallampati: II  TM distance: >3 FB  Neck ROM: full       Cardiovascular - normal exam  Rhythm: regular  Rate: normal  (-) murmur     Dental - normal exam         Pulmonary - normal exam  Breath sounds clear to auscultation     Abdominal    Neurological - normal exam                 Anesthesia Plan    ASA 3   ASA physical status 3 criteria: COPD    Plan - general       Airway plan will be natural airway      Plan Factors:   Patient was not previously instructed to abstain from smoking on day of procedure.  Patient smoked on day of procedure.      Induction: intravenous    Postoperative Plan: Postoperative administration of opioids is intended.    Pertinent diagnostic labs and testing reviewed    Informed Consent:    Anesthetic plan and risks discussed with patient.    Use of blood products discussed with: patient whom consented to blood products.

## 2020-01-04 NOTE — OP REPORT
DATE OF SERVICE:  01/04/2020    GI PROCEDURE NOTE    PREOPERATIVE DIAGNOSIS:  Profound anemia.    POSTOPERATIVE DIAGNOSES:  1.  Grade A esophagitis.  2.  Duodenitis.  3.  No evidence of significant upper gastrointestinal bleeding source.    PROCEDURE:  Esophagogastroduodenoscopy.    HISTORY OF PRESENT ILLNESS:  This is a 64-year-old female with a history of   anemia in the past with previous extensive GI workup.  She presents with   profound iron deficiency anemia.  Informed consent was obtained after   explanation of risks, benefits, and alternatives.    DESCRIPTION OF PROCEDURE:  The procedure was  performed in the main OR at   [facility].  Patient was placed under deep sedation and monitored   by anesthesiology throughout the procedure.  After adequate sedation was   achieved, the Olympus  endoscope was passed via the oropharynx into the   esophagus without difficulty.  It was then advanced while visualizing the   lumen.  The proximal and mid esophagus were normal.  Distal esophagus showed   grade A esophagitis without bleeding.  The endoscope passed easily through the   GE junction to the stomach.  It was then advanced through the pylorus into   the duodenum.  Third portion of duodenum was reached and was normal.  There   was no blood present within the upper GI tract.  The endoscope was then   withdrawn to the duodenal bulb where mild duodenitis was seen.  The pylorus   was normal.  Gastric antrum and body were normal.  Retroflexion of the   endoscope revealed normal gastric angularis, cardia, and fundus.  The   endoscope was straightened and withdrawn.  The patient tolerated the procedure   well.  There were no apparent complications.    IMPRESSION AND PLAN:  This is a 64-year-old female with profound iron   deficiency anemia.  Upper endoscopy shows grade A esophagitis and duodenitis.    Recommend therapy with oral proton pump inhibitor.  She will need further   workup with colonoscopy and  possibly capsule endoscopy.  This can be pursued   inpatient versus outpatient, which will be discussed with the patient.       ____________________________________     JOEL KAY DO    PIS / NTS    DD:  01/04/2020 14:01:50  DT:  01/04/2020 14:28:38    D#:  [id number]  Job#:  [id number]    cc: CORTES COSTELLO MD

## 2020-01-04 NOTE — ANESTHESIA TIME REPORT
Anesthesia Start and Stop Event Times     Date Time Event    1/4/2020 1247 Ready for Procedure     1319 Anesthesia Start     1339 Anesthesia Stop        Responsible Staff  01/04/20    Name Role Begin End    Mariano Rodriguez M.D. Anesth 1319 1339        Preop Diagnosis (Free Text):  Pre-op Diagnosis     profound anemia        Preop Diagnosis (Codes):    Post op Diagnosis  Anemia      Premium Reason  E. Weekend    Comments:

## 2020-01-04 NOTE — PROGRESS NOTES
Patient is AO x 4, RASS -1, and denies pain/nausea in PACU.  VSS on 1L NC.  POC reviewed, PIV verified, and safety protocols in place.  Tolerating PO fluids.  Daughter Virginia updated over the phone per patient request.  Report given to Chinyere LAGOS.

## 2020-01-04 NOTE — PROGRESS NOTES
2 RN skin check completed with Yodit LAGOS.  Devices in place oxygen.  Skin assessed under devices oxygen tubing.  Confirmed pressure ulcers found on n/a.  New potential pressure ulcers noted on n/a. Wound consult placed n/a.    No wounds or pressure ulcers found on assessment. Bilateral ears pink/blanching.    The following interventions in place: Silicone nasal cannula applied. Pressure redistribution mattress in place. Frequent repositioning encouraged.

## 2020-01-05 ENCOUNTER — ANESTHESIA EVENT (OUTPATIENT)
Dept: SURGERY | Facility: MEDICAL CENTER | Age: 65
DRG: 377 | End: 2020-01-05
Payer: MEDICARE

## 2020-01-05 ENCOUNTER — ANESTHESIA (OUTPATIENT)
Dept: SURGERY | Facility: MEDICAL CENTER | Age: 65
DRG: 377 | End: 2020-01-05
Payer: MEDICARE

## 2020-01-05 LAB
ANION GAP SERPL CALC-SCNC: 9 MMOL/L (ref 0–11.9)
BASOPHILS # BLD AUTO: 0.3 % (ref 0–1.8)
BASOPHILS # BLD AUTO: 0.4 % (ref 0–1.8)
BASOPHILS # BLD: 0.04 K/UL (ref 0–0.12)
BASOPHILS # BLD: 0.06 K/UL (ref 0–0.12)
BUN SERPL-MCNC: 8 MG/DL (ref 8–22)
CALCIUM SERPL-MCNC: 7.9 MG/DL (ref 8.5–10.5)
CHLORIDE SERPL-SCNC: 109 MMOL/L (ref 96–112)
CO2 SERPL-SCNC: 20 MMOL/L (ref 20–33)
CREAT SERPL-MCNC: 0.69 MG/DL (ref 0.5–1.4)
EOSINOPHIL # BLD AUTO: 0.49 K/UL (ref 0–0.51)
EOSINOPHIL # BLD AUTO: 0.5 K/UL (ref 0–0.51)
EOSINOPHIL NFR BLD: 3.6 % (ref 0–6.9)
EOSINOPHIL NFR BLD: 4 % (ref 0–6.9)
ERYTHROCYTE [DISTWIDTH] IN BLOOD BY AUTOMATED COUNT: 65.5 FL (ref 35.9–50)
ERYTHROCYTE [DISTWIDTH] IN BLOOD BY AUTOMATED COUNT: 67.7 FL (ref 35.9–50)
EST. AVERAGE GLUCOSE BLD GHB EST-MCNC: 128 MG/DL
GLUCOSE SERPL-MCNC: 175 MG/DL (ref 65–99)
HBA1C MFR BLD: 6.1 % (ref 0–5.6)
HCT VFR BLD AUTO: 23.9 % (ref 37–47)
HCT VFR BLD AUTO: 26.6 % (ref 37–47)
HGB BLD-MCNC: 7.3 G/DL (ref 12–16)
HGB BLD-MCNC: 8 G/DL (ref 12–16)
IMM GRANULOCYTES # BLD AUTO: 0.1 K/UL (ref 0–0.11)
IMM GRANULOCYTES # BLD AUTO: 0.11 K/UL (ref 0–0.11)
IMM GRANULOCYTES NFR BLD AUTO: 0.7 % (ref 0–0.9)
IMM GRANULOCYTES NFR BLD AUTO: 0.9 % (ref 0–0.9)
LYMPHOCYTES # BLD AUTO: 1.27 K/UL (ref 1–4.8)
LYMPHOCYTES # BLD AUTO: 1.32 K/UL (ref 1–4.8)
LYMPHOCYTES NFR BLD: 10.3 % (ref 22–41)
LYMPHOCYTES NFR BLD: 9.6 % (ref 22–41)
MCH RBC QN AUTO: 23.1 PG (ref 27–33)
MCH RBC QN AUTO: 23.4 PG (ref 27–33)
MCHC RBC AUTO-ENTMCNC: 30.1 G/DL (ref 33.6–35)
MCHC RBC AUTO-ENTMCNC: 30.5 G/DL (ref 33.6–35)
MCV RBC AUTO: 76.6 FL (ref 81.4–97.8)
MCV RBC AUTO: 76.9 FL (ref 81.4–97.8)
MONOCYTES # BLD AUTO: 0.7 K/UL (ref 0–0.85)
MONOCYTES # BLD AUTO: 0.71 K/UL (ref 0–0.85)
MONOCYTES NFR BLD AUTO: 5.1 % (ref 0–13.4)
MONOCYTES NFR BLD AUTO: 5.7 % (ref 0–13.4)
NEUTROPHILS # BLD AUTO: 11.1 K/UL (ref 2–7.15)
NEUTROPHILS # BLD AUTO: 9.75 K/UL (ref 2–7.15)
NEUTROPHILS NFR BLD: 78.8 % (ref 44–72)
NEUTROPHILS NFR BLD: 80.6 % (ref 44–72)
NRBC # BLD AUTO: 0.03 K/UL
NRBC # BLD AUTO: 0.03 K/UL
NRBC BLD-RTO: 0.2 /100 WBC
NRBC BLD-RTO: 0.2 /100 WBC
PLATELET # BLD AUTO: 525 K/UL (ref 164–446)
PLATELET # BLD AUTO: 551 K/UL (ref 164–446)
PMV BLD AUTO: 8.8 FL (ref 9–12.9)
PMV BLD AUTO: 9 FL (ref 9–12.9)
POTASSIUM SERPL-SCNC: 3.8 MMOL/L (ref 3.6–5.5)
RBC # BLD AUTO: 3.12 M/UL (ref 4.2–5.4)
RBC # BLD AUTO: 3.46 M/UL (ref 4.2–5.4)
SODIUM SERPL-SCNC: 138 MMOL/L (ref 135–145)
WBC # BLD AUTO: 12.4 K/UL (ref 4.8–10.8)
WBC # BLD AUTO: 13.8 K/UL (ref 4.8–10.8)

## 2020-01-05 PROCEDURE — A9270 NON-COVERED ITEM OR SERVICE: HCPCS | Performed by: INTERNAL MEDICINE

## 2020-01-05 PROCEDURE — 770020 HCHG ROOM/CARE - TELE (206)

## 2020-01-05 PROCEDURE — 700102 HCHG RX REV CODE 250 W/ 637 OVERRIDE(OP): Performed by: INTERNAL MEDICINE

## 2020-01-05 PROCEDURE — 83036 HEMOGLOBIN GLYCOSYLATED A1C: CPT

## 2020-01-05 PROCEDURE — 85025 COMPLETE CBC W/AUTO DIFF WBC: CPT

## 2020-01-05 PROCEDURE — 36415 COLL VENOUS BLD VENIPUNCTURE: CPT

## 2020-01-05 PROCEDURE — 99233 SBSQ HOSP IP/OBS HIGH 50: CPT | Performed by: INTERNAL MEDICINE

## 2020-01-05 PROCEDURE — 700111 HCHG RX REV CODE 636 W/ 250 OVERRIDE (IP): Performed by: FAMILY MEDICINE

## 2020-01-05 PROCEDURE — 700111 HCHG RX REV CODE 636 W/ 250 OVERRIDE (IP): Performed by: INTERNAL MEDICINE

## 2020-01-05 PROCEDURE — 700105 HCHG RX REV CODE 258: Performed by: INTERNAL MEDICINE

## 2020-01-05 PROCEDURE — 80048 BASIC METABOLIC PNL TOTAL CA: CPT

## 2020-01-05 RX ORDER — PEG-3350, SODIUM SULFATE, SODIUM CHLORIDE, POTASSIUM CHLORIDE, SODIUM ASCORBATE AND ASCORBIC ACID 7.5-2.691G
100 KIT ORAL 2 TIMES DAILY
Status: COMPLETED | OUTPATIENT
Start: 2020-01-05 | End: 2020-01-05

## 2020-01-05 RX ORDER — HYDRALAZINE HYDROCHLORIDE 20 MG/ML
10 INJECTION INTRAMUSCULAR; INTRAVENOUS ONCE
Status: COMPLETED | OUTPATIENT
Start: 2020-01-05 | End: 2020-01-05

## 2020-01-05 RX ORDER — SODIUM CHLORIDE 9 MG/ML
INJECTION, SOLUTION INTRAVENOUS CONTINUOUS
Status: DISCONTINUED | OUTPATIENT
Start: 2020-01-05 | End: 2020-01-09

## 2020-01-05 RX ORDER — LISINOPRIL 5 MG/1
10 TABLET ORAL
Status: DISCONTINUED | OUTPATIENT
Start: 2020-01-05 | End: 2020-01-10 | Stop reason: HOSPADM

## 2020-01-05 RX ADMIN — ACETAMINOPHEN 650 MG: 325 TABLET, FILM COATED ORAL at 01:55

## 2020-01-05 RX ADMIN — POLYETHYLENE GLYCOL 3350, SODIUM SULFATE, SODIUM CHLORIDE, POTASSIUM CHLORIDE, ASCORBIC ACID, SODIUM ASCORBATE 100 G: KIT at 18:25

## 2020-01-05 RX ADMIN — LISINOPRIL 10 MG: 5 TABLET ORAL at 09:12

## 2020-01-05 RX ADMIN — OXYCODONE HYDROCHLORIDE 10 MG: 10 TABLET ORAL at 04:50

## 2020-01-05 RX ADMIN — OXYCODONE HYDROCHLORIDE 10 MG: 10 TABLET ORAL at 11:46

## 2020-01-05 RX ADMIN — ONDANSETRON 4 MG: 2 INJECTION INTRAMUSCULAR; INTRAVENOUS at 15:06

## 2020-01-05 RX ADMIN — OXYCODONE HYDROCHLORIDE 10 MG: 10 TABLET ORAL at 00:00

## 2020-01-05 RX ADMIN — OXYCODONE HYDROCHLORIDE 10 MG: 10 TABLET ORAL at 08:41

## 2020-01-05 RX ADMIN — OXYCODONE HYDROCHLORIDE 10 MG: 10 TABLET ORAL at 19:06

## 2020-01-05 RX ADMIN — POLYETHYLENE GLYCOL 3350, SODIUM SULFATE, SODIUM CHLORIDE, POTASSIUM CHLORIDE, ASCORBIC ACID, SODIUM ASCORBATE 100 G: KIT at 14:29

## 2020-01-05 RX ADMIN — OXYCODONE HYDROCHLORIDE 10 MG: 10 TABLET ORAL at 22:44

## 2020-01-05 RX ADMIN — HYDRALAZINE HYDROCHLORIDE 10 MG: 20 INJECTION INTRAMUSCULAR; INTRAVENOUS at 03:02

## 2020-01-05 RX ADMIN — SODIUM CHLORIDE: 9 INJECTION, SOLUTION INTRAVENOUS at 15:06

## 2020-01-05 RX ADMIN — OMEPRAZOLE 20 MG: 20 CAPSULE, DELAYED RELEASE ORAL at 04:50

## 2020-01-05 RX ADMIN — OXYCODONE HYDROCHLORIDE 10 MG: 10 TABLET ORAL at 15:05

## 2020-01-05 RX ADMIN — HYDRALAZINE HYDROCHLORIDE 10 MG: 20 INJECTION INTRAMUSCULAR; INTRAVENOUS at 01:18

## 2020-01-05 RX ADMIN — ONDANSETRON 4 MG: 2 INJECTION INTRAMUSCULAR; INTRAVENOUS at 19:03

## 2020-01-05 ASSESSMENT — ENCOUNTER SYMPTOMS
SHORTNESS OF BREATH: 0
FEVER: 0
DIARRHEA: 0
CONSTIPATION: 0
BLOOD IN STOOL: 0
COUGH: 0
CHILLS: 0
NAUSEA: 0
ABDOMINAL PAIN: 0
VOMITING: 0
DIZZINESS: 0
HEADACHES: 0

## 2020-01-05 NOTE — CARE PLAN
Problem: Communication  Goal: The ability to communicate needs accurately and effectively will improve  Outcome: MET  Note:   Pt educated on use of call light and white board for communication, pt verbalizes understanding of white board communication and times of rounding.      Problem: Pain Management  Goal: Pain level will decrease to patient's comfort goal  Outcome: PROGRESSING SLOWER THAN EXPECTED  Note:   Pain assessed Q2h. Pain medication given per orders, see MAR.

## 2020-01-05 NOTE — PROGRESS NOTES
Gastroenterology Progress Note     Author: Polo Andujar   Date & Time Created: 2020 11:24 AM    Chief Complaint:  Anemia    Interval History:  Only complaint is of pain in R shoulder.    Review of Systems:  Review of Systems   Constitutional: Negative for chills and fever.   Gastrointestinal: Negative for abdominal pain, blood in stool, constipation, diarrhea, melena, nausea and vomiting.       Physical Exam:  Physical Exam    Labs:          Recent Labs     20   SODIUM 140 142 138   POTASSIUM 3.1* 3.9 3.8   CHLORIDE 109 113* 109   CO2 16* 20 20   BUN 15 11 8   CREATININE 0.86 0.78 0.69   CALCIUM 7.8* 8.0* 7.9*     Recent Labs     20   ALTSGPT 6  --   --    ASTSGOT 11*  --   --    ALKPHOSPHAT 73  --   --    TBILIRUBIN 0.2  --   --    LIPASE 101*  --   --    GLUCOSE 172* 141* 175*     Recent Labs     20  1649 20  0926   RBC 2.51*  --   --  3.12* 3.46*   HEMOGLOBIN 5.6* 7.7* 7.6* 7.3* 8.0*   HEMATOCRIT 18.4*  --   --  23.9* 26.6*   PLATELETCT 518*  --   --  525* 551*   IRON  --  343*  --   --   --    TOTIRONBC  --  332  --   --   --      Recent Labs     20  0926   WBC 9.0 8.6 12.4* 13.8*   NEUTSPOLYS 68.40 66.60 78.80* 80.60*   LYMPHOCYTES 25.10 23.40 10.30* 9.60*   MONOCYTES 3.70 6.30 5.70 5.10   EOSINOPHILS 2.10 2.50 4.00 3.60   BASOPHILS 0.10 0.60 0.30 0.40   ASTSGOT 11*  --   --   --    ALTSGPT 6  --   --   --    ALKPHOSPHAT 73  --   --   --    TBILIRUBIN 0.2  --   --   --      Hemodynamics:  Temp (24hrs), Av.7 °C (98 °F), Min:36.1 °C (97 °F), Max:37.6 °C (99.6 °F)  Temperature: 36.6 °C (97.8 °F)  Pulse  Av.9  Min: 75  Max: 121   Blood Pressure: 135/67     Respiratory:    Respiration: 16, Pulse Oximetry: 96 %        RUL Breath Sounds: Clear, RML Breath Sounds: Diminished, RLL Breath Sounds: Diminished, GREY  Breath Sounds: Clear, LLL Breath Sounds: Diminished  Fluids:  No intake or output data in the 24 hours ending 01/05/20 1124  Weight: 82 kg (180 lb 12.4 oz)  GI/Nutrition:  Orders Placed This Encounter   Procedures   • Diet Order Regular     Standing Status:   Standing     Number of Occurrences:   1     Order Specific Question:   Diet:     Answer:   Regular [1]     Medical Decision Making, by Problem:  Active Hospital Problems    Diagnosis   • Anemia: No evidence of UGI bleeding source.  -Colonoscopy tomorrow   • Acute on chronic respiratory failure with hypoxia ([facility]) [J96.21]   • Arthritis of left shoulder region [M19.012]   • Diabetes mellitus, type 2 (HCC) [E11.9]   • COPD (chronic obstructive pulmonary disease) ([facility]) [J44.9]   • Tobacco use disorder [F17.200]           Quality-Core Measures

## 2020-01-05 NOTE — PROGRESS NOTES
[facility] Medicine Daily Progress Note    Date of Service  1/5/2020    Chief Complaint  64 y.o. female admitted 1/3/2020 with weakness found to have severe Fe deficient anemia    Hospital Course    see below      Interval Problem Update  Anemia-remains stable, EGD was somewhat unrevealing. No abdominal pain. Feels light-headed when walking only.     Consultants/Specialty  GI    Code Status  fcfc    Disposition  TELE    Review of Systems  Review of Systems   Constitutional: Positive for malaise/fatigue (somewhat improved). Negative for fever.   HENT: Negative for hearing loss and tinnitus.    Respiratory: Negative for cough and shortness of breath.    Cardiovascular: Negative for chest pain.   Gastrointestinal: Negative for abdominal pain, blood in stool, melena, nausea and vomiting.   Genitourinary: Negative for dysuria and urgency.   Neurological: Negative for dizziness and headaches.   All other systems reviewed and are negative.       Physical Exam  Temp:  [36.4 °C (97.6 °F)-37.6 °C (99.6 °F)] 37.2 °C (99 °F)  Pulse:  [] 102  Resp:  [16-18] 16  BP: (111-180)/(61-93) 159/84  SpO2:  [93 %-99 %] 93 %    Physical Exam  Vitals signs and nursing note reviewed.   Constitutional:       General: She is not in acute distress.     Appearance: She is well-developed.   HENT:      Head: Normocephalic and atraumatic.      Mouth/Throat:      Pharynx: No oropharyngeal exudate.   Eyes:      General:         Right eye: No discharge.         Left eye: No discharge.      Pupils: Pupils are equal, round, and reactive to light.   Neck:      Musculoskeletal: Normal range of motion and neck supple.      Thyroid: No thyromegaly.   Cardiovascular:      Rate and Rhythm: Regular rhythm. Tachycardia present.      Heart sounds: Normal heart sounds. No gallop.    Pulmonary:      Effort: Pulmonary effort is normal.      Breath sounds: Normal breath sounds. No wheezing.   Abdominal:      General: Bowel sounds are normal.      Palpations:  Abdomen is soft.      Tenderness: There is no tenderness.   Musculoskeletal: Normal range of motion.         General: No swelling or tenderness.   Skin:     General: Skin is warm and dry.      Coloration: Skin is pale.      Findings: No rash.   Neurological:      Mental Status: She is alert and oriented to person, place, and time.      Cranial Nerves: No cranial nerve deficit.         Fluids  No intake or output data in the 24 hours ending 01/05/20 1441    Laboratory  Recent Labs     01/04/20  0105  01/04/20  1649 01/05/20  0051 01/05/20  0926   WBC 8.6  --   --  12.4* 13.8*   RBC 2.51*  --   --  3.12* 3.46*   HEMOGLOBIN 5.6*   < > 7.6* 7.3* 8.0*   HEMATOCRIT 18.4*  --   --  23.9* 26.6*   MCV 73.3*  --   --  76.6* 76.9*   MCH 22.3*  --   --  23.4* 23.1*   MCHC 30.4*  --   --  30.5* 30.1*   RDW 61.6*  --   --  65.5* 67.7*   PLATELETCT 518*  --   --  525* 551*   MPV 9.4  --   --  8.8* 9.0    < > = values in this interval not displayed.     Recent Labs     01/03/20  1411 01/04/20  0833 01/05/20  0051   SODIUM 140 142 138   POTASSIUM 3.1* 3.9 3.8   CHLORIDE 109 113* 109   CO2 16* 20 20   GLUCOSE 172* 141* 175*   BUN 15 11 8   CREATININE 0.86 0.78 0.69   CALCIUM 7.8* 8.0* 7.9*                   Imaging  No orders to display        Assessment/Plan  GI bleed- (present on admission)  Assessment & Plan  -EGD shows LA Grade A esophagitis and duodenitis  -Transfusion threshold is less than 7/21  -change to oral PPI BID  -daily CBC  -GI consulted  -COLO tomorrow, Hb remains stable at this time      Acute on chronic respiratory failure with hypoxia (HCC)- (present on admission)  Assessment & Plan  COPD and profound anemia  RBC transfusion  Supplemental oxygen  RT protocol  -down to 0.5 L NC    Arthritis of left shoulder region- (present on admission)  Assessment & Plan  Tylenol, oxycodone as needed  Avoid NSAID    Diabetes mellitus, type 2 (HCC)- (present on admission)  Assessment & Plan  Most recent A1c was 7.3   3 months ago,  now 6.3  Patient is not on medications for diabetes.  We will start sliding scale    COPD (chronic obstructive pulmonary disease) (HCC)- (present on admission)  Assessment & Plan  RT protocol  Continue supplemental oxygen, DuoNeb as needed  Smoking cessation counseling    Tobacco use disorder- (present on admission)  Assessment & Plan  Spent approx 5 mins on Tobacco cessation education . Discussed options of nicotine patch, medical treatment with wellbutrin and chantix. Discussed the benefits of quitting smoking and risks of continued smoking including cardiovascular disease, cancer and COPD.   Code 13255         VTE prophylaxis: SCD's

## 2020-01-05 NOTE — PROGRESS NOTES
"BP still elevated.  ..Blood Pressure (Abnormal) 164/75   Pulse 97   Temperature 36.5 °C (97.7 °F) (Temporal)   Respiration 16   Height 1.727 m (5' 8\")   Weight 82 kg (180 lb 12.4 oz)   Last Menstrual Period 01/01/2007   Oxygen Saturation 97%   Body Mass Index 27.49 kg/m²  Pt sleeping, denies HA or blurred vision.  Will call MD on call.   0304- Spoke to Dr. Tatum who orders another one time dose of Hydralazine 10 mg IV x once now.   TO/ Dr. Tatum RBO/ Maya Gonsales RN    "

## 2020-01-05 NOTE — CARE PLAN
Problem: Safety  Goal: Will remain free from injury  Outcome: PROGRESSING AS EXPECTED  Goal: Will remain free from falls  Outcome: PROGRESSING AS EXPECTED     Problem: Venous Thromboembolism (VTW)/Deep Vein Thrombosis (DVT) Prevention:  Goal: Patient will participate in Venous Thrombosis (VTE)/Deep Vein Thrombosis (DVT)Prevention Measures  Outcome: PROGRESSING AS EXPECTED     Problem: Bowel/Gastric:  Goal: Normal bowel function is maintained or improved  Outcome: PROGRESSING AS EXPECTED  Goal: Will not experience complications related to bowel motility  Outcome: PROGRESSING AS EXPECTED     Problem: Knowledge Deficit  Goal: Knowledge of disease process/condition, treatment plan, diagnostic tests, and medications will improve  Outcome: PROGRESSING AS EXPECTED  Goal: Knowledge of the prescribed therapeutic regimen will improve  Outcome: PROGRESSING AS EXPECTED

## 2020-01-05 NOTE — PROGRESS NOTES
Pt is A&Ox4.  No family is at bedside. VSS. C/O chronic neck and right shoulder pain.  Will give pain meds per orders.  Up self to BR. Calls appropriately.  Pt updated on POC, needs met and questions answered.  Telemonitor in place.  Call light within reach and working properly.

## 2020-01-05 NOTE — PROGRESS NOTES
Assumed care of patient at bedside report from NOC RN. Updated on POC. Patient currently A & O x 4; on 0.5 L O2 nasal cannula; up self; without complaints of acute pain. Call light within reach. Whiteboard updated. Fall precautions in place. Bed locked and in lowest position. All questions answered. No other needs indicated at this time.

## 2020-01-05 NOTE — PROGRESS NOTES
"CNA informs RN bp 164/77 .  Pt c/o of pain, will give pain meds and recheck bp.  0100 ..Blood Pressure (Abnormal) 180/75   Pulse 98   Temperature 36.5 °C (97.7 °F) (Temporal)   Respiration 16   Height 1.727 m (5' 8\")   Weight 82 kg (180 lb 12.4 oz)   Last Menstrual Period 01/01/2007   Oxygen Saturation 97%   Body Mass Index 27.49 kg/m²  Pt is laying in bed calm and trying to go to sleep.  Spoke to Dr. Tatum who orders one time dose of Hydralazine 10 mg IV x once give now.   TO/ Dr. Tatum RBO/ Maya Gonsales RN    "

## 2020-01-05 NOTE — PROGRESS NOTES
[facility] Medicine Daily Progress Note    Date of Service  1/4/2020    Chief Complaint  64 y.o. female admitted 1/3/2020 with weakness found to have severe Fe deficient anemia    Hospital Course    see below      Interval Problem Update  Anemia-responded well to 3 units PRBC's. Feels a slight HA. No abdominal pain    Consultants/Specialty  GI    Code Status  fcfc    Disposition  TELE    Review of Systems  Review of Systems   Constitutional: Positive for malaise/fatigue. Negative for chills and fever.   Cardiovascular: Negative for chest pain and palpitations.   Gastrointestinal: Negative for abdominal pain, blood in stool, melena, nausea and vomiting.   Neurological: Positive for headaches. Negative for dizziness.   All other systems reviewed and are negative.       Physical Exam  Temp:  [36.1 °C (97 °F)-37.4 °C (99.3 °F)] 36.4 °C (97.6 °F)  Pulse:  [] 94  Resp:  [13-19] 18  BP: (111-168)/(44-95) 137/93  SpO2:  [92 %-100 %] 97 %    Physical Exam  Vitals signs and nursing note reviewed.   Constitutional:       General: She is not in acute distress.     Appearance: She is well-developed.   HENT:      Head: Normocephalic and atraumatic.      Mouth/Throat:      Pharynx: No oropharyngeal exudate.   Eyes:      General: No scleral icterus.     Pupils: Pupils are equal, round, and reactive to light.   Neck:      Musculoskeletal: Normal range of motion and neck supple.      Thyroid: No thyromegaly.   Cardiovascular:      Rate and Rhythm: Normal rate and regular rhythm.      Heart sounds: Normal heart sounds. No murmur.   Pulmonary:      Effort: Pulmonary effort is normal. No respiratory distress.      Breath sounds: Normal breath sounds. No wheezing.   Abdominal:      General: Bowel sounds are normal. There is no distension.      Palpations: Abdomen is soft.      Tenderness: There is no tenderness.   Musculoskeletal: Normal range of motion.         General: No tenderness.   Skin:     General: Skin is warm and dry.       Coloration: Skin is pale.      Findings: No rash.   Neurological:      Mental Status: She is alert and oriented to person, place, and time.      Cranial Nerves: No cranial nerve deficit.         Fluids    Intake/Output Summary (Last 24 hours) at 1/4/2020 1632  Last data filed at 1/4/2020 0601  Gross per 24 hour   Intake 1051.25 ml   Output --   Net 1051.25 ml       Laboratory  Recent Labs     01/03/20  1411  01/03/20  2110 01/04/20  0105 01/04/20  0833   WBC 9.0  --   --  8.6  --    RBC 1.98*  --   --  2.51*  --    HEMOGLOBIN 3.7*   < > 4.5* 5.6* 7.7*   HEMATOCRIT 12.9*  --  15.0* 18.4*  --    MCV 65.7*  --   --  73.3*  --    MCH 18.2*  --   --  22.3*  --    MCHC 27.7*  --   --  30.4*  --    RDW 46.3  --   --  61.6*  --    PLATELETCT 573*  --   --  518*  --    MPV 8.9*  --   --  9.4  --     < > = values in this interval not displayed.     Recent Labs     01/03/20  1411 01/04/20  0833   SODIUM 140 142   POTASSIUM 3.1* 3.9   CHLORIDE 109 113*   CO2 16* 20   GLUCOSE 172* 141*   BUN 15 11   CREATININE 0.86 0.78   CALCIUM 7.8* 8.0*                   Imaging  No orders to display        Assessment/Plan  GI bleed- (present on admission)  Assessment & Plan  -EGD shows LA Grade A esophagitis and duodenitis  -Transfusion threshold is less than 7/21  -change to oral PPI BID  -daily CBC  -GI consulted      Acute on chronic respiratory failure with hypoxia (HCC)- (present on admission)  Assessment & Plan  COPD and profound anemia  RBC transfusion  Supplemental oxygen  RT protocol  -down to 0.5 L NC    Arthritis of left shoulder region- (present on admission)  Assessment & Plan  Tylenol, oxycodone as needed  Avoid NSAID    Diabetes mellitus, type 2 (HCC)- (present on admission)  Assessment & Plan  Most recent A1c was 7.3   3 months ago  Patient is not on medications for diabetes.  We will start sliding scale    COPD (chronic obstructive pulmonary disease) (HCC)- (present on admission)  Assessment & Plan  RT protocol  Continue  supplemental oxygen, DuoNeb as needed  Smoking cessation counseling    Tobacco use disorder- (present on admission)  Assessment & Plan  Spent approx 5 mins on Tobacco cessation education . Discussed options of nicotine patch, medical treatment with wellbutrin and chantix. Discussed the benefits of quitting smoking and risks of continued smoking including cardiovascular disease, cancer and COPD.   Code 74224         VTE prophylaxis: SCD's

## 2020-01-06 LAB
ALBUMIN SERPL BCP-MCNC: 2.8 G/DL (ref 3.2–4.9)
ALBUMIN/GLOB SERPL: 1 G/DL
ALP SERPL-CCNC: 76 U/L (ref 30–99)
ALT SERPL-CCNC: 5 U/L (ref 2–50)
ANION GAP SERPL CALC-SCNC: 8 MMOL/L (ref 0–11.9)
ANISOCYTOSIS BLD QL SMEAR: ABNORMAL
AST SERPL-CCNC: 13 U/L (ref 12–45)
BASOPHILS # BLD AUTO: 0.3 % (ref 0–1.8)
BASOPHILS # BLD: 0.03 K/UL (ref 0–0.12)
BILIRUB SERPL-MCNC: 0.3 MG/DL (ref 0.1–1.5)
BUN SERPL-MCNC: 6 MG/DL (ref 8–22)
CALCIUM SERPL-MCNC: 7.8 MG/DL (ref 8.5–10.5)
CHLORIDE SERPL-SCNC: 111 MMOL/L (ref 96–112)
CO2 SERPL-SCNC: 24 MMOL/L (ref 20–33)
COMMENT 1642: NORMAL
CREAT SERPL-MCNC: 0.67 MG/DL (ref 0.5–1.4)
DACRYOCYTES BLD QL SMEAR: NORMAL
EOSINOPHIL # BLD AUTO: 0.39 K/UL (ref 0–0.51)
EOSINOPHIL NFR BLD: 3.3 % (ref 0–6.9)
ERYTHROCYTE [DISTWIDTH] IN BLOOD BY AUTOMATED COUNT: 71.5 FL (ref 35.9–50)
GLOBULIN SER CALC-MCNC: 2.8 G/DL (ref 1.9–3.5)
GLUCOSE SERPL-MCNC: 142 MG/DL (ref 65–99)
HCT VFR BLD AUTO: 23.6 % (ref 37–47)
HGB BLD-MCNC: 7.2 G/DL (ref 12–16)
HYPOCHROMIA BLD QL SMEAR: ABNORMAL
IMM GRANULOCYTES # BLD AUTO: 0.07 K/UL (ref 0–0.11)
IMM GRANULOCYTES NFR BLD AUTO: 0.6 % (ref 0–0.9)
LG PLATELETS BLD QL SMEAR: NORMAL
LYMPHOCYTES # BLD AUTO: 1.29 K/UL (ref 1–4.8)
LYMPHOCYTES NFR BLD: 11 % (ref 22–41)
MACROCYTES BLD QL SMEAR: ABNORMAL
MCH RBC QN AUTO: 23.8 PG (ref 27–33)
MCHC RBC AUTO-ENTMCNC: 30.5 G/DL (ref 33.6–35)
MCV RBC AUTO: 78.1 FL (ref 81.4–97.8)
MICROCYTES BLD QL SMEAR: ABNORMAL
MONOCYTES # BLD AUTO: 0.73 K/UL (ref 0–0.85)
MONOCYTES NFR BLD AUTO: 6.2 % (ref 0–13.4)
MORPHOLOGY BLD-IMP: NORMAL
NEUTROPHILS # BLD AUTO: 9.25 K/UL (ref 2–7.15)
NEUTROPHILS NFR BLD: 78.6 % (ref 44–72)
NRBC # BLD AUTO: 0 K/UL
NRBC BLD-RTO: 0 /100 WBC
PATHOLOGY CONSULT NOTE: NORMAL
PLATELET # BLD AUTO: 499 K/UL (ref 164–446)
PLATELET BLD QL SMEAR: NORMAL
PMV BLD AUTO: 9.2 FL (ref 9–12.9)
POIKILOCYTOSIS BLD QL SMEAR: NORMAL
POLYCHROMASIA BLD QL SMEAR: NORMAL
POTASSIUM SERPL-SCNC: 3.4 MMOL/L (ref 3.6–5.5)
PROT SERPL-MCNC: 5.6 G/DL (ref 6–8.2)
RBC # BLD AUTO: 3.02 M/UL (ref 4.2–5.4)
RBC BLD AUTO: PRESENT
SODIUM SERPL-SCNC: 143 MMOL/L (ref 135–145)
WBC # BLD AUTO: 11.8 K/UL (ref 4.8–10.8)

## 2020-01-06 PROCEDURE — 160002 HCHG RECOVERY MINUTES (STAT): Performed by: INTERNAL MEDICINE

## 2020-01-06 PROCEDURE — 700111 HCHG RX REV CODE 636 W/ 250 OVERRIDE (IP): Performed by: ANESTHESIOLOGY

## 2020-01-06 PROCEDURE — 700111 HCHG RX REV CODE 636 W/ 250 OVERRIDE (IP): Performed by: INTERNAL MEDICINE

## 2020-01-06 PROCEDURE — 700101 HCHG RX REV CODE 250: Performed by: ANESTHESIOLOGY

## 2020-01-06 PROCEDURE — 160207 HCHG ENDO MINUTES - EA ADDL 1 MIN LEVEL 3: Performed by: INTERNAL MEDICINE

## 2020-01-06 PROCEDURE — 0DBP8ZX EXCISION OF RECTUM, VIA NATURAL OR ARTIFICIAL OPENING ENDOSCOPIC, DIAGNOSTIC: ICD-10-PCS | Performed by: INTERNAL MEDICINE

## 2020-01-06 PROCEDURE — 160048 HCHG OR STATISTICAL LEVEL 1-5: Performed by: INTERNAL MEDICINE

## 2020-01-06 PROCEDURE — 160009 HCHG ANES TIME/MIN: Performed by: INTERNAL MEDICINE

## 2020-01-06 PROCEDURE — 85025 COMPLETE CBC W/AUTO DIFF WBC: CPT

## 2020-01-06 PROCEDURE — 99232 SBSQ HOSP IP/OBS MODERATE 35: CPT | Performed by: INTERNAL MEDICINE

## 2020-01-06 PROCEDURE — 700105 HCHG RX REV CODE 258: Performed by: ANESTHESIOLOGY

## 2020-01-06 PROCEDURE — 80053 COMPREHEN METABOLIC PANEL: CPT

## 2020-01-06 PROCEDURE — A9270 NON-COVERED ITEM OR SERVICE: HCPCS | Performed by: INTERNAL MEDICINE

## 2020-01-06 PROCEDURE — 88305 TISSUE EXAM BY PATHOLOGIST: CPT

## 2020-01-06 PROCEDURE — 700102 HCHG RX REV CODE 250 W/ 637 OVERRIDE(OP): Performed by: INTERNAL MEDICINE

## 2020-01-06 PROCEDURE — 160035 HCHG PACU - 1ST 60 MINS PHASE I: Performed by: INTERNAL MEDICINE

## 2020-01-06 PROCEDURE — 770020 HCHG ROOM/CARE - TELE (206)

## 2020-01-06 PROCEDURE — 700105 HCHG RX REV CODE 258: Performed by: INTERNAL MEDICINE

## 2020-01-06 PROCEDURE — 160202 HCHG ENDO MINUTES - 1ST 30 MINS LEVEL 3: Performed by: INTERNAL MEDICINE

## 2020-01-06 PROCEDURE — 0DBK8ZX EXCISION OF ASCENDING COLON, VIA NATURAL OR ARTIFICIAL OPENING ENDOSCOPIC, DIAGNOSTIC: ICD-10-PCS | Performed by: INTERNAL MEDICINE

## 2020-01-06 PROCEDURE — 36415 COLL VENOUS BLD VENIPUNCTURE: CPT

## 2020-01-06 RX ORDER — MORPHINE SULFATE 4 MG/ML
1-2 INJECTION, SOLUTION INTRAMUSCULAR; INTRAVENOUS
Status: DISCONTINUED | OUTPATIENT
Start: 2020-01-06 | End: 2020-01-10 | Stop reason: HOSPADM

## 2020-01-06 RX ORDER — SODIUM CHLORIDE, SODIUM LACTATE, POTASSIUM CHLORIDE, CALCIUM CHLORIDE 600; 310; 30; 20 MG/100ML; MG/100ML; MG/100ML; MG/100ML
INJECTION, SOLUTION INTRAVENOUS
Status: DISCONTINUED | OUTPATIENT
Start: 2020-01-06 | End: 2020-01-06 | Stop reason: SURG

## 2020-01-06 RX ORDER — MIDAZOLAM HYDROCHLORIDE 1 MG/ML
1 INJECTION INTRAMUSCULAR; INTRAVENOUS
Status: DISCONTINUED | OUTPATIENT
Start: 2020-01-06 | End: 2020-01-06 | Stop reason: HOSPADM

## 2020-01-06 RX ORDER — ONDANSETRON 2 MG/ML
4 INJECTION INTRAMUSCULAR; INTRAVENOUS
Status: DISCONTINUED | OUTPATIENT
Start: 2020-01-06 | End: 2020-01-06 | Stop reason: HOSPADM

## 2020-01-06 RX ORDER — HALOPERIDOL 5 MG/ML
1 INJECTION INTRAMUSCULAR
Status: DISCONTINUED | OUTPATIENT
Start: 2020-01-06 | End: 2020-01-06 | Stop reason: HOSPADM

## 2020-01-06 RX ORDER — LIDOCAINE HYDROCHLORIDE 20 MG/ML
INJECTION, SOLUTION EPIDURAL; INFILTRATION; INTRACAUDAL; PERINEURAL PRN
Status: DISCONTINUED | OUTPATIENT
Start: 2020-01-06 | End: 2020-01-06 | Stop reason: SURG

## 2020-01-06 RX ORDER — SODIUM CHLORIDE, SODIUM LACTATE, POTASSIUM CHLORIDE, CALCIUM CHLORIDE 600; 310; 30; 20 MG/100ML; MG/100ML; MG/100ML; MG/100ML
INJECTION, SOLUTION INTRAVENOUS CONTINUOUS
Status: DISCONTINUED | OUTPATIENT
Start: 2020-01-06 | End: 2020-01-06 | Stop reason: HOSPADM

## 2020-01-06 RX ORDER — OXYCODONE HYDROCHLORIDE 5 MG/1
5 TABLET ORAL
Status: DISCONTINUED | OUTPATIENT
Start: 2020-01-06 | End: 2020-01-10 | Stop reason: HOSPADM

## 2020-01-06 RX ORDER — POTASSIUM CHLORIDE 7.45 MG/ML
10 INJECTION INTRAVENOUS
Status: DISPENSED | OUTPATIENT
Start: 2020-01-06 | End: 2020-01-06

## 2020-01-06 RX ORDER — OXYCODONE HYDROCHLORIDE 10 MG/1
10 TABLET ORAL
Status: DISCONTINUED | OUTPATIENT
Start: 2020-01-06 | End: 2020-01-10 | Stop reason: HOSPADM

## 2020-01-06 RX ADMIN — MORPHINE SULFATE 1 MG: 4 INJECTION INTRAVENOUS at 08:04

## 2020-01-06 RX ADMIN — POTASSIUM CHLORIDE 10 MEQ: 10 INJECTION, SOLUTION INTRAVENOUS at 10:35

## 2020-01-06 RX ADMIN — SODIUM CHLORIDE, POTASSIUM CHLORIDE, SODIUM LACTATE AND CALCIUM CHLORIDE: 600; 310; 30; 20 INJECTION, SOLUTION INTRAVENOUS at 14:46

## 2020-01-06 RX ADMIN — LIDOCAINE HYDROCHLORIDE 5 ML: 20 INJECTION, SOLUTION EPIDURAL; INFILTRATION; INTRACAUDAL at 14:55

## 2020-01-06 RX ADMIN — SODIUM CHLORIDE 125 MG: 9 INJECTION, SOLUTION INTRAVENOUS at 21:35

## 2020-01-06 RX ADMIN — POTASSIUM CHLORIDE 10 MEQ: 10 INJECTION, SOLUTION INTRAVENOUS at 12:39

## 2020-01-06 RX ADMIN — ONDANSETRON 4 MG: 2 INJECTION INTRAMUSCULAR; INTRAVENOUS at 08:13

## 2020-01-06 RX ADMIN — OXYCODONE HYDROCHLORIDE 10 MG: 10 TABLET ORAL at 21:21

## 2020-01-06 RX ADMIN — SODIUM CHLORIDE: 9 INJECTION, SOLUTION INTRAVENOUS at 12:39

## 2020-01-06 RX ADMIN — PROPOFOL 100 MG: 10 INJECTION, EMULSION INTRAVENOUS at 14:55

## 2020-01-06 RX ADMIN — POTASSIUM CHLORIDE 10 MEQ: 10 INJECTION, SOLUTION INTRAVENOUS at 11:37

## 2020-01-06 RX ADMIN — PROPOFOL 100 MCG/KG/MIN: 10 INJECTION, EMULSION INTRAVENOUS at 14:55

## 2020-01-06 ASSESSMENT — ENCOUNTER SYMPTOMS
COUGH: 0
DIARRHEA: 1
CHILLS: 0
FEVER: 0
SHORTNESS OF BREATH: 0
HEADACHES: 0
DIZZINESS: 0
PALPITATIONS: 0
NAUSEA: 0
VOMITING: 0
ABDOMINAL PAIN: 1

## 2020-01-06 ASSESSMENT — PAIN SCALES - GENERAL: PAIN_LEVEL: 0

## 2020-01-06 NOTE — ANESTHESIA PREPROCEDURE EVALUATION
Relevant Problems   PULMONARY   (+) COPD (chronic obstructive pulmonary disease) (HCC)   (+) Chronic airway obstruction, not elsewhere classified      CARDIAC   (+) Atherosclerosis   (+) Hypertension      GI   (+) Esophageal reflux         (+) Renal stone      ENDO   (+) Controlled type 2 diabetes mellitus without complication, without long-term current use of insulin (HCC)   (+) Diabetes mellitus, type 2 (HCC)      Other   (+) Acute on chronic respiratory failure with hypoxia (HCC)   (+) Arthritis   (+) Arthritis of left shoulder region   (+) CVD (cardiovascular disease)   (+) Chronic ischemic heart disease   (+) Chronic pain   (+) Fibromyalgia   (+) GI bleed   (+) LVH (left ventricular hypertrophy)   (+) Nausea & vomiting   (+) Opiate withdrawal ([facility])   (+) Pulmonary nodule   (+) Swelling, mass, or lump in head and neck   (+) Tobacco use disorder       Physical Exam    Airway   Mallampati: II  TM distance: >3 FB  Neck ROM: full       Cardiovascular - normal exam  Rhythm: regular  Rate: normal  (-) murmur     Dental - normal exam  Comments: edentulous       Pulmonary - normal exam  Breath sounds clear to auscultation     Abdominal    Neurological - normal exam               Anesthesia Plan    ASA 3   ASA physical status 3 criteria: CAD/stents (> 3 months)    Plan - general       Airway plan will be natural airway        Induction: intravenous    Postoperative Plan: Postoperative administration of opioids is intended.    Pertinent diagnostic labs and testing reviewed    Informed Consent:    Anesthetic plan and risks discussed with patient.

## 2020-01-06 NOTE — ANESTHESIA TIME REPORT
Anesthesia Start and Stop Event Times     Date Time Event    1/6/2020 1433 Ready for Procedure     1446 Anesthesia Start     1535 Anesthesia Stop        Responsible Staff  01/06/20    Name Role Begin End    Drake Zafar M.D. Anesth 1446 1535        Preop Diagnosis (Free Text):  Pre-op Diagnosis     iron deficiency anemia        Preop Diagnosis (Codes):    Post op Diagnosis  Rectal bleeding      Premium Reason  A. 3PM - 7AM    Comments:

## 2020-01-06 NOTE — ANESTHESIA POSTPROCEDURE EVALUATION
Patient: Heidi Navas    Procedure Summary     Date:  01/06/20 Room / Location:  [facility] OR 06 / SURGERY [facility]    Anesthesia Start:  1446 Anesthesia Stop:  1535    Procedure:  COLONOSCOPY (N/A Anus) Diagnosis:  (Ascending colon ulcer, erythema in rectum)    Surgeon:  David Carranza M.D. Responsible Provider:  Drake Zafar M.D.    Anesthesia Type:  general ASA Status:  3          Final Anesthesia Type: general  Last vitals  BP   Blood Pressure: 138/65    Temp   37.1 °C (98.7 °F)    Pulse   Pulse: 94   Resp   19    SpO2   94 %      Anesthesia Post Evaluation    Patient location during evaluation: PACU  Patient participation: complete - patient participated  Level of consciousness: awake and alert  Pain score: 0    Airway patency: patent  Anesthetic complications: no  Cardiovascular status: hemodynamically stable  Respiratory status: acceptable  Hydration status: euvolemic    PONV: none           Nurse Pain Score: 3 (NPRS)

## 2020-01-06 NOTE — PROGRESS NOTES
Vital signs stable, hemoglobin down to 7.2 from 8.0.  Patient was able to complete her bowel prep and reports she is having yellow clear stools.  She understands the plan for colonoscopy today and verbalizes agreement.

## 2020-01-06 NOTE — PROGRESS NOTES
Hospital Medicine Daily Progress Note    Date of Service  1/6/2020    Chief Complaint  64 y.o. female admitted 1/3/2020 with weakness found to have severe Fe deficient anemia    Hospital Course    see below      Interval Problem Update  Anemia-remains stable. Yellow stool throughout the night on prep. Mild LUQ abdominal pain.     Consultants/Specialty  GI    Code Status  fcfc    Disposition  TELE    Review of Systems  Review of Systems   Constitutional: Positive for malaise/fatigue (relatively unchanged today). Negative for chills and fever.   HENT: Negative for hearing loss and tinnitus.    Respiratory: Negative for cough and shortness of breath.    Cardiovascular: Negative for palpitations.   Gastrointestinal: Positive for abdominal pain and diarrhea. Negative for nausea and vomiting.   Genitourinary: Negative for dysuria, frequency, hematuria and urgency.   Neurological: Negative for dizziness and headaches.   All other systems reviewed and are negative.       Physical Exam  Temp:  [36.6 °C (97.9 °F)-37.3 °C (99.2 °F)] 37.1 °C (98.7 °F)  Pulse:  [] 94  Resp:  [14-19] 19  BP: (122-151)/(59-96) 138/65  SpO2:  [90 %-98 %] 94 %    Physical Exam  Vitals signs and nursing note reviewed.   Constitutional:       General: She is not in acute distress.     Appearance: She is well-developed.   HENT:      Head: Normocephalic and atraumatic.      Mouth/Throat:      Pharynx: No oropharyngeal exudate.   Eyes:      General: No scleral icterus.     Pupils: Pupils are equal, round, and reactive to light.   Neck:      Musculoskeletal: Normal range of motion and neck supple.      Thyroid: No thyromegaly.   Cardiovascular:      Rate and Rhythm: Regular rhythm. Tachycardia present.      Heart sounds: Normal heart sounds. No gallop.    Pulmonary:      Effort: Pulmonary effort is normal. No respiratory distress.      Breath sounds: Normal breath sounds.   Abdominal:      General: Bowel sounds are normal. There is no distension.       Palpations: Abdomen is soft.      Tenderness: There is tenderness (mild LUQ).   Musculoskeletal: Normal range of motion.         General: No swelling or tenderness.      Right lower leg: No edema.      Left lower leg: No edema.   Skin:     General: Skin is warm and dry.      Coloration: Skin is pale. Skin is not jaundiced.   Neurological:      Mental Status: She is alert and oriented to person, place, and time.      Cranial Nerves: No cranial nerve deficit.         Fluids  No intake or output data in the 24 hours ending 01/06/20 1424    Laboratory  Recent Labs     01/05/20  0051 01/05/20  0926 01/06/20  0253   WBC 12.4* 13.8* 11.8*   RBC 3.12* 3.46* 3.02*   HEMOGLOBIN 7.3* 8.0* 7.2*   HEMATOCRIT 23.9* 26.6* 23.6*   MCV 76.6* 76.9* 78.1*   MCH 23.4* 23.1* 23.8*   MCHC 30.5* 30.1* 30.5*   RDW 65.5* 67.7* 71.5*   PLATELETCT 525* 551* 499*   MPV 8.8* 9.0 9.2     Recent Labs     01/04/20  0833 01/05/20  0051 01/06/20  0253   SODIUM 142 138 143   POTASSIUM 3.9 3.8 3.4*   CHLORIDE 113* 109 111   CO2 20 20 24   GLUCOSE 141* 175* 142*   BUN 11 8 6*   CREATININE 0.78 0.69 0.67   CALCIUM 8.0* 7.9* 7.8*                   Imaging  No orders to display        Assessment/Plan  GI bleed- (present on admission)  Assessment & Plan  -EGD shows LA Grade A esophagitis and duodenitis  -Transfusion threshold is less than 7/21  -oral PPI daily  -COLO today, Hb dropped again a bit this AM  -daily CBC  -GI consulted      Acute on chronic respiratory failure with hypoxia (HCC)- (present on admission)  Assessment & Plan  COPD and profound anemia  RBC transfusion  Supplemental oxygen  RT protocol  -down to 0.5 L NC    Arthritis of left shoulder region- (present on admission)  Assessment & Plan  Tylenol, oxycodone as needed  Avoid NSAID    Diabetes mellitus, type 2 (HCC)- (present on admission)  Assessment & Plan  Most recent A1c was 7.3   3 months ago, now 6.3  Patient is not on medications for diabetes.  We will start sliding scale, sugars  remain well controlled at this time    COPD (chronic obstructive pulmonary disease) (HCC)- (present on admission)  Assessment & Plan  RT protocol  Continue supplemental oxygen, DuoNeb as needed  Smoking cessation counseling    Tobacco use disorder- (present on admission)  Assessment & Plan  Nicotine patch         VTE prophylaxis: SCD's

## 2020-01-06 NOTE — ANESTHESIA QCDR
2019 [facility] Clinical Data Registry (for Quality Improvement)     Postoperative nausea/vomiting risk protocol (Adult = 18 yrs and Pediatric 3-17 yrs)- (430 and 463)  General inhalation anesthetic (NOT TIVA) with PONV risk factors: No  Provision of anti-emetic therapy with at least 2 different classes of agents: N/A  Patient DID NOT receive anti-emetic therapy and reason is documented in Medical Record: N/A    Multimodal Pain Management- (477)  Non-emergent surgery AND patient age >= 18: No  Use of Multimodal Pain Management, two or more drugs and/or interventions, NOT including systemic opioids:   Exception: Documented allergy to multiple classes of analgesics:     Smoking Abstinence (404)  Patient is current smoker (cigarette, pipe, e-cig, marijuanna): No  Elective Surgery:   Abstinence instructions provided prior to day of surgery:   Patient abstained from smoking on day of surgery:     Pre-Op Beta-Blocker in Isolated CABG (44)  Isolated CABG AND patient age >= 18: No  Beta-blocker admin within 24 hours of surgical incision:   Exception:of medical reason(s) for not administering beta blocker within 24 hours prior to surgical incision (e.g., not  indicated,other medical reason):     PACU assessment of acute postoperative pain prior to Anesthesia Care End- Applies to Patients Age = 18- (ABG7)  Initial PACU pain score is which of the following: < 7/10  Patient unable to report pain score: N/A    Post-anesthetic transfer of care checklist/protocol to PACU/ICU- (426 and 427)  Upon conclusion of case, patient transferred to which of the following locations: PACU/Non-ICU  Use of transfer checklist/protocol: Yes  Exclusion: Service Performed in Patient Hospital Room (and thus did not require transfer): N/A  Unplanned admission to ICU related to anesthesia service up through end of PACU care- (MD51)  Unplanned admission to ICU (not initially anticipated at anesthesia start time): No

## 2020-01-07 LAB
ABO GROUP BLD: ABNORMAL
ANION GAP SERPL CALC-SCNC: 7 MMOL/L (ref 0–11.9)
BARCODED ABORH UBTYP: 1700
BARCODED PRD CODE UBPRD: ABNORMAL
BARCODED UNIT NUM UBUNT: ABNORMAL
BASOPHILS # BLD AUTO: 0.4 % (ref 0–1.8)
BASOPHILS # BLD: 0.03 K/UL (ref 0–0.12)
BLD GP AB SCN SERPL QL: ABNORMAL
BUN SERPL-MCNC: 4 MG/DL (ref 8–22)
CALCIUM SERPL-MCNC: 7.8 MG/DL (ref 8.5–10.5)
CHLORIDE SERPL-SCNC: 109 MMOL/L (ref 96–112)
CO2 SERPL-SCNC: 25 MMOL/L (ref 20–33)
COMPONENT R 8504R: ABNORMAL
CREAT SERPL-MCNC: 0.61 MG/DL (ref 0.5–1.4)
EOSINOPHIL # BLD AUTO: 0.38 K/UL (ref 0–0.51)
EOSINOPHIL NFR BLD: 4.5 % (ref 0–6.9)
ERYTHROCYTE [DISTWIDTH] IN BLOOD BY AUTOMATED COUNT: 74.5 FL (ref 35.9–50)
GLUCOSE SERPL-MCNC: 151 MG/DL (ref 65–99)
HCT VFR BLD AUTO: 22.7 % (ref 37–47)
HCT VFR BLD AUTO: 27.1 % (ref 37–47)
HGB BLD-MCNC: 6.9 G/DL (ref 12–16)
HGB BLD-MCNC: 8.1 G/DL (ref 12–16)
IMM GRANULOCYTES # BLD AUTO: 0.06 K/UL (ref 0–0.11)
IMM GRANULOCYTES NFR BLD AUTO: 0.7 % (ref 0–0.9)
LYMPHOCYTES # BLD AUTO: 1.14 K/UL (ref 1–4.8)
LYMPHOCYTES NFR BLD: 13.6 % (ref 22–41)
MCH RBC QN AUTO: 24.1 PG (ref 27–33)
MCHC RBC AUTO-ENTMCNC: 30.4 G/DL (ref 33.6–35)
MCV RBC AUTO: 79.4 FL (ref 81.4–97.8)
MONOCYTES # BLD AUTO: 0.68 K/UL (ref 0–0.85)
MONOCYTES NFR BLD AUTO: 8.1 % (ref 0–13.4)
NEUTROPHILS # BLD AUTO: 6.09 K/UL (ref 2–7.15)
NEUTROPHILS NFR BLD: 72.7 % (ref 44–72)
NRBC # BLD AUTO: 0 K/UL
NRBC BLD-RTO: 0 /100 WBC
PLATELET # BLD AUTO: 491 K/UL (ref 164–446)
PMV BLD AUTO: 9.3 FL (ref 9–12.9)
POTASSIUM SERPL-SCNC: 3.6 MMOL/L (ref 3.6–5.5)
PRODUCT TYPE UPROD: ABNORMAL
RBC # BLD AUTO: 2.86 M/UL (ref 4.2–5.4)
RH BLD: ABNORMAL
SODIUM SERPL-SCNC: 141 MMOL/L (ref 135–145)
UNIT STATUS USTAT: ABNORMAL
WBC # BLD AUTO: 8.4 K/UL (ref 4.8–10.8)

## 2020-01-07 PROCEDURE — 86901 BLOOD TYPING SEROLOGIC RH(D): CPT

## 2020-01-07 PROCEDURE — A9270 NON-COVERED ITEM OR SERVICE: HCPCS | Performed by: NURSE PRACTITIONER

## 2020-01-07 PROCEDURE — 700102 HCHG RX REV CODE 250 W/ 637 OVERRIDE(OP): Performed by: NURSE PRACTITIONER

## 2020-01-07 PROCEDURE — 36415 COLL VENOUS BLD VENIPUNCTURE: CPT

## 2020-01-07 PROCEDURE — 700105 HCHG RX REV CODE 258: Performed by: FAMILY MEDICINE

## 2020-01-07 PROCEDURE — A9270 NON-COVERED ITEM OR SERVICE: HCPCS | Performed by: INTERNAL MEDICINE

## 2020-01-07 PROCEDURE — P9016 RBC LEUKOCYTES REDUCED: HCPCS

## 2020-01-07 PROCEDURE — 770020 HCHG ROOM/CARE - TELE (206)

## 2020-01-07 PROCEDURE — 700111 HCHG RX REV CODE 636 W/ 250 OVERRIDE (IP): Performed by: INTERNAL MEDICINE

## 2020-01-07 PROCEDURE — 85014 HEMATOCRIT: CPT

## 2020-01-07 PROCEDURE — 86900 BLOOD TYPING SEROLOGIC ABO: CPT

## 2020-01-07 PROCEDURE — 80048 BASIC METABOLIC PNL TOTAL CA: CPT

## 2020-01-07 PROCEDURE — 700102 HCHG RX REV CODE 250 W/ 637 OVERRIDE(OP): Performed by: INTERNAL MEDICINE

## 2020-01-07 PROCEDURE — 36430 TRANSFUSION BLD/BLD COMPNT: CPT

## 2020-01-07 PROCEDURE — 86850 RBC ANTIBODY SCREEN: CPT

## 2020-01-07 PROCEDURE — 86923 COMPATIBILITY TEST ELECTRIC: CPT

## 2020-01-07 PROCEDURE — 85025 COMPLETE CBC W/AUTO DIFF WBC: CPT

## 2020-01-07 PROCEDURE — 700105 HCHG RX REV CODE 258: Performed by: INTERNAL MEDICINE

## 2020-01-07 PROCEDURE — 99232 SBSQ HOSP IP/OBS MODERATE 35: CPT | Performed by: INTERNAL MEDICINE

## 2020-01-07 PROCEDURE — 85018 HEMOGLOBIN: CPT

## 2020-01-07 RX ORDER — SODIUM CHLORIDE 9 MG/ML
INJECTION, SOLUTION INTRAVENOUS CONTINUOUS
Status: DISPENSED | OUTPATIENT
Start: 2020-01-07 | End: 2020-01-07

## 2020-01-07 RX ORDER — MESALAMINE 1000 MG/1
1000 SUPPOSITORY RECTAL
Status: DISCONTINUED | OUTPATIENT
Start: 2020-01-07 | End: 2020-01-10 | Stop reason: HOSPADM

## 2020-01-07 RX ADMIN — OMEPRAZOLE 20 MG: 20 CAPSULE, DELAYED RELEASE ORAL at 07:03

## 2020-01-07 RX ADMIN — OXYCODONE HYDROCHLORIDE 10 MG: 10 TABLET ORAL at 03:34

## 2020-01-07 RX ADMIN — SODIUM CHLORIDE: 9 INJECTION, SOLUTION INTRAVENOUS at 03:34

## 2020-01-07 RX ADMIN — OXYCODONE HYDROCHLORIDE 10 MG: 10 TABLET ORAL at 14:55

## 2020-01-07 RX ADMIN — LISINOPRIL 10 MG: 5 TABLET ORAL at 07:01

## 2020-01-07 RX ADMIN — OXYCODONE HYDROCHLORIDE 10 MG: 10 TABLET ORAL at 21:38

## 2020-01-07 RX ADMIN — MESALAMINE 1000 MG: 1000 SUPPOSITORY RECTAL at 21:39

## 2020-01-07 RX ADMIN — OXYCODONE HYDROCHLORIDE 10 MG: 10 TABLET ORAL at 18:44

## 2020-01-07 RX ADMIN — OXYCODONE HYDROCHLORIDE 10 MG: 10 TABLET ORAL at 00:23

## 2020-01-07 RX ADMIN — SODIUM CHLORIDE 125 MG: 9 INJECTION, SOLUTION INTRAVENOUS at 07:13

## 2020-01-07 RX ADMIN — OXYCODONE HYDROCHLORIDE 10 MG: 10 TABLET ORAL at 07:01

## 2020-01-07 ASSESSMENT — ENCOUNTER SYMPTOMS
SHORTNESS OF BREATH: 0
DIAPHORESIS: 1
PALPITATIONS: 0
ABDOMINAL PAIN: 0
CHILLS: 0
DIARRHEA: 0
VOMITING: 0
CONSTIPATION: 0
BLOOD IN STOOL: 0
DIZZINESS: 0
NAUSEA: 0
HEADACHES: 0
FEVER: 0
COUGH: 0
HEARTBURN: 0

## 2020-01-07 NOTE — OR SURGEON
Immediate Post OP Note    PreOp Diagnosis: anemia  PostOp Diagnosis:    1/ poor prep obstructing portions of the colon    2/ ascending colon ulceration linear 4 cm long biopsied   3/ erythema in the rectum biopsied   4/ poor anal tone   5/ possible rectal erythema is from prolapse    Procedure(s):  COLONOSCOPY - Wound Class: Clean Contaminated    Surgeon(s):  David Carranza M.D.    Anesthesiologist/Type of Anesthesia:  Anesthesiologist: Drake Zafar M.D./MAC    Surgical Staff:  Circulator: Niurka Jade R.N.; Edison Orourke R.N.  Endoscopy Technician: Maggie Coello; Puja Green    Specimens removed if any:  ID Type Source Tests Collected by Time Destination   A : ulceration biopsy Tissue Colon - Ascending PATHOLOGY SPECIMEN David Carranza M.D. 1/6/2020 1522    B :  Tissue Rectal PATHOLOGY SPECIMEN David Carranza M.D. 1/6/2020 1527        Estimated Blood Loss: none    Findings:   Prior to the procedure the patient was informed of the risks and benefits of the procedure and freely signed the consent form.  She was monitored under standard monitoring blood pressure oxygen and heart monitor no appreciable abnormality was noted during the procedure.  She was placed in left Q's position digital rectal exam is nontender there is no internal or external hemorrhoids are appreciated however there is very poor anal tone.  Using the standard Olympus variable stiffness colonoscope was introduced under manual insertion and passed the cecum.  Preparation was poor obscuring approximately 40 to 50% of the colonic tissue.  In the cecum just proximal involving the ascending colon there was an ulceration approximately 4 cm long linear in character clean base no active bleeding or visible vessel was noted.  Biopsies were taken.  Gradual withdrawing of the colonoscope blood further appreciation of the ascending colon, transverse descending and sigmoid colon all of which essentially appeared normal  except for areas were obscured by formed stool.  Despite our efforts of using copious amounts of irrigation clearance could not be achieved.  Rectum appeared normal except for a small portion of erythema that is noted which was biopsied.  The area involved could be consistent with a rectal prolapse that may be occurring given the patient's poor anal tone.    Complications: none    Recommendations    Await biopsy results  [facility] 7 lab test  evaluation for ruling out inflammatory bowel disease  Outpatient anal manometry  Consider outpatient capsule endoscopy  Follow-up with primary care physician further healthcare needs  Follow-up at digestive [facility] for further evaluation and recommendations.  Continue iron supplementation along with vitamin C  If any further questions or concerns please feel free to give us call at [phone].  Consider hematological evaluation       1/6/2020 4:21 PM David Carranza M.D.

## 2020-01-07 NOTE — DISCHARGE PLANNING
Care Transition Team Assessment    LSW met w/ pt at bedside and confirmed facesheet demographics.  Pt lives alone and has a walker, wc, and home o2 concentrator (which she inherited from family).  Pt has support from her dtr, Erica.  Pt also reports having a care giver through the [employer] program who assits her w/ bathing, food prep, shopping, and household chores.  Pt reports being very happy w/ the program and the care giver, Naye, who has been with her for many years.  Pt states that she is current w/ her PCP, Viviana Thorne, and she uses [facility] on [address].    Pt plans to dc home and reports having transportation and plenty of support.    Information Source  Orientation : Oriented x 4  Information Given By: Patient  Informant's Name: Heidi  Who is responsible for making decisions for patient? : Patient         Elopement Risk  Legal Hold: No  Ambulatory or Self Mobile in Wheelchair: Yes  Disoriented: No  Psychiatric Symptoms: None  History of Wandering: No  Elopement this Admit: No  Vocalizing Wanting to Leave: No  Displays Behaviors, Body Language Wanting to Leave: No-Not at Risk for Elopement  Elopement Risk: Not at Risk for Elopement    Interdisciplinary Discharge Planning  Patient or legal guardian wants to designate a caregiver (see row info): No    Discharge Preparedness  What is your plan after discharge?: Home with help  What are your discharge supports?: Child, Other (comment)(Care giver, Naye)  Prior Functional Level: Needs Assist with Activities of Daily Living  Difficulity with ADLs: Bathing  Difficulity with IADLs: Cooking, Driving, Laundry, Shopping    Functional Assesment  Prior Functional Level: Needs Assist with Activities of Daily Living    Finances  Financial Barriers to Discharge: No  Prescription Coverage: Yes    Vision / Hearing Impairment  Vision Impairment : No  Right Eye Vision: Impaired, Wears Glasses  Left Eye Vision: Impaired, Wears Glasses  Hearing Impairment :  No              Domestic Abuse  Have you ever been the victim of abuse or violence?: No  Physical Abuse or Sexual Abuse: No  Verbal Abuse or Emotional Abuse: No  Possible Abuse Reported to:: Not Applicable         Discharge Risks or Barriers  Discharge risks or barriers?: No    Anticipated Discharge Information  Anticipated discharge disposition: Home  Discharge Address: [facility] [address], [place]  Discharge Contact Phone Number: [phone]

## 2020-01-07 NOTE — PROGRESS NOTES
Gastroenterology Progress Note     Author: Hellen Giovany   Date & Time Created: 1/7/2020 10:46 AM    Chief Complaint:  Anemia  Interval History:  Patient reports she feels well, no bowel movement overnight.  Tolerating GI soft diet without abdominal pain, nausea, vomiting.  EGD negative aside from esophagitis, duodenitis, coloscopy revealed ascending colon ulceration linear 4 cm long, erythema in the rectum.   Review of Systems:  Review of Systems   Constitutional: Positive for diaphoresis. Negative for chills, fever and malaise/fatigue.   Respiratory: Negative for shortness of breath.    Cardiovascular: Negative for chest pain.   Gastrointestinal: Negative for abdominal pain, blood in stool, constipation, diarrhea, heartburn, melena, nausea and vomiting.   Musculoskeletal: Positive for joint pain.       Physical Exam:  Physical Exam  HENT:      Head: Normocephalic.   Eyes:      Conjunctiva/sclera: Conjunctivae normal.      Pupils: Pupils are equal, round, and reactive to light.   Cardiovascular:      Rate and Rhythm: Normal rate and regular rhythm.      Pulses: Normal pulses.      Heart sounds: Normal heart sounds. No murmur.   Pulmonary:      Effort: Pulmonary effort is normal. No respiratory distress.      Breath sounds: Normal breath sounds.   Abdominal:      General: Bowel sounds are normal. There is distension (mild, pt reports it is baseline).   Skin:     General: Skin is warm and dry.      Coloration: Skin is not jaundiced.   Neurological:      General: No focal deficit present.      Mental Status: She is alert and oriented to person, place, and time.   Psychiatric:         Mood and Affect: Mood normal.         Behavior: Behavior normal.         Thought Content: Thought content normal.         Judgment: Judgment normal.         Labs:          Recent Labs     01/05/20  0051 01/06/20  0253 01/07/20  0105   SODIUM 138 143 141   POTASSIUM 3.8 3.4* 3.6   CHLORIDE 109 111 109   CO2 20 24 25   BUN 8  6* 4*   CREATININE 0.69 0.67 0.61   CALCIUM 7.9* 7.8* 7.8*     Recent Labs     20  00520   ALTSGPT  --  5  --    ASTSGOT  --  13  --    ALKPHOSPHAT  --  76  --    TBILIRUBIN  --  0.3  --    GLUCOSE 175* 142* 151*     Recent Labs     20  0920  0819   RBC 3.46* 3.02* 2.86*  --    HEMOGLOBIN 8.0* 7.2* 6.9* 8.1*   HEMATOCRIT 26.6* 23.6* 22.7* 27.1*   PLATELETCT 551* 499* 491*  --      Recent Labs     20   WBC 13.8* 11.8* 8.4   NEUTSPOLYS 80.60* 78.60* 72.70*   LYMPHOCYTES 9.60* 11.00* 13.60*   MONOCYTES 5.10 6.20 8.10   EOSINOPHILS 3.60 3.30 4.50   BASOPHILS 0.40 0.30 0.40   ASTSGOT  --  13  --    ALTSGPT  --  5  --    ALKPHOSPHAT  --  76  --    TBILIRUBIN  --  0.3  --      Hemodynamics:  Temp (24hrs), Av.6 °C (97.8 °F), Min:36.1 °C (96.9 °F), Max:37.1 °C (98.7 °F)  Temperature: 36.5 °C (97.7 °F)  Pulse  Av.1  Min: 75  Max: 121   Blood Pressure: 149/69     Respiratory:    Respiration: 20, Pulse Oximetry: 92 %        RUL Breath Sounds: Clear, RML Breath Sounds: Diminished, RLL Breath Sounds: Diminished, GREY Breath Sounds: Clear, LLL Breath Sounds: Diminished  Fluids:    Intake/Output Summary (Last 24 hours) at 2020 1046  Last data filed at 2020 0630  Gross per 24 hour   Intake 558 ml   Output --   Net 558 ml     Weight: 82.1 kg (181 lb)  GI/Nutrition:  Orders Placed This Encounter   Procedures   • Diet Order Low Fiber(GI Soft)     Standing Status:   Standing     Number of Occurrences:   1     Order Specific Question:   Diet:     Answer:   Low Fiber(GI Soft) [2]     Medical Decision Making, by Problem:  Active Hospital Problems    Diagnosis   • GI bleed [K92.2]   • Acute on chronic respiratory failure with hypoxia (HCC) [J96.21]   • Arthritis of left shoulder region [M19.012]   • Diabetes mellitus, type 2 ([facility]) [E11.9]   • COPD (chronic obstructive pulmonary disease) ([facility]) [J44.9]   •  Tobacco use disorder [F17.200]       Plan:  Pathology:  Ascending colon ulceration biopsy revealed colonic mucosa with a markedly increased eosinophilic infiltrate, rectal biopsy revealed focal active colitis with associated surface erosions and focal glandular dropout.  Neither revealed granulomas, dysplasia or malignancy identified.     -Hgb droppd to  After transfusion.  Continue to follow q 8 hours. If she has an signs of active bleeding or her hgb continues to drop, plan for inpatient capsule endoscopy.   -Low residue diet.   -[facility] 7 lab test evaluation for ruling out inflammatory bowel disease pending.    -Start nightly canasa suppository.   -Plan for outpatient anal manometry.  -In the absence of further overt bleeding or instability, consider outpatient capsule endoscopy.      Follow-up with primary care physician further healthcare needs  Follow-up at digestive health for further evaluation and recommendations.  Continue iron supplementation along with vitamin C  If any further questions or concerns please feel free to give us call at [phone].      Quality-Core Measures

## 2020-01-08 PROBLEM — L03.90 CELLULITIS: Status: ACTIVE | Noted: 2020-01-08

## 2020-01-08 LAB
ANION GAP SERPL CALC-SCNC: 7 MMOL/L (ref 0–11.9)
BASOPHILS # BLD AUTO: 0.6 % (ref 0–1.8)
BASOPHILS # BLD: 0.07 K/UL (ref 0–0.12)
BUN SERPL-MCNC: 4 MG/DL (ref 8–22)
CALCIUM SERPL-MCNC: 7.9 MG/DL (ref 8.5–10.5)
CHLORIDE SERPL-SCNC: 107 MMOL/L (ref 96–112)
CO2 SERPL-SCNC: 25 MMOL/L (ref 20–33)
CREAT SERPL-MCNC: 0.62 MG/DL (ref 0.5–1.4)
CRP SERPL HS-MCNC: 7.78 MG/DL (ref 0–0.75)
EOSINOPHIL # BLD AUTO: 0.64 K/UL (ref 0–0.51)
EOSINOPHIL NFR BLD: 5.5 % (ref 0–6.9)
ERYTHROCYTE [DISTWIDTH] IN BLOOD BY AUTOMATED COUNT: 76.3 FL (ref 35.9–50)
ERYTHROCYTE [SEDIMENTATION RATE] IN BLOOD BY WESTERGREN METHOD: 41 MM/HOUR (ref 0–30)
GLUCOSE SERPL-MCNC: 158 MG/DL (ref 65–99)
HCT VFR BLD AUTO: 27.4 % (ref 37–47)
HGB BLD-MCNC: 8.2 G/DL (ref 12–16)
IMM GRANULOCYTES # BLD AUTO: 0.05 K/UL (ref 0–0.11)
IMM GRANULOCYTES NFR BLD AUTO: 0.4 % (ref 0–0.9)
LYMPHOCYTES # BLD AUTO: 1.71 K/UL (ref 1–4.8)
LYMPHOCYTES NFR BLD: 14.7 % (ref 22–41)
MCH RBC QN AUTO: 24.2 PG (ref 27–33)
MCHC RBC AUTO-ENTMCNC: 29.9 G/DL (ref 33.6–35)
MCV RBC AUTO: 80.8 FL (ref 81.4–97.8)
MONOCYTES # BLD AUTO: 1.05 K/UL (ref 0–0.85)
MONOCYTES NFR BLD AUTO: 9 % (ref 0–13.4)
NEUTROPHILS # BLD AUTO: 8.13 K/UL (ref 2–7.15)
NEUTROPHILS NFR BLD: 69.8 % (ref 44–72)
NRBC # BLD AUTO: 0 K/UL
NRBC BLD-RTO: 0 /100 WBC
PLATELET # BLD AUTO: 504 K/UL (ref 164–446)
PMV BLD AUTO: 9 FL (ref 9–12.9)
POTASSIUM SERPL-SCNC: 3.8 MMOL/L (ref 3.6–5.5)
PROCALCITONIN SERPL-MCNC: 0.05 NG/ML
RBC # BLD AUTO: 3.39 M/UL (ref 4.2–5.4)
SODIUM SERPL-SCNC: 139 MMOL/L (ref 135–145)
WBC # BLD AUTO: 11.7 K/UL (ref 4.8–10.8)

## 2020-01-08 PROCEDURE — 80048 BASIC METABOLIC PNL TOTAL CA: CPT

## 2020-01-08 PROCEDURE — 87040 BLOOD CULTURE FOR BACTERIA: CPT | Mod: 91

## 2020-01-08 PROCEDURE — 99233 SBSQ HOSP IP/OBS HIGH 50: CPT | Performed by: INTERNAL MEDICINE

## 2020-01-08 PROCEDURE — 86255 FLUORESCENT ANTIBODY SCREEN: CPT

## 2020-01-08 PROCEDURE — 700111 HCHG RX REV CODE 636 W/ 250 OVERRIDE (IP): Performed by: INTERNAL MEDICINE

## 2020-01-08 PROCEDURE — A9270 NON-COVERED ITEM OR SERVICE: HCPCS | Performed by: NURSE PRACTITIONER

## 2020-01-08 PROCEDURE — 36415 COLL VENOUS BLD VENIPUNCTURE: CPT

## 2020-01-08 PROCEDURE — 86671 FUNGUS NES ANTIBODY: CPT

## 2020-01-08 PROCEDURE — 700105 HCHG RX REV CODE 258: Performed by: INTERNAL MEDICINE

## 2020-01-08 PROCEDURE — 700102 HCHG RX REV CODE 250 W/ 637 OVERRIDE(OP): Performed by: NURSE PRACTITIONER

## 2020-01-08 PROCEDURE — 84145 PROCALCITONIN (PCT): CPT

## 2020-01-08 PROCEDURE — 85652 RBC SED RATE AUTOMATED: CPT

## 2020-01-08 PROCEDURE — 700102 HCHG RX REV CODE 250 W/ 637 OVERRIDE(OP): Performed by: INTERNAL MEDICINE

## 2020-01-08 PROCEDURE — 86140 C-REACTIVE PROTEIN: CPT

## 2020-01-08 PROCEDURE — A9270 NON-COVERED ITEM OR SERVICE: HCPCS | Performed by: INTERNAL MEDICINE

## 2020-01-08 PROCEDURE — 90686 IIV4 VACC NO PRSV 0.5 ML IM: CPT | Performed by: INTERNAL MEDICINE

## 2020-01-08 PROCEDURE — 770020 HCHG ROOM/CARE - TELE (206)

## 2020-01-08 PROCEDURE — 85025 COMPLETE CBC W/AUTO DIFF WBC: CPT

## 2020-01-08 PROCEDURE — 3E02340 INTRODUCTION OF INFLUENZA VACCINE INTO MUSCLE, PERCUTANEOUS APPROACH: ICD-10-PCS | Performed by: INTERNAL MEDICINE

## 2020-01-08 PROCEDURE — 90471 IMMUNIZATION ADMIN: CPT

## 2020-01-08 RX ADMIN — OXYCODONE HYDROCHLORIDE 10 MG: 10 TABLET ORAL at 17:34

## 2020-01-08 RX ADMIN — ONDANSETRON 4 MG: 2 INJECTION INTRAMUSCULAR; INTRAVENOUS at 17:41

## 2020-01-08 RX ADMIN — OXYCODONE HYDROCHLORIDE 10 MG: 10 TABLET ORAL at 01:06

## 2020-01-08 RX ADMIN — MESALAMINE 1000 MG: 1000 SUPPOSITORY RECTAL at 21:00

## 2020-01-08 RX ADMIN — VANCOMYCIN HYDROCHLORIDE 2100 MG: 500 INJECTION, POWDER, LYOPHILIZED, FOR SOLUTION INTRAVENOUS at 13:56

## 2020-01-08 RX ADMIN — OXYCODONE HYDROCHLORIDE 10 MG: 10 TABLET ORAL at 06:47

## 2020-01-08 RX ADMIN — ONDANSETRON 4 MG: 2 INJECTION INTRAMUSCULAR; INTRAVENOUS at 22:34

## 2020-01-08 RX ADMIN — MORPHINE SULFATE 2 MG: 4 INJECTION INTRAVENOUS at 22:30

## 2020-01-08 RX ADMIN — MORPHINE SULFATE 2 MG: 4 INJECTION INTRAVENOUS at 08:57

## 2020-01-08 RX ADMIN — LISINOPRIL 10 MG: 5 TABLET ORAL at 06:46

## 2020-01-08 RX ADMIN — SODIUM CHLORIDE 250 MG: 9 INJECTION, SOLUTION INTRAVENOUS at 06:47

## 2020-01-08 RX ADMIN — OXYCODONE HYDROCHLORIDE 10 MG: 10 TABLET ORAL at 20:49

## 2020-01-08 RX ADMIN — ONDANSETRON 4 MG: 2 INJECTION INTRAMUSCULAR; INTRAVENOUS at 08:57

## 2020-01-08 RX ADMIN — OMEPRAZOLE 20 MG: 20 CAPSULE, DELAYED RELEASE ORAL at 06:46

## 2020-01-08 RX ADMIN — SODIUM CHLORIDE: 9 INJECTION, SOLUTION INTRAVENOUS at 01:09

## 2020-01-08 RX ADMIN — SODIUM CHLORIDE: 9 INJECTION, SOLUTION INTRAVENOUS at 17:35

## 2020-01-08 RX ADMIN — OXYCODONE HYDROCHLORIDE 10 MG: 10 TABLET ORAL at 12:31

## 2020-01-08 RX ADMIN — INFLUENZA A VIRUS A/BRISBANE/02/2018 IVR-190 (H1N1) ANTIGEN (FORMALDEHYDE INACTIVATED), INFLUENZA A VIRUS A/KANSAS/14/2017 X-327 (H3N2) ANTIGEN (FORMALDEHYDE INACTIVATED), INFLUENZA B VIRUS B/PHUKET/3073/2013 ANTIGEN (FORMALDEHYDE INACTIVATED), AND INFLUENZA B VIRUS B/MARYLAND/15/2016 BX-69A ANTIGEN (FORMALDEHYDE INACTIVATED) 0.5 ML: 15; 15; 15; 15 INJECTION, SUSPENSION INTRAMUSCULAR at 08:57

## 2020-01-08 ASSESSMENT — ENCOUNTER SYMPTOMS
COUGH: 0
PALPITATIONS: 0
ABDOMINAL PAIN: 0
VOMITING: 0
DIZZINESS: 0
FEVER: 0
SHORTNESS OF BREATH: 0
DIARRHEA: 0
HEADACHES: 0
WEIGHT LOSS: 0
NAUSEA: 0
CHILLS: 0

## 2020-01-08 NOTE — CARE PLAN
Problem: Safety  Goal: Will remain free from falls  Outcome: PROGRESSING AS EXPECTED  Intervention: Implement fall precautions  Note:   Fall precautions in place. Bed in lowest position. Non-skid socks in place. Personal possessions within reach. Mobility sign on door. Bed-alarm on. Call light within reach. Pt educated regarding fall prevention and states understanding.

## 2020-01-08 NOTE — PROGRESS NOTES
Patient A+Ox4, sitting at edge of bed eating breakfast. On room air, VSS. On tele. C/O pain to right shoulder, this is chronic, patient sees ortho outpatient. Oxy and morphine PRN pain. Had flu shot this AM. IV fluids infusing as ordered. Using call bell approp, bed locked and in lowest position, whiteboard updated, hourly rounding in place

## 2020-01-08 NOTE — PROGRESS NOTES
[facility] Medicine Daily Progress Note    Date of Service  1/7/2020    Chief Complaint  64 y.o. female admitted 1/3/2020 with weakness found to have severe iron  deficient anemia.    Hospital Course    64-year-old female with history of COPD on chronic oxygen, osteoarthritis and chronic pain presented 1/3 with weakness and rectal bleeding with diarrhea, she was found to have severe anemia, GI was consulted for colonoscopy and EGD which were done      Interval Problem Update  -Denied nausea vomiting or significant bleeding however her hemoglobin dropped to 6.9 and received 1 unit red blood cell.  -Continue monitoring with hemoglobin daily       Consultants/Specialty  GI    Code Status  Full code    Disposition  TELE    Review of Systems  Review of Systems   Constitutional: Positive for malaise/fatigue (relatively unchanged today). Negative for chills and fever.   HENT: Negative for hearing loss and tinnitus.    Respiratory: Negative for cough and shortness of breath.    Cardiovascular: Negative for palpitations.   Gastrointestinal: Negative for abdominal pain, diarrhea, nausea and vomiting.   Genitourinary: Negative for dysuria, frequency, hematuria and urgency.   Neurological: Negative for dizziness and headaches.   All other systems reviewed and are negative.       Physical Exam  Temp:  [36.1 °C (96.9 °F)-37.2 °C (98.9 °F)] 37.1 °C (98.7 °F)  Pulse:  [] 98  Resp:  [16-20] 20  BP: (125-158)/(61-70) 158/70  SpO2:  [92 %-98 %] 98 %    Physical Exam  Vitals signs and nursing note reviewed.   Constitutional:       General: She is not in acute distress.     Appearance: She is well-developed.   HENT:      Head: Normocephalic and atraumatic.      Mouth/Throat:      Pharynx: No oropharyngeal exudate.   Eyes:      General: No scleral icterus.     Pupils: Pupils are equal, round, and reactive to light.   Neck:      Musculoskeletal: Normal range of motion and neck supple.      Thyroid: No thyromegaly.   Cardiovascular:       Rate and Rhythm: Regular rhythm. Tachycardia present.      Heart sounds: Normal heart sounds. No gallop.    Pulmonary:      Effort: Pulmonary effort is normal. No respiratory distress.      Breath sounds: Normal breath sounds.   Abdominal:      General: Bowel sounds are normal. There is no distension.      Palpations: Abdomen is soft.      Tenderness: There is tenderness (mild LUQ).   Musculoskeletal: Normal range of motion.         General: No swelling or tenderness.      Right lower leg: No edema.      Left lower leg: No edema.   Skin:     General: Skin is warm and dry.      Coloration: Skin is pale. Skin is not jaundiced.   Neurological:      Mental Status: She is alert and oriented to person, place, and time.      Cranial Nerves: No cranial nerve deficit.         Fluids    Intake/Output Summary (Last 24 hours) at 1/7/2020 1924  Last data filed at 1/7/2020 0630  Gross per 24 hour   Intake 408 ml   Output --   Net 408 ml       Laboratory  Recent Labs     01/05/20  0926 01/06/20  0253 01/07/20  0105 01/07/20  0819   WBC 13.8* 11.8* 8.4  --    RBC 3.46* 3.02* 2.86*  --    HEMOGLOBIN 8.0* 7.2* 6.9* 8.1*   HEMATOCRIT 26.6* 23.6* 22.7* 27.1*   MCV 76.9* 78.1* 79.4*  --    MCH 23.1* 23.8* 24.1*  --    MCHC 30.1* 30.5* 30.4*  --    RDW 67.7* 71.5* 74.5*  --    PLATELETCT 551* 499* 491*  --    MPV 9.0 9.2 9.3  --      Recent Labs     01/05/20  0051 01/06/20  0253 01/07/20  0105   SODIUM 138 143 141   POTASSIUM 3.8 3.4* 3.6   CHLORIDE 109 111 109   CO2 20 24 25   GLUCOSE 175* 142* 151*   BUN 8 6* 4*   CREATININE 0.69 0.67 0.61   CALCIUM 7.9* 7.8* 7.8*                   Imaging  No orders to display        Assessment/Plan  GI bleed- (present on admission)  Assessment & Plan  -EGD shows LA Grade A esophagitis and duodenitis  -Colonoscopy: though her evaluation was limited by her poor prep. Slight proctitis appreciated in the rectum.   - Continue further IBD/ischemic colitis  -Transfusion threshold is less than 7  -oral PPI  daily  -daily CBC  -GI on board: May patient denied CT angios or wireless capsule      Acute on chronic respiratory failure with hypoxia (HCC)- (present on admission)  Assessment & Plan  COPD and profound anemia  RBC transfusion  Supplemental oxygen  RT protocol  -down to 0.5 L NC    Arthritis of left shoulder region- (present on admission)  Assessment & Plan  Tylenol, oxycodone as needed  Avoid NSAID    Diabetes mellitus, type 2 (HCC)- (present on admission)  Assessment & Plan  Most recent A1c was 7.3   3 months ago, now 6.3  Patient is not on medications for diabetes.  We will start sliding scale, sugars remain well controlled at this time    COPD (chronic obstructive pulmonary disease) (HCC)- (present on admission)  Assessment & Plan  Stable  RT protocol  Continue supplemental oxygen, DuoNeb as needed  Smoking cessation counseling    Tobacco use disorder- (present on admission)  Assessment & Plan  Nicotine patch         VTE prophylaxis: SCD's, no heparin or Lovenox due to GI bleeding

## 2020-01-09 ENCOUNTER — APPOINTMENT (OUTPATIENT)
Dept: RADIOLOGY | Facility: MEDICAL CENTER | Age: 65
DRG: 377 | End: 2020-01-09
Attending: INTERNAL MEDICINE
Payer: MEDICARE

## 2020-01-09 LAB
ANION GAP SERPL CALC-SCNC: 9 MMOL/L (ref 0–11.9)
BASOPHILS # BLD AUTO: 0.5 % (ref 0–1.8)
BASOPHILS # BLD: 0.05 K/UL (ref 0–0.12)
BUN SERPL-MCNC: 4 MG/DL (ref 8–22)
CALCIUM SERPL-MCNC: 7.9 MG/DL (ref 8.5–10.5)
CHLORIDE SERPL-SCNC: 105 MMOL/L (ref 96–112)
CO2 SERPL-SCNC: 26 MMOL/L (ref 20–33)
CREAT SERPL-MCNC: 0.62 MG/DL (ref 0.5–1.4)
EOSINOPHIL # BLD AUTO: 0.51 K/UL (ref 0–0.51)
EOSINOPHIL NFR BLD: 4.7 % (ref 0–6.9)
ERYTHROCYTE [DISTWIDTH] IN BLOOD BY AUTOMATED COUNT: 76.8 FL (ref 35.9–50)
GLUCOSE SERPL-MCNC: 164 MG/DL (ref 65–99)
HCT VFR BLD AUTO: 26.2 % (ref 37–47)
HGB BLD-MCNC: 8.1 G/DL (ref 12–16)
IMM GRANULOCYTES # BLD AUTO: 0.04 K/UL (ref 0–0.11)
IMM GRANULOCYTES NFR BLD AUTO: 0.4 % (ref 0–0.9)
LYMPHOCYTES # BLD AUTO: 1.49 K/UL (ref 1–4.8)
LYMPHOCYTES NFR BLD: 13.7 % (ref 22–41)
MCH RBC QN AUTO: 25 PG (ref 27–33)
MCHC RBC AUTO-ENTMCNC: 30.9 G/DL (ref 33.6–35)
MCV RBC AUTO: 80.9 FL (ref 81.4–97.8)
MONOCYTES # BLD AUTO: 0.81 K/UL (ref 0–0.85)
MONOCYTES NFR BLD AUTO: 7.4 % (ref 0–13.4)
NEUTROPHILS # BLD AUTO: 7.99 K/UL (ref 2–7.15)
NEUTROPHILS NFR BLD: 73.3 % (ref 44–72)
NRBC # BLD AUTO: 0 K/UL
NRBC BLD-RTO: 0 /100 WBC
PLATELET # BLD AUTO: 481 K/UL (ref 164–446)
PMV BLD AUTO: 9.3 FL (ref 9–12.9)
POTASSIUM SERPL-SCNC: 3.6 MMOL/L (ref 3.6–5.5)
PROCALCITONIN SERPL-MCNC: 0.07 NG/ML
RBC # BLD AUTO: 3.24 M/UL (ref 4.2–5.4)
SODIUM SERPL-SCNC: 140 MMOL/L (ref 135–145)
WBC # BLD AUTO: 10.9 K/UL (ref 4.8–10.8)

## 2020-01-09 PROCEDURE — 80048 BASIC METABOLIC PNL TOTAL CA: CPT

## 2020-01-09 PROCEDURE — 700102 HCHG RX REV CODE 250 W/ 637 OVERRIDE(OP): Performed by: INTERNAL MEDICINE

## 2020-01-09 PROCEDURE — 76882 US LMTD JT/FCL EVL NVASC XTR: CPT | Mod: RT

## 2020-01-09 PROCEDURE — A9270 NON-COVERED ITEM OR SERVICE: HCPCS | Performed by: INTERNAL MEDICINE

## 2020-01-09 PROCEDURE — 700105 HCHG RX REV CODE 258: Performed by: INTERNAL MEDICINE

## 2020-01-09 PROCEDURE — 700111 HCHG RX REV CODE 636 W/ 250 OVERRIDE (IP): Performed by: INTERNAL MEDICINE

## 2020-01-09 PROCEDURE — 84145 PROCALCITONIN (PCT): CPT

## 2020-01-09 PROCEDURE — A9270 NON-COVERED ITEM OR SERVICE: HCPCS | Performed by: NURSE PRACTITIONER

## 2020-01-09 PROCEDURE — 700102 HCHG RX REV CODE 250 W/ 637 OVERRIDE(OP): Performed by: NURSE PRACTITIONER

## 2020-01-09 PROCEDURE — 85025 COMPLETE CBC W/AUTO DIFF WBC: CPT

## 2020-01-09 PROCEDURE — 36415 COLL VENOUS BLD VENIPUNCTURE: CPT

## 2020-01-09 PROCEDURE — 99232 SBSQ HOSP IP/OBS MODERATE 35: CPT | Performed by: INTERNAL MEDICINE

## 2020-01-09 PROCEDURE — 94760 N-INVAS EAR/PLS OXIMETRY 1: CPT

## 2020-01-09 PROCEDURE — 770020 HCHG ROOM/CARE - TELE (206)

## 2020-01-09 RX ORDER — BUDESONIDE AND FORMOTEROL FUMARATE DIHYDRATE 80; 4.5 UG/1; UG/1
2 AEROSOL RESPIRATORY (INHALATION)
Status: DISCONTINUED | OUTPATIENT
Start: 2020-01-09 | End: 2020-01-09

## 2020-01-09 RX ORDER — SULFAMETHOXAZOLE AND TRIMETHOPRIM 800; 160 MG/1; MG/1
1 TABLET ORAL EVERY 12 HOURS
Status: DISCONTINUED | OUTPATIENT
Start: 2020-01-09 | End: 2020-01-10 | Stop reason: HOSPADM

## 2020-01-09 RX ORDER — BUDESONIDE AND FORMOTEROL FUMARATE DIHYDRATE 80; 4.5 UG/1; UG/1
2 AEROSOL RESPIRATORY (INHALATION) 2 TIMES DAILY
Status: DISCONTINUED | OUTPATIENT
Start: 2020-01-10 | End: 2020-01-10 | Stop reason: HOSPADM

## 2020-01-09 RX ADMIN — SODIUM CHLORIDE: 9 INJECTION, SOLUTION INTRAVENOUS at 06:43

## 2020-01-09 RX ADMIN — MORPHINE SULFATE 2 MG: 4 INJECTION INTRAVENOUS at 04:29

## 2020-01-09 RX ADMIN — SODIUM CHLORIDE 250 MG: 9 INJECTION, SOLUTION INTRAVENOUS at 06:42

## 2020-01-09 RX ADMIN — ONDANSETRON 4 MG: 2 INJECTION INTRAMUSCULAR; INTRAVENOUS at 04:30

## 2020-01-09 RX ADMIN — OXYCODONE HYDROCHLORIDE 10 MG: 10 TABLET ORAL at 13:51

## 2020-01-09 RX ADMIN — OXYCODONE HYDROCHLORIDE 10 MG: 10 TABLET ORAL at 23:55

## 2020-01-09 RX ADMIN — OXYCODONE HYDROCHLORIDE 10 MG: 10 TABLET ORAL at 03:45

## 2020-01-09 RX ADMIN — ONDANSETRON 4 MG: 2 INJECTION INTRAMUSCULAR; INTRAVENOUS at 08:52

## 2020-01-09 RX ADMIN — OMEPRAZOLE 20 MG: 20 CAPSULE, DELAYED RELEASE ORAL at 04:29

## 2020-01-09 RX ADMIN — OXYCODONE HYDROCHLORIDE 10 MG: 10 TABLET ORAL at 08:47

## 2020-01-09 RX ADMIN — MESALAMINE 1000 MG: 1000 SUPPOSITORY RECTAL at 20:50

## 2020-01-09 RX ADMIN — LISINOPRIL 10 MG: 5 TABLET ORAL at 04:29

## 2020-01-09 RX ADMIN — BUDESONIDE AND FORMOTEROL FUMARATE DIHYDRATE 2 PUFF: 80; 4.5 AEROSOL RESPIRATORY (INHALATION) at 19:08

## 2020-01-09 RX ADMIN — OXYCODONE HYDROCHLORIDE 10 MG: 10 TABLET ORAL at 20:50

## 2020-01-09 RX ADMIN — OXYCODONE HYDROCHLORIDE 10 MG: 10 TABLET ORAL at 17:23

## 2020-01-09 RX ADMIN — SULFAMETHOXAZOLE AND TRIMETHOPRIM 1 TABLET: 800; 160 TABLET ORAL at 17:23

## 2020-01-09 ASSESSMENT — ENCOUNTER SYMPTOMS
FEVER: 0
WEIGHT LOSS: 0
CHILLS: 0
COUGH: 0
ABDOMINAL PAIN: 0
VOMITING: 0
PALPITATIONS: 0
SHORTNESS OF BREATH: 0
DIZZINESS: 0
HEADACHES: 0
DIARRHEA: 0
NAUSEA: 0

## 2020-01-09 NOTE — CARE PLAN
Problem: Safety  Goal: Will remain free from falls  Outcome: PROGRESSING AS EXPECTED    Bed locked and lowest position. Bed alarm on. Treaded socks on. Call light and belongings within reach. Pt educated to call for assistance. Pt verbalized understanding. Hourly rounding in place.        Problem: Knowledge Deficit  Goal: Knowledge of disease process/condition, treatment plan, diagnostic tests, and medications will improve  Outcome: PROGRESSING AS EXPECTED     Discussed POC and medications with pt, pt verbalized understanding.

## 2020-01-09 NOTE — PROGRESS NOTES
Hospital Medicine Daily Progress Note    Date of Service  1/9/2020    Chief Complaint  64 y.o. female admitted 1/3/2020 with weakness found to have severe iron  deficient anemia.    Hospital Course    64-year-old female with history of COPD on chronic oxygen, osteoarthritis and chronic pain presented 1/3 with weakness and rectal bleeding with diarrhea, she was found to have severe anemia, GI was consulted for colonoscopy and EGD which were done which showed LA grade 1 esophagitis and duodenitis and colonoscopy showed slight proctitis, the patient has been on PPI oral and mesalamine with improving and stabilization on her hemoglobin around 8, the patient is to follow-up with GI clinic for possible wireless capsule.   During this hospitalization the patient developed swelling and redness with warmth on her left elbow under the skin, the joint was normal, with leukocytosis but no fever, vancomycin was restarted and de-escalated later to Bactrim, blood culture did not grow anything, and ultrasound showed undrainable abscess        Interval Problem Update  -No fever and chills, some improvement on her cellulitis  -Hemoglobin stable, and improving on her leukocytosis, blood cultures negative till now, de-escalate antibiotics to Bactrim.    Consultants/Specialty  GI    Code Status  Full code    Disposition  Discharge tomorrow if she is a stable    Review of Systems  Review of Systems   Constitutional: Negative for chills, fever, malaise/fatigue (relatively unchanged today) and weight loss.   HENT: Negative for hearing loss and tinnitus.    Respiratory: Negative for cough and shortness of breath.    Cardiovascular: Negative for palpitations.   Gastrointestinal: Negative for abdominal pain, diarrhea, nausea and vomiting.   Genitourinary: Negative for dysuria, frequency, hematuria and urgency.   Neurological: Negative for dizziness and headaches.   All other systems reviewed and are negative.       Physical Exam  Temp:  [36.1  °C (97 °F)-37.4 °C (99.3 °F)] 37.1 °C (98.8 °F)  Pulse:  [] 69  Resp:  [15-18] 16  BP: (129-163)/(64-88) 129/64  SpO2:  [92 %-97 %] 97 %    Physical Exam  Vitals signs and nursing note reviewed.   Constitutional:       General: She is not in acute distress.     Appearance: She is well-developed.   HENT:      Head: Normocephalic and atraumatic.      Mouth/Throat:      Pharynx: No oropharyngeal exudate.   Eyes:      General: No scleral icterus.     Pupils: Pupils are equal, round, and reactive to light.   Neck:      Musculoskeletal: Normal range of motion and neck supple.      Thyroid: No thyromegaly.   Cardiovascular:      Rate and Rhythm: Regular rhythm. Tachycardia present.      Heart sounds: Normal heart sounds. No gallop.    Pulmonary:      Effort: Pulmonary effort is normal. No respiratory distress.      Breath sounds: Normal breath sounds.   Abdominal:      General: Bowel sounds are normal. There is no distension.      Palpations: Abdomen is soft.      Tenderness: There is no tenderness (mild LUQ). There is no guarding.   Musculoskeletal: Normal range of motion.         General: No swelling or tenderness.      Right lower leg: No edema.      Left lower leg: No edema.   Skin:     General: Skin is warm and dry.      Coloration: Skin is pale. Skin is not jaundiced.      Comments: Redness and swelling with warmth on the right elbow with white pimple.    Neurological:      Mental Status: She is alert and oriented to person, place, and time.      Cranial Nerves: No cranial nerve deficit.         Fluids    Intake/Output Summary (Last 24 hours) at 1/9/2020 1552  Last data filed at 1/9/2020 0419  Gross per 24 hour   Intake --   Output 1030 ml   Net -1030 ml       Laboratory  Recent Labs     01/07/20  0105 01/07/20  0819 01/08/20  0229 01/09/20  0300   WBC 8.4  --  11.7* 10.9*   RBC 2.86*  --  3.39* 3.24*   HEMOGLOBIN 6.9* 8.1* 8.2* 8.1*   HEMATOCRIT 22.7* 27.1* 27.4* 26.2*   MCV 79.4*  --  80.8* 80.9*   MCH 24.1*   --  24.2* 25.0*   MCHC 30.4*  --  29.9* 30.9*   RDW 74.5*  --  76.3* 76.8*   PLATELETCT 491*  --  504* 481*   MPV 9.3  --  9.0 9.3     Recent Labs     01/07/20  0105 01/08/20  0229 01/09/20  0300   SODIUM 141 139 140   POTASSIUM 3.6 3.8 3.6   CHLORIDE 109 107 105   CO2 25 25 26   GLUCOSE 151* 158* 164*   BUN 4* 4* 4*   CREATININE 0.61 0.62 0.62   CALCIUM 7.8* 7.9* 7.9*                   Imaging  US-EXTREMITY NON VASCULAR UNILATERAL RIGHT   Final Result         1. Cellulitis. No drainable abscess.   2. Thrombosis of one of the superficial elbow veins, suggestive of thrombophlebitis.           Assessment/Plan  * GI bleed- (present on admission)  Assessment & Plan  -EGD shows LA Grade A esophagitis and duodenitis  -Colonoscopy: though her evaluation was limited by her poor prep. Slight proctitis appreciated in the rectum.   - Continue further IBD/ischemic colitis  -Transfusion threshold is less than 7  -oral PPI daily  -daily CBC  -GI on board: May patient need CT angios or wireless capsule  -Mesalamine per GI      Cellulitis  Assessment & Plan  Worsening swelling, redness and warmth on the right elbow with a small white head.  No fever  Mild leukocytosis and normal procalcitonin; improving leukocytosis  Blood culture was negative so far  Vancomycin was started on 1/8 de-escalate on 1/9 to Bactrim  Ultrasound showed thrombophlebitis and no drainable abscess  Continue monitoring     Acute on chronic respiratory failure with hypoxia (HCC)- (present on admission)  Assessment & Plan  COPD and profound anemia  RBC transfusion  Improved; on room air  RT protocol      Arthritis of left shoulder region- (present on admission)  Assessment & Plan  Tylenol, oxycodone as needed  Avoid NSAID    Diabetes mellitus, type 2 (HCC)- (present on admission)  Assessment & Plan  Most recent A1c was 7.3   3 months ago, now 6.3  Patient is not on medications for diabetes.  We will start sliding scale, sugars remain well controlled at this  time    COPD (chronic obstructive pulmonary disease) (HCC)- (present on admission)  Assessment & Plan  Stable  RT protocol  Continue supplemental oxygen, DuoNeb as needed  Smoking cessation counseling    Tobacco use disorder- (present on admission)  Assessment & Plan  Nicotine patch         VTE prophylaxis: SCD's, no heparin or Lovenox due to GI bleeding

## 2020-01-09 NOTE — PROGRESS NOTES
Hospital Medicine Daily Progress Note    Date of Service  1/8/2020    Chief Complaint  64 y.o. female admitted 1/3/2020 with weakness found to have severe iron  deficient anemia.    Hospital Course    64-year-old female with history of COPD on chronic oxygen, osteoarthritis and chronic pain presented 1/3 with weakness and rectal bleeding with diarrhea, she was found to have severe anemia, GI was consulted for colonoscopy and EGD which were done        Interval Problem Update  -Denied nausea vomiting or significant bleeding and hemoglobin stable around 8  -Continue monitoring with hemoglobin daily   -She developed worsening redness and swelling on the right elbow, no fever, however she has leukocytosis today, blood culture was ordered and vancomycin was started.       Consultants/Specialty  GI    Code Status  Full code    Disposition  TELE    Review of Systems  Review of Systems   Constitutional: Negative for chills, fever, malaise/fatigue (relatively unchanged today) and weight loss.   HENT: Negative for hearing loss and tinnitus.    Respiratory: Negative for cough and shortness of breath.    Cardiovascular: Negative for palpitations.   Gastrointestinal: Negative for abdominal pain, diarrhea, nausea and vomiting.   Genitourinary: Negative for dysuria, frequency, hematuria and urgency.   Neurological: Negative for dizziness and headaches.   All other systems reviewed and are negative.       Physical Exam  Temp:  [36.4 °C (97.6 °F)-37.4 °C (99.3 °F)] 37.4 °C (99.3 °F)  Pulse:  [] 104  Resp:  [16-18] 16  BP: (135-166)/(73-94) 163/82  SpO2:  [92 %-98 %] 92 %    Physical Exam  Vitals signs and nursing note reviewed.   Constitutional:       General: She is not in acute distress.     Appearance: She is well-developed.   HENT:      Head: Normocephalic and atraumatic.      Mouth/Throat:      Pharynx: No oropharyngeal exudate.   Eyes:      General: No scleral icterus.     Pupils: Pupils are equal, round, and reactive to  light.   Neck:      Musculoskeletal: Normal range of motion and neck supple.      Thyroid: No thyromegaly.   Cardiovascular:      Rate and Rhythm: Regular rhythm. Tachycardia present.      Heart sounds: Normal heart sounds. No gallop.    Pulmonary:      Effort: Pulmonary effort is normal. No respiratory distress.      Breath sounds: Normal breath sounds.   Abdominal:      General: Bowel sounds are normal. There is no distension.      Palpations: Abdomen is soft.      Tenderness: There is no tenderness (mild LUQ). There is no guarding.   Musculoskeletal: Normal range of motion.         General: No swelling or tenderness.      Right lower leg: No edema.      Left lower leg: No edema.   Skin:     General: Skin is warm and dry.      Coloration: Skin is pale. Skin is not jaundiced.      Comments: Redness and swelling with warmth on the right elbow with white pimple.    Neurological:      Mental Status: She is alert and oriented to person, place, and time.      Cranial Nerves: No cranial nerve deficit.         Fluids    Intake/Output Summary (Last 24 hours) at 1/8/2020 1845  Last data filed at 1/8/2020 0426  Gross per 24 hour   Intake 100 ml   Output 550 ml   Net -450 ml       Laboratory  Recent Labs     01/06/20  0253 01/07/20  0105 01/07/20  0819 01/08/20 0229   WBC 11.8* 8.4  --  11.7*   RBC 3.02* 2.86*  --  3.39*   HEMOGLOBIN 7.2* 6.9* 8.1* 8.2*   HEMATOCRIT 23.6* 22.7* 27.1* 27.4*   MCV 78.1* 79.4*  --  80.8*   MCH 23.8* 24.1*  --  24.2*   MCHC 30.5* 30.4*  --  29.9*   RDW 71.5* 74.5*  --  76.3*   PLATELETCT 499* 491*  --  504*   MPV 9.2 9.3  --  9.0     Recent Labs     01/06/20  0253 01/07/20  0105 01/08/20 0229   SODIUM 143 141 139   POTASSIUM 3.4* 3.6 3.8   CHLORIDE 111 109 107   CO2 24 25 25   GLUCOSE 142* 151* 158*   BUN 6* 4* 4*   CREATININE 0.67 0.61 0.62   CALCIUM 7.8* 7.8* 7.9*                   Imaging  No orders to display        Assessment/Plan  * GI bleed- (present on admission)  Assessment &  Plan  -EGD shows LA Grade A esophagitis and duodenitis  -Colonoscopy: though her evaluation was limited by her poor prep. Slight proctitis appreciated in the rectum.   - Continue further IBD/ischemic colitis  -Transfusion threshold is less than 7  -oral PPI daily  -daily CBC  -GI on board: May patient denied CT angios or wireless capsule  -Mesalamine per GI      Cellulitis  Assessment & Plan  Worsening swelling, redness and warmth on the right elbow with a small white head.  No fever  Mild leukocytosis and normal procalcitonin  Blood culture was ordered  Vancomycin was started on 1/8 de-escalate later  Monitor closely and consider ultrasound or image to assess for abscess    Acute on chronic respiratory failure with hypoxia (HCC)- (present on admission)  Assessment & Plan  COPD and profound anemia  RBC transfusion  Supplemental oxygen  RT protocol  -down to 0.5 L NC    Arthritis of left shoulder region- (present on admission)  Assessment & Plan  Tylenol, oxycodone as needed  Avoid NSAID    Diabetes mellitus, type 2 (HCC)- (present on admission)  Assessment & Plan  Most recent A1c was 7.3   3 months ago, now 6.3  Patient is not on medications for diabetes.  We will start sliding scale, sugars remain well controlled at this time    COPD (chronic obstructive pulmonary disease) (HCC)- (present on admission)  Assessment & Plan  Stable  RT protocol  Continue supplemental oxygen, DuoNeb as needed  Smoking cessation counseling    Tobacco use disorder- (present on admission)  Assessment & Plan  Nicotine patch         VTE prophylaxis: SCD's, no heparin or Lovenox due to GI bleeding

## 2020-01-09 NOTE — PROGRESS NOTES
"Pharmacy Kinetics 64 y.o. female on vancomycin day # 1  2020    Starting with Vancomycin 2100 mg iv x 1 dose then Vanco 1600 mg iv q 24 hrs  Provider specified end date: 5 days    Indication for Treatment: SSTI    Pertinent history per medical record: Admitted on 1/3/2020 for GIB. Pt evaluated for GIB and anemia. Now MD with concern for pimple like wound that is suspicious for staph infection on the pt's arm. Pt also has leucocytosis. Vanco ordered to be started and blood cultures ordered    Other antibiotics: None    Allergies: Aspartame; Augmentin; Latex; Lipitor [atorvastatin calcium]; Other misc; and Saccharin     List concerns for renal function: low albumin    Pertinent cultures to date:   pending    MRSA nares swab if pneumonia is a concern (ordered/positive/negative/n-a): n/a    Recent Labs     20   WBC 11.8* 8.4 11.7*   NEUTSPOLYS 78.60* 72.70* 69.80     Recent Labs     20   BUN 6* 4* 4*   CREATININE 0.67 0.61 0.62   ALBUMIN 2.8*  --   --      No results for input(s): VANCOTROUGH, VANCOPEAK, VANCORANDOM in the last 72 hours.    Intake/Output Summary (Last 24 hours) at 2020 1611  Last data filed at 2020 0426  Gross per 24 hour   Intake 100 ml   Output 550 ml   Net -450 ml      /73   Pulse 89   Temp 37.1 °C (98.7 °F) (Temporal)   Resp 16   Ht 1.727 m (5' 8\")   Wt 82.3 kg (181 lb 7 oz)   SpO2 97%  Temp (24hrs), Av.8 °C (98.3 °F), Min:36.4 °C (97.6 °F), Max:37.1 °C (98.8 °F)      A/P   1. Vancomycin dose change: new start  2. Next vancomycin level: prior to the 4th total dose (not ordered)  3. Goal trough: 12-16 mcg/ml  4. Comments: Pt to have vanco loading dose today and then will start vanco 20 mg/kg (1600 mg) iv q 24 hrs tomorrow. Pt with minimal risk factors for vanco accumulation. Await culture results.     Willian Cody, PharmD    "

## 2020-01-09 NOTE — PROGRESS NOTES
Bedside report received. POC discussed with pt; all questions answered at this time. Bed in lowest position with wheels locked and call light in reach.

## 2020-01-09 NOTE — ASSESSMENT & PLAN NOTE
Worsening swelling, redness and warmth on the right elbow with a small white head.  No fever  Mild leukocytosis and normal procalcitonin; improving leukocytosis  Blood culture was negative so far  Vancomycin was started on 1/8 de-escalate on 1/9 to Bactrim  Ultrasound showed thrombophlebitis and no drainable abscess  Continue monitoring

## 2020-01-10 ENCOUNTER — PATIENT OUTREACH (OUTPATIENT)
Dept: HEALTH INFORMATION MANAGEMENT | Facility: OTHER | Age: 65
End: 2020-01-10

## 2020-01-10 VITALS
TEMPERATURE: 97.6 F | WEIGHT: 183.42 LBS | BODY MASS INDEX: 27.8 KG/M2 | DIASTOLIC BLOOD PRESSURE: 74 MMHG | RESPIRATION RATE: 16 BRPM | OXYGEN SATURATION: 96 % | HEIGHT: 68 IN | HEART RATE: 84 BPM | SYSTOLIC BLOOD PRESSURE: 153 MMHG

## 2020-01-10 PROBLEM — J96.21 ACUTE ON CHRONIC RESPIRATORY FAILURE WITH HYPOXIA (HCC): Status: RESOLVED | Noted: 2020-01-03 | Resolved: 2020-01-10

## 2020-01-10 PROBLEM — K92.2 GI BLEED: Status: RESOLVED | Noted: 2020-01-03 | Resolved: 2020-01-10

## 2020-01-10 LAB
ANCA IGG TITR SER IF: NORMAL {TITER}
ANION GAP SERPL CALC-SCNC: 8 MMOL/L (ref 0–11.9)
BAKER'S YEAST IGA QN IA: 7.9 UNITS (ref 0–24.9)
BAKER'S YEAST IGG QN IA: 9.4 UNITS (ref 0–24.9)
BASOPHILS # BLD AUTO: 0.7 % (ref 0–1.8)
BASOPHILS # BLD: 0.08 K/UL (ref 0–0.12)
BUN SERPL-MCNC: 7 MG/DL (ref 8–22)
CALCIUM SERPL-MCNC: 8.4 MG/DL (ref 8.5–10.5)
CHLORIDE SERPL-SCNC: 101 MMOL/L (ref 96–112)
CO2 SERPL-SCNC: 28 MMOL/L (ref 20–33)
CREAT SERPL-MCNC: 0.67 MG/DL (ref 0.5–1.4)
CRP SERPL HS-MCNC: 11.78 MG/DL (ref 0–0.75)
EOSINOPHIL # BLD AUTO: 0.56 K/UL (ref 0–0.51)
EOSINOPHIL NFR BLD: 5.2 % (ref 0–6.9)
ERYTHROCYTE [DISTWIDTH] IN BLOOD BY AUTOMATED COUNT: 79.3 FL (ref 35.9–50)
GLUCOSE SERPL-MCNC: 179 MG/DL (ref 65–99)
HCT VFR BLD AUTO: 27 % (ref 37–47)
HGB BLD-MCNC: 8.3 G/DL (ref 12–16)
IBD INTERP Q0082: NORMAL
IMM GRANULOCYTES # BLD AUTO: 0.05 K/UL (ref 0–0.11)
IMM GRANULOCYTES NFR BLD AUTO: 0.5 % (ref 0–0.9)
LYMPHOCYTES # BLD AUTO: 1.57 K/UL (ref 1–4.8)
LYMPHOCYTES NFR BLD: 14.6 % (ref 22–41)
MAGNESIUM SERPL-MCNC: 1.4 MG/DL (ref 1.5–2.5)
MCH RBC QN AUTO: 24.9 PG (ref 27–33)
MCHC RBC AUTO-ENTMCNC: 30.7 G/DL (ref 33.6–35)
MCV RBC AUTO: 81.1 FL (ref 81.4–97.8)
MONOCYTES # BLD AUTO: 0.77 K/UL (ref 0–0.85)
MONOCYTES NFR BLD AUTO: 7.2 % (ref 0–13.4)
NEUTROPHILS # BLD AUTO: 7.71 K/UL (ref 2–7.15)
NEUTROPHILS NFR BLD: 71.8 % (ref 44–72)
NRBC # BLD AUTO: 0 K/UL
NRBC BLD-RTO: 0 /100 WBC
PATHOLOGY STUDY: NORMAL
PLATELET # BLD AUTO: 468 K/UL (ref 164–446)
PMV BLD AUTO: 9.2 FL (ref 9–12.9)
POTASSIUM SERPL-SCNC: 3.7 MMOL/L (ref 3.6–5.5)
RBC # BLD AUTO: 3.33 M/UL (ref 4.2–5.4)
SODIUM SERPL-SCNC: 137 MMOL/L (ref 135–145)
WBC # BLD AUTO: 10.7 K/UL (ref 4.8–10.8)

## 2020-01-10 PROCEDURE — 700102 HCHG RX REV CODE 250 W/ 637 OVERRIDE(OP): Performed by: INTERNAL MEDICINE

## 2020-01-10 PROCEDURE — A9270 NON-COVERED ITEM OR SERVICE: HCPCS | Performed by: INTERNAL MEDICINE

## 2020-01-10 PROCEDURE — 36415 COLL VENOUS BLD VENIPUNCTURE: CPT

## 2020-01-10 PROCEDURE — 86140 C-REACTIVE PROTEIN: CPT

## 2020-01-10 PROCEDURE — 80048 BASIC METABOLIC PNL TOTAL CA: CPT

## 2020-01-10 PROCEDURE — 83735 ASSAY OF MAGNESIUM: CPT

## 2020-01-10 PROCEDURE — 85025 COMPLETE CBC W/AUTO DIFF WBC: CPT

## 2020-01-10 PROCEDURE — 99239 HOSP IP/OBS DSCHRG MGMT >30: CPT | Performed by: INTERNAL MEDICINE

## 2020-01-10 RX ORDER — SULFAMETHOXAZOLE AND TRIMETHOPRIM 800; 160 MG/1; MG/1
1 TABLET ORAL EVERY 12 HOURS
Qty: 14 TAB | Refills: 0 | Status: SHIPPED | OUTPATIENT
Start: 2020-01-10 | End: 2020-01-17

## 2020-01-10 RX ORDER — MESALAMINE 1000 MG/1
1000 SUPPOSITORY RECTAL
Qty: 30 SUPPOSITORY | Refills: 1 | Status: SHIPPED | OUTPATIENT
Start: 2020-01-10 | End: 2022-06-04

## 2020-01-10 RX ORDER — MAGNESIUM SULFATE HEPTAHYDRATE 40 MG/ML
2 INJECTION, SOLUTION INTRAVENOUS ONCE
Status: DISCONTINUED | OUTPATIENT
Start: 2020-01-10 | End: 2020-01-10

## 2020-01-10 RX ORDER — BUDESONIDE AND FORMOTEROL FUMARATE DIHYDRATE 80; 4.5 UG/1; UG/1
2 AEROSOL RESPIRATORY (INHALATION) 2 TIMES DAILY
Qty: 1 INHALER | Refills: 1 | Status: SHIPPED | OUTPATIENT
Start: 2020-01-10 | End: 2022-06-04

## 2020-01-10 RX ORDER — POTASSIUM CHLORIDE 20 MEQ/1
40 TABLET, EXTENDED RELEASE ORAL ONCE
Status: COMPLETED | OUTPATIENT
Start: 2020-01-10 | End: 2020-01-10

## 2020-01-10 RX ORDER — FERROUS SULFATE 325(65) MG
325 TABLET ORAL DAILY
Qty: 30 TAB | Refills: 2 | Status: SHIPPED | OUTPATIENT
Start: 2020-01-10 | End: 2022-06-04

## 2020-01-10 RX ORDER — OMEPRAZOLE 20 MG/1
20 CAPSULE, DELAYED RELEASE ORAL DAILY
Qty: 30 CAP | Refills: 1 | Status: SHIPPED | OUTPATIENT
Start: 2020-01-11 | End: 2022-06-04

## 2020-01-10 RX ORDER — LISINOPRIL 10 MG/1
10 TABLET ORAL DAILY
Qty: 30 TAB | Refills: 1 | Status: SHIPPED | OUTPATIENT
Start: 2020-01-11 | End: 2022-06-04

## 2020-01-10 RX ORDER — OXYCODONE HYDROCHLORIDE 5 MG/1
5 TABLET ORAL EVERY 8 HOURS PRN
Qty: 9 TAB | Refills: 0 | Status: SHIPPED | OUTPATIENT
Start: 2020-01-10 | End: 2020-01-16 | Stop reason: SDUPTHER

## 2020-01-10 RX ORDER — MAGNESIUM SULFATE HEPTAHYDRATE 40 MG/ML
4 INJECTION, SOLUTION INTRAVENOUS ONCE
Status: DISCONTINUED | OUTPATIENT
Start: 2020-01-10 | End: 2020-01-10

## 2020-01-10 RX ADMIN — OXYCODONE HYDROCHLORIDE 10 MG: 10 TABLET ORAL at 05:50

## 2020-01-10 RX ADMIN — ACETAMINOPHEN 650 MG: 325 TABLET, FILM COATED ORAL at 14:49

## 2020-01-10 RX ADMIN — OXYCODONE HYDROCHLORIDE 10 MG: 10 TABLET ORAL at 13:04

## 2020-01-10 RX ADMIN — OMEPRAZOLE 20 MG: 20 CAPSULE, DELAYED RELEASE ORAL at 05:50

## 2020-01-10 RX ADMIN — OXYCODONE HYDROCHLORIDE 10 MG: 10 TABLET ORAL at 02:47

## 2020-01-10 RX ADMIN — POTASSIUM CHLORIDE 40 MEQ: 1500 TABLET, EXTENDED RELEASE ORAL at 05:50

## 2020-01-10 RX ADMIN — LISINOPRIL 10 MG: 5 TABLET ORAL at 05:50

## 2020-01-10 RX ADMIN — OXYCODONE HYDROCHLORIDE 10 MG: 10 TABLET ORAL at 09:49

## 2020-01-10 RX ADMIN — BUDESONIDE AND FORMOTEROL FUMARATE DIHYDRATE 2 PUFF: 80; 4.5 AEROSOL RESPIRATORY (INHALATION) at 05:49

## 2020-01-10 RX ADMIN — SULFAMETHOXAZOLE AND TRIMETHOPRIM 1 TABLET: 800; 160 TABLET ORAL at 05:50

## 2020-01-10 RX ADMIN — MAGNESIUM OXIDE TAB 400 MG (241.3 MG ELEMENTAL MG) 400 MG: 400 (241.3 MG) TAB at 13:04

## 2020-01-10 RX ADMIN — MAGNESIUM OXIDE TAB 400 MG (241.3 MG ELEMENTAL MG) 400 MG: 400 (241.3 MG) TAB at 05:49

## 2020-01-10 NOTE — PROGRESS NOTES
Bedside report received. POC discussed with pt; all questions answered at this time. Bed in lowest position with wheels locked and call light in reach.  Spoke with Dr. Aleksandra Emanuel about pt's Iv access. Previous IV in right forearm had phlebitis and was DC'd and is on Antibiotics.  Left IV is red and warm. Asked to DC left IV because pt. is likely to go home tomorrow and IV site is starting to degress. Orders to DC IV received.

## 2020-01-10 NOTE — PROGRESS NOTES
Patient A+Ox4, sitting at edge of bed eating breakfast. C.O shoulder pain, chronic. IV fluids infusing as ordered. Using call bell approp, bed locked and in lowest position, hourly rounding in place, whiteboard updated

## 2020-01-10 NOTE — DISCHARGE SUMMARY
Discharge Summary    CHIEF COMPLAINT ON ADMISSION  Chief Complaint   Patient presents with   • N/V   • Rectal Bleeding       Reason for Admission  Melena and rectal bleeding     Admission Date  1/3/2020    CODE STATUS  Full Code    HPI & HOSPITAL COURSE     64-year-old female with history of COPD on chronic oxygen, osteoarthritis and chronic pain presented 1/3 with weakness and rectal bleeding with diarrhea, she was found to have severe anemia, GI was consulted for colonoscopy and EGD which were done and showed LA grade 1 esophagitis and duodenitis and colonoscopy showed slight proctitis, the patient has been on PPI oral and mesalamine with improving and stabilization on her hemoglobin around 8, celiac antibodies were negative, to follow-up with GI clinic for possible wireless capsule and rule out colitis.   During this hospitalization the patient developed swelling and redness with warmth on her left elbow under the skin, with leukocytosis, no swelling on her elbow joint, and no fever, vancomycin was started and de-escalated later to Bactrim, blood culture did not grow any organism, and ultrasound showed undrainable abscess and showed thrombophlebitis, her swelling and pain have improved, the patient insisted to leave, we encouraged her to continue antibiotics for 10 days at least and follow-up with primary care doctor.  Her iron panel was done after giving 1 unit red blood cell, however will start with oral ferrous daily and follow-up with new labs in couple months.   Labs showed hypomagnesemia, it was repleted with oral.   The plan of care was discussed with the patient and answered all her questions, we recommended to keep her another day to follow her cellulitis however the patient insisted to leave and she states she will follow-up with primary care doctor, the risk of abscess was discussed with the patient, and encouraged her to come back to the emergency for any new symptoms like fever or severe  swelling.    Therefore, she is discharged in good and stable condition to home with close outpatient follow-up.    The patient met 2-midnight criteria for an inpatient stay at the time of discharge.    Discharge Date  01/10/20      FOLLOW UP ITEMS POST DISCHARGE  Follow-up with GI clinic  Follow-up with primary care doctor for cellulitis, and reassess for abscess.    DISCHARGE DIAGNOSES  Principal Problem (Resolved):    GI bleed POA: Yes  Active Problems:    Cellulitis POA: No    Tobacco use disorder POA: Yes    COPD (chronic obstructive pulmonary disease) ([facility]) POA: Yes    Diabetes mellitus, type 2 ([facility]) POA: Yes    Arthritis of left shoulder region POA: Yes  Resolved Problems:    Acute on chronic respiratory failure with hypoxia ([facility]) POA: Yes      FOLLOW UP  No future appointments.  David Carranza M.D.  [address] [place] [id number] [phone]      Per office please call to schedule your hospital follow up. Thank you      MEDICATIONS ON DISCHARGE     Medication List      START taking these medications      Instructions   budesonide-formoterol 80-4.5 MCG/ACT Aero  Commonly known as:  SYMBICORT   Inhale 2 Puffs by mouth 2 Times a Day.  Dose:  2 Puff     ferrous sulfate 325 (65 Fe) MG tablet   Take 1 Tab by mouth every day.  Dose:  325 mg     lisinopril 10 MG Tabs  Start taking on:  January 11, 2020  Commonly known as:  PRINIVIL   Take 1 Tab by mouth every day.  Dose:  10 mg     magnesium oxide 400 (241.3 Mg) MG Tabs tablet  Commonly known as:  MAG-OX   Take 1 Tab by mouth 3 times a day.  Dose:  400 mg     mesalamine 1000 MG Supp  Commonly known as:  CANASA   Insert 1 Suppository in rectum every bedtime.  Dose:  1,000 mg     NS SOLN 100 mL with ferric gluconate complex 12.5 MG/ML SOLN 250 mg   250 mg by Intravenous route every 24 hours.  Dose:  250 mg     omeprazole 20 MG delayed-release capsule  Start taking on:  January 11, 2020  Commonly known as:  PRILOSEC   Take 1 Cap by mouth every day.  Dose:   20 mg     oxyCODONE immediate-release 5 MG Tabs  Commonly known as:  ROXICODONE   Take 1 Tab by mouth every 8 hours as needed for up to 3 days.  Dose:  5 mg     sulfamethoxazole-trimethoprim 800-160 MG tablet  Commonly known as:  BACTRIM DS   Take 1 Tab by mouth every 12 hours for 7 days.  Dose:  1 Tab        CONTINUE taking these medications      Instructions   aspirin 325 MG Tabs  Commonly known as:  ASA   Take 975 mg by mouth every 6 hours as needed for Mild Pain.  Dose:  975 mg     diphenhydrAMINE 25 MG Tabs  Commonly known as:  BENADRYL   Take 25 mg by mouth 2 times a day as needed for Itching.  Dose:  25 mg     ondansetron 4 MG Tbdp  Commonly known as:  ZOFRAN ODT   Take 4 mg by mouth every 6 hours as needed for Nausea.  Dose:  4 mg     tizanidine 2 MG tablet  Commonly known as:  ZANAFLEX   Take 2 mg by mouth 3 times a day as needed (Muscle Spasms.).  Dose:  2 mg        STOP taking these medications    ibuprofen 200 MG Tabs  Commonly known as:  MOTRIN            Allergies  Allergies   Allergen Reactions   • Aspartame Nausea     headache   • Augmentin Vomiting   • Latex Rash and Itching   • Lipitor [Atorvastatin Calcium] Swelling     Lip swelling   • Other Misc Vomiting     Bug spray   • Saccharin Nausea     Nausea and headache       DIET  Orders Placed This Encounter   Procedures   • Diet Order Low Fiber(GI Soft)     Standing Status:   Standing     Number of Occurrences:   1     Order Specific Question:   Diet:     Answer:   Low Fiber(GI Soft) [2]   • Discontinue Diet Tray     Standing Status:   Standing     Number of Occurrences:   1       ACTIVITY  As tolerated.  Weight bearing as tolerated    CONSULTATIONS  GI    PROCEDURES  EGD and colonoscopy    LABORATORY  Lab Results   Component Value Date    SODIUM 137 01/10/2020    POTASSIUM 3.7 01/10/2020    CHLORIDE 101 01/10/2020    CO2 28 01/10/2020    GLUCOSE 179 (H) 01/10/2020    BUN 7 (L) 01/10/2020    CREATININE 0.67 01/10/2020    CREATININE 0.8 04/09/2009         Lab Results   Component Value Date    WBC 10.7 01/10/2020    HEMOGLOBIN 8.3 (L) 01/10/2020    HEMATOCRIT 27.0 (L) 01/10/2020    PLATELETCT 468 (H) 01/10/2020        Total time of the discharge process exceeds 34 minutes.

## 2020-01-10 NOTE — CARE PLAN
Problem: Safety  Goal: Will remain free from injury  Outcome: PROGRESSING AS EXPECTED    Bed locked and lowest position. Bed alarm on. Treaded socks on. Call light and belongings within reach. Pt educated to call for assistance. Pt verbalized understanding. Hourly rounding in place.        Problem: Knowledge Deficit  Goal: Knowledge of disease process/condition, treatment plan, diagnostic tests, and medications will improve  Outcome: PROGRESSING AS EXPECTED    Discussed POC and medications with pt, pt verbalized understanding.

## 2020-01-10 NOTE — DISCHARGE INSTRUCTIONS
Discharge Instructions    Discharged to home by car with relative. Discharged via wheelchair, hospital escort: Yes.  Special equipment needed: Not Applicable    Be sure to schedule a follow-up appointment with your primary care doctor or any specialists as instructed.     Discharge Plan:   Diet Plan: Discussed  Activity Level: Discussed  Smoking Cessation Offered: Patient Refused  Confirmed Follow up Appointment: Appointment Scheduled  Confirmed Symptoms Management: Discussed  Medication Reconciliation Updated: Yes  Influenza Vaccine Indication: Indicated: 9 to 64 years of age  Influenza Vaccine Given - only chart on this line when given: Influenza Vaccine Given (See MAR)    I understand that a diet low in cholesterol, fat, and sodium is recommended for good health. Unless I have been given specific instructions below for another diet, I accept this instruction as my diet prescription.       Special Instructions: None    · Is patient discharged on Warfarin / Coumadin?   No     Depression / Suicide Risk    As you are discharged from this [facility] facility, it is important to learn how to keep safe from harming yourself.    Recognize the warning signs:  · Abrupt changes in personality, positive or negative- including increase in energy   · Giving away possessions  · Change in eating patterns- significant weight changes-  positive or negative  · Change in sleeping patterns- unable to sleep or sleeping all the time   · Unwillingness or inability to communicate  · Depression  · Unusual sadness, discouragement and loneliness  · Talk of wanting to die  · Neglect of personal appearance   · Rebelliousness- reckless behavior  · Withdrawal from people/activities they love  · Confusion- inability to concentrate     If you or a loved one observes any of these behaviors or has concerns about self-harm, here's what you can do:  · Talk about it- your feelings and reasons for harming yourself  · Remove any means that you might  use to hurt yourself (examples: pills, rope, extension cords, firearm)  · Get professional help from the community (Mental Health, Substance Abuse, psychological counseling)  · Do not be alone:Call your Safe Contact- someone whom you trust who will be there for you.  · Call your local CRISIS HOTLINE 548-6978 or 082-148-3693  · Call your local Children's Mobile Crisis Response Team [place] (252) 303-2630 or www.[employer]  · Call the toll free National Suicide Prevention Hotlines   · National Suicide Prevention Lifeline 401-734-YRZL (1275)  · [employer] Hope Line Network 800-SUICIDE (345-0381)      Gastrointestinal Bleeding  Introduction  Gastrointestinal bleeding is bleeding somewhere along the path food travels through the body (digestive tract). This path is anywhere between the mouth and the opening of the butt (anus). You may have blood in your poop (stools) or have black poop. If you throw up (vomit), there may be blood in it.  This condition can be mild, serious, or even life-threatening. If you have a lot of bleeding, you may need to stay in the hospital.  Follow these instructions at home:  · Take over-the-counter and prescription medicines only as told by your doctor.  · Eat foods that have a lot of fiber in them. These foods include whole grains, fruits, and vegetables. You can also try eating 1-3 prunes each day.  · Drink enough fluid to keep your pee (urine) clear or pale yellow.  · Keep all follow-up visits as told by your doctor. This is important.  Contact a doctor if:  · Your symptoms do not get better.  Get help right away if:  · Your bleeding gets worse.  · You feel dizzy or you pass out (faint).  · You feel weak.  · You have very bad cramps in your back or belly (abdomen).  · You pass large clumps of blood (clots) in your poop.  · Your symptoms are getting worse.  This information is not intended to replace advice given to you by your health care provider. Make sure you discuss any  questions you have with your health care provider.  Document Released: 09/26/2009 Document Revised: 05/25/2017 Document Reviewed: 06/06/2016  © 2017 [employer]vier

## 2020-01-10 NOTE — PROGRESS NOTES
"Pt. Refusing bed alarm stating \"I don't want to annoy my roommate.\"  Education provided and charge notified.  "

## 2020-01-10 NOTE — PROGRESS NOTES
Informed Dr. Garcia of pt's 7 beats of VTach at beginning of shift and new Magnesium lab of 1.4.  New orders from Dr. Villagran.

## 2020-01-10 NOTE — CARE PLAN
Problem: Safety  Goal: Will remain free from falls  Outcome: PROGRESSING AS EXPECTED  Intervention: Implement fall precautions  Flowsheets (Taken 1/10/2020 1411)  Environmental Precautions: Treaded Slipper Socks on Patient; Bed in Low Position; Communication Sign for Patients & Families; Mobility Assessed & Appropriate Sign Placed     Problem: Knowledge Deficit  Goal: Knowledge of disease process/condition, treatment plan, diagnostic tests, and medications will improve  Outcome: PROGRESSING AS EXPECTED  Intervention: Explain information regarding disease process/condition, treatment plan, diagnostic tests, and medications and document in education  Note:   Pt educated regarding plan of care and medications. All questions answered.

## 2020-01-11 NOTE — PROGRESS NOTES
Patient discharged home. Patient AOX 4.  IV and tele box removed, discharge instructions provided to patient and reviewed with them. All questions answered, patient provided copy of discharge instructions and instructed on when to F/U with MD. Patient wheeled off unit. Patient discharged into the care of self/relative.

## 2020-01-13 LAB
BACTERIA BLD CULT: NORMAL
BACTERIA BLD CULT: NORMAL
SIGNIFICANT IND 70042: NORMAL
SIGNIFICANT IND 70042: NORMAL
SITE SITE: NORMAL
SITE SITE: NORMAL
SOURCE SOURCE: NORMAL
SOURCE SOURCE: NORMAL

## 2020-01-16 ENCOUNTER — OFFICE VISIT (OUTPATIENT)
Dept: MEDICAL GROUP | Facility: IMAGING CENTER | Age: 65
End: 2020-01-16
Payer: MEDICARE

## 2020-01-16 VITALS
HEART RATE: 95 BPM | DIASTOLIC BLOOD PRESSURE: 74 MMHG | RESPIRATION RATE: 16 BRPM | SYSTOLIC BLOOD PRESSURE: 126 MMHG | BODY MASS INDEX: 26.84 KG/M2 | OXYGEN SATURATION: 96 % | HEIGHT: 67 IN | WEIGHT: 171 LBS | TEMPERATURE: 98.4 F

## 2020-01-16 DIAGNOSIS — M19.90 ARTHRITIS: ICD-10-CM

## 2020-01-16 DIAGNOSIS — Z09 HOSPITAL DISCHARGE FOLLOW-UP: ICD-10-CM

## 2020-01-16 DIAGNOSIS — L03.114 CELLULITIS OF LEFT UPPER EXTREMITY: ICD-10-CM

## 2020-01-16 DIAGNOSIS — D50.0 ANEMIA DUE TO GASTROINTESTINAL BLOOD LOSS: ICD-10-CM

## 2020-01-16 DIAGNOSIS — K62.5 RECTAL BLEEDING: ICD-10-CM

## 2020-01-16 DIAGNOSIS — E11.9 CONTROLLED TYPE 2 DIABETES MELLITUS WITHOUT COMPLICATION, WITHOUT LONG-TERM CURRENT USE OF INSULIN (HCC): ICD-10-CM

## 2020-01-16 DIAGNOSIS — F06.31 MOOD DISORDER WITH MAJOR DEPRESSIVE-LIKE EPISODE DUE TO GENERAL MEDICAL CONDITION: ICD-10-CM

## 2020-01-16 DIAGNOSIS — I10 ESSENTIAL HYPERTENSION: ICD-10-CM

## 2020-01-16 DIAGNOSIS — G89.29 OTHER CHRONIC PAIN: ICD-10-CM

## 2020-01-16 DIAGNOSIS — J44.9 CHRONIC OBSTRUCTIVE PULMONARY DISEASE, UNSPECIFIED COPD TYPE (HCC): ICD-10-CM

## 2020-01-16 PROCEDURE — 99214 OFFICE O/P EST MOD 30 MIN: CPT | Performed by: FAMILY MEDICINE

## 2020-01-16 RX ORDER — DULOXETIN HYDROCHLORIDE 30 MG/1
30 CAPSULE, DELAYED RELEASE ORAL DAILY
Qty: 30 CAP | Refills: 3 | Status: SHIPPED | OUTPATIENT
Start: 2020-01-16 | End: 2020-06-15

## 2020-01-16 RX ORDER — OXYCODONE HYDROCHLORIDE 5 MG/1
5 TABLET ORAL EVERY 8 HOURS PRN
Qty: 14 TAB | Refills: 0 | Status: SHIPPED | OUTPATIENT
Start: 2020-01-16 | End: 2020-01-23 | Stop reason: SDUPTHER

## 2020-01-16 RX ORDER — ONDANSETRON 4 MG/1
4 TABLET, ORALLY DISINTEGRATING ORAL EVERY 6 HOURS PRN
Qty: 10 TAB | Refills: 0 | Status: SHIPPED
Start: 2020-01-16 | End: 2020-01-23

## 2020-01-16 NOTE — PROGRESS NOTES
SUBJECTIVE:        Chief Complaint   Patient presents with   • ED Follow-Up   • Rectal Bleeding     currently resolved       HPI:     Heidi Navas is a 64 y.o. female here for with chronic pain, OA,  fibromyalgia, DM type 2, anxiety, COPD, hypertension, hyperlipidemia and recent anemia due to GI bleed.   Patient is here with her friend, Pamela.     Had prior hospital visit-presented with rectal bleeding and is currently followed by GI. Presented to ER with diarrhea, rectal bleeding, nausea, vomiting for 2 weeks. Currently does not have any further rectal bleeding.   Notes having been given 4 units of blood in the hospital as she had severely low H/H.  States that she thought she had the flu. Felt nauseated. She had upper and lower GI scope which noted grade 1 esophagitis, duodenitis and slight proctitis. Was on PPI and mesalamine in the hospital. Patient is to follow up with GI .   Patient has a hx of OA and chronic pain issues for which she has had multiple surgeries.   She was on tramadol for chronic pain, needs to have shoulder replacement surgery on right, which is her greatest area of pain currently. 8/10 pain and sometimes 12/10 pain. Sometimes has spasms. Has had prior left shoulder replacement surgery and bilateral hip replacement surgeries. Has had cervical spine surgery as well.   Had xray at [facility] August 2019. States she was bone on bone.     She was seeing [facility] who recommended ibuprofen previously. Saw for pain medication as well, was on tramadol therapy, but had a positive urine for methamphetamines, thus no further tramadol was prescribed.  States she has not use any methamphetamines recently but was around people who use it. Uses CBD cream. Not on glucosamine chondroitin.   States she was taking advil 800 mg at a time previously and has been taking it more regularly due to her pain symptoms and lack of other pain medication. Notes she was taking ibuprofen 800 mg every 4-5 hours, increased  ibuprofen since July when she had to stop the tramadol. She has been advised to stop ibuprofen use. Had tizanidine. Out of zofran.  States she willl take anything to stop the pain. She asked for hospice due to her pain symptoms. Has affected her mood.   Does not have any more asa- not sure how much she was taking, 4-5 a day.   Not sure, does not remember some things.   She was discharged with oxycodone 5 mg, taking twice daily.   Had constipation due to pain medication, stool softeners recommended.     Left arm cellulitis- has had redness/edema-has improved with antibiotic therapy, Bactrim.     COPD- on symbicort therapy.   Had acute on chronic respiratory failure which improved with transfusion.   Currently stable.     Hypertension- lisinopril 10 mg. BP stable. Does not check BP routinely.     Diabetes mellitus type 2- not on medication therapy. Does not check glucose routinely.     ROS:  Denies any recent fevers or chills. No nausea or vomiting. No diarrhea. No chest pains or shortness of breath. No lower extremity edema.    Current Outpatient Medications on File Prior to Visit   Medication Sig Dispense Refill   • budesonide-formoterol (SYMBICORT) 80-4.5 MCG/ACT Aerosol Inhale 2 Puffs by mouth 2 Times a Day. (Patient not taking: Reported on 1/23/2020) 1 Inhaler 1   • NS SOLN 100 mL with ferric gluconate complex 12.5 MG/ML SOLN 250 mg 250 mg by Intravenous route every 24 hours. 30 Tab 1   • lisinopril (PRINIVIL) 10 MG Tab Take 1 Tab by mouth every day. 30 Tab 1   • magnesium oxide (MAG-OX) 400 (241.3 Mg) MG Tab tablet Take 1 Tab by mouth 3 times a day. 30 Tab 0   • mesalamine (CANASA) 1000 MG Suppos Insert 1 Suppository in rectum every bedtime. 30 Suppository 1   • omeprazole (PRILOSEC) 20 MG delayed-release capsule Take 1 Cap by mouth every day. 30 Cap 1   • ferrous sulfate 325 (65 Fe) MG tablet Take 1 Tab by mouth every day. 30 Tab 2   • diphenhydrAMINE (BENADRYL) 25 MG Tab Take 25 mg by mouth 2 times a day as  needed for Itching.       No current facility-administered medications on file prior to visit.        Allergies   Allergen Reactions   • Aspartame Nausea     headache   • Augmentin Vomiting   • Latex Rash and Itching   • Lipitor [Atorvastatin Calcium] Swelling     Lip swelling   • Other Misc Vomiting     Bug spray   • Saccharin Nausea     Nausea and headache       Patient Active Problem List    Diagnosis Date Noted   • Cellulitis 01/08/2020     Priority: High   • Anemia due to gastrointestinal blood loss 02/09/2020   • Anemia 05/19/2019   • Atherosclerosis 05/19/2019   • LVH (left ventricular hypertrophy) 05/19/2019   • Arthritis of left shoulder region 07/12/2017   • CVD (cardiovascular disease) 12/04/2015   • Pulmonary nodule 12/01/2015   • Renal stone 12/01/2015   • Diverticulosis 12/01/2015   • Chest pain 07/16/2015   • Nausea & vomiting 07/13/2015   • Opiate withdrawal ([facility]) 07/13/2015   • Diabetes mellitus, type 2 ([facility]) 10/03/2014   • Brachial neuritis or radiculitis 09/10/2014   • History of total hip arthroplasty 07/22/2014   • COPD (chronic obstructive pulmonary disease) ([facility]) 03/30/2014   • Arthritis 12/26/2013   • Chronic pain 02/21/2012   • Anxiety 01/18/2012   • Vitamin D insufficiency 04/28/2011   • Hypertension 03/22/2010   • Fibromyalgia 10/09/2009   • Swelling, mass, or lump in head and neck 07/21/2009   • Tobacco use disorder 07/21/2009   • Chronic ischemic heart disease 07/21/2009   • Esophageal reflux 07/21/2009   • Chronic airway obstruction, not elsewhere classified 07/21/2009   • Allergic rhinitis 07/21/2009   • Controlled type 2 diabetes mellitus without complication, without long-term current use of insulin ([facility]) 07/21/2009   • Mixed hyperlipidemia 07/21/2009   • Other atopic dermatitis and related conditions 07/21/2009   • Deficiency anemia 07/21/2009       Past Medical History:   Diagnosis Date   • Anemia    • Arthritis     OA and  not RA per rheumatology   • Breath shortness     oxygen  "PRN    • Bronchitis 2008   • Colon polyps 2015   • Convulsions (HCC) 7/21/2009     ICD-10 transition   • COPD (chronic obstructive pulmonary disease) (HCC)    • Dental disorder     full dentures   • Depression     depression, anxiety    • Diverticulosis 5/10     colonoscopy   • Hemorrhoid 7/29/2009   • History of colonoscopy   2005   • Leukocytosis 3/30/2014   • Lymphocytosis (symptomatic) 7/21/2009   • MI (myocardial infarction) (HCC) 4/29/2007   • Other specified disorder of intestines     constipation   • Pain     shoulders, neck, lower back   • Pancreatitis    • Pap smear 4/2006   • Pneumonia 2013   • Pulmonary nodule    • Renal lesion    • Renal stone    • S/P colonoscopy 5/2010    will need repeat in 5 years   • Screening mammogram never   • Seizure (HCC)     x1 in 2008   • Shingles    • Symptomatic menopausal or female climacteric states 7/21/2009   • Type II or unspecified type diabetes mellitus without mention of complication, uncontrolled 7/21/2009    diet controlled    • Unspecified urinary incontinence        OBJECTIVE:   /74 (BP Location: Left arm, Patient Position: Sitting, BP Cuff Size: Adult)   Pulse 95   Temp 36.9 °C (98.4 °F) (Temporal)   Resp 16   Ht 1.702 m (5' 7\")   Wt 77.6 kg (171 lb)   LMP 01/01/2007   SpO2 96%   BMI 26.78 kg/m²   General: Well-developed well-nourished female, no acute distress  Neck: supple, no lymphadenopathy- cervical or supraclavicular, no thyromegaly. Limited ROM.   Cardiovascular: regular rate and rhythm, no murmurs, gallops, rubs  Lungs: clear to auscultation bilaterally, no wheezes, crackles, or rhonchi  Abdomen: +bowel sounds, soft, nontender, nondistended, no rebound, no guarding, no hepatosplenomegaly  Extremities: no cyanosis, clubbing, edema. Left upper extremity without significant erythema or warmth noted.  Limited ROM of upper extremities bilaterally, TTP of right shoulder region.   Skin: Warm and dry. Multiple well healed surgical scars noted. "   Psych: appears irritable with somewhat flat affect      ASSESSMENT/PLAN:    64 y.o.female with chronic pain, OA,  fibromyalgia, DM type 2, anxiety, COPD, hypertension, hyperlipidemia and recent anemia due to GI bleed.    1. Anemia due to gastrointestinal blood loss - appears recent acute rectal bleeding on possible chronic issues. Received blood transfusion in hospital. Appears stable. Patient to remain off ibuprofen and asa therapy.   -Continue on iron supplement and GI medications. Monitor. Follow up with GI.     2. Rectal bleeding -no further symptoms noted.   -Follow up with GI.     3. Hospital discharge follow-up     4. Other chronic pain- appears to be in significant pain of right shoulder currently. Hx of multiple surgeries for OA. Appeared to be overall doing well on prior tramadol therapy. Unable to take nsaids/asa therapy at this time due to GI bleed.  Patient does needs further chronic pain management.   -Will plan to continue on oxycodone therapy at this time. Refill for 1 week given Patient agreeable to starting cymbalta therapy. Reviewed precautions and potential side effects of medication therapy. Recommend regular use of stool softeners. Patient will need to follow up with pain specialist.  Controlled Substance Treatment Agreement    oxyCODONE immediate-release (ROXICODONE) 5 MG Tab   5. Arthritis- multiple areas, hx of multiple surgeries.   -Recommend consider glucosamine chondroitin, otherwise as above.      6. Cellulitis of left upper extremity- improved. Follow up if any recurrent issues.      7. Chronic obstructive pulmonary disease, unspecified COPD type (HCC) - stable, continue current medications.     8. Essential hypertension- stable, continue current medication. Heart healthy diet. Monitor.      9. Controlled type 2 diabetes mellitus without complication, without long-term current use of insulin (HCC) - stable. Not on medication therapy.   -Recommend diabetic diet and monitor.       10.  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   -Will start on cymbalta therapy as tolerated. Reviewed precautions and potential side effects of medication therapy.  Recommend consider counseling therapy.  DULoxetine (CYMBALTA) 30 MG Cap DR Particles     Return in about 1 week (around 1/23/2020).    This medical record contains text that has been entered with the assistance of computer voice recognition and dictation software.  Therefore, it may contain unintended errors in text, spelling, punctuation, or grammar.

## 2020-01-23 ENCOUNTER — OFFICE VISIT (OUTPATIENT)
Dept: MEDICAL GROUP | Facility: IMAGING CENTER | Age: 65
End: 2020-01-23
Payer: MEDICARE

## 2020-01-23 VITALS
HEART RATE: 107 BPM | TEMPERATURE: 98.4 F | DIASTOLIC BLOOD PRESSURE: 68 MMHG | RESPIRATION RATE: 14 BRPM | WEIGHT: 167.6 LBS | BODY MASS INDEX: 26.3 KG/M2 | SYSTOLIC BLOOD PRESSURE: 104 MMHG | HEIGHT: 67 IN | OXYGEN SATURATION: 96 %

## 2020-01-23 DIAGNOSIS — G89.29 OTHER CHRONIC PAIN: ICD-10-CM

## 2020-01-23 DIAGNOSIS — D62 ANEMIA DUE TO ACUTE BLOOD LOSS: ICD-10-CM

## 2020-01-23 DIAGNOSIS — M19.90 ARTHRITIS: ICD-10-CM

## 2020-01-23 DIAGNOSIS — Z87.19 HISTORY OF GI BLEED: ICD-10-CM

## 2020-01-23 DIAGNOSIS — J44.9 CHRONIC OBSTRUCTIVE PULMONARY DISEASE, UNSPECIFIED COPD TYPE (HCC): ICD-10-CM

## 2020-01-23 DIAGNOSIS — R10.10 PAIN OF UPPER ABDOMEN: ICD-10-CM

## 2020-01-23 DIAGNOSIS — F41.9 ANXIETY: ICD-10-CM

## 2020-01-23 PROCEDURE — 99214 OFFICE O/P EST MOD 30 MIN: CPT | Performed by: FAMILY MEDICINE

## 2020-01-23 RX ORDER — TIZANIDINE 4 MG/1
4 TABLET ORAL EVERY 6 HOURS PRN
Qty: 90 TAB | Refills: 0 | Status: SHIPPED | OUTPATIENT
Start: 2020-01-23 | End: 2020-02-17

## 2020-01-23 RX ORDER — ONDANSETRON 4 MG/1
4 TABLET, FILM COATED ORAL EVERY 6 HOURS PRN
Qty: 20 TAB | Refills: 0 | Status: SHIPPED | OUTPATIENT
Start: 2020-01-23 | End: 2022-06-04

## 2020-01-23 RX ORDER — OXYCODONE HYDROCHLORIDE 5 MG/1
5 TABLET ORAL EVERY 8 HOURS PRN
Qty: 14 TAB | Refills: 0 | Status: SHIPPED | OUTPATIENT
Start: 2020-01-23 | End: 2020-01-30

## 2020-01-23 ASSESSMENT — PAIN SCALES - GENERAL: PAINLEVEL: 10=SEVERE PAIN

## 2020-01-23 NOTE — PROGRESS NOTES
"  SUBJECTIVE:        Chief Complaint   Patient presents with   • Abdominal Pain     not feeling better. \"feeling worse\" 10/10. not eating, no appetite. has appt with gastro on Tuesday.   • Medication Refill     out of tizanidine and zofran. inhaler too expensive.       HPI:     Heidi Navas is a 64 y.o. female with chronic pain, OA,  fibromyalgia, DM type 2, anxiety, COPD, hypertension, hyperlipidemia and recent anemia due to GI bleed. Had prior hospital visit-presented with rectal bleeding and is currently followed by GI. Patient is here with her friend.     Her biggest complaint is management her chronic pain issues currently. Her right shoulder is bothering her. She previously was seeing a pain management physician, but was noted to have methamphetamines in her UDS, thus was discontinued from her tramadol therapy. States she does not use methamphetamines but people around her have used it. Since then states she increase her use of asa and ibuprofen therapy.   Was given some pain medication on discharge from hospital visit.   Last took pain medication on Monday around 4 or 5 am.   Pain issues have been affecting her mood.   States she took ibuprofen due to pain as well. She has been advised to avoid nsaids and asa. Denies any current black or bloody stools.     States she was on able to afford the mesalamine that was recommended, thus has not been taking it. She has an appointment with GI in 2 days. Had EGD and colonoscopy- showed grade 1 esophagitis and duodenitis as well as proctitis on colonoscopy.   Anemia noted due to GI bleed.   Has had some nausea and abdominal pain symptoms since her hospital visit- admitted on 1/3/20.   Needs tab of zofran- due to sugar in the dissolvable which she does not tolerate.   Nausea due to pain, b/l shoulders, worse of right. Has been recommended to have surgery on right side. Notices pain more so with weather changes.   States she has been out of zanaflex as well. "   Occasionally smoke MJ. She cannot reach her right shoulder with her left arm for topical therapy application. Multiple level cervical fusion. Has pain of neck, shoulder, and some numbness in her fingers. She has seen Dr. Hooper in the past. Then was referred to [facility]. Has seen [facility].   Some abdominal symptoms, no appetite, having nausea. No fevers/chills. No vomiting.   Taking prilosec and cymbalta daily currently. Has been tolerating cymbalta at this time.   She has been drinking ensure. Eating jello.     COPD- stable. Has combivent to use as needed. Not on symbicort, unable to afford. No chest pain or shortness of breath.   Monday last dose- morning at 5 am.     ROS:  Denies any recent fevers or chills. No nausea or vomiting. No diarrhea. No chest pains or shortness of breath. No lower extremity edema.    Current Outpatient Medications on File Prior to Visit   Medication Sig Dispense Refill   • DULoxetine (CYMBALTA) 30 MG Cap DR Particles Take 1 Cap by mouth every day. 30 Cap 3   • NS SOLN 100 mL with ferric gluconate complex 12.5 MG/ML SOLN 250 mg 250 mg by Intravenous route every 24 hours. 30 Tab 1   • lisinopril (PRINIVIL) 10 MG Tab Take 1 Tab by mouth every day. 30 Tab 1   • magnesium oxide (MAG-OX) 400 (241.3 Mg) MG Tab tablet Take 1 Tab by mouth 3 times a day. 30 Tab 0   • omeprazole (PRILOSEC) 20 MG delayed-release capsule Take 1 Cap by mouth every day. 30 Cap 1   • ferrous sulfate 325 (65 Fe) MG tablet Take 1 Tab by mouth every day. 30 Tab 2   • diphenhydrAMINE (BENADRYL) 25 MG Tab Take 25 mg by mouth 2 times a day as needed for Itching.     • budesonide-formoterol (SYMBICORT) 80-4.5 MCG/ACT Aerosol Inhale 2 Puffs by mouth 2 Times a Day. (Patient not taking: Reported on 1/23/2020) 1 Inhaler 1   • mesalamine (CANASA) 1000 MG Suppos Insert 1 Suppository in rectum every bedtime. 30 Suppository 1     No current facility-administered medications on file prior to visit.        Allergies   Allergen Reactions    • Aspartame Nausea     headache   • Augmentin Vomiting   • Latex Rash and Itching   • Lipitor [Atorvastatin Calcium] Swelling     Lip swelling   • Other Misc Vomiting     Bug spray   • Saccharin Nausea     Nausea and headache       Patient Active Problem List    Diagnosis Date Noted   • Cellulitis 01/08/2020     Priority: High   • Anemia 05/19/2019   • Atherosclerosis 05/19/2019   • LVH (left ventricular hypertrophy) 05/19/2019   • Arthritis of left shoulder region 07/12/2017   • CVD (cardiovascular disease) 12/04/2015   • Pulmonary nodule 12/01/2015   • Renal stone 12/01/2015   • Diverticulosis 12/01/2015   • Chest pain 07/16/2015   • Nausea & vomiting 07/13/2015   • Opiate withdrawal ([facility]) 07/13/2015   • Diabetes mellitus, type 2 ([facility]) 10/03/2014   • Brachial neuritis or radiculitis 09/10/2014   • History of total hip arthroplasty 07/22/2014   • COPD (chronic obstructive pulmonary disease) ([facility]) 03/30/2014   • Arthritis 12/26/2013   • Chronic pain 02/21/2012   • Anxiety 01/18/2012   • Vitamin D insufficiency 04/28/2011   • Hypertension 03/22/2010   • Fibromyalgia 10/09/2009   • Swelling, mass, or lump in head and neck 07/21/2009   • Tobacco use disorder 07/21/2009   • Chronic ischemic heart disease 07/21/2009   • Esophageal reflux 07/21/2009   • Chronic airway obstruction, not elsewhere classified 07/21/2009   • Allergic rhinitis 07/21/2009   • Controlled type 2 diabetes mellitus without complication, without long-term current use of insulin ([facility]) 07/21/2009   • Mixed hyperlipidemia 07/21/2009   • Other atopic dermatitis and related conditions 07/21/2009   • Deficiency anemia 07/21/2009       Past Medical History:   Diagnosis Date   • Anemia    • Arthritis     OA and  not RA per rheumatology   • Breath shortness     oxygen PRN    • Bronchitis 2008   • Colon polyps 2015   • Convulsions ([facility]) 7/21/2009     ICD-10 transition   • COPD (chronic obstructive pulmonary disease) ([facility])    • Dental disorder     full  "dentures   • Depression     depression, anxiety    • Diverticulosis 5/10     colonoscopy   • Hemorrhoid 7/29/2009   • History of colonoscopy   2005   • Leukocytosis 3/30/2014   • Lymphocytosis (symptomatic) 7/21/2009   • MI (myocardial infarction) (HCC) 4/29/2007   • Other specified disorder of intestines     constipation   • Pain     shoulders, neck, lower back   • Pancreatitis    • Pap smear 4/2006   • Pneumonia 2013   • Pulmonary nodule    • Renal lesion    • Renal stone    • S/P colonoscopy 5/2010    will need repeat in 5 years   • Screening mammogram never   • Seizure (HCC)     x1 in 2008   • Shingles    • Symptomatic menopausal or female climacteric states 7/21/2009   • Type II or unspecified type diabetes mellitus without mention of complication, uncontrolled 7/21/2009    diet controlled    • Unspecified urinary incontinence        OBJECTIVE:   /68 (BP Location: Right arm, Patient Position: Sitting, BP Cuff Size: Adult)   Pulse (!) 107   Temp 36.9 °C (98.4 °F) (Temporal)   Resp 14   Ht 1.702 m (5' 7\")   Wt 76 kg (167 lb 9.6 oz)   LMP 01/01/2007   SpO2 96%   BMI 26.25 kg/m²   General: Well-developed well-nourished female, no acute distress  Neck: supple, no lymphadenopathy- cervical or supraclavicular, no thyromegaly. Limited ROM of neck. Surgical scars present.   Cardiovascular: regular rate and rhythm, no murmurs, gallops, rubs  Lungs: clear to auscultation bilaterally, no wheezes, crackles, or rhonchi  Abdomen: +bowel sounds, soft, minimal discomfort with palpation in upper abdominal region, nondistended, no rebound, no guarding, no hepatosplenomegaly  Extremities: no cyanosis, clubbing, edema. TTP bilateral shoulders. Limited ROM of upper extremities.   Skin: Warm and dry  Psych: slightly flattened affect      ASSESSMENT/PLAN:    64 y.o.female with chronic pain, OA,  fibromyalgia, DM type 2, anxiety, COPD, hypertension, hyperlipidemia and recent anemia due to GI bleed.      1. Other chronic " pain- hx of OA, limited joint movement and surgeries. Patient has legitimate chronic pain issues. Long term issues with chronic pain, was overall doing well on prior tramadol therapy.   -Patient needs further appropriate pain management at this time. Has been advised not to use nsaids and asa due to recent GI bleed. Pain medication refilled today for limited quantity. Recommend establish with pain management. Recently started on cymbalta therapy which she will continue. Had discussion with patient regarding setting boundaries, avoiding company/friends who may abuse other substances as patient denies current substance abuse and she will need to have routine UDS completed.   Pain contract signed today. Reviewed .  oxyCODONE immediate-release (ROXICODONE) 5 MG Tab    REFERRAL TO PAIN CLINIC   2. Arthritis- as above.     3. Pain of upper abdomen- patient was unable to obtain mesalamine. Recommend avoid nsaids and asa therapy.   -Continue on PPI therapy. Patient to follow up with GI as scheduled. Discussed appropriate food and fluid intake.     4. History of GI bleed     5. Anemia due to acute blood loss     6. Chronic obstructive pulmonary disease, unspecified COPD type (HCC)     7. Anxiety-stable. Continue cymbalta 30 mg daily.     Patient to establish with pain management.   Follow up in 3-4 weeks.       This medical record contains text that has been entered with the assistance of computer voice recognition and dictation software.  Therefore, it may contain unintended errors in text, spelling, punctuation, or grammar.

## 2020-01-25 NOTE — OP REPORT
Post OP Note     PreOp Diagnosis: anemia  PostOp Diagnosis:               1/ poor prep obstructing portions of the colon               2/ ascending colon ulceration linear 4 cm long biopsied              3/ erythema in the rectum biopsied              4/ poor anal tone              5/ possible rectal erythema is from prolapse     Procedure(s):  COLONOSCOPY - Wound Class: Clean Contaminated     Surgeon(s):  David Carranza M.D.     Anesthesiologist/Type of Anesthesia:  Anesthesiologist: Drake Zafar M.D./MAC     Surgical Staff:  Circulator: Niurka Jade R.N.; Edison Orourke R.N.  Endoscopy Technician: Maggie Coello; Puja Green     Specimens removed if any:  ID Type Source Tests Collected by Time Destination   A : ulceration biopsy Tissue Colon - Ascending PATHOLOGY SPECIMEN David Carranza M.D. 1/6/2020 1522     B :  Tissue Rectal PATHOLOGY SPECIMEN David Carranza M.D. 1/6/2020 1527           Estimated Blood Loss: none     Findings:   Prior to the procedure the patient was informed of the risks and benefits of the procedure and freely signed the consent form.  She was monitored under standard monitoring blood pressure oxygen and heart monitor no appreciable abnormality was noted during the procedure.  She was placed in left Q's position digital rectal exam is nontender there is no internal or external hemorrhoids are appreciated however there is very poor anal tone.  Using the standard Olympus variable stiffness colonoscope was introduced under manual insertion and passed the cecum.  Preparation was poor obscuring approximately 40 to 50% of the colonic tissue.  In the cecum just proximal involving the ascending colon there was an ulceration approximately 4 cm long linear in character clean base no active bleeding or visible vessel was noted.  Biopsies were taken.  Gradual withdrawing of the colonoscope blood further appreciation of the ascending colon, transverse descending and  sigmoid colon all of which essentially appeared normal except for areas were obscured by formed stool.  Despite our efforts of using copious amounts of irrigation clearance could not be achieved.  Rectum appeared normal except for a small portion of erythema that is noted which was biopsied.  The area involved could be consistent with a rectal prolapse that may be occurring given the patient's poor anal tone.     Complications: none     Recommendations     Await biopsy results  Prometheus 7 lab test  evaluation for ruling out inflammatory bowel disease  Outpatient anal manometry  Consider outpatient capsule endoscopy  Follow-up with primary care physician further healthcare needs  Follow-up at digestive [facility] for further evaluation and recommendations.  Continue iron supplementation along with vitamin C  If any further questions or concerns please feel free to give us call at [phone].  Consider hematological evaluation         1/6/2020 4:21 PM David Carranza M.D.         Routing History

## 2020-02-09 PROBLEM — D50.0 ANEMIA DUE TO GASTROINTESTINAL BLOOD LOSS: Status: ACTIVE | Noted: 2020-02-09

## 2020-02-11 ENCOUNTER — TELEPHONE (OUTPATIENT)
Dept: MEDICAL GROUP | Facility: IMAGING CENTER | Age: 65
End: 2020-02-11

## 2020-02-11 NOTE — TELEPHONE ENCOUNTER
Received message from patient that Dr. Hooper declined her referral and she would like to be referred to someone else.  Referral rerouted to [facility]. Patient notified and given information. No further questions at this time.

## 2020-02-11 NOTE — TELEPHONE ENCOUNTER
----- Message from Annabelle Middleton sent at 2/11/2020 11:14 AM PST -----  Regarding: RE: pain mangement referral  The referral has been rerouted to the office below:    [facility]  [id number]  [place] [address]  Ph: [phone]  ----- Message -----  From: Laura Gonzalez  Sent: 2/11/2020   9:58 AM PST  To: Annabelle Middleton  Subject: RE: pain mangement referral                      She did not have somewhere specific.  Thanks!  ----- Message -----  From: Annabelle Middleton  Sent: 2/11/2020   9:34 AM PST  To: Luara Gonzalez  Subject: RE: pain mangement referral                      Of course. Did you want it to go to somewhere specific?  ----- Message -----  From: Laura Gonzalez  Sent: 2/11/2020   8:59 AM PST  To: Annabelle Middleton  Subject: pain mangement referral                          Sarath Alanis    I received a message from this patient that her referral was declined by Dr. Hooper. Is there anyway we could get her referral changed to someone else?  Thank you,  MARISOL Sanchez

## 2020-12-16 ENCOUNTER — APPOINTMENT (OUTPATIENT)
Dept: MEDICAL GROUP | Facility: IMAGING CENTER | Age: 65
End: 2020-12-16
Payer: MEDICARE

## 2020-12-21 ENCOUNTER — PATIENT OUTREACH (OUTPATIENT)
Dept: HEALTH INFORMATION MANAGEMENT | Facility: OTHER | Age: 65
End: 2020-12-21

## 2020-12-21 NOTE — PROGRESS NOTES
Welcome Call for [facility]    Outcome:   Left Message Please transfer to Patient Outreach Team at [phone] when patient returns call.     Attempt # 1  -[id number]

## 2021-01-08 ENCOUNTER — TELEMEDICINE (OUTPATIENT)
Dept: MEDICAL GROUP | Facility: IMAGING CENTER | Age: 66
End: 2021-01-08
Payer: MEDICARE

## 2021-01-08 VITALS — WEIGHT: 160 LBS | HEIGHT: 67 IN | BODY MASS INDEX: 25.11 KG/M2

## 2021-01-08 DIAGNOSIS — E55.9 VITAMIN D INSUFFICIENCY: ICD-10-CM

## 2021-01-08 DIAGNOSIS — I10 ESSENTIAL HYPERTENSION: ICD-10-CM

## 2021-01-08 DIAGNOSIS — E78.2 MIXED HYPERLIPIDEMIA: ICD-10-CM

## 2021-01-08 DIAGNOSIS — D50.0 ANEMIA DUE TO GASTROINTESTINAL BLOOD LOSS: ICD-10-CM

## 2021-01-08 DIAGNOSIS — F33.0 MILD EPISODE OF RECURRENT MAJOR DEPRESSIVE DISORDER (HCC): ICD-10-CM

## 2021-01-08 DIAGNOSIS — J44.9 CHRONIC OBSTRUCTIVE PULMONARY DISEASE, UNSPECIFIED COPD TYPE (HCC): ICD-10-CM

## 2021-01-08 DIAGNOSIS — G89.29 OTHER CHRONIC PAIN: ICD-10-CM

## 2021-01-08 DIAGNOSIS — E11.9 CONTROLLED TYPE 2 DIABETES MELLITUS WITHOUT COMPLICATION, WITHOUT LONG-TERM CURRENT USE OF INSULIN (HCC): ICD-10-CM

## 2021-01-08 PROCEDURE — 99214 OFFICE O/P EST MOD 30 MIN: CPT | Mod: 95,CR | Performed by: FAMILY MEDICINE

## 2021-01-08 RX ORDER — DULOXETIN HYDROCHLORIDE 30 MG/1
30 CAPSULE, DELAYED RELEASE ORAL DAILY
Qty: 90 CAP | Refills: 0 | Status: SHIPPED | OUTPATIENT
Start: 2021-01-08 | End: 2021-01-08

## 2021-01-08 RX ORDER — DULOXETIN HYDROCHLORIDE 30 MG/1
30 CAPSULE, DELAYED RELEASE ORAL DAILY
Qty: 90 CAP | Refills: 0 | Status: SHIPPED | OUTPATIENT
Start: 2021-01-08 | End: 2021-03-17

## 2021-01-08 ASSESSMENT — PATIENT HEALTH QUESTIONNAIRE - PHQ9
5. POOR APPETITE OR OVEREATING: 2 - MORE THAN HALF THE DAYS
SUM OF ALL RESPONSES TO PHQ QUESTIONS 1-9: 12
CLINICAL INTERPRETATION OF PHQ2 SCORE: 5

## 2021-01-08 ASSESSMENT — FIBROSIS 4 INDEX: FIB4 SCORE: 0.81

## 2021-01-08 ASSESSMENT — PAIN SCALES - GENERAL: PAINLEVEL: 5=MODERATE PAIN

## 2021-01-08 NOTE — PROGRESS NOTES
Telemedicine Visit: Established Patient     This encounter was conducted via Zoom .   Verbal consent was obtained. Patient's identity was verified.    Subjective:     Chief Complaint   Patient presents with   • Depression     re-start cymbalta        65 y.o. female with chronic pain, OA,  fibromyalgia, DM type 2, anxiety, COPD, hypertension, hyperlipidemia and prior anemia due to GI bleed.   Presenting for evaluation and management of:     Depression-has been off Cymbalta for about a month as she ran out of medication.  She would like to restart it for depression symptoms.  Denies any SI/HI.  She did well on the Cymbalta 30 mg.  Has tolerated medication well previously.  She does have chronic pain for which she is seeing the pain specialist.    She has not done lab work in a while.  1/2020 last labs in system.  She does not check blood sugars for diabetes regularly.  Not on medications.  She does not check her blood pressure regularly.    Vitamin D levels have been low in the past.  Anemia on prior lab work due to GI blood loss.  Has not had repeat lab work completed.  Last labs 1/2020.  Has been overall stable.  COPD -states she is not on Symbicort due to cost.  Otherwise has been stable.    ROS   Denies any recent fevers or chills. No nausea or vomiting. No chest pains or shortness of breath.  No abdominal pain.    Allergies   Allergen Reactions   • Aspartame Nausea     headache   • Augmentin Vomiting   • Latex Rash and Itching   • Lipitor [Atorvastatin Calcium] Swelling     Lip swelling   • Other Misc Vomiting     Bug spray   • Saccharin Nausea     Nausea and headache       Current medicines (including changes today)  Current Outpatient Medications   Medication Sig Dispense Refill   • DULoxetine (CYMBALTA) 30 MG Cap DR Particles Take 1 Cap by mouth every day. 90 Cap 0   • budesonide-formoterol (SYMBICORT) 80-4.5 MCG/ACT Aerosol Inhale 2 Puffs by mouth 2 Times a Day. 1 Inhaler 1   • NS SOLN 100 mL with ferric  gluconate complex 12.5 MG/ML SOLN 250 mg 250 mg by Intravenous route every 24 hours. 30 Tab 1   • omeprazole (PRILOSEC) 20 MG delayed-release capsule Take 1 Cap by mouth every day. 30 Cap 1   • ferrous sulfate 325 (65 Fe) MG tablet Take 1 Tab by mouth every day. 30 Tab 2   • diphenhydrAMINE (BENADRYL) 25 MG Tab Take 25 mg by mouth 2 times a day as needed for Itching.     • tizanidine (ZANAFLEX) 4 MG Tab TAKE 1 TAB BY MOUTH EVERY 6 HOURS AS NEEDED. MAXIMUM 3 TIMES A DAY. (Patient not taking: Reported on 1/8/2021) 90 Tab 0   • ondansetron (ZOFRAN) 4 MG Tab tablet Take 1 Tab by mouth every 6 hours as needed for Nausea/Vomiting. (Patient not taking: Reported on 1/8/2021) 20 Tab 0   • lisinopril (PRINIVIL) 10 MG Tab Take 1 Tab by mouth every day. (Patient not taking: Reported on 1/8/2021) 30 Tab 1   • magnesium oxide (MAG-OX) 400 (241.3 Mg) MG Tab tablet Take 1 Tab by mouth 3 times a day. (Patient not taking: Reported on 1/8/2021) 30 Tab 0   • mesalamine (CANASA) 1000 MG Suppos Insert 1 Suppository in rectum every bedtime. (Patient not taking: Reported on 1/8/2021) 30 Suppository 1     No current facility-administered medications for this visit.        Patient Active Problem List    Diagnosis Date Noted   • Cellulitis 01/08/2020     Priority: High   • Anemia due to gastrointestinal blood loss 02/09/2020   • Anemia 05/19/2019   • Atherosclerosis 05/19/2019   • LVH (left ventricular hypertrophy) 05/19/2019   • Arthritis of left shoulder region 07/12/2017   • CVD (cardiovascular disease) 12/04/2015   • Pulmonary nodule 12/01/2015   • Renal stone 12/01/2015   • Diverticulosis 12/01/2015   • Chest pain 07/16/2015   • Nausea & vomiting 07/13/2015   • Opiate withdrawal ([facility]CC) 07/13/2015   • Diabetes mellitus, type 2 ([facility]CC) 10/03/2014   • Brachial neuritis or radiculitis 09/10/2014   • History of total hip arthroplasty 07/22/2014   • COPD (chronic obstructive pulmonary disease) ([facility]CC) 03/30/2014   • Arthritis 12/26/2013   • Chronic  pain 02/21/2012   • Anxiety 01/18/2012   • Vitamin D insufficiency 04/28/2011   • Hypertension 03/22/2010   • Fibromyalgia 10/09/2009   • Swelling, mass, or lump in head and neck 07/21/2009   • Tobacco use disorder 07/21/2009   • Chronic ischemic heart disease 07/21/2009   • Esophageal reflux 07/21/2009   • Chronic airway obstruction, not elsewhere classified 07/21/2009   • Allergic rhinitis 07/21/2009   • Controlled type 2 diabetes mellitus without complication, without long-term current use of insulin ([facility]) 07/21/2009   • Mixed hyperlipidemia 07/21/2009   • Other atopic dermatitis and related conditions 07/21/2009   • Deficiency anemia 07/21/2009       Family History   Problem Relation Age of Onset   • Diabetes Mother    • Cancer Mother         bladder cancer   • Diabetes Brother    • Cancer Brother         bladder cancer   • Diabetes Brother    • Cancer Sister         bladder cancer       Patient has a past medical history of Anemia, Arthritis, Breath shortness, Bronchitis (2008), Colon polyps (2015), Convulsions ([facility]) (7/21/2009), COPD (chronic obstructive pulmonary disease) ([facility]), Dental disorder, Depression, Diverticulosis (5/10 ), Hemorrhoid (7/29/2009), History of colonoscopy (  2005), Leukocytosis (3/30/2014), Lymphocytosis (symptomatic) (7/21/2009), MI (myocardial infarction) ([facility]) (4/29/2007), Other specified disorder of intestines, Pain, Pancreatitis, Pap smear (4/2006), Pneumonia (2013), Pulmonary nodule, Renal lesion, Renal stone, S/P colonoscopy (5/2010), Screening mammogram (never), Seizure ([facility]), Shingles, Symptomatic menopausal or female climacteric states (7/21/2009), Type II or unspecified type diabetes mellitus without mention of complication, uncontrolled (7/21/2009), and Unspecified urinary incontinence.  Patient  has a past surgical history that includes tonsillectomy; eye surgery (laser); other orthopedic surgery; hip arthroplasty mis total (7/16/14); cervical fusion posterior (9/10/2014);  "gyn surgery (1992); other cardiac surgery (2007); shoulder arthroplasty total (Left, 7/12/2017); gastroscopy (N/A, 1/4/2020); and colonoscopy (N/A, 1/6/2020).       Objective:   Vitals obtained by patient:  Ht 1.702 m (5' 7\")   Wt 72.6 kg (160 lb)   LMP 01/01/2007   BMI 25.06 kg/m²     Physical Exam:  Constitutional: Alert, no distress, well-groomed.  Skin: No rashes in visible areas.  Eye: Round. Conjunctiva clear, lids normal. No icterus.   ENMT: Lips pink without lesions, good dentition, moist mucous membranes. Phonation normal.  Neck: No masses, no thyromegaly. Moves freely without pain.  CV: Pulse as reported by patient  Respiratory: Unlabored respiratory effort, no cough or audible wheeze  Psych: Alert and oriented x3, normal affect and mood.       Assessment and Plan:   The following treatment plan was discussed:       65 y.o. female with chronic pain, OA,  fibromyalgia, DM type 2, anxiety, COPD, hypertension, hyperlipidemia and prior anemia due to GI bleed.     1. Mild episode of recurrent major depressive disorder (HCC)  DULoxetine (CYMBALTA) 30 MG Cap DR Particles   2. Other chronic pain  DULoxetine (CYMBALTA) 30 MG Cap DR Particles       3. Controlled type 2 diabetes mellitus without complication, without long-term current use of insulin ([facility])  HEMOGLOBIN A1C    MICROALB/CREAT RATIO RAND. UR   4. Essential hypertension     5. Mixed hyperlipidemia  Comp Metabolic Panel    Lipid Profile    TSH WITH REFLEX TO FT4    VITAMIN D,25 HYDROXY   6. Anemia due to gastrointestinal blood loss  CBC WITH DIFFERENTIAL    VITAMIN D,25 HYDROXY   7. Vitamin D insufficiency  VITAMIN D,25 HYDROXY   8. Chronic obstructive pulmonary disease, unspecified COPD type (HCC)     -MDD-has been off Cymbalta with some recurrent symptoms. Recommend restart Cymbalta 30 mg daily.  -Other chronic pain-follow-up with pain specialist routinely.  Cymbalta 30 mg daily.  -Diabetes type 2-not on medication.  Does not check blood sugars " regularly.  Due for lab work.  -Hypertension-stable on current medication.  Lisinopril 10 mg daily.  Does not check blood pressure regularly.  -Hyperlipidemia-not on medication.  Due for lab work.  -Anemia due to GI blood loss previously-has not had follow-up lab work.  Has been otherwise stable.  Repeat lab work.  -Vitamin D insufficiency-monitor labs.  -COPD-stable off medication.  Has not been on Symbicort due to cost.    Follow-up: Return in about 1 month (around 2/8/2021).   Follow up sooner as needed.   Recommend routine annual well exam for future to schedule.            This medical record contains text that has been entered with the assistance of computer voice recognition and dictation software.  Therefore, it may contain unintended errors in text, spelling, punctuation, or grammar.

## 2021-03-03 DIAGNOSIS — Z23 NEED FOR VACCINATION: ICD-10-CM

## 2021-04-01 ENCOUNTER — PATIENT MESSAGE (OUTPATIENT)
Dept: HEALTH INFORMATION MANAGEMENT | Facility: OTHER | Age: 66
End: 2021-04-01

## 2021-06-07 ENCOUNTER — TELEPHONE (OUTPATIENT)
Dept: MEDICAL GROUP | Facility: IMAGING CENTER | Age: 66
End: 2021-06-07

## 2021-06-07 NOTE — TELEPHONE ENCOUNTER
LVM instructing pt to call [phone] and ask for scheduling to schedule a follow up appointment. Instructed pt to complete the ordered labs prior to her appt. Informed pt labs require fasting 8-10 hrs prior.

## 2021-12-06 NOTE — DISCHARGE PLANNING
Medical Social Work    SW spoke with RN regarding pt. Anticipated D/C plan is home with no needs from case managment when medically cleared.           [Arrhythmia/ECG Abnorrmalities] : arrhythmia/ECG abnormalities [FreeTextEntry3] : Referring Physician: Dr. Storm Lopez

## 2022-02-10 ENCOUNTER — PATIENT MESSAGE (OUTPATIENT)
Dept: HEALTH INFORMATION MANAGEMENT | Facility: OTHER | Age: 67
End: 2022-02-10
Payer: MEDICARE

## 2022-04-08 ENCOUNTER — APPOINTMENT (OUTPATIENT)
Dept: RADIOLOGY | Facility: MEDICAL CENTER | Age: 67
End: 2022-04-08
Attending: EMERGENCY MEDICINE
Payer: MEDICARE

## 2022-04-08 ENCOUNTER — HOSPITAL ENCOUNTER (EMERGENCY)
Facility: MEDICAL CENTER | Age: 67
End: 2022-04-08
Attending: EMERGENCY MEDICINE
Payer: MEDICARE

## 2022-04-08 ENCOUNTER — TELEPHONE (OUTPATIENT)
Dept: MEDICAL GROUP | Facility: IMAGING CENTER | Age: 67
End: 2022-04-08

## 2022-04-08 VITALS
DIASTOLIC BLOOD PRESSURE: 75 MMHG | TEMPERATURE: 97.8 F | RESPIRATION RATE: 18 BRPM | HEART RATE: 97 BPM | BODY MASS INDEX: 20.1 KG/M2 | WEIGHT: 128.31 LBS | OXYGEN SATURATION: 96 % | SYSTOLIC BLOOD PRESSURE: 148 MMHG

## 2022-04-08 DIAGNOSIS — R73.9 HYPERGLYCEMIA: ICD-10-CM

## 2022-04-08 DIAGNOSIS — D50.9 MICROCYTIC ANEMIA: ICD-10-CM

## 2022-04-08 DIAGNOSIS — B37.49 CANDIDIASIS OF PERINEUM: ICD-10-CM

## 2022-04-08 LAB
ALBUMIN SERPL BCP-MCNC: 3.9 G/DL (ref 3.2–4.9)
ALBUMIN/GLOB SERPL: 1 G/DL
ALP SERPL-CCNC: 119 U/L (ref 30–99)
ALT SERPL-CCNC: 6 U/L (ref 2–50)
ANION GAP SERPL CALC-SCNC: 14 MMOL/L (ref 7–16)
ANISOCYTOSIS BLD QL SMEAR: ABNORMAL
AST SERPL-CCNC: 9 U/L (ref 12–45)
BASOPHILS # BLD AUTO: 0.5 % (ref 0–1.8)
BASOPHILS # BLD: 0.08 K/UL (ref 0–0.12)
BILIRUB SERPL-MCNC: 0.2 MG/DL (ref 0.1–1.5)
BUN SERPL-MCNC: 16 MG/DL (ref 8–22)
CALCIUM SERPL-MCNC: 9.4 MG/DL (ref 8.5–10.5)
CHLORIDE SERPL-SCNC: 95 MMOL/L (ref 96–112)
CO2 SERPL-SCNC: 23 MMOL/L (ref 20–33)
COMMENT 1642: NORMAL
CREAT SERPL-MCNC: 1 MG/DL (ref 0.5–1.4)
EOSINOPHIL # BLD AUTO: 0.14 K/UL (ref 0–0.51)
EOSINOPHIL NFR BLD: 1 % (ref 0–6.9)
ERYTHROCYTE [DISTWIDTH] IN BLOOD BY AUTOMATED COUNT: 45.1 FL (ref 35.9–50)
GFR SERPLBLD CREATININE-BSD FMLA CKD-EPI: 62 ML/MIN/1.73 M 2
GIANT PLATELETS BLD QL SMEAR: NORMAL
GLOBULIN SER CALC-MCNC: 4.1 G/DL (ref 1.9–3.5)
GLUCOSE SERPL-MCNC: 434 MG/DL (ref 65–99)
HCT VFR BLD AUTO: 26.9 % (ref 37–47)
HGB BLD-MCNC: 7.7 G/DL (ref 12–16)
HYPOCHROMIA BLD QL SMEAR: ABNORMAL
IMM GRANULOCYTES # BLD AUTO: 0.08 K/UL (ref 0–0.11)
IMM GRANULOCYTES NFR BLD AUTO: 0.5 % (ref 0–0.9)
LYMPHOCYTES # BLD AUTO: 2.25 K/UL (ref 1–4.8)
LYMPHOCYTES NFR BLD: 15.3 % (ref 22–41)
MCH RBC QN AUTO: 19 PG (ref 27–33)
MCHC RBC AUTO-ENTMCNC: 28.6 G/DL (ref 33.6–35)
MCV RBC AUTO: 66.3 FL (ref 81.4–97.8)
MICROCYTES BLD QL SMEAR: ABNORMAL
MONOCYTES # BLD AUTO: 0.8 K/UL (ref 0–0.85)
MONOCYTES NFR BLD AUTO: 5.4 % (ref 0–13.4)
MORPHOLOGY BLD-IMP: NORMAL
NEUTROPHILS # BLD AUTO: 11.33 K/UL (ref 2–7.15)
NEUTROPHILS NFR BLD: 77.3 % (ref 44–72)
NRBC # BLD AUTO: 0 K/UL
NRBC BLD-RTO: 0 /100 WBC
OVALOCYTES BLD QL SMEAR: NORMAL
PLATELET # BLD AUTO: 561 K/UL (ref 164–446)
PMV BLD AUTO: 9.1 FL (ref 9–12.9)
POLYCHROMASIA BLD QL SMEAR: NORMAL
POTASSIUM SERPL-SCNC: 4.3 MMOL/L (ref 3.6–5.5)
PROT SERPL-MCNC: 8 G/DL (ref 6–8.2)
RBC # BLD AUTO: 4.06 M/UL (ref 4.2–5.4)
RBC BLD AUTO: PRESENT
SODIUM SERPL-SCNC: 132 MMOL/L (ref 135–145)
STOMATOCYTES BLD QL SMEAR: NORMAL
WBC # BLD AUTO: 14.7 K/UL (ref 4.8–10.8)

## 2022-04-08 PROCEDURE — 36415 COLL VENOUS BLD VENIPUNCTURE: CPT

## 2022-04-08 PROCEDURE — 700111 HCHG RX REV CODE 636 W/ 250 OVERRIDE (IP): Performed by: EMERGENCY MEDICINE

## 2022-04-08 PROCEDURE — A9270 NON-COVERED ITEM OR SERVICE: HCPCS | Performed by: EMERGENCY MEDICINE

## 2022-04-08 PROCEDURE — 80053 COMPREHEN METABOLIC PANEL: CPT

## 2022-04-08 PROCEDURE — 99284 EMERGENCY DEPT VISIT MOD MDM: CPT

## 2022-04-08 PROCEDURE — 700102 HCHG RX REV CODE 250 W/ 637 OVERRIDE(OP): Performed by: EMERGENCY MEDICINE

## 2022-04-08 PROCEDURE — 71046 X-RAY EXAM CHEST 2 VIEWS: CPT

## 2022-04-08 PROCEDURE — 85025 COMPLETE CBC W/AUTO DIFF WBC: CPT

## 2022-04-08 RX ORDER — ONDANSETRON 4 MG/1
4 TABLET, ORALLY DISINTEGRATING ORAL ONCE
Status: COMPLETED | OUTPATIENT
Start: 2022-04-08 | End: 2022-04-08

## 2022-04-08 RX ORDER — FLUCONAZOLE 200 MG/1
200 TABLET ORAL DAILY
Qty: 1 TABLET | Refills: 0 | Status: SHIPPED | OUTPATIENT
Start: 2022-04-08 | End: 2022-06-04

## 2022-04-08 RX ADMIN — ONDANSETRON 4 MG: 4 TABLET, ORALLY DISINTEGRATING ORAL at 19:38

## 2022-04-08 RX ADMIN — FLUCONAZOLE 150 MG: 50 TABLET ORAL at 19:28

## 2022-04-08 NOTE — TELEPHONE ENCOUNTER
Pt left  stating she was concerned about severe mold infection. Returned call. Pt reports that she moved into a new house in September and over the last few months she experienced runny nose, changes in eye sight, yellowing of skin and a weight loss of 30 lbs. She reports her cat is having similar symptoms. She reports she was nervous about coming in to be seen since covid began. Pt reports that she spoke with urgent care and they advised she would be better assessed at the ER. She is agreeable to this. She will plan on going to [facility] today.

## 2022-04-09 NOTE — ED PROVIDER NOTES
ED Provider Note    ED Provider Note    Scribed for Sahil Montejo MD by Sahil Montejo M.D.. 4/8/2022, 6:19 PM.    Primary care provider: Viviana Thorne M.D.  Means of arrival: Private  History obtained from: Patient  History limited by: None    CHIEF COMPLAINT  Chief Complaint   Patient presents with   • Vaginal Discharge     Pt concerned she may be exposed to mold at her home.  Pt states sent here for eval by her PMD       HPI  Heidi Navas is a 66 y.o. female who presents to the Emergency Department for evaluation of perineal pain.  Patient notes she has had discomfort when she goes to urinate and when she sits for the last 3 to 4 days.  No fever, but does endorse body aches and joint pain that she has had for the last several months.  Patient seen the facility for GI bleed in early January, found to have hemoglobin less than 7 was admitted and transfused.  She is a diabetic as well and has not been recently checking her blood sugars.  She is not on insulin at this time.  Patient called her primary care provider and relates she was told to come to the emergency department to be evaluated.  With regard to the perineal discomfort and dysuria she generally had that in the past.  She tried Monistat over-the-counter with no improvement.    REVIEW OF SYSTEMS  Pertinent positives include joint pain, fatigue, body aches for several months, acute perineal discomfort and dysuria. Pertinent negatives include no fever, no acute bleeding.      PAST MEDICAL HISTORY   has a past medical history of Anemia, Arthritis, Breath shortness, Bronchitis (2008), Colon polyps (2015), Convulsions ([facility]) (7/21/2009), COPD (chronic obstructive pulmonary disease) ([facility]), Dental disorder, Depression, Diverticulosis (5/10 ), Hemorrhoid (7/29/2009), History of colonoscopy (  2005), Leukocytosis (3/30/2014), Lymphocytosis (symptomatic) (7/21/2009), MI (myocardial infarction) ([facility]) (4/29/2007), Other specified disorder of  intestines, Pain, Pancreatitis, Pap smear (4/2006), Pneumonia (2013), Pulmonary nodule, Renal lesion, Renal stone, S/P colonoscopy (5/2010), Screening mammogram (never), Seizure (HCC), Shingles, Symptomatic menopausal or female climacteric states (7/21/2009), Type II or unspecified type diabetes mellitus without mention of complication, uncontrolled (7/21/2009), and Unspecified urinary incontinence.    SURGICAL HISTORY   has a past surgical history that includes tonsillectomy; eye surgery (laser); other orthopedic surgery; hip arthroplasty mis total (7/16/14); cervical fusion posterior (9/10/2014); gyn surgery (1992); other cardiac surgery (2007); shoulder arthroplasty total (Left, 7/12/2017); gastroscopy (N/A, 1/4/2020); and colonoscopy (N/A, 1/6/2020).    SOCIAL HISTORY  Social History     Tobacco Use   • Smoking status: Current Every Day Smoker     Packs/day: 0.25     Years: 50.00     Pack years: 12.50     Types: Cigarettes   • Smokeless tobacco: Never Used   Substance Use Topics   • Alcohol use: No     Alcohol/week: 0.0 oz   • Drug use: Yes     Comment: MARIJUANA- 2 times per week       Social History     Substance and Sexual Activity   Drug Use Yes    Comment: MARIJUANA- 2 times per week        FAMILY HISTORY  Family History   Problem Relation Age of Onset   • Diabetes Mother    • Cancer Mother         bladder cancer   • Diabetes Brother    • Cancer Brother         bladder cancer   • Diabetes Brother    • Cancer Sister         bladder cancer       CURRENT MEDICATIONS  Home Medications    **Home medications have not yet been reviewed for this encounter**         ALLERGIES  Allergies   Allergen Reactions   • Aspartame Nausea     headache   • Augmentin Vomiting   • Latex Rash and Itching   • Lipitor [Atorvastatin Calcium] Swelling     Lip swelling   • Other Misc Vomiting     Bug spray   • Saccharin Nausea     Nausea and headache       PHYSICAL EXAM  VITAL SIGNS: /75   Pulse 97   Temp 36.6 °C (97.8 °F)  (Temporal)   Resp 18   Wt 58.2 kg (128 lb 4.9 oz)   LMP 01/01/2007   SpO2 96%   BMI 20.10 kg/m²     General: Alert, no acute distress  Skin: Warm, dry, normal for ethnicity  Head: Normocephalic, atraumatic  Neck: Trachea midline, limited range of motion secondary to history of cervical fusion.  Eye: PERRL, moderate conjunctival pallor noted  ENMT: Oral mucosa pale and dry  Cardiovascular: S1, S2, mildly tachycardic, otherwise regular rate and rhythm, No murmur, Normal peripheral perfusion  Respiratory: Lungs CTA, respirations are non-labored, breath sounds are equal  Gastrointestinal:  Exam of the perineum demonstrates significant erythema with minimal induration, multiple satellite type lesions concerning for candidiasis.  Musculoskeletal: No swelling, no deformity  Neurological: Alert and oriented to person, place, time, and situation.  Normal gait assessed.  Lymphatics: No lymphadenopathy  Psychiatric: Cooperative, mildly anxious, otherwise appropriate mood & affect      DIAGNOSTIC STUDIES/PROCEDURES    LABS  Results for orders placed or performed during the hospital encounter of 04/08/22   CBC WITH DIFFERENTIAL   Result Value Ref Range    WBC 14.7 (H) 4.8 - 10.8 K/uL    RBC 4.06 (L) 4.20 - 5.40 M/uL    Hemoglobin 7.7 (L) 12.0 - 16.0 g/dL    Hematocrit 26.9 (L) 37.0 - 47.0 %    MCV 66.3 (L) 81.4 - 97.8 fL    MCH 19.0 (L) 27.0 - 33.0 pg    MCHC 28.6 (L) 33.6 - 35.0 g/dL    RDW 45.1 35.9 - 50.0 fL    Platelet Count 561 (H) 164 - 446 K/uL    MPV 9.1 9.0 - 12.9 fL    Neutrophils-Polys 77.30 (H) 44.00 - 72.00 %    Lymphocytes 15.30 (L) 22.00 - 41.00 %    Monocytes 5.40 0.00 - 13.40 %    Eosinophils 1.00 0.00 - 6.90 %    Basophils 0.50 0.00 - 1.80 %    Immature Granulocytes 0.50 0.00 - 0.90 %    Nucleated RBC 0.00 /100 WBC    Neutrophils (Absolute) 11.33 (H) 2.00 - 7.15 K/uL    Lymphs (Absolute) 2.25 1.00 - 4.80 K/uL    Monos (Absolute) 0.80 0.00 - 0.85 K/uL    Eos (Absolute) 0.14 0.00 - 0.51 K/uL    Baso  (Absolute) 0.08 0.00 - 0.12 K/uL    Immature Granulocytes (abs) 0.08 0.00 - 0.11 K/uL    NRBC (Absolute) 0.00 K/uL    Hypochromia 1+     Anisocytosis 2+ (A)     Microcytosis 2+ (A)    Comp Metabolic Panel   Result Value Ref Range    Sodium 132 (L) 135 - 145 mmol/L    Potassium 4.3 3.6 - 5.5 mmol/L    Chloride 95 (L) 96 - 112 mmol/L    Co2 23 20 - 33 mmol/L    Anion Gap 14.0 7.0 - 16.0    Glucose 434 (H) 65 - 99 mg/dL    Bun 16 8 - 22 mg/dL    Creatinine 1.00 0.50 - 1.40 mg/dL    Calcium 9.4 8.5 - 10.5 mg/dL    AST(SGOT) 9 (L) 12 - 45 U/L    ALT(SGPT) 6 2 - 50 U/L    Alkaline Phosphatase 119 (H) 30 - 99 U/L    Total Bilirubin 0.2 0.1 - 1.5 mg/dL    Albumin 3.9 3.2 - 4.9 g/dL    Total Protein 8.0 6.0 - 8.2 g/dL    Globulin 4.1 (H) 1.9 - 3.5 g/dL    A-G Ratio 1.0 g/dL   ESTIMATED GFR   Result Value Ref Range    GFR (CKD-EPI) 62 >60 mL/min/1.73 m 2   PERIPHERAL SMEAR REVIEW   Result Value Ref Range    Peripheral Smear Review see below    MORPHOLOGY   Result Value Ref Range    RBC Morphology Present     Giant Platelets 1+     Polychromia 1+     Ovalocytes 1+     Stomatocytes 1+    DIFFERENTIAL COMMENT   Result Value Ref Range    Comments-Diff see below      All labs reviewed by me, significant hyperglycemia appreciated without evidence of acidosis.  Hemoglobin similar to previous values, mild leukocytosis appreciated but no lincoln bandemia.        RADIOLOGY  DX-CHEST-2 VIEWS   Final Result      No active disease.        The radiologist's interpretation of all radiological studies have been reviewed by me.    COURSE & MEDICAL DECISION MAKING  Pertinent Labs & Imaging studies reviewed. (See chart for details)    6:19 PM - Patient seen and examined at bedside. Patient will be treated with Diflucan. Ordered metabolic/septic work-up to evaluate her symptoms. The differential diagnoses include but are not limited to: Cutaneous candidiasis, hyperglycemia, electrolyte abnormality    1935: Patient reassessed, she would like to go  and has already tolerated antifungal well.  I have updated her with concerning findings with significant hyperglycemia.  She is comfortable following up with her primary care with regard to her blood sugars.  She does have leukocytosis as well but given no fever, no persistent tachycardia, no bandemia, otherwise unremarkable imaging this would not be consistent with septicemia.    Patient Vitals for the past 24 hrs:   BP Temp Temp src Pulse Resp SpO2 Weight   04/08/22 2000 148/75 36.6 °C (97.8 °F) Temporal 97 18 96 % --   04/08/22 1658 -- -- -- -- -- -- 58.2 kg (128 lb 4.9 oz)   04/08/22 1653 144/69 36.3 °C (97.4 °F) Temporal (!) 105 18 98 % --         Decision Making:  This is a 66 y.o. year old female who presents with perineal discomfort for the last several days.  She notes preceding body aches and fatigue for several months.  Patient concern for mold exposure, her lungs are clear and she demonstrates no wheezing, x-ray is unremarkable, no evidence of significant pulmonary fungal infection.  Perineal exam is consistent with significant perineal candidiasis.  Blood sugars are certainly quite high which is obviously a risk factor for this diagnosis.  Of note, patient is significantly anemic but this is actually quite similar to previous values drawn several months ago.  Leukocytosis appreciated with no evidence otherwise of serious bacterial illness or sepsis.  She is not febrile nor persistently tachycardic and has no bandemia.  No indication at this time for inpatient management or transfusion but close follow-up with primary care is indicated, appropriate antifungals initiated here in the ED and written for home.  Patient is very anxious here in the ED and it is difficult to explain many of these findings to her but she would like to leave and is comfortable with outpatient follow-up.    The patient will return for new or worsening symptoms and is stable at the time of discharge.    Patient has had high blood  pressure while in the emergency department, felt likely secondary to medical condition. Counseled patient to monitor blood pressure at home and follow up with primary care physician.      DISPOSITION:  Patient will be discharged home in stable condition.    FOLLOW UP:  Viviana Thorne M.D.  [address] [address] [place] [id number] [phone]    Schedule an appointment as soon as possible for a visit         OUTPATIENT MEDICATIONS:  Discharge Medication List as of 4/8/2022  8:17 PM      START taking these medications    Details   fluconazole (DIFLUCAN) 200 MG Tab Take 1 Tablet by mouth every day.Take 72 hours after firistDisp-1 Tablet, R-0, Normal               FINAL IMPRESSION  1. Candidiasis of perineum    2. Hyperglycemia    3. Microcytic anemia          Sahil HANNAH M.D. (Eddie), am scribing for, and in the presence of, Sahil Montejo MD.    Electronically signed by: Sahil Montejo M.D. (Eddie), 4/8/2022    ISahil MD personally performed the services described in this documentation, as scribed by Sahil Montejo M.D. in my presence, and it is both accurate and complete    The note accurately reflects work and decisions made by me.  Sahil Montejo M.D.  4/9/2022  12:16 AM

## 2022-04-09 NOTE — ED TRIAGE NOTES
Chief Complaint   Patient presents with   • Vaginal Discharge     Pt concerned she may be exposed to mold at her home.  Pt states sent here for eval by her PMD

## 2022-04-09 NOTE — DISCHARGE INSTRUCTIONS
Your blood sugar was high today which can make you more at risk for yeast infections of the skin.  Please follow-up with your primary care provider to make sure your sugars are controlled.

## 2022-04-09 NOTE — ED NOTES
Patient discharged in stable condition per orders. Wristband removed per protocol. Patient verbalized understanding of all discharge instructions. All belongings accounted for. Ambulatory with cane to front lobby.

## 2022-06-03 ENCOUNTER — OFFICE VISIT (OUTPATIENT)
Dept: MEDICAL GROUP | Facility: IMAGING CENTER | Age: 67
End: 2022-06-03
Payer: MEDICARE

## 2022-06-03 VITALS
WEIGHT: 116.6 LBS | TEMPERATURE: 98.9 F | OXYGEN SATURATION: 96 % | SYSTOLIC BLOOD PRESSURE: 120 MMHG | HEART RATE: 97 BPM | BODY MASS INDEX: 18.3 KG/M2 | RESPIRATION RATE: 14 BRPM | DIASTOLIC BLOOD PRESSURE: 62 MMHG | HEIGHT: 67 IN

## 2022-06-03 DIAGNOSIS — R32 URINARY INCONTINENCE, UNSPECIFIED TYPE: ICD-10-CM

## 2022-06-03 DIAGNOSIS — D64.9 ANEMIA, UNSPECIFIED TYPE: ICD-10-CM

## 2022-06-03 DIAGNOSIS — R62.7 ADULT FAILURE TO THRIVE: ICD-10-CM

## 2022-06-03 DIAGNOSIS — K62.3 RECTAL PROLAPSE: ICD-10-CM

## 2022-06-03 DIAGNOSIS — R63.4 WEIGHT LOSS, UNINTENTIONAL: ICD-10-CM

## 2022-06-03 DIAGNOSIS — E11.9 CONTROLLED TYPE 2 DIABETES MELLITUS WITHOUT COMPLICATION, WITHOUT LONG-TERM CURRENT USE OF INSULIN (HCC): ICD-10-CM

## 2022-06-03 DIAGNOSIS — H53.9 VISION CHANGES: ICD-10-CM

## 2022-06-03 DIAGNOSIS — R81 GLUCOSURIA: ICD-10-CM

## 2022-06-03 DIAGNOSIS — L89.159 PRESSURE INJURY OF SKIN OF SACRAL REGION, UNSPECIFIED INJURY STAGE: ICD-10-CM

## 2022-06-03 DIAGNOSIS — R53.83 FATIGUE, UNSPECIFIED TYPE: ICD-10-CM

## 2022-06-03 DIAGNOSIS — G89.29 OTHER CHRONIC PAIN: ICD-10-CM

## 2022-06-03 DIAGNOSIS — Z13.220 SCREENING, LIPID: ICD-10-CM

## 2022-06-03 LAB
APPEARANCE UR: CLEAR
BILIRUB UR STRIP-MCNC: NEGATIVE MG/DL
COLOR UR AUTO: YELLOW
GLUCOSE UR STRIP.AUTO-MCNC: 500 MG/DL
KETONES UR STRIP.AUTO-MCNC: NEGATIVE MG/DL
LEUKOCYTE ESTERASE UR QL STRIP.AUTO: NEGATIVE
NITRITE UR QL STRIP.AUTO: NEGATIVE
PH UR STRIP.AUTO: 5.5 [PH] (ref 5–8)
PROT UR QL STRIP: NEGATIVE MG/DL
RBC UR QL AUTO: NORMAL
SP GR UR STRIP.AUTO: 1
UROBILINOGEN UR STRIP-MCNC: 0.2 MG/DL

## 2022-06-03 PROCEDURE — 81002 URINALYSIS NONAUTO W/O SCOPE: CPT | Performed by: FAMILY MEDICINE

## 2022-06-03 PROCEDURE — 99214 OFFICE O/P EST MOD 30 MIN: CPT | Performed by: FAMILY MEDICINE

## 2022-06-03 ASSESSMENT — PATIENT HEALTH QUESTIONNAIRE - PHQ9
5. POOR APPETITE OR OVEREATING: 3 - NEARLY EVERY DAY
SUM OF ALL RESPONSES TO PHQ QUESTIONS 1-9: 20
CLINICAL INTERPRETATION OF PHQ2 SCORE: 6

## 2022-06-03 ASSESSMENT — FIBROSIS 4 INDEX: FIB4 SCORE: 0.44

## 2022-06-03 ASSESSMENT — PAIN SCALES - GENERAL: PAINLEVEL: 6=MODERATE PAIN

## 2022-06-03 NOTE — PROGRESS NOTES
SUBJECTIVE:    Chief Complaint   Patient presents with   • Incontinence     Since January    • Weight Loss   • Fatigue     HPI:    Heidi Navas is a 67 y.o. female with chronic pain, OA,  fibromyalgia, DM type 2, anxiety, COPD, hypertension, hyperlipidemia and prior anemia due to GI bleed  here with care giver, Amara.   ER visit 4/8/22. Last appointment in office was over a year ago 1/2021- patient states she previously had insurance changes, thus did not see new PCP at the time and did not seek care elsewhere in between. She has been off her prior medications-she stopped her medications over a year ago as she ran out. OTC arthritis medication, no other medications.     States she has a rectal prolapse- has picture on her phone. Occurred after having colonoscopy in 2020 per patient.   She has had significant weight loss in the past several months since September.   Down to 116 lbs. Body mass index is 18.26 kg/m².   Care taker states she in a new place which is better and feels she may be eating more now. Was living by self- depressed and not eating some days.   She has significant fatigue.   Last H/H low. Hx of GI bleed issues.   No bloody stools but dark stools.   4/2020- colonoscopy.   Sometimes has throat hoarseness and upset stomach.   No hematuria/dysuria. No cough. Does tend to feel hot and cold.   No nausea or vomiting.     Urinary incontinence- notes a sore on her tailbone area, skin is cracking.   Incontinence has been going on for a while.     Diabetes has not be checking glucose. She is not taking medications.      She also saw pain specialist in the past for chronic pain issues-arthritis.     Vision blurred- new glasses. Ophthalmology noted possible cataracts.       Vitals 1/23/2020 1/8/2021 4/8/2022 6/3/2022   WEIGHT 167.6 160 128.31 116.6       ROS:  No recent fevers or chills. No nausea or vomiting. No diarrhea. No chest pains or shortness of breath. No lower extremity edema.    No current  facility-administered medications on file prior to visit.     No current outpatient medications on file prior to visit.       Allergies   Allergen Reactions   • Aspartame Nausea     headache   • Latex Rash and Itching   • Lipitor [Atorvastatin Calcium] Swelling     Patient denies allergy 06/04/22   • Other Misc Vomiting     Bug spray   • Saccharin Nausea     Nausea and headache       Patient Active Problem List    Diagnosis Date Noted   • Depression 06/05/2022   • GIB (gastrointestinal bleeding) 06/04/2022   • Acute on chronic blood loss anemia 06/04/2022   • DM (diabetes mellitus), secondary uncontrolled ([facility]) 06/04/2022   • Severe protein-calorie malnutrition ([facility]) 06/04/2022   • Severely underweight adult 06/04/2022   • ACP (advance care planning) 06/04/2022   • Hyponatremia 06/04/2022   • Hyperosmolar hyperglycemic state (HHS), pressure ulcer, anemia ([facility]) 06/04/2022   • Dehydration 06/04/2022   • Pressure ulcer, sacrum 06/04/2022   • Fatigue 06/03/2022   • Weight loss, unintentional 06/03/2022   • Urinary incontinence 06/03/2022   • Adult failure to thrive 06/03/2022   • Iron deficiency anemia due to chronic blood loss 02/09/2020   • Cellulitis 01/08/2020   • Anemia 05/19/2019   • Atherosclerosis 05/19/2019   • LVH (left ventricular hypertrophy) 05/19/2019   • Arthritis of left shoulder region 07/12/2017   • CVD (cardiovascular disease) 12/04/2015   • Pulmonary nodule 12/01/2015   • Renal stone 12/01/2015   • Diverticulosis 12/01/2015   • Chest pain 07/16/2015   • Nausea & vomiting 07/13/2015   • Opiate withdrawal ([facility]) 07/13/2015   • Diabetes mellitus, type 2 ([facility]) 10/03/2014   • Brachial neuritis or radiculitis 09/10/2014   • History of total hip arthroplasty 07/22/2014   • Leukocytosis 03/30/2014   • COPD (chronic obstructive pulmonary disease) ([facility]) 03/30/2014   • Arthritis 12/26/2013   • Chronic pain 02/21/2012   • Anxiety 01/18/2012   • Vitamin D insufficiency 04/28/2011   • Hypertension 03/22/2010   •  "Fibromyalgia 10/09/2009   • Swelling, mass, or lump in head and neck 07/21/2009   • Tobacco use disorder 07/21/2009   • Chronic ischemic heart disease 07/21/2009   • Esophageal reflux 07/21/2009   • Chronic airway obstruction, not elsewhere classified 07/21/2009   • Allergic rhinitis 07/21/2009   • Controlled type 2 diabetes mellitus without complication, without long-term current use of insulin ([facility]) 07/21/2009   • Mixed hyperlipidemia 07/21/2009   • Other atopic dermatitis and related conditions 07/21/2009   • Deficiency anemia 07/21/2009       Past Medical History:   Diagnosis Date   • Anemia    • Arthritis     OA and  not RA per rheumatology   • Breath shortness     oxygen PRN    • Bronchitis 2008   • Colon polyps 2015   • Convulsions ([facility]) 7/21/2009     ICD-10 transition   • COPD (chronic obstructive pulmonary disease) ([facility])    • Dental disorder     full dentures   • Depression     depression, anxiety    • Diverticulosis 5/10     colonoscopy   • Hemorrhoid 7/29/2009   • History of colonoscopy   2005   • Leukocytosis 3/30/2014   • Lymphocytosis (symptomatic) 7/21/2009   • MI (myocardial infarction) ([facility]) 4/29/2007   • Other specified disorder of intestines     constipation   • Pain     shoulders, neck, lower back   • Pancreatitis    • Pap smear 4/2006   • Pneumonia 2013   • Pulmonary nodule    • Renal lesion    • Renal stone    • S/P colonoscopy 5/2010    will need repeat in 5 years   • Screening mammogram never   • Seizure ([facility])     x1 in 2008   • Shingles    • Symptomatic menopausal or female climacteric states 7/21/2009   • Type II or unspecified type diabetes mellitus without mention of complication, uncontrolled 7/21/2009    diet controlled    • Unspecified urinary incontinence        OBJECTIVE:   /62   Pulse 97   Temp 37.2 °C (98.9 °F)   Resp 14   Ht 1.702 m (5' 7\")   Wt 52.9 kg (116 lb 9.6 oz)   LMP 01/01/2007   SpO2 96%   BMI 18.26 kg/m²   General: thin female, no acute distress  Neck: " supple, no lymphadenopathy- cervical or supraclavicular, no thyromegaly  Cardiovascular: regular rate and rhythm, no murmurs, gallops, rubs  Lungs: clear to auscultation bilaterally, no wheezes, crackles, or rhonchi  Abdomen: +bowel sounds, soft, nontender, nondistended, no rebound, no guarding, no hepatosplenomegaly  Extremities: no cyanosis, clubbing, edema  Skin: Warm and dry. Sacral region with mild pink and slight break down of skin, superficial vertical fissure noted.   Psych: slight flattened affect     Latest Reference Range & Units 06/03/22 16:06   POC Color Negative  yellow   POC Appearance Negative  clear   POC Specific Gravity <1.005 - >1.030  1.005   POC Urine PH 5.0 - 8.0  5.5   POC Glucose Negative mg/dL 500   POC Ketones Negative mg/dL negative   POC Protein Negative mg/dL negative   POC Nitrites Negative  negative   POC Leukocyte Esterase Negative  negative   POC Blood Negative  trace-intact   POC Bilirubin Negative mg/dL negative   POC Urobiligen Negative (0.2) mg/dL 0.2     ASSESSMENT/PLAN:    67 y.o.female with chronic pain, OA,  fibromyalgia, DM type 2, anxiety, COPD, hypertension, hyperlipidemia and prior anemia due to GI bleed.     1. Fatigue, unspecified type  CBC WITH DIFFERENTIAL    Comp Metabolic Panel    TSH WITH REFLEX TO FT4   2. Anemia, unspecified type     3. Weight loss, unintentional     4. Adult failure to thrive     5. Adult BMI <19 kg/sq m     6. Urinary incontinence, unspecified type  POCT Urinalysis   7. Pressure injury of skin of sacral region, unspecified injury stage     8. Rectal prolapse     9. Controlled type 2 diabetes mellitus without complication, without long-term current use of insulin ([facility]CC)  HEMOGLOBIN A1C    MICROALBUMIN CREAT RATIO URINE   10. Glucosuria     11. Other chronic pain     12. Vision changes     13. Screening, lipid  Lipid Profile   Reviewed current symptoms and prior labs. Reviewed findings with patient and caretaker. Prior H/H 7.7/26.9 noted on last  labs, HR in 90s-thus with worsening fatigue and weight loss recommend ER and possible hospital admission for further evaluation of her symptoms. Patient agreeable with going to ER and will go by private vehicle with care taker.     Follow up after hospital visit.     This medical record contains text that has been entered with the assistance of computer voice recognition and dictation software.  Therefore, it may contain unintended errors in text, spelling, punctuation, or grammar.

## 2022-06-04 ENCOUNTER — HOSPITAL ENCOUNTER (INPATIENT)
Facility: MEDICAL CENTER | Age: 67
LOS: 4 days | DRG: 638 | End: 2022-06-08
Attending: EMERGENCY MEDICINE | Admitting: STUDENT IN AN ORGANIZED HEALTH CARE EDUCATION/TRAINING PROGRAM
Payer: MEDICARE

## 2022-06-04 ENCOUNTER — APPOINTMENT (OUTPATIENT)
Dept: RADIOLOGY | Facility: MEDICAL CENTER | Age: 67
DRG: 638 | End: 2022-06-04
Attending: EMERGENCY MEDICINE
Payer: MEDICARE

## 2022-06-04 DIAGNOSIS — L89.156 PRESSURE INJURY OF DEEP TISSUE OF SACRAL REGION: ICD-10-CM

## 2022-06-04 DIAGNOSIS — E11.69 TYPE 2 DIABETES MELLITUS WITH OTHER SPECIFIED COMPLICATION, WITHOUT LONG-TERM CURRENT USE OF INSULIN (HCC): ICD-10-CM

## 2022-06-04 DIAGNOSIS — M79.7 FIBROMYALGIA: ICD-10-CM

## 2022-06-04 DIAGNOSIS — L89.159 PRESSURE INJURY OF SKIN OF SACRAL REGION, UNSPECIFIED INJURY STAGE: ICD-10-CM

## 2022-06-04 DIAGNOSIS — M19.012 ARTHRITIS OF LEFT SHOULDER REGION: ICD-10-CM

## 2022-06-04 DIAGNOSIS — E11.00 HYPEROSMOLAR HYPERGLYCEMIC STATE (HHS) (HCC): ICD-10-CM

## 2022-06-04 DIAGNOSIS — R07.9 CHEST PAIN, UNSPECIFIED TYPE: ICD-10-CM

## 2022-06-04 PROBLEM — E86.0 DEHYDRATION: Status: ACTIVE | Noted: 2022-06-04

## 2022-06-04 PROBLEM — E87.1 HYPONATREMIA: Status: ACTIVE | Noted: 2022-06-04

## 2022-06-04 PROBLEM — R63.6 SEVERELY UNDERWEIGHT ADULT: Status: ACTIVE | Noted: 2022-06-04

## 2022-06-04 PROBLEM — D62 ACUTE ON CHRONIC BLOOD LOSS ANEMIA: Status: ACTIVE | Noted: 2022-06-04

## 2022-06-04 PROBLEM — K92.2 GIB (GASTROINTESTINAL BLEEDING): Status: ACTIVE | Noted: 2022-06-04

## 2022-06-04 PROBLEM — Z71.89 ACP (ADVANCE CARE PLANNING): Status: ACTIVE | Noted: 2022-06-04

## 2022-06-04 PROBLEM — D62 ACUTE ON CHRONIC BLOOD LOSS ANEMIA: Status: ACTIVE | Noted: 2020-02-09

## 2022-06-04 PROBLEM — E43 SEVERE PROTEIN-CALORIE MALNUTRITION (HCC): Status: ACTIVE | Noted: 2022-06-04

## 2022-06-04 LAB
ABO GROUP BLD: NORMAL
ALBUMIN SERPL BCP-MCNC: 3.5 G/DL (ref 3.2–4.9)
ALBUMIN/GLOB SERPL: 0.9 G/DL
ALP SERPL-CCNC: 143 U/L (ref 30–99)
ALT SERPL-CCNC: 14 U/L (ref 2–50)
ANION GAP SERPL CALC-SCNC: 12 MMOL/L (ref 7–16)
ANISOCYTOSIS BLD QL SMEAR: ABNORMAL
APPEARANCE UR: CLEAR
APTT PPP: 36.2 SEC (ref 24.7–36)
AST SERPL-CCNC: 14 U/L (ref 12–45)
B-OH-BUTYR SERPL-MCNC: 0.19 MMOL/L (ref 0.02–0.27)
B-OH-BUTYR SERPL-MCNC: 0.25 MMOL/L (ref 0.02–0.27)
BASOPHILS # BLD AUTO: 0.8 % (ref 0–1.8)
BASOPHILS # BLD: 0.1 K/UL (ref 0–0.12)
BILIRUB SERPL-MCNC: <0.2 MG/DL (ref 0.1–1.5)
BILIRUB UR QL STRIP.AUTO: NEGATIVE
BLD GP AB SCN SERPL QL: NORMAL
BUN SERPL-MCNC: 15 MG/DL (ref 8–22)
CALCIUM SERPL-MCNC: 9 MG/DL (ref 8.5–10.5)
CHLORIDE SERPL-SCNC: 88 MMOL/L (ref 96–112)
CO2 SERPL-SCNC: 24 MMOL/L (ref 20–33)
COLOR UR: YELLOW
COMMENT 1642: NORMAL
CREAT SERPL-MCNC: 0.87 MG/DL (ref 0.5–1.4)
EOSINOPHIL # BLD AUTO: 0.14 K/UL (ref 0–0.51)
EOSINOPHIL NFR BLD: 1.1 % (ref 0–6.9)
ERYTHROCYTE [DISTWIDTH] IN BLOOD BY AUTOMATED COUNT: 52.9 FL (ref 35.9–50)
EST. AVERAGE GLUCOSE BLD GHB EST-MCNC: 441 MG/DL
FERRITIN SERPL-MCNC: 32.5 NG/ML (ref 10–291)
FOLATE SERPL-MCNC: 9.4 NG/ML
GFR SERPLBLD CREATININE-BSD FMLA CKD-EPI: 73 ML/MIN/1.73 M 2
GLOBULIN SER CALC-MCNC: 3.7 G/DL (ref 1.9–3.5)
GLUCOSE BLD STRIP.AUTO-MCNC: 357 MG/DL (ref 65–99)
GLUCOSE BLD STRIP.AUTO-MCNC: 457 MG/DL (ref 65–99)
GLUCOSE SERPL-MCNC: 895 MG/DL (ref 65–99)
GLUCOSE UR STRIP.AUTO-MCNC: >=1000 MG/DL
HBA1C MFR BLD: 17 % (ref 4–5.6)
HCT VFR BLD AUTO: 31.4 % (ref 37–47)
HGB BLD-MCNC: 9.1 G/DL (ref 12–16)
HYPOCHROMIA BLD QL SMEAR: ABNORMAL
IMM GRANULOCYTES # BLD AUTO: 0.06 K/UL (ref 0–0.11)
IMM GRANULOCYTES NFR BLD AUTO: 0.5 % (ref 0–0.9)
INR PPP: 1.11 (ref 0.87–1.13)
IRON SATN MFR SERPL: 7 % (ref 15–55)
IRON SERPL-MCNC: 19 UG/DL (ref 40–170)
KETONES UR STRIP.AUTO-MCNC: NEGATIVE MG/DL
LDH SERPL L TO P-CCNC: 249 U/L (ref 107–266)
LEUKOCYTE ESTERASE UR QL STRIP.AUTO: NEGATIVE
LIPASE SERPL-CCNC: 101 U/L (ref 11–82)
LYMPHOCYTES # BLD AUTO: 1.18 K/UL (ref 1–4.8)
LYMPHOCYTES NFR BLD: 9.5 % (ref 22–41)
MAGNESIUM SERPL-MCNC: 1.9 MG/DL (ref 1.5–2.5)
MCH RBC QN AUTO: 19.9 PG (ref 27–33)
MCHC RBC AUTO-ENTMCNC: 29 G/DL (ref 33.6–35)
MCV RBC AUTO: 68.6 FL (ref 81.4–97.8)
MICRO URNS: ABNORMAL
MICROCYTES BLD QL SMEAR: ABNORMAL
MONOCYTES # BLD AUTO: 0.5 K/UL (ref 0–0.85)
MONOCYTES NFR BLD AUTO: 4 % (ref 0–13.4)
MORPHOLOGY BLD-IMP: NORMAL
NEUTROPHILS # BLD AUTO: 10.49 K/UL (ref 2–7.15)
NEUTROPHILS NFR BLD: 84.1 % (ref 44–72)
NITRITE UR QL STRIP.AUTO: NEGATIVE
NRBC # BLD AUTO: 0 K/UL
NRBC BLD-RTO: 0 /100 WBC
PH UR STRIP.AUTO: 7.5 [PH] (ref 5–8)
PHOSPHATE SERPL-MCNC: 3.3 MG/DL (ref 2.5–4.5)
PLATELET # BLD AUTO: 504 K/UL (ref 164–446)
PLATELET BLD QL SMEAR: NORMAL
PMV BLD AUTO: 10.1 FL (ref 9–12.9)
POIKILOCYTOSIS BLD QL SMEAR: NORMAL
POLYCHROMASIA BLD QL SMEAR: NORMAL
POTASSIUM SERPL-SCNC: 4.8 MMOL/L (ref 3.6–5.5)
PROT SERPL-MCNC: 7.2 G/DL (ref 6–8.2)
PROT UR QL STRIP: NEGATIVE MG/DL
PROTHROMBIN TIME: 14 SEC (ref 12–14.6)
RBC # BLD AUTO: 4.58 M/UL (ref 4.2–5.4)
RBC BLD AUTO: PRESENT
RBC UR QL AUTO: NEGATIVE
RH BLD: NORMAL
SODIUM SERPL-SCNC: 124 MMOL/L (ref 135–145)
SP GR UR STRIP.AUTO: 1.02
STOMATOCYTES BLD QL SMEAR: NORMAL
TIBC SERPL-MCNC: 261 UG/DL (ref 250–450)
TRANSFERRIN SERPL-MCNC: 207 MG/DL (ref 200–370)
TSH SERPL DL<=0.005 MIU/L-ACNC: 1.08 UIU/ML (ref 0.38–5.33)
UIBC SERPL-MCNC: 242 UG/DL (ref 110–370)
UROBILINOGEN UR STRIP.AUTO-MCNC: 0.2 MG/DL
VIT B12 SERPL-MCNC: 1312 PG/ML (ref 211–911)
WBC # BLD AUTO: 12.5 K/UL (ref 4.8–10.8)

## 2022-06-04 PROCEDURE — 82728 ASSAY OF FERRITIN: CPT

## 2022-06-04 PROCEDURE — 85610 PROTHROMBIN TIME: CPT

## 2022-06-04 PROCEDURE — 80053 COMPREHEN METABOLIC PANEL: CPT

## 2022-06-04 PROCEDURE — 86901 BLOOD TYPING SEROLOGIC RH(D): CPT

## 2022-06-04 PROCEDURE — 83690 ASSAY OF LIPASE: CPT

## 2022-06-04 PROCEDURE — 700105 HCHG RX REV CODE 258: Performed by: STUDENT IN AN ORGANIZED HEALTH CARE EDUCATION/TRAINING PROGRAM

## 2022-06-04 PROCEDURE — 82962 GLUCOSE BLOOD TEST: CPT | Mod: 91

## 2022-06-04 PROCEDURE — 36415 COLL VENOUS BLD VENIPUNCTURE: CPT

## 2022-06-04 PROCEDURE — 96374 THER/PROPH/DIAG INJ IV PUSH: CPT

## 2022-06-04 PROCEDURE — 82746 ASSAY OF FOLIC ACID SERUM: CPT

## 2022-06-04 PROCEDURE — 770020 HCHG ROOM/CARE - TELE (206)

## 2022-06-04 PROCEDURE — 84466 ASSAY OF TRANSFERRIN: CPT

## 2022-06-04 PROCEDURE — A9270 NON-COVERED ITEM OR SERVICE: HCPCS | Performed by: STUDENT IN AN ORGANIZED HEALTH CARE EDUCATION/TRAINING PROGRAM

## 2022-06-04 PROCEDURE — 82010 KETONE BODYS QUAN: CPT

## 2022-06-04 PROCEDURE — 83735 ASSAY OF MAGNESIUM: CPT

## 2022-06-04 PROCEDURE — 83540 ASSAY OF IRON: CPT

## 2022-06-04 PROCEDURE — 85046 RETICYTE/HGB CONCENTRATE: CPT

## 2022-06-04 PROCEDURE — 96372 THER/PROPH/DIAG INJ SC/IM: CPT

## 2022-06-04 PROCEDURE — 700105 HCHG RX REV CODE 258: Performed by: EMERGENCY MEDICINE

## 2022-06-04 PROCEDURE — 700111 HCHG RX REV CODE 636 W/ 250 OVERRIDE (IP): Performed by: STUDENT IN AN ORGANIZED HEALTH CARE EDUCATION/TRAINING PROGRAM

## 2022-06-04 PROCEDURE — 83036 HEMOGLOBIN GLYCOSYLATED A1C: CPT

## 2022-06-04 PROCEDURE — 96375 TX/PRO/DX INJ NEW DRUG ADDON: CPT

## 2022-06-04 PROCEDURE — C9113 INJ PANTOPRAZOLE SODIUM, VIA: HCPCS | Performed by: STUDENT IN AN ORGANIZED HEALTH CARE EDUCATION/TRAINING PROGRAM

## 2022-06-04 PROCEDURE — 99285 EMERGENCY DEPT VISIT HI MDM: CPT

## 2022-06-04 PROCEDURE — 82607 VITAMIN B-12: CPT

## 2022-06-04 PROCEDURE — 99223 1ST HOSP IP/OBS HIGH 75: CPT | Mod: AI | Performed by: STUDENT IN AN ORGANIZED HEALTH CARE EDUCATION/TRAINING PROGRAM

## 2022-06-04 PROCEDURE — 83010 ASSAY OF HAPTOGLOBIN QUANT: CPT

## 2022-06-04 PROCEDURE — 81003 URINALYSIS AUTO W/O SCOPE: CPT

## 2022-06-04 PROCEDURE — 71045 X-RAY EXAM CHEST 1 VIEW: CPT

## 2022-06-04 PROCEDURE — 86850 RBC ANTIBODY SCREEN: CPT

## 2022-06-04 PROCEDURE — 700111 HCHG RX REV CODE 636 W/ 250 OVERRIDE (IP): Performed by: EMERGENCY MEDICINE

## 2022-06-04 PROCEDURE — 83615 LACTATE (LD) (LDH) ENZYME: CPT

## 2022-06-04 PROCEDURE — 85025 COMPLETE CBC W/AUTO DIFF WBC: CPT

## 2022-06-04 PROCEDURE — 83550 IRON BINDING TEST: CPT

## 2022-06-04 PROCEDURE — 84443 ASSAY THYROID STIM HORMONE: CPT

## 2022-06-04 PROCEDURE — 85730 THROMBOPLASTIN TIME PARTIAL: CPT

## 2022-06-04 PROCEDURE — 700102 HCHG RX REV CODE 250 W/ 637 OVERRIDE(OP): Performed by: STUDENT IN AN ORGANIZED HEALTH CARE EDUCATION/TRAINING PROGRAM

## 2022-06-04 PROCEDURE — 86900 BLOOD TYPING SEROLOGIC ABO: CPT

## 2022-06-04 PROCEDURE — 84100 ASSAY OF PHOSPHORUS: CPT

## 2022-06-04 RX ORDER — AMOXICILLIN 250 MG
2 CAPSULE ORAL 2 TIMES DAILY
Status: DISCONTINUED | OUTPATIENT
Start: 2022-06-04 | End: 2022-06-08 | Stop reason: HOSPADM

## 2022-06-04 RX ORDER — ONDANSETRON 4 MG/1
4 TABLET, ORALLY DISINTEGRATING ORAL EVERY 4 HOURS PRN
Status: DISCONTINUED | OUTPATIENT
Start: 2022-06-04 | End: 2022-06-08 | Stop reason: HOSPADM

## 2022-06-04 RX ORDER — HYDRALAZINE HYDROCHLORIDE 20 MG/ML
10 INJECTION INTRAMUSCULAR; INTRAVENOUS EVERY 4 HOURS PRN
Status: DISCONTINUED | OUTPATIENT
Start: 2022-06-04 | End: 2022-06-08 | Stop reason: HOSPADM

## 2022-06-04 RX ORDER — SODIUM CHLORIDE 9 MG/ML
INJECTION, SOLUTION INTRAVENOUS CONTINUOUS
Status: ACTIVE | OUTPATIENT
Start: 2022-06-04 | End: 2022-06-05

## 2022-06-04 RX ORDER — ZOLPIDEM TARTRATE 5 MG/1
5 TABLET ORAL NIGHTLY PRN
Status: DISCONTINUED | OUTPATIENT
Start: 2022-06-04 | End: 2022-06-08 | Stop reason: HOSPADM

## 2022-06-04 RX ORDER — GAUZE BANDAGE 2" X 2"
100 BANDAGE TOPICAL DAILY
Status: DISCONTINUED | OUTPATIENT
Start: 2022-06-05 | End: 2022-06-08 | Stop reason: HOSPADM

## 2022-06-04 RX ORDER — GUAIFENESIN/DEXTROMETHORPHAN 100-10MG/5
10 SYRUP ORAL EVERY 6 HOURS PRN
Status: DISCONTINUED | OUTPATIENT
Start: 2022-06-04 | End: 2022-06-08 | Stop reason: HOSPADM

## 2022-06-04 RX ORDER — CEFTRIAXONE 1 G/1
1 INJECTION, POWDER, FOR SOLUTION INTRAMUSCULAR; INTRAVENOUS ONCE
Status: COMPLETED | OUTPATIENT
Start: 2022-06-04 | End: 2022-06-04

## 2022-06-04 RX ORDER — FOLIC ACID 1 MG/1
1 TABLET ORAL DAILY
Status: DISCONTINUED | OUTPATIENT
Start: 2022-06-05 | End: 2022-06-08 | Stop reason: HOSPADM

## 2022-06-04 RX ORDER — SODIUM CHLORIDE 9 MG/ML
1000 INJECTION, SOLUTION INTRAVENOUS ONCE
Status: COMPLETED | OUTPATIENT
Start: 2022-06-04 | End: 2022-06-04

## 2022-06-04 RX ORDER — PANTOPRAZOLE SODIUM 40 MG/10ML
40 INJECTION, POWDER, LYOPHILIZED, FOR SOLUTION INTRAVENOUS 2 TIMES DAILY
Status: DISCONTINUED | OUTPATIENT
Start: 2022-06-04 | End: 2022-06-05

## 2022-06-04 RX ORDER — INSULIN LISPRO 100 [IU]/ML
3-14 INJECTION, SOLUTION INTRAVENOUS; SUBCUTANEOUS EVERY 6 HOURS
Status: DISCONTINUED | OUTPATIENT
Start: 2022-06-04 | End: 2022-06-08 | Stop reason: HOSPADM

## 2022-06-04 RX ORDER — BISACODYL 10 MG
10 SUPPOSITORY, RECTAL RECTAL
Status: DISCONTINUED | OUTPATIENT
Start: 2022-06-04 | End: 2022-06-08 | Stop reason: HOSPADM

## 2022-06-04 RX ORDER — ACETAMINOPHEN 325 MG/1
650 TABLET ORAL EVERY 6 HOURS PRN
Status: DISCONTINUED | OUTPATIENT
Start: 2022-06-04 | End: 2022-06-06

## 2022-06-04 RX ORDER — ROSUVASTATIN CALCIUM 10 MG/1
10 TABLET, COATED ORAL EVERY EVENING
Status: DISCONTINUED | OUTPATIENT
Start: 2022-06-04 | End: 2022-06-08 | Stop reason: HOSPADM

## 2022-06-04 RX ORDER — POLYETHYLENE GLYCOL 3350 17 G/17G
1 POWDER, FOR SOLUTION ORAL
Status: DISCONTINUED | OUTPATIENT
Start: 2022-06-04 | End: 2022-06-08 | Stop reason: HOSPADM

## 2022-06-04 RX ORDER — ONDANSETRON 2 MG/ML
4 INJECTION INTRAMUSCULAR; INTRAVENOUS EVERY 4 HOURS PRN
Status: DISCONTINUED | OUTPATIENT
Start: 2022-06-04 | End: 2022-06-08 | Stop reason: HOSPADM

## 2022-06-04 RX ORDER — ENOXAPARIN SODIUM 100 MG/ML
40 INJECTION SUBCUTANEOUS DAILY
Status: DISCONTINUED | OUTPATIENT
Start: 2022-06-04 | End: 2022-06-04

## 2022-06-04 RX ORDER — DEXTROSE MONOHYDRATE 25 G/50ML
25 INJECTION, SOLUTION INTRAVENOUS
Status: DISCONTINUED | OUTPATIENT
Start: 2022-06-04 | End: 2022-06-08 | Stop reason: HOSPADM

## 2022-06-04 RX ORDER — LISINOPRIL 10 MG/1
5 TABLET ORAL
Status: DISCONTINUED | OUTPATIENT
Start: 2022-06-05 | End: 2022-06-05

## 2022-06-04 RX ADMIN — ZOLPIDEM TARTRATE 5 MG: 5 TABLET ORAL at 21:19

## 2022-06-04 RX ADMIN — ROSUVASTATIN CALCIUM 10 MG: 20 TABLET, FILM COATED ORAL at 21:06

## 2022-06-04 RX ADMIN — CEFTRIAXONE SODIUM 1 G: 1 INJECTION, POWDER, FOR SOLUTION INTRAMUSCULAR; INTRAVENOUS at 21:09

## 2022-06-04 RX ADMIN — INSULIN GLARGINE-YFGN 11 UNITS: 100 INJECTION, SOLUTION SUBCUTANEOUS at 21:23

## 2022-06-04 RX ADMIN — ENOXAPARIN SODIUM 40 MG: 40 INJECTION SUBCUTANEOUS at 21:04

## 2022-06-04 RX ADMIN — SODIUM CHLORIDE 1000 ML: 9 INJECTION, SOLUTION INTRAVENOUS at 18:16

## 2022-06-04 RX ADMIN — PANTOPRAZOLE SODIUM 40 MG: 40 INJECTION, POWDER, FOR SOLUTION INTRAVENOUS at 21:26

## 2022-06-04 RX ADMIN — SODIUM CHLORIDE: 9 INJECTION, SOLUTION INTRAVENOUS at 21:25

## 2022-06-04 RX ADMIN — INSULIN LISPRO 14 UNITS: 100 INJECTION, SOLUTION INTRAVENOUS; SUBCUTANEOUS at 21:22

## 2022-06-04 RX ADMIN — SENNOSIDES AND DOCUSATE SODIUM 2 TABLET: 50; 8.6 TABLET ORAL at 21:19

## 2022-06-04 RX ADMIN — INSULIN HUMAN 5 UNITS: 100 INJECTION, SOLUTION PARENTERAL at 21:21

## 2022-06-04 ASSESSMENT — ENCOUNTER SYMPTOMS
CHILLS: 0
FEVER: 0
ABDOMINAL PAIN: 1
WHEEZING: 0
BRUISES/BLEEDS EASILY: 0
DIZZINESS: 0
NAUSEA: 1
FOCAL WEAKNESS: 0
DIARRHEA: 0
MYALGIAS: 1
DEPRESSION: 0
DOUBLE VISION: 0
FLANK PAIN: 0
HEARTBURN: 1
BLOOD IN STOOL: 1
BLURRED VISION: 0
CONSTIPATION: 1
WEAKNESS: 1
WEIGHT LOSS: 1
SPEECH CHANGE: 0
VOMITING: 1
COUGH: 0
HEADACHES: 0

## 2022-06-04 ASSESSMENT — LIFESTYLE VARIABLES
SUBSTANCE_ABUSE: 0
DO YOU DRINK ALCOHOL: NO

## 2022-06-04 ASSESSMENT — FIBROSIS 4 INDEX: FIB4 SCORE: 0.44

## 2022-06-04 NOTE — ED NOTES
Shahram  from Lab called with critical result of Glucose 895 at 1606. Critical lab result read back to Shahram

## 2022-06-04 NOTE — ED TRIAGE NOTES
"Chief Complaint   Patient presents with   • Fatigue   • Weight Loss     50lbs over approx 6 months, unintentional   • Incontinence     With skin breakdown/incontinence dermatitis, pt states this is new   • Sent by MD     PT seen at GP office and sent for concerns over Hgb levels. Pt's practitioner states her blood levels have been \"borderline anemia and could be worsening.\"       Wheelchair with family to triage for above complaint.   Educated on triage process, encourage to inform staff of any changes.     /71   Pulse 68   Temp 36.5 °C (97.7 °F) (Temporal)   Resp 12   Ht 1.727 m (5' 8\")   Wt 52.9 kg (116 lb 10 oz)   LMP 01/01/2007   SpO2 97%   BMI 17.73 kg/m²     "

## 2022-06-05 ENCOUNTER — APPOINTMENT (OUTPATIENT)
Dept: RADIOLOGY | Facility: MEDICAL CENTER | Age: 67
DRG: 638 | End: 2022-06-05
Attending: INTERNAL MEDICINE
Payer: MEDICARE

## 2022-06-05 PROBLEM — F32.A DEPRESSION: Status: ACTIVE | Noted: 2022-06-05

## 2022-06-05 LAB
ALBUMIN SERPL BCP-MCNC: 3.1 G/DL (ref 3.2–4.9)
ALBUMIN/GLOB SERPL: 0.9 G/DL
ALP SERPL-CCNC: 115 U/L (ref 30–99)
ALT SERPL-CCNC: 11 U/L (ref 2–50)
ANION GAP SERPL CALC-SCNC: 11 MMOL/L (ref 7–16)
AST SERPL-CCNC: 18 U/L (ref 12–45)
BASOPHILS # BLD AUTO: 0.7 % (ref 0–1.8)
BASOPHILS # BLD: 0.08 K/UL (ref 0–0.12)
BILIRUB SERPL-MCNC: <0.2 MG/DL (ref 0.1–1.5)
BUN SERPL-MCNC: 11 MG/DL (ref 8–22)
CALCIUM SERPL-MCNC: 8.6 MG/DL (ref 8.5–10.5)
CHLORIDE SERPL-SCNC: 102 MMOL/L (ref 96–112)
CHOLEST SERPL-MCNC: 104 MG/DL (ref 100–199)
CO2 SERPL-SCNC: 23 MMOL/L (ref 20–33)
CREAT SERPL-MCNC: 0.42 MG/DL (ref 0.5–1.4)
EOSINOPHIL # BLD AUTO: 0.26 K/UL (ref 0–0.51)
EOSINOPHIL NFR BLD: 2.2 % (ref 0–6.9)
ERYTHROCYTE [DISTWIDTH] IN BLOOD BY AUTOMATED COUNT: 51.9 FL (ref 35.9–50)
GFR SERPLBLD CREATININE-BSD FMLA CKD-EPI: 107 ML/MIN/1.73 M 2
GLOBULIN SER CALC-MCNC: 3.4 G/DL (ref 1.9–3.5)
GLUCOSE BLD STRIP.AUTO-MCNC: 215 MG/DL (ref 65–99)
GLUCOSE BLD STRIP.AUTO-MCNC: 240 MG/DL (ref 65–99)
GLUCOSE BLD STRIP.AUTO-MCNC: 325 MG/DL (ref 65–99)
GLUCOSE BLD STRIP.AUTO-MCNC: 344 MG/DL (ref 65–99)
GLUCOSE SERPL-MCNC: 190 MG/DL (ref 65–99)
HCT VFR BLD AUTO: 27.7 % (ref 37–47)
HCT VFR BLD AUTO: 28.4 % (ref 37–47)
HDLC SERPL-MCNC: 34 MG/DL
HGB BLD-MCNC: 8.2 G/DL (ref 12–16)
HGB BLD-MCNC: 8.4 G/DL (ref 12–16)
HGB RETIC QN AUTO: 25 PG/CELL (ref 29–35)
IMM GRANULOCYTES # BLD AUTO: 0.05 K/UL (ref 0–0.11)
IMM GRANULOCYTES NFR BLD AUTO: 0.4 % (ref 0–0.9)
IMM RETICS NFR: 23.4 % (ref 9.3–17.4)
LDLC SERPL CALC-MCNC: 48 MG/DL
LYMPHOCYTES # BLD AUTO: 3.12 K/UL (ref 1–4.8)
LYMPHOCYTES NFR BLD: 26.4 % (ref 22–41)
MAGNESIUM SERPL-MCNC: 1.8 MG/DL (ref 1.5–2.5)
MCH RBC QN AUTO: 20.1 PG (ref 27–33)
MCHC RBC AUTO-ENTMCNC: 29.6 G/DL (ref 33.6–35)
MCV RBC AUTO: 68.1 FL (ref 81.4–97.8)
MONOCYTES # BLD AUTO: 0.57 K/UL (ref 0–0.85)
MONOCYTES NFR BLD AUTO: 4.8 % (ref 0–13.4)
NEUTROPHILS # BLD AUTO: 7.75 K/UL (ref 2–7.15)
NEUTROPHILS NFR BLD: 65.5 % (ref 44–72)
NRBC # BLD AUTO: 0 K/UL
NRBC BLD-RTO: 0 /100 WBC
PHOSPHATE SERPL-MCNC: 2.7 MG/DL (ref 2.5–4.5)
PLATELET # BLD AUTO: 451 K/UL (ref 164–446)
PMV BLD AUTO: 9.4 FL (ref 9–12.9)
POTASSIUM SERPL-SCNC: 3.6 MMOL/L (ref 3.6–5.5)
PROT SERPL-MCNC: 6.5 G/DL (ref 6–8.2)
RBC # BLD AUTO: 4.17 M/UL (ref 4.2–5.4)
RETICS # AUTO: 0.09 M/UL (ref 0.04–0.06)
RETICS/RBC NFR: 2 % (ref 0.8–2.1)
SODIUM SERPL-SCNC: 136 MMOL/L (ref 135–145)
TRIGL SERPL-MCNC: 111 MG/DL (ref 0–149)
WBC # BLD AUTO: 11.8 K/UL (ref 4.8–10.8)

## 2022-06-05 PROCEDURE — A9270 NON-COVERED ITEM OR SERVICE: HCPCS | Performed by: INTERNAL MEDICINE

## 2022-06-05 PROCEDURE — 82962 GLUCOSE BLOOD TEST: CPT

## 2022-06-05 PROCEDURE — 36415 COLL VENOUS BLD VENIPUNCTURE: CPT

## 2022-06-05 PROCEDURE — 99233 SBSQ HOSP IP/OBS HIGH 50: CPT | Performed by: INTERNAL MEDICINE

## 2022-06-05 PROCEDURE — 80053 COMPREHEN METABOLIC PANEL: CPT

## 2022-06-05 PROCEDURE — 700102 HCHG RX REV CODE 250 W/ 637 OVERRIDE(OP): Performed by: STUDENT IN AN ORGANIZED HEALTH CARE EDUCATION/TRAINING PROGRAM

## 2022-06-05 PROCEDURE — 700105 HCHG RX REV CODE 258: Performed by: STUDENT IN AN ORGANIZED HEALTH CARE EDUCATION/TRAINING PROGRAM

## 2022-06-05 PROCEDURE — 770020 HCHG ROOM/CARE - TELE (206)

## 2022-06-05 PROCEDURE — C9113 INJ PANTOPRAZOLE SODIUM, VIA: HCPCS | Performed by: STUDENT IN AN ORGANIZED HEALTH CARE EDUCATION/TRAINING PROGRAM

## 2022-06-05 PROCEDURE — 84100 ASSAY OF PHOSPHORUS: CPT

## 2022-06-05 PROCEDURE — 96372 THER/PROPH/DIAG INJ SC/IM: CPT

## 2022-06-05 PROCEDURE — 85014 HEMATOCRIT: CPT

## 2022-06-05 PROCEDURE — A9270 NON-COVERED ITEM OR SERVICE: HCPCS | Performed by: STUDENT IN AN ORGANIZED HEALTH CARE EDUCATION/TRAINING PROGRAM

## 2022-06-05 PROCEDURE — 96376 TX/PRO/DX INJ SAME DRUG ADON: CPT

## 2022-06-05 PROCEDURE — 74177 CT ABD & PELVIS W/CONTRAST: CPT | Mod: MG

## 2022-06-05 PROCEDURE — 85025 COMPLETE CBC W/AUTO DIFF WBC: CPT

## 2022-06-05 PROCEDURE — 85018 HEMOGLOBIN: CPT

## 2022-06-05 PROCEDURE — 700102 HCHG RX REV CODE 250 W/ 637 OVERRIDE(OP): Performed by: INTERNAL MEDICINE

## 2022-06-05 PROCEDURE — 80061 LIPID PANEL: CPT

## 2022-06-05 PROCEDURE — 83735 ASSAY OF MAGNESIUM: CPT

## 2022-06-05 PROCEDURE — 700117 HCHG RX CONTRAST REV CODE 255: Performed by: INTERNAL MEDICINE

## 2022-06-05 PROCEDURE — 96375 TX/PRO/DX INJ NEW DRUG ADDON: CPT

## 2022-06-05 PROCEDURE — 700111 HCHG RX REV CODE 636 W/ 250 OVERRIDE (IP): Performed by: STUDENT IN AN ORGANIZED HEALTH CARE EDUCATION/TRAINING PROGRAM

## 2022-06-05 RX ORDER — OMEPRAZOLE 20 MG/1
20 CAPSULE, DELAYED RELEASE ORAL 2 TIMES DAILY
Status: DISCONTINUED | OUTPATIENT
Start: 2022-06-05 | End: 2022-06-08 | Stop reason: HOSPADM

## 2022-06-05 RX ORDER — LISINOPRIL 5 MG/1
5 TABLET ORAL
Status: DISCONTINUED | OUTPATIENT
Start: 2022-06-05 | End: 2022-06-08 | Stop reason: HOSPADM

## 2022-06-05 RX ORDER — LORAZEPAM 2 MG/ML
0.5 INJECTION INTRAMUSCULAR EVERY 8 HOURS PRN
Status: DISCONTINUED | OUTPATIENT
Start: 2022-06-05 | End: 2022-06-05

## 2022-06-05 RX ORDER — OXYCODONE HYDROCHLORIDE 5 MG/1
5 TABLET ORAL EVERY 8 HOURS PRN
Status: DISCONTINUED | OUTPATIENT
Start: 2022-06-05 | End: 2022-06-06

## 2022-06-05 RX ADMIN — LISINOPRIL 5 MG: 10 TABLET ORAL at 05:36

## 2022-06-05 RX ADMIN — INSULIN LISPRO 10 UNITS: 100 INJECTION, SOLUTION INTRAVENOUS; SUBCUTANEOUS at 12:51

## 2022-06-05 RX ADMIN — OXYCODONE 5 MG: 5 TABLET ORAL at 12:46

## 2022-06-05 RX ADMIN — SODIUM CHLORIDE: 9 INJECTION, SOLUTION INTRAVENOUS at 05:39

## 2022-06-05 RX ADMIN — LORAZEPAM 0.5 MG: 2 INJECTION INTRAMUSCULAR; INTRAVENOUS at 00:50

## 2022-06-05 RX ADMIN — OXYCODONE 5 MG: 5 TABLET ORAL at 20:52

## 2022-06-05 RX ADMIN — PANTOPRAZOLE SODIUM 40 MG: 40 INJECTION, POWDER, FOR SOLUTION INTRAVENOUS at 05:38

## 2022-06-05 RX ADMIN — INSULIN LISPRO 4 UNITS: 100 INJECTION, SOLUTION INTRAVENOUS; SUBCUTANEOUS at 00:24

## 2022-06-05 RX ADMIN — ACETAMINOPHEN 650 MG: 325 TABLET ORAL at 18:33

## 2022-06-05 RX ADMIN — SENNOSIDES AND DOCUSATE SODIUM 2 TABLET: 50; 8.6 TABLET ORAL at 05:35

## 2022-06-05 RX ADMIN — INSULIN GLARGINE-YFGN 11 UNITS: 100 INJECTION, SOLUTION SUBCUTANEOUS at 18:26

## 2022-06-05 RX ADMIN — ZOLPIDEM TARTRATE 5 MG: 5 TABLET ORAL at 20:52

## 2022-06-05 RX ADMIN — ACETAMINOPHEN 650 MG: 325 TABLET ORAL at 05:34

## 2022-06-05 RX ADMIN — INSULIN LISPRO 4 UNITS: 100 INJECTION, SOLUTION INTRAVENOUS; SUBCUTANEOUS at 05:51

## 2022-06-05 RX ADMIN — ROSUVASTATIN CALCIUM 10 MG: 20 TABLET, FILM COATED ORAL at 18:22

## 2022-06-05 RX ADMIN — INSULIN LISPRO 7 UNITS: 100 INJECTION, SOLUTION INTRAVENOUS; SUBCUTANEOUS at 23:42

## 2022-06-05 RX ADMIN — FOLIC ACID 1 MG: 1 TABLET ORAL at 05:36

## 2022-06-05 RX ADMIN — OMEPRAZOLE 20 MG: 20 CAPSULE, DELAYED RELEASE ORAL at 18:23

## 2022-06-05 RX ADMIN — SODIUM CHLORIDE 125 MG: 9 INJECTION, SOLUTION INTRAVENOUS at 18:23

## 2022-06-05 RX ADMIN — INSULIN LISPRO 10 UNITS: 100 INJECTION, SOLUTION INTRAVENOUS; SUBCUTANEOUS at 18:27

## 2022-06-05 RX ADMIN — THIAMINE HCL TAB 100 MG 100 MG: 100 TAB at 05:37

## 2022-06-05 RX ADMIN — SERTRALINE 25 MG: 50 TABLET, FILM COATED ORAL at 18:33

## 2022-06-05 RX ADMIN — IOHEXOL 100 ML: 350 INJECTION, SOLUTION INTRAVENOUS at 18:13

## 2022-06-05 ASSESSMENT — COGNITIVE AND FUNCTIONAL STATUS - GENERAL
TURNING FROM BACK TO SIDE WHILE IN FLAT BAD: A LITTLE
HELP NEEDED FOR BATHING: A LITTLE
TOILETING: A LITTLE
DRESSING REGULAR UPPER BODY CLOTHING: A LITTLE
MOVING TO AND FROM BED TO CHAIR: A LITTLE
MOBILITY SCORE: 16
DRESSING REGULAR LOWER BODY CLOTHING: A LITTLE
DAILY ACTIVITIY SCORE: 20
SUGGESTED CMS G CODE MODIFIER DAILY ACTIVITY: CJ
SUGGESTED CMS G CODE MODIFIER MOBILITY: CK
MOVING FROM LYING ON BACK TO SITTING ON SIDE OF FLAT BED: A LITTLE
CLIMB 3 TO 5 STEPS WITH RAILING: A LOT
STANDING UP FROM CHAIR USING ARMS: A LOT
WALKING IN HOSPITAL ROOM: A LITTLE

## 2022-06-05 ASSESSMENT — PAIN DESCRIPTION - PAIN TYPE
TYPE: ACUTE PAIN

## 2022-06-05 ASSESSMENT — LIFESTYLE VARIABLES
ALCOHOL_USE: NO
HAVE PEOPLE ANNOYED YOU BY CRITICIZING YOUR DRINKING: NO
AVERAGE NUMBER OF DAYS PER WEEK YOU HAVE A DRINK CONTAINING ALCOHOL: 0
DOES PATIENT WANT TO STOP DRINKING: NO
SUBSTANCE_ABUSE: 0
HAVE YOU EVER FELT YOU SHOULD CUT DOWN ON YOUR DRINKING: NO
TOTAL SCORE: 0
CONSUMPTION TOTAL: NEGATIVE
ON A TYPICAL DAY WHEN YOU DRINK ALCOHOL HOW MANY DRINKS DO YOU HAVE: 0
TOTAL SCORE: 0
HOW MANY TIMES IN THE PAST YEAR HAVE YOU HAD 5 OR MORE DRINKS IN A DAY: 0
EVER FELT BAD OR GUILTY ABOUT YOUR DRINKING: NO
TOTAL SCORE: 0
EVER HAD A DRINK FIRST THING IN THE MORNING TO STEADY YOUR NERVES TO GET RID OF A HANGOVER: NO

## 2022-06-05 ASSESSMENT — PATIENT HEALTH QUESTIONNAIRE - PHQ9
2. FEELING DOWN, DEPRESSED, IRRITABLE, OR HOPELESS: MORE THAN HALF THE DAYS
4. FEELING TIRED OR HAVING LITTLE ENERGY: MORE THAN HALF THE DAYS
8. MOVING OR SPEAKING SO SLOWLY THAT OTHER PEOPLE COULD HAVE NOTICED. OR THE OPPOSITE, BEING SO FIGETY OR RESTLESS THAT YOU HAVE BEEN MOVING AROUND A LOT MORE THAN USUAL: NOT AT ALL
9. THOUGHTS THAT YOU WOULD BE BETTER OFF DEAD, OR OF HURTING YOURSELF: NOT AT ALL
3. TROUBLE FALLING OR STAYING ASLEEP OR SLEEPING TOO MUCH: MORE THAN HALF THE DAYS
5. POOR APPETITE OR OVEREATING: NOT AT ALL
SUM OF ALL RESPONSES TO PHQ9 QUESTIONS 1 AND 2: 4
1. LITTLE INTEREST OR PLEASURE IN DOING THINGS: MORE THAN HALF THE DAYS
SUM OF ALL RESPONSES TO PHQ QUESTIONS 1-9: 9
6. FEELING BAD ABOUT YOURSELF - OR THAT YOU ARE A FAILURE OR HAVE LET YOURSELF OR YOUR FAMILY DOWN: NOT AL ALL
7. TROUBLE CONCENTRATING ON THINGS, SUCH AS READING THE NEWSPAPER OR WATCHING TELEVISION: SEVERAL DAYS

## 2022-06-05 ASSESSMENT — FIBROSIS 4 INDEX
FIB4 SCORE: 0.81
FIB4 SCORE: 0.81

## 2022-06-05 ASSESSMENT — ENCOUNTER SYMPTOMS
HALLUCINATIONS: 0
NEUROLOGICAL NEGATIVE: 1
EYES NEGATIVE: 1
MUSCULOSKELETAL NEGATIVE: 1
CARDIOVASCULAR NEGATIVE: 1
WEIGHT LOSS: 1
NERVOUS/ANXIOUS: 0
DEPRESSION: 1
CHILLS: 0
GASTROINTESTINAL NEGATIVE: 1
RESPIRATORY NEGATIVE: 1
INSOMNIA: 0

## 2022-06-05 NOTE — ED NOTES
Break RN: Pt resting comfortably on gurney with no needs at this time. Equal chest rise and fall noted. Pending bed placement.

## 2022-06-05 NOTE — ASSESSMENT & PLAN NOTE
Iron 19  IV iron infusion  rectal exam did not show any bleeding  patient refused colonoscopy, holding on G.I. consult    CT findings:  1.  No evidence of active arterial extravasation into the GI tract on this single phase venous study. No abdominopelvic hematoma.  2.  Tiny distal colonic diverticula.  3.  Nonobstructing 5 mm stone in the right kidney. No hydronephrosis.    Outpatient GI followup; consult if actively bleeding, Hb drops or symptomatic anemia

## 2022-06-05 NOTE — ASSESSMENT & PLAN NOTE
Patient is not following her diet   glucose 895, HbA1c 17, normal AG at admission  Uncontrolled BGs in the 300s.  Lantus 15 units BID  Daibetes education

## 2022-06-05 NOTE — ED NOTES
Pt placed into a brief and Purewick placed. Pillow and warm blankets provided. Lights dimmed for comfort.

## 2022-06-05 NOTE — ED PROVIDER NOTES
"ED Provider Note    CHIEF COMPLAINT  Chief Complaint   Patient presents with   • Fatigue   • Weight Loss     50lbs over approx 6 months, unintentional   • Incontinence     With skin breakdown/incontinence dermatitis, pt states this is new   • Sent by MD     PT seen at GP office and sent for concerns over Hgb levels. Pt's practitioner states her blood levels have been \"borderline anemia and could be worsening.\"       HPI  Heidi Navas is a 67 y.o. female who presents to the emergency department with complaint of fatigue, weight loss for proxy 50 pounds over 6 months, incontinence with skin breakdown.  The patient was seen at the PCPs office told that her hemoglobin is dropped.  The patient denies dark stool, bloody stool, hematemesis, hematochezia, melena.  In addition, the patient states that she is feeling extremely weak.  The patient was seen by her primary care doctor and says she is slightly anemic therefore she was sent here for evaluation.  She has not taken her medications has not been taking medications for several weeks, secondary to her insurance reasons.  She also endorses profound breakdown surrounding her vagina.    REVIEW OF SYSTEMS  Positives as above. Pertinent negatives include fever, vomiting, chest pain, lightness, dizziness  All other 10 review of systems are negative    PAST MEDICAL HISTORY  Past Medical History:   Diagnosis Date   • Anemia    • Arthritis     OA and  not RA per rheumatology   • Breath shortness     oxygen PRN    • Bronchitis 2008   • Colon polyps 2015   • Convulsions ([facility]) 7/21/2009     ICD-10 transition   • COPD (chronic obstructive pulmonary disease) ([facility])    • Dental disorder     full dentures   • Depression     depression, anxiety    • Diverticulosis 5/10     colonoscopy   • Hemorrhoid 7/29/2009   • History of colonoscopy   2005   • Leukocytosis 3/30/2014   • Lymphocytosis (symptomatic) 7/21/2009   • MI (myocardial infarction) ([facility]) 4/29/2007   • Other specified disorder " of intestines     constipation   • Pain     shoulders, neck, lower back   • Pancreatitis    • Pap smear 4/2006   • Pneumonia 2013   • Pulmonary nodule    • Renal lesion    • Renal stone    • S/P colonoscopy 5/2010    will need repeat in 5 years   • Screening mammogram never   • Seizure (HCC)     x1 in 2008   • Shingles    • Symptomatic menopausal or female climacteric states 7/21/2009   • Type II or unspecified type diabetes mellitus without mention of complication, uncontrolled 7/21/2009    diet controlled    • Unspecified urinary incontinence        FAMILY HISTORY  Noncontributory    SOCIAL HISTORY  Social History     Socioeconomic History   • Marital status: Single   Occupational History   • Occupation: disability for OA     Employer: [employer]   Tobacco Use   • Smoking status: Current Every Day Smoker     Packs/day: 0.25     Years: 50.00     Pack years: 12.50     Types: Cigarettes   • Smokeless tobacco: Never Used   Substance and Sexual Activity   • Alcohol use: No     Alcohol/week: 0.0 oz   • Drug use: Yes     Comment: MARIJUANA- 2 times per week    • Sexual activity: Never   Social History Narrative    Has care provider.     Lives alone.        SURGICAL HISTORY  Past Surgical History:   Procedure Laterality Date   • COLONOSCOPY N/A 1/6/2020    Procedure: COLONOSCOPY;  Surgeon: David Carranza M.D.;  Location: [facility];  Service: Gastroenterology   • GASTROSCOPY N/A 1/4/2020    Procedure: GASTROSCOPY;  Surgeon: Polo Andujar D.O.;  Location: [facility];  Service: Gastroenterology   • SHOULDER ARTHROPLASTY TOTAL Left 7/12/2017    Procedure: SHOULDER ARTHROPLASTY TOTAL- REVERSE;  Surgeon: David Villalta M.D.;  Location: [facility];  Service:    • CERVICAL FUSION POSTERIOR  9/10/2014    Performed by Abhilash Bishop M.D. at [facility]   • HIP ARTHROPLASTY MIS TOTAL  7/16/14    rt and lt   • OTHER CARDIAC SURGERY  2007    angioplasty   • GYN SURGERY   "1992    c section   • EYE SURGERY  laser   • OTHER ORTHOPEDIC SURGERY     • TONSILLECTOMY         CURRENT MEDICATIONS  Home Medications    **Home medications have not yet been reviewed for this encounter**         ALLERGIES  Allergies   Allergen Reactions   • Aspartame Nausea     headache   • Augmentin Vomiting   • Latex Rash and Itching   • Lipitor [Atorvastatin Calcium] Swelling     Lip swelling   • Other Misc Vomiting     Bug spray   • Saccharin Nausea     Nausea and headache       PHYSICAL EXAM  VITAL SIGNS: /71   Pulse 68   Temp 36.5 °C (97.7 °F) (Temporal)   Resp 12   Ht 1.727 m (5' 8\")   Wt 52.9 kg (116 lb 10 oz)   LMP 01/01/2007   SpO2 97%   BMI 17.73 kg/m²      Constitutional: Well developed, Well nourished, No acute distress, Non-toxic appearance.   Eyes: PERRLA, EOMI, Conjunctiva normal, No discharge.   Cardiovascular: Normal heart rate, Normal rhythm, No murmurs, No rubs, No gallops, and intact distal pulses.   Thorax & Lungs:  No respiratory distress, no rales, no rhonchi, No wheezing, No chest wall tenderness.   Abdomen: Bowel sounds normal, Soft, No tenderness, No guarding, No rebound, No pulsatile masses.   Skin: Warm, Dry, No erythema, No rash.   Rectal: The presence of female nurse, brown stool, guaiac negative  Vaginal evaluation: the labia majora has significant erythema  Extremities: Full range of motion, no deformity, no edema.  Neurologic: Alert & oriented x 3, No focal deficits noted, acting appropriately on exam.  Psychiatric: Affect normal for clinical presentation.      LABORATORY/ECG  Results for orders placed or performed during the hospital encounter of 06/04/22   COD (ADULT)   Result Value Ref Range    ABO Grouping Only AB     Rh Grouping Only POS     Antibody Screen-Cod NEG    CBC WITH DIFFERENTIAL   Result Value Ref Range    WBC 12.5 (H) 4.8 - 10.8 K/uL    RBC 4.58 4.20 - 5.40 M/uL    Hemoglobin 9.1 (L) 12.0 - 16.0 g/dL    Hematocrit 31.4 (L) 37.0 - 47.0 %    MCV 68.6 " (L) 81.4 - 97.8 fL    MCH 19.9 (L) 27.0 - 33.0 pg    MCHC 29.0 (L) 33.6 - 35.0 g/dL    RDW 52.9 (H) 35.9 - 50.0 fL    Platelet Count 504 (H) 164 - 446 K/uL    MPV 10.1 9.0 - 12.9 fL    Neutrophils-Polys 84.10 (H) 44.00 - 72.00 %    Lymphocytes 9.50 (L) 22.00 - 41.00 %    Monocytes 4.00 0.00 - 13.40 %    Eosinophils 1.10 0.00 - 6.90 %    Basophils 0.80 0.00 - 1.80 %    Immature Granulocytes 0.50 0.00 - 0.90 %    Nucleated RBC 0.00 /100 WBC    Neutrophils (Absolute) 10.49 (H) 2.00 - 7.15 K/uL    Lymphs (Absolute) 1.18 1.00 - 4.80 K/uL    Monos (Absolute) 0.50 0.00 - 0.85 K/uL    Eos (Absolute) 0.14 0.00 - 0.51 K/uL    Baso (Absolute) 0.10 0.00 - 0.12 K/uL    Immature Granulocytes (abs) 0.06 0.00 - 0.11 K/uL    NRBC (Absolute) 0.00 K/uL    Hypochromia 2+ (A)     Anisocytosis 2+ (A)     Microcytosis 2+ (A)    COMP METABOLIC PANEL   Result Value Ref Range    Sodium 124 (L) 135 - 145 mmol/L    Potassium 4.8 3.6 - 5.5 mmol/L    Chloride 88 (L) 96 - 112 mmol/L    Co2 24 20 - 33 mmol/L    Anion Gap 12.0 7.0 - 16.0    Glucose 895 (HH) 65 - 99 mg/dL    Bun 15 8 - 22 mg/dL    Creatinine 0.87 0.50 - 1.40 mg/dL    Calcium 9.0 8.5 - 10.5 mg/dL    AST(SGOT) 14 12 - 45 U/L    ALT(SGPT) 14 2 - 50 U/L    Alkaline Phosphatase 143 (H) 30 - 99 U/L    Total Bilirubin <0.2 0.1 - 1.5 mg/dL    Albumin 3.5 3.2 - 4.9 g/dL    Total Protein 7.2 6.0 - 8.2 g/dL    Globulin 3.7 (H) 1.9 - 3.5 g/dL    A-G Ratio 0.9 g/dL   LIPASE   Result Value Ref Range    Lipase 101 (H) 11 - 82 U/L   PROTHROMBIN TIME   Result Value Ref Range    PT 14.0 12.0 - 14.6 sec    INR 1.11 0.87 - 1.13   APTT   Result Value Ref Range    APTT 36.2 (H) 24.7 - 36.0 sec   MORPHOLOGY   Result Value Ref Range    RBC Morphology Present     Polychromia 1+     Poikilocytosis 2+     Stomatocytes 2+    PERIPHERAL SMEAR REVIEW   Result Value Ref Range    Peripheral Smear Review see below    PLATELET ESTIMATE   Result Value Ref Range    Plt Estimation Increased    DIFFERENTIAL COMMENT    Result Value Ref Range    Comments-Diff see below    ESTIMATED GFR   Result Value Ref Range    GFR (CKD-EPI) 73 >60 mL/min/1.73 m 2   BETA-HYDROXYBUTYRIC ACID   Result Value Ref Range    beta-Hydroxybutyric Acid 0.19 0.02 - 0.27 mmol/L   MAGNESIUM   Result Value Ref Range    Magnesium 1.9 1.5 - 2.5 mg/dL   PHOSPHORUS   Result Value Ref Range    Phosphorus 3.3 2.5 - 4.5 mg/dL   Hemoglobin A1C (HBA1C)   Result Value Ref Range    Glycohemoglobin 17.0 (H) 4.0 - 5.6 %    Est Avg Glucose 441 mg/dL   URINALYSIS    Specimen: Urine   Result Value Ref Range    Color Yellow     Character Clear     Specific Gravity 1.024 <1.035    Ph 7.5 5.0 - 8.0    Glucose >=1000 (A) Negative mg/dL    Ketones Negative Negative mg/dL    Protein Negative Negative mg/dL    Bilirubin Negative Negative    Urobilinogen, Urine 0.2 Negative    Nitrite Negative Negative    Leukocyte Esterase Negative Negative    Occult Blood Negative Negative    Micro Urine Req see below          RADIOLOGY/PROCEDURES  DX-CHEST-PORTABLE (1 VIEW)   Final Result      No acute cardiac or pulmonary abnormalities are identified.            COURSE & MEDICAL DECISION MAKING  Pertinent Labs & Imaging studies reviewed. (See chart for details)  This is a charming 67-year-old female presents with multiple complaints.  Here in the emergency department, the patient has evidence of an elevated blood sugar of 895, she does not have acidosis and normal anion gap and negative beta hydroxybutyric acid I do not believe she is in DKA.  In addition, the patient has vulvovaginitis with possible cellulitis.  She has a leukocytosis as well.  I will be treating her with IV antibiotics in the form of Rocephin.  The patient also has evidence of a slightly elevated lipase she is not complaining of severe abdominal pain, nausea or vomiting.  Though she is received 1 L normal saline IV fluid secondary elevated blood sugar.  In addition, the patient has evidence of anemia with a hemoglobin 9.1 yet  does not have a source of bleeding.  I discussed the patient with Dr. white who graciously excepts hospitalization of this patient states he will have her hospitalized for control of her blood sugar as well as follow her hemoglobin.      FINAL IMPRESSION  Hyperglycemia  Vulvovaginitis  Anemia  Failure to thrive         Electronically signed by: Efraín Kelly D.O., 6/4/2022 5:41 PM

## 2022-06-05 NOTE — ASSESSMENT & PLAN NOTE
"Patient has significant depression   denied any SI or HI   supportive care  PT and OT  start with Zoloft\"    Body mass index is 18.94 kg/m².  Supplements ordered  "

## 2022-06-05 NOTE — DISCHARGE PLANNING
"HTH/SC[facility] TCN chart review completed. Collaborated with RN Dora DE LEON, prior to meeting with the patient. The most current review of medical record, knowledge of pt's PLOF and social support, LACE+ score of 69, 6 clicks scores of 16 mobility were considered.      TCN met with patient at bedside. Introduced TCN program. Provided education regarding differences in post acute resources including IRF, SNF and HH. Discussed S[facility] plan benefits including [facility] and [facility] [place]. Patient verbalized understanding.     Patient endorses she recently moved, and is currently \"renting a room in [place].\" Patient advised she has a state , Jonathan Godinez, and has a caregiver that assists her daily, Ana. Patient endorses she \"feels pretty weak\" endorsing she uses either a \"walker or cane depending on how I feel that day.\" Patient was tentative regarding post acute resources discussed that would involve in home care stating \"the person I lives with works from home and I don't want to disturb her.\"     Appreciate orders present for PT/OT consults and will appreciate recommendations to further assist in patient discharge planning. Choice forms proactively discussed related to post acute level of care. Patient requested to further discuss choice following recommendations from PT/OT consults; however, anticipate patient will likely have some need for post acute resources given current 6 clicks score of mobility, 16. Patient amendable to [facility] [place] sent.      TCN will continue to follow and collaborate with discharge planning team as additional post acute needs arise. Thank you.   "

## 2022-06-05 NOTE — ED NOTES
Pt medicated.  After consult with pharmacy and Dr Johnson, pts dose of Insulin Regular is changed to 5 units.

## 2022-06-05 NOTE — ASSESSMENT & PLAN NOTE
"Iron deficiency anemia   patient states she has rectal prolapse   rectal exam did not show any mass or bleeding   patient refused colonoscopy, holding on G.I. consult   Transfuse for Hgb<7 or hemodynamic instability  IV iron was ordered  consider CT scan for abdomen\"    CT as above  "

## 2022-06-05 NOTE — PROGRESS NOTES
[facility] Medicine Daily Progress Note    Date of Service  6/5/2022    Chief Complaint  Heidi Navas is a 67 y.o. female admitted 6/4/2022 with hyperglycemia and weakness    Hospital Course  67-year-old female with a history of diabetes, dyslipidemia with chronic and continuous presented 6/4 with generalized weakness, patient states she has had generalized weakness with fatigue and depression, denies any significant bleeding or fever and no other symptoms or urinary symptoms, patient states she has had drinking so much sugar and juice, when we asked her about the reason she states she stops caring about her diabetes, on admission blood sugar was more than 800, no anion sixto, A1c 17 hemoglobin 9.1, IV fluid was started.       Interval Problem Update  -Viviana examined the patient bedside, patient has generalized weakness, patient has significant depression, agreed for Zoloft  -continue Lantus with close monitoring blood sugar  -IV iron and monitoring her hemoglobin, patient refused colonoscopy, rectal exam did not show any mass, waiting for occult blood in stool test  -will order a CT scan for abdomen with contrast for severe iron  -PT and OT      I have personally seen and examined the patient at bedside. I discussed the plan of care with patient and bedside RN.    Consultants/Specialty  None     Code Status  DNAR/DNI    Disposition  Patient is not medically cleared for discharge.   Anticipate discharge to to skilled nursing facility.  I have placed the appropriate orders for post-discharge needs.    Review of Systems  Review of Systems   Constitutional: Positive for malaise/fatigue and weight loss. Negative for chills.   HENT: Negative.    Eyes: Negative.    Respiratory: Negative.    Cardiovascular: Negative.    Gastrointestinal: Negative.    Genitourinary: Negative.    Musculoskeletal: Negative.    Skin: Negative.    Neurological: Negative.    Psychiatric/Behavioral: Positive for depression. Negative for  hallucinations, substance abuse and suicidal ideas. The patient is not nervous/anxious and does not have insomnia.         Physical Exam  Temp:  [36.6 °C (97.9 °F)-36.8 °C (98.2 °F)] 36.6 °C (97.9 °F)  Pulse:  [] 78  Resp:  [17-24] 17  BP: (124-169)/() 152/71  SpO2:  [91 %-97 %] 96 %    Physical Exam  Constitutional:       Appearance: She is ill-appearing.      Comments: Cachectic appearance   HENT:      Head: Normocephalic and atraumatic.   Eyes:      General: No scleral icterus.  Cardiovascular:      Rate and Rhythm: Normal rate.      Heart sounds: No murmur heard.  Pulmonary:      Effort: No respiratory distress.      Breath sounds: No rales.   Abdominal:      General: There is no distension.      Palpations: Abdomen is soft.      Tenderness: There is no abdominal tenderness.   Genitourinary:     Rectum: Normal.   Musculoskeletal:      Right lower leg: No edema.      Left lower leg: No edema.   Skin:     General: Skin is dry.      Coloration: Skin is pale. Skin is not jaundiced.      Findings: No bruising, erythema or rash.   Neurological:      Mental Status: She is alert and oriented to person, place, and time. Mental status is at baseline.      Cranial Nerves: No cranial nerve deficit.      Motor: No weakness.      Gait: Gait normal.   Psychiatric:         Judgment: Judgment normal.      Comments: Patient is depressed         Fluids    Intake/Output Summary (Last 24 hours) at 6/5/2022 1536  Last data filed at 6/5/2022 1157  Gross per 24 hour   Intake 1260 ml   Output --   Net 1260 ml       Laboratory  Recent Labs     06/04/22  1457 06/05/22  0513   WBC 12.5* 11.8*   RBC 4.58 4.17*   HEMOGLOBIN 9.1* 8.4*   HEMATOCRIT 31.4* 28.4*   MCV 68.6* 68.1*   MCH 19.9* 20.1*   MCHC 29.0* 29.6*   RDW 52.9* 51.9*   PLATELETCT 504* 451*   MPV 10.1 9.4     Recent Labs     06/04/22  1457 06/05/22  0513   SODIUM 124* 136   POTASSIUM 4.8 3.6   CHLORIDE 88* 102   CO2 24 23   GLUCOSE 895* 190*   BUN 15 11   CREATININE  0.87 0.42*   CALCIUM 9.0 8.6     Recent Labs     06/04/22  1457   APTT 36.2*   INR 1.11         Recent Labs     06/05/22  0513   TRIGLYCERIDE 111   HDL 34*   LDL 48       Imaging  DX-CHEST-PORTABLE (1 VIEW)   Final Result      No acute cardiac or pulmonary abnormalities are identified.           Assessment/Plan  * Hyperosmolar hyperglycemic state (HHS) (HCC)  Assessment & Plan  Patient is not following her diet   glucose 895, HbA1c 17, normal AG at admission  improving  continue Lantus 11 unit is daily and that shows that those if needed  DM education  close monitoring with sliding scale    Depression  Assessment & Plan  Patient has low mood, insomnia and hopelessness  no SI or HI  start to Zoloft   for social issues    Acute on chronic blood loss anemia  Assessment & Plan  Iron deficiency anemia   patient states she has rectal prolapse   rectal exam did not show any mass or bleeding   patient refused colonoscopy, holding on G.I. consult   Transfuse for Hgb<7 or hemodynamic instability  IV iron was ordered  consider CT scan for abdomen    Adult failure to thrive- (present on admission)  Assessment & Plan  Patient has significant depression   denied any SI or HI   supportive care  PT and OT  start with Zoloft    Pressure ulcer, sacrum  Assessment & Plan  Wound care    Dehydration  Assessment & Plan  Continue IV fluid  improving    Hyponatremia  Assessment & Plan  Pseudohyponatremia in setting of current hyperglycemia  IVF    Severely underweight adult  Assessment & Plan  PO intake as tolerated  Ensure  Body mass index is 17.73 kg/m².      Severe protein-calorie malnutrition (HCC)  Assessment & Plan  PO intake as tolerated  ensure    DM (diabetes mellitus), secondary uncontrolled (HCC)  Assessment & Plan  As noted above    GIB (gastrointestinal bleeding)- (present on admission)  Assessment & Plan  Suspected  PPI  Trend H/H, transfuse as needed    Iron deficiency anemia due to chronic blood loss  Assessment  & Plan  Iron 19  IV iron infusion  rectal exam did not show any bleeding  patient refused colonoscopy, holding on G.I. consult  consider CT scan of her abdomen for more evaluation    Leukocytosis  Assessment & Plan  Likely reactive  No pyuria, CXR no definitive focal consolidation  S/p ceftraixone x1 in ED, hold further abx at this time  close monitoring and start with antibiotics if needed    ACP (advance care planning)  Assessment & Plan  Patient wanted DNR DNI    Vitamin D insufficiency- (present on admission)  Assessment & Plan  Continue supplementation        VTE prophylaxis: SCDs/TEDs xarelto     I have performed a physical exam and reviewed and updated ROS and Plan today (6/5/2022). In review of yesterday's note (6/4/2022), there are no changes except as documented above.

## 2022-06-05 NOTE — HOSPITAL COURSE
67-year-old female with a history of diabetes, dyslipidemia with chronic and continuous presented 6/4 with generalized weakness, patient states she has had generalized weakness with fatigue and depression, denies any significant bleeding or fever and no other symptoms or urinary symptoms, patient states she has had drinking so much sugar and juice, when we asked her about the reason she states she stops caring about her diabetes, on admission blood sugar was more than 800, no anion sixto, A1c 17 hemoglobin 9.1, IV fluid was started.

## 2022-06-05 NOTE — ED NOTES
Pt continually moves around in bed. This causes the Purewick to move out of place and pt then is incontinent into the bed. Pt has multiple linen and diaper changes an hour. Dr Johnson notified of this issue. Order for Ativan received.   Pts linens changed, new brief placed and warm blankets given.

## 2022-06-05 NOTE — ED NOTES
Med rec was completed per patient. Patient denies taking any medications for a year now.    Allergies were reviewed per patient. Patient denies Augmentin and Lipitor allergy.    No antibiotics in the last 30 days.    Home pharmacy is [facility] on [address] ([phone])

## 2022-06-05 NOTE — RESPIRATORY CARE
COPD EDUCATION by COPD CLINICAL EDUCATOR  6/5/2022 at 08:49 AM by Vandana Carvajal, RRT     Patient interviewed by COPD education team. Patient refused COPD program at this time.Currently not on any Respiratory Medication. Continues to smoke and declined cessation materials

## 2022-06-05 NOTE — ED NOTES
Labs drawn and sent. Pt states she is wet and the purewick hasn't been catching her urine. I told her I would be back with supplies to get her cleaned up.

## 2022-06-05 NOTE — H&P
"Hospital Medicine History & Physical Note    Date of Service  6/4/2022    Primary Care Physician  Viviana Thorne M.D.    Consultants  None    Code Status  DNAR/DNI    Chief Complaint  Chief Complaint   Patient presents with   • Fatigue   • Weight Loss     50lbs over approx 6 months, unintentional   • Incontinence     With skin breakdown/incontinence dermatitis, pt states this is new   • Sent by MD     PT seen at GP office and sent for concerns over Hgb levels. Pt's practitioner states her blood levels have been \"borderline anemia and could be worsening.\"       History of Presenting Illness  Heidi Navas is a 67 y.o. frail female dependent on caregiver with h/o DM, HLD, anemia, prior GIB, rectal prolapse with chronic incontinence who was brought in by caregiver to ER 6/4/2022 for evaluation of weakness, weight loss.  Patient reported having changed her insurance from [employer] to [employer], reported having trouble receiving appropriate care for the past year.  She has been off all her home medications for at least 1 year now.  She reported difficulty ambulating, requires cane and caregiver's assistance.  She reported having ongoing incontinence, pressure injury to her buttock region as well as some degree of urinary incontinence.  She was seen by PCP yesterday, advised her to go to ER for evaluation of 50 pound weight loss as well as concerning anemia, possibly GI bleed given her prior history of GI bleed.  No further bleeding from rectum seen in ER.  However, she she was found to have glucose of 895, normal anion gap, HbA1c 17, hemoglobin 9.1. Admission requested for further evaluation and treatment.    I discussed the plan of care with patient and bedside RN.    Review of Systems  Review of Systems   Constitutional: Positive for malaise/fatigue and weight loss. Negative for chills and fever.   HENT: Negative for hearing loss and tinnitus.    Eyes: Negative for blurred vision and double vision.   Respiratory: " Negative for cough and wheezing.    Cardiovascular: Negative for chest pain and leg swelling.   Gastrointestinal: Positive for abdominal pain, blood in stool, constipation, heartburn, nausea and vomiting. Negative for diarrhea.   Genitourinary: Negative for dysuria and flank pain.   Musculoskeletal: Positive for myalgias. Negative for joint pain.   Skin: Negative for itching and rash.   Neurological: Positive for weakness. Negative for dizziness, speech change, focal weakness and headaches.   Endo/Heme/Allergies: Negative for environmental allergies. Does not bruise/bleed easily.   Psychiatric/Behavioral: Negative for depression and substance abuse.   All other systems reviewed and are negative.      Past Medical History   has a past medical history of Anemia, Arthritis, Breath shortness, Bronchitis (2008), Colon polyps (2015), Convulsions ([facility]) (7/21/2009), COPD (chronic obstructive pulmonary disease) ([facility]), Dental disorder, Depression, Diverticulosis (5/10 ), Hemorrhoid (7/29/2009), History of colonoscopy (  2005), Leukocytosis (3/30/2014), Lymphocytosis (symptomatic) (7/21/2009), MI (myocardial infarction) ([facility]) (4/29/2007), Other specified disorder of intestines, Pain, Pancreatitis, Pap smear (4/2006), Pneumonia (2013), Pulmonary nodule, Renal lesion, Renal stone, S/P colonoscopy (5/2010), Screening mammogram (never), Seizure ([facility]), Shingles, Symptomatic menopausal or female climacteric states (7/21/2009), Type II or unspecified type diabetes mellitus without mention of complication, uncontrolled (7/21/2009), and Unspecified urinary incontinence.    Surgical History   has a past surgical history that includes tonsillectomy; eye surgery (laser); other orthopedic surgery; hip arthroplasty mis total (7/16/14); cervical fusion posterior (9/10/2014); gyn surgery (1992); other cardiac surgery (2007); shoulder arthroplasty total (Left, 7/12/2017); gastroscopy (N/A, 1/4/2020); and colonoscopy (N/A, 1/6/2020).     Family  History  family history includes Cancer in her brother, mother, and sister; Diabetes in her brother, brother, and mother.   Family history reviewed with patient. There is family history that is pertinent to the chief complaint.     Social History   reports that she has been smoking cigarettes. She has a 12.50 pack-year smoking history. She has never used smokeless tobacco. She reports current drug use. She reports that she does not drink alcohol.    Allergies  Allergies   Allergen Reactions   • Aspartame Nausea     headache   • Latex Rash and Itching   • Lipitor [Atorvastatin Calcium] Swelling     Patient denies allergy 06/04/22   • Other Misc Vomiting     Bug spray   • Saccharin Nausea     Nausea and headache       Medications  None       Physical Exam  Temp:  [36.5 °C (97.7 °F)] 36.5 °C (97.7 °F)  Pulse:  [] 93  Resp:  [12-22] 20  BP: (124-169)/(62-94) 169/77  SpO2:  [91 %-97 %] 93 %  Blood Pressure : 134/62   Temperature: 36.5 °C (97.7 °F)   Pulse: 93   Respiration: 18   Pulse Oximetry: 95 %       Physical Exam  Vitals and nursing note reviewed.   Constitutional:       Appearance: She is ill-appearing.      Comments: Frail, cachectic, underweight   HENT:      Head: Normocephalic and atraumatic.      Nose: Nose normal.      Mouth/Throat:      Mouth: Mucous membranes are dry.      Pharynx: Oropharynx is clear.   Eyes:      General: No scleral icterus.     Extraocular Movements: Extraocular movements intact.   Cardiovascular:      Rate and Rhythm: Normal rate and regular rhythm.      Pulses: Normal pulses.      Heart sounds:     No friction rub.   Pulmonary:      Effort: Pulmonary effort is normal. No respiratory distress.      Breath sounds: No stridor. No wheezing or rales.   Abdominal:      General: Bowel sounds are normal. There is no distension.      Palpations: Abdomen is soft.      Tenderness: There is no abdominal tenderness. There is no guarding or rebound.   Musculoskeletal:         General: No  swelling or tenderness. Normal range of motion.      Cervical back: Neck supple. No tenderness.      Comments: Pressure ulcer injury of sacrum, superficial   Skin:     General: Skin is dry.      Capillary Refill: Capillary refill takes less than 2 seconds.      Coloration: Skin is pale.      Comments: Poor skin turgor   Neurological:      General: No focal deficit present.      Mental Status: She is alert and oriented to person, place, and time.      Motor: Weakness (Nonfocal) present.   Psychiatric:         Mood and Affect: Mood normal.         Laboratory:  Recent Labs     06/04/22  1457   WBC 12.5*   RBC 4.58   HEMOGLOBIN 9.1*   HEMATOCRIT 31.4*   MCV 68.6*   MCH 19.9*   MCHC 29.0*   RDW 52.9*   PLATELETCT 504*   MPV 10.1     Recent Labs     06/04/22  1457   SODIUM 124*   POTASSIUM 4.8   CHLORIDE 88*   CO2 24   GLUCOSE 895*   BUN 15   CREATININE 0.87   CALCIUM 9.0     Recent Labs     06/04/22  1457   ALTSGPT 14   ASTSGOT 14   ALKPHOSPHAT 143*   TBILIRUBIN <0.2   LIPASE 101*   GLUCOSE 895*     Recent Labs     06/04/22  1457   APTT 36.2*   INR 1.11     No results for input(s): NTPROBNP in the last 72 hours.      No results for input(s): TROPONINT in the last 72 hours.    Imaging:  DX-CHEST-PORTABLE (1 VIEW)   Final Result      No acute cardiac or pulmonary abnormalities are identified.          X-Ray:  I have personally reviewed the images and compared with prior images.  EKG:  I have personally reviewed the images and compared with prior images.    Assessment/Plan:  Justification for Admission Status  I anticipate this patient is appropriate for inpatient status    * Hyperosmolar hyperglycemic state (HHS) (HCC)  Assessment & Plan  Glucose 895, HbA1c 17, normal AG at admission  Aggressive IVF  lantus + ISS  DM education  statin    DM (diabetes mellitus), secondary uncontrolled (HCC)  Assessment & Plan  As noted above    Acute on chronic blood loss anemia  Assessment & Plan  Trend H/H  Transfuse for Hgb<7 or  hemodynamic instability    GIB (gastrointestinal bleeding)- (present on admission)  Assessment & Plan  Suspected  PPI  Trend H/H, transfuse as needed    Iron deficiency anemia due to chronic blood loss  Assessment & Plan  Iron 19  IV iron infusion    Hyponatremia  Assessment & Plan  Pseudohyponatremia in setting of current hyperglycemia  IVF    Dehydration  Assessment & Plan  IVF    Severely underweight adult  Assessment & Plan  PO intake as tolerated  Ensure  Body mass index is 17.73 kg/m².      Pressure ulcer, sacrum  Assessment & Plan  Wound care    ACP (advance care planning)  Assessment & Plan  GOC d/w pt in ED -- DNR/DNI per pt's wish  ACP: 16mins    Severe protein-calorie malnutrition (HCC)  Assessment & Plan  PO intake as tolerated  ensure    Adult failure to thrive- (present on admission)  Assessment & Plan  Supportive care    Leukocytosis  Assessment & Plan  Likely reactive  No pyuria, CXR no definitive focal consolidation  S/p ceftraixone x1 in ED, hold further abx at this time    Vitamin D insufficiency- (present on admission)  Assessment & Plan  Supplement      VTE prophylaxis: SCDs

## 2022-06-06 LAB
ERYTHROCYTE [DISTWIDTH] IN BLOOD BY AUTOMATED COUNT: 52.1 FL (ref 35.9–50)
GLUCOSE BLD STRIP.AUTO-MCNC: 249 MG/DL (ref 65–99)
GLUCOSE BLD STRIP.AUTO-MCNC: 257 MG/DL (ref 65–99)
GLUCOSE BLD STRIP.AUTO-MCNC: 274 MG/DL (ref 65–99)
GLUCOSE BLD STRIP.AUTO-MCNC: 326 MG/DL (ref 65–99)
GLUCOSE BLD STRIP.AUTO-MCNC: 335 MG/DL (ref 65–99)
HCT VFR BLD AUTO: 27.7 % (ref 37–47)
HGB BLD-MCNC: 8.2 G/DL (ref 12–16)
MCH RBC QN AUTO: 20 PG (ref 27–33)
MCHC RBC AUTO-ENTMCNC: 29.6 G/DL (ref 33.6–35)
MCV RBC AUTO: 67.7 FL (ref 81.4–97.8)
PLATELET # BLD AUTO: 471 K/UL (ref 164–446)
PMV BLD AUTO: 9.7 FL (ref 9–12.9)
PROCALCITONIN SERPL-MCNC: 0.27 NG/ML
RBC # BLD AUTO: 4.09 M/UL (ref 4.2–5.4)
WBC # BLD AUTO: 11.2 K/UL (ref 4.8–10.8)

## 2022-06-06 PROCEDURE — A9270 NON-COVERED ITEM OR SERVICE: HCPCS | Performed by: INTERNAL MEDICINE

## 2022-06-06 PROCEDURE — 82962 GLUCOSE BLOOD TEST: CPT

## 2022-06-06 PROCEDURE — 700111 HCHG RX REV CODE 636 W/ 250 OVERRIDE (IP): Performed by: STUDENT IN AN ORGANIZED HEALTH CARE EDUCATION/TRAINING PROGRAM

## 2022-06-06 PROCEDURE — A9270 NON-COVERED ITEM OR SERVICE: HCPCS | Performed by: STUDENT IN AN ORGANIZED HEALTH CARE EDUCATION/TRAINING PROGRAM

## 2022-06-06 PROCEDURE — 99233 SBSQ HOSP IP/OBS HIGH 50: CPT | Performed by: INTERNAL MEDICINE

## 2022-06-06 PROCEDURE — 700105 HCHG RX REV CODE 258: Performed by: STUDENT IN AN ORGANIZED HEALTH CARE EDUCATION/TRAINING PROGRAM

## 2022-06-06 PROCEDURE — 700102 HCHG RX REV CODE 250 W/ 637 OVERRIDE(OP): Performed by: STUDENT IN AN ORGANIZED HEALTH CARE EDUCATION/TRAINING PROGRAM

## 2022-06-06 PROCEDURE — 36415 COLL VENOUS BLD VENIPUNCTURE: CPT

## 2022-06-06 PROCEDURE — 85027 COMPLETE CBC AUTOMATED: CPT

## 2022-06-06 PROCEDURE — 770001 HCHG ROOM/CARE - MED/SURG/GYN PRIV*

## 2022-06-06 PROCEDURE — 700102 HCHG RX REV CODE 250 W/ 637 OVERRIDE(OP): Performed by: INTERNAL MEDICINE

## 2022-06-06 PROCEDURE — 84145 PROCALCITONIN (PCT): CPT

## 2022-06-06 RX ORDER — ACETAMINOPHEN 500 MG
1000 TABLET ORAL EVERY 6 HOURS PRN
Status: DISCONTINUED | OUTPATIENT
Start: 2022-06-11 | End: 2022-06-08 | Stop reason: HOSPADM

## 2022-06-06 RX ORDER — OXYCODONE HYDROCHLORIDE 5 MG/1
2.5 TABLET ORAL
Status: DISCONTINUED | OUTPATIENT
Start: 2022-06-06 | End: 2022-06-08 | Stop reason: HOSPADM

## 2022-06-06 RX ORDER — ACETAMINOPHEN 500 MG
1000 TABLET ORAL EVERY 6 HOURS
Status: DISCONTINUED | OUTPATIENT
Start: 2022-06-06 | End: 2022-06-08 | Stop reason: HOSPADM

## 2022-06-06 RX ORDER — OXYCODONE HYDROCHLORIDE 5 MG/1
5 TABLET ORAL
Status: DISCONTINUED | OUTPATIENT
Start: 2022-06-06 | End: 2022-06-08 | Stop reason: HOSPADM

## 2022-06-06 RX ORDER — HYDROMORPHONE HYDROCHLORIDE 1 MG/ML
0.25 INJECTION, SOLUTION INTRAMUSCULAR; INTRAVENOUS; SUBCUTANEOUS
Status: DISCONTINUED | OUTPATIENT
Start: 2022-06-06 | End: 2022-06-08 | Stop reason: HOSPADM

## 2022-06-06 RX ADMIN — THIAMINE HCL TAB 100 MG 100 MG: 100 TAB at 06:11

## 2022-06-06 RX ADMIN — INSULIN LISPRO 10 UNITS: 100 INJECTION, SOLUTION INTRAVENOUS; SUBCUTANEOUS at 06:17

## 2022-06-06 RX ADMIN — ZOLPIDEM TARTRATE 5 MG: 5 TABLET ORAL at 19:36

## 2022-06-06 RX ADMIN — OXYCODONE 5 MG: 5 TABLET ORAL at 19:36

## 2022-06-06 RX ADMIN — ACETAMINOPHEN 1000 MG: 500 TABLET, FILM COATED ORAL at 17:46

## 2022-06-06 RX ADMIN — OXYCODONE 5 MG: 5 TABLET ORAL at 06:11

## 2022-06-06 RX ADMIN — ROSUVASTATIN CALCIUM 10 MG: 20 TABLET, FILM COATED ORAL at 17:47

## 2022-06-06 RX ADMIN — INSULIN LISPRO 7 UNITS: 100 INJECTION, SOLUTION INTRAVENOUS; SUBCUTANEOUS at 23:29

## 2022-06-06 RX ADMIN — SENNOSIDES AND DOCUSATE SODIUM 2 TABLET: 50; 8.6 TABLET ORAL at 17:47

## 2022-06-06 RX ADMIN — INSULIN LISPRO 10 UNITS: 100 INJECTION, SOLUTION INTRAVENOUS; SUBCUTANEOUS at 17:39

## 2022-06-06 RX ADMIN — INSULIN LISPRO 4 UNITS: 100 INJECTION, SOLUTION INTRAVENOUS; SUBCUTANEOUS at 12:45

## 2022-06-06 RX ADMIN — OXYCODONE 5 MG: 5 TABLET ORAL at 23:21

## 2022-06-06 RX ADMIN — ACETAMINOPHEN 1000 MG: 500 TABLET, FILM COATED ORAL at 23:21

## 2022-06-06 RX ADMIN — OMEPRAZOLE 20 MG: 20 CAPSULE, DELAYED RELEASE ORAL at 17:47

## 2022-06-06 RX ADMIN — FOLIC ACID 1 MG: 1 TABLET ORAL at 06:11

## 2022-06-06 RX ADMIN — SODIUM CHLORIDE 125 MG: 9 INJECTION, SOLUTION INTRAVENOUS at 17:49

## 2022-06-06 RX ADMIN — SERTRALINE 25 MG: 50 TABLET, FILM COATED ORAL at 06:11

## 2022-06-06 RX ADMIN — LISINOPRIL 5 MG: 10 TABLET ORAL at 06:11

## 2022-06-06 RX ADMIN — ACETAMINOPHEN 650 MG: 325 TABLET ORAL at 02:09

## 2022-06-06 RX ADMIN — OMEPRAZOLE 20 MG: 20 CAPSULE, DELAYED RELEASE ORAL at 06:11

## 2022-06-06 RX ADMIN — ACETAMINOPHEN 650 MG: 325 TABLET ORAL at 12:43

## 2022-06-06 RX ADMIN — OXYCODONE 5 MG: 5 TABLET ORAL at 15:27

## 2022-06-06 RX ADMIN — SENNOSIDES AND DOCUSATE SODIUM 2 TABLET: 50; 8.6 TABLET ORAL at 06:11

## 2022-06-06 ASSESSMENT — ENCOUNTER SYMPTOMS
INSOMNIA: 0
CARDIOVASCULAR NEGATIVE: 1
EYES NEGATIVE: 1
DEPRESSION: 1
NERVOUS/ANXIOUS: 0
MUSCULOSKELETAL NEGATIVE: 1
NEUROLOGICAL NEGATIVE: 1
CHILLS: 0
RESPIRATORY NEGATIVE: 1
WEIGHT LOSS: 1
GASTROINTESTINAL NEGATIVE: 1
HALLUCINATIONS: 0

## 2022-06-06 ASSESSMENT — LIFESTYLE VARIABLES: SUBSTANCE_ABUSE: 0

## 2022-06-06 ASSESSMENT — FIBROSIS 4 INDEX: FIB4 SCORE: 0.77

## 2022-06-06 ASSESSMENT — PAIN DESCRIPTION - PAIN TYPE
TYPE: ACUTE PAIN

## 2022-06-06 NOTE — PROGRESS NOTES
Received report and assumed care of patient. Patient is alert and oriented x4. Patient is in no signs of acute distress. Patient was updated on the plan of care for the day. Call light within reach, bed in low position, 2 side rails up. All fall precautions in place.

## 2022-06-06 NOTE — PROGRESS NOTES
Monitor summary:        Rhythm: SR   Rate: 82-94  Ectopy: (R)PVC, (R)PAC,  Measurements: 18./.08/.33        12hr chart check

## 2022-06-06 NOTE — PROGRESS NOTES
Bedside report received from NOC RN. Assumed care of pt. Pt awake, laying in bed. A/Ox4, VSS. No concerns, complaints or distress. Pt educated to call before getting out of bed. POC reviewed and white board updated. Tele box on. SR 85 on the monitor. Call light in reach. Bed locked in lowest position with 2 upper bed rails up. Bed alarm on.

## 2022-06-06 NOTE — DISCHARGE PLANNING
Case Management Discharge Planning    Admission Date: 6/4/2022  GMLOS: 4.4  ALOS: 2    6-Clicks ADL Score: 20  6-Clicks Mobility Score: 16  PT and/or OT Eval ordered: Yes  Post-acute Referrals Ordered: No  Post-acute Choice Obtained: No  Has referral(s) been sent to post-acute provider:  No      Anticipated Discharge Dispo: Discharge Disposition: D/T to home under HHA care in anticipation of covered skilled care (06)    DME Needed: TBD    Action(s) Taken: Chart review     Escalations Completed: None    Medically Clear: No    Next Steps:  f/u with pt, medical and nursing teams regarding treatment plan, discharge planning needs and barriers with TCN.     Barriers to Discharge: Medical clearance

## 2022-06-06 NOTE — DISCHARGE PLANNING
"HTH/SCP TCN chart review completed. Collaborated with HALEY john . Patient seen at bedside. OT ,PT, Wound Care Eval and recommendations pending. TCN proactively obtained SNF and HH choices and gave to HALEY John 6/6/2022.    Hospitalist noted on 6/5/22 \" DNAR, DNI....Patient is not medically cleared for discharge. Anticipate discharge to to skilled nursing facility...\"      TCN will continue to follow and collaborate with discharge planning team as additional post acute needs arise. Thank you.    Previously completed:  - [facility] introduced (Y), referral (sent). 6/5/22  "

## 2022-06-06 NOTE — PROGRESS NOTES
Hospital Medicine Daily Progress Note    Date of Service  6/6/2022    Chief Complaint  Heidi Navas is a 67 y.o. female admitted 6/4/2022 with hyperglycemia and weakness    Hospital Course  67-year-old female with a history of diabetes, dyslipidemia with chronic and continuous presented 6/4 with generalized weakness, patient states she has had generalized weakness with fatigue and depression, denies any significant bleeding or fever and no other symptoms or urinary symptoms, patient states she has had drinking so much sugar and juice, when we asked her about the reason she states she stops caring about her diabetes, on admission blood sugar was more than 800, no anion sixto, A1c 17 hemoglobin 9.1, IV fluid was started.     -Viviana examined the patient bedside, patient has generalized weakness, patient has significant depression, agreed for Zoloft  -continue Lantus with close monitoring blood sugar  -IV iron and monitoring her hemoglobin, patient refused colonoscopy, rectal exam did not show any mass, waiting for occult blood in stool test  -will order a CT scan for abdomen with contrast for severe iron  -PT and OT  Interval Problem Update  6/6. She was complaining of chornic aches and pains. She said she has a cervical neck fusion, bilateral hip replacements and shoulder surgeries. Her blood sugars are still out of control.        I have personally seen and examined the patient at bedside. I discussed the plan of care with patient and bedside RN.    Consultants/Specialty  None     Code Status  DNAR/DNI    Disposition  Patient is not medically cleared for discharge.   Anticipate discharge to to skilled nursing facility.  I have placed the appropriate orders for post-discharge needs.    Review of Systems  Review of Systems   Constitutional: Positive for malaise/fatigue and weight loss. Negative for chills.   HENT: Negative.    Eyes: Negative.    Respiratory: Negative.    Cardiovascular: Negative.    Gastrointestinal:  Negative.    Genitourinary: Negative.    Musculoskeletal: Negative.    Skin: Negative.    Neurological: Negative.    Psychiatric/Behavioral: Positive for depression. Negative for hallucinations, substance abuse and suicidal ideas. The patient is not nervous/anxious and does not have insomnia.         Physical Exam  Temp:  [35.9 °C (96.7 °F)-37.2 °C (99 °F)] 35.9 °C (96.7 °F)  Pulse:  [78-98] 97  Resp:  [17-20] 18  BP: (106-152)/(63-75) 106/63  SpO2:  [92 %-97 %] 94 %    Physical Exam  Constitutional:       Appearance: She is ill-appearing.      Comments: Cachectic appearance  Older appearing   HENT:      Head: Normocephalic and atraumatic.      Comments: Temporal wasting  Eyes:      General: No scleral icterus.  Cardiovascular:      Rate and Rhythm: Normal rate.      Heart sounds: No murmur heard.  Pulmonary:      Effort: No respiratory distress.      Breath sounds: No rales.   Abdominal:      General: There is no distension.      Palpations: Abdomen is soft.      Tenderness: There is no abdominal tenderness.   Genitourinary:     Rectum: Normal.   Musculoskeletal:      Right lower leg: No edema.      Left lower leg: No edema.   Skin:     General: Skin is dry.      Coloration: Skin is pale. Skin is not jaundiced.      Findings: No bruising, erythema or rash.   Neurological:      Mental Status: She is alert and oriented to person, place, and time. Mental status is at baseline.      Cranial Nerves: No cranial nerve deficit.      Motor: No weakness.      Gait: Gait normal.   Psychiatric:         Judgment: Judgment normal.      Comments: Patient is depressed         Fluids    Intake/Output Summary (Last 24 hours) at 6/6/2022 0804  Last data filed at 6/5/2022 1300  Gross per 24 hour   Intake 740 ml   Output --   Net 740 ml       Laboratory  Recent Labs     06/04/22  1457 06/05/22  0513 06/05/22  1710   WBC 12.5* 11.8*  --    RBC 4.58 4.17*  --    HEMOGLOBIN 9.1* 8.4* 8.2*   HEMATOCRIT 31.4* 28.4* 27.7*   MCV 68.6* 68.1*   "--    MCH 19.9* 20.1*  --    MCHC 29.0* 29.6*  --    RDW 52.9* 51.9*  --    PLATELETCT 504* 451*  --    MPV 10.1 9.4  --      Recent Labs     06/04/22  1457 06/05/22  0513   SODIUM 124* 136   POTASSIUM 4.8 3.6   CHLORIDE 88* 102   CO2 24 23   GLUCOSE 895* 190*   BUN 15 11   CREATININE 0.87 0.42*   CALCIUM 9.0 8.6     Recent Labs     06/04/22  1457   APTT 36.2*   INR 1.11         Recent Labs     06/05/22  0513   TRIGLYCERIDE 111   HDL 34*   LDL 48       Imaging  CT-ABDOMEN-PELVIS WITH   Final Result      1.  No evidence of active arterial extravasation into the GI tract on this single phase venous study. No abdominopelvic hematoma.   2.  Tiny distal colonic diverticula.   3.  Nonobstructing 5 mm stone in the right kidney. No hydronephrosis.      DX-CHEST-PORTABLE (1 VIEW)   Final Result      No acute cardiac or pulmonary abnormalities are identified.           Assessment/Plan  * Hyperosmolar hyperglycemic state (HHS), pressure ulcer, anemia (HCC)  Assessment & Plan  Patient is not following her diet   glucose 895, HbA1c 17, normal AG at admission  improving  continue Lantus 11 unit is daily and that shows that those if needed  DM education  close monitoring with sliding scale\"    Uncontrolled BGs in the 300s.  Lantus 15 units BID  Daibetes education pending    Depression  Assessment & Plan  Patient has low mood, insomnia and hopelessness  no SI or HI  start to [facility]   for social issues    Pressure ulcer, sacrum  Assessment & Plan  Wound care    Dehydration  Assessment & Plan  Continue IV fluid  improving    Hyponatremia  Assessment & Plan  Pseudohyponatremia in setting of current hyperglycemia  IVF    ACP (advance care planning)  Assessment & Plan  Patient wanted DNR DNI    Severely underweight adult  Assessment & Plan  PO intake as tolerated  Ensure  Body mass index is 17.73 kg/m².      Severe protein-calorie malnutrition (HCC)  Assessment & Plan  PO intake as tolerated  Ensure\"    Supplements " "ordered    DM (diabetes mellitus), secondary uncontrolled (HCC)  Assessment & Plan  As noted above    Acute on chronic blood loss anemia  Assessment & Plan  Iron deficiency anemia   patient states she has rectal prolapse   rectal exam did not show any mass or bleeding   patient refused colonoscopy, holding on G.I. consult   Transfuse for Hgb<7 or hemodynamic instability  IV iron was ordered  consider CT scan for abdomen\"    CT as above    GIB (gastrointestinal bleeding)- (present on admission)  Assessment & Plan  Suspected  PPI  Trend H/H, transfuse as needed    Adult failure to thrive- (present on admission)  Assessment & Plan  Patient has significant depression   denied any SI or HI   supportive care  PT and OT  start with Zoloft\"    Body mass index is 18.94 kg/m².  Supplements ordered    Iron deficiency anemia due to chronic blood loss  Assessment & Plan  Iron 19  IV iron infusion  rectal exam did not show any bleeding  patient refused colonoscopy, holding on G.I. consult  consider CT scan of her abdomen for more evaluation\"    CT findings:  1.  No evidence of active arterial extravasation into the GI tract on this single phase venous study. No abdominopelvic hematoma.  2.  Tiny distal colonic diverticula.  3.  Nonobstructing 5 mm stone in the right kidney. No hydronephrosis.    Outpatient GI followup; consult if actively bleeding, Hb drops or symptomatic anemia    Leukocytosis  Assessment & Plan  Likely reactive  No pyuria, CXR no definitive focal consolidation  S/p ceftraixone x1 in ED, hold further abx at this time  close monitoring and start with antibiotics if needed\"    Ordered 6/6 WBCs myself.  U/A, CXR, CT Ab show no UTI or pneumonia  Trend WBCs for now.    Vitamin D insufficiency- (present on admission)  Assessment & Plan  Continue supplementation        VTE prophylaxis: SCDs/TEDs xarelto     I have performed a physical exam and reviewed and updated ROS and Plan today (6/6/2022). In review of yesterday's " note (6/5/2022), there are no changes except as documented above.

## 2022-06-06 NOTE — DIETARY
"Nutrition services: Day 2 of admit.  Heidi Navas is a 67 y.o. female with admitting DX of gastrointestinal bleeding     Consult received for unintentional weight loss (MST 4), BMI <19 and DM education, calorie count     Met with pt at bedside. Pt reports a 50 lb weight loss since September, this is a 29% wt loss in <1 year which is severe. Pt shares that PO intake has not been a concern and is eating her usual intake. Pt shares that wt loss is related to black mold in her house and depression. Pt also has had poor adherence to diabetes diet and poor blood sugar control, which can cause weight loss. RD performed a nutrition focused physical exam (NFPE) and noted moderate muscle loss. Pt has been eating well this admit and drinking Boost, do not believe a calorie count is needed at this time.     RD attempted to provide DM ed though pt refused verbal education and was agreeable to receiving the handout.      Assessment:  Height: 172.7 cm (5' 8\")  Weight: 56.5 kg (124 lb 9 oz) via stand up scale   Body mass index is 18.94 kg/m²., BMI classification: Underweight   Diet/Intake: Consistent CHO/Cardiac PO intake: % of all meals and Boost supplements     Evaluation:   1. Pt with DM, severe protein calorie malnutrition, ACP, hyponatremia, pressure ulcer, dehyrdration, adult failure to thrive, vitamin D deficiency, depression   2. Current clinical picture and MD notes reviewed. Per MD, patient states she has had drinking so much sugar and juice, when we asked her about the reason she states she stops caring about her diabetes.   3. Skin: Scrotum/Coccyx/Buttocks Redness, Non-Blanching, Excoriation and Discoloration, wound consult pending   4. Labs: Glucose , 34 HDL (6/5), Cr 0.42 (6/5)  5. Meds: Ferric gluconate complex, folvite, SSI + glargine, NS infusion (completed), Omeprazole, Senna, Thiamine, PPI (completed)  6. Last BM: PTA  7. NFPE: moderate muscle loss-slight depression in temples, flat dorsal hand " region, mild depression of inner thigh, slight firmness in calve region.     Malnutrition Risk: Moderate malnutrition in the context of chronic illness related to depression as evidenced by 29% wt loss since September per pt report and moderate muscle loss per NFPE.     Recommendations/Plan:  1. Continue with Boost Glucose Control TID    2. Encourage intake of >50% of meals and supplements   3. Document intake of all meals and supplements  as % taken in ADL's to provide interdisciplinary communication across all shifts.   4. Monitor weight.  5. Nutrition rep will continue to see patient for ongoing meal and snack preferences.   6. No other education needs identified at this time. Consider referral to outpatient nutrition services for continuation of education as indicated or per pt preferences.     RD monitoring per dept policy

## 2022-06-06 NOTE — CARE PLAN
The patient is Stable - Low risk of patient condition declining or worsening         Progress made toward(s) clinical / shift goals:  yes    Patient is not progressing towards the following goals:    Problem: Knowledge Deficit - Standard  Goal: Patient and family/care givers will demonstrate understanding of plan of care, disease process/condition, diagnostic tests and medications  Outcome: Progressing     Problem: Fall Risk  Goal: Patient will remain free from falls  Outcome: Progressing     Problem: Pain - Standard  Goal: Alleviation of pain or a reduction in pain to the patient’s comfort goal  Outcome: Progressing

## 2022-06-06 NOTE — DISCHARGE PLANNING
Received Choice form at 1442  Agency/Facility Name: [facility]   Referral sent per Choice form @ [phone]  Agency/Facility Name: [facility]   Spoke To: Jay   Outcome: Jay requesting PT/OT in order to review referral.

## 2022-06-06 NOTE — CARE PLAN
The patient is Watcher - Medium risk of patient condition declining or worsening    Shift Goals  Clinical Goals: Promote comfort, Monitor Hgb  Patient Goals: rest, pain control    Progress made toward(s) clinical / shift goals:    Problem: Knowledge Deficit - Standard  Goal: Patient and family/care givers will demonstrate understanding of plan of care, disease process/condition, diagnostic tests and medications  Outcome: Progressing     Problem: Fall Risk  Goal: Patient will remain free from falls  Outcome: Progressing       Patient is not progressing towards the following goals: N/A

## 2022-06-06 NOTE — CARE PLAN
The patient is Stable - Low risk of patient condition declining or worsening    Shift Goals  Clinical Goals: pain control, anxiety control  Patient Goals: pain, anxiety control    Progress made toward(s) clinical / shift goals:  yes    Patient is not progressing towards the following goals:    Problem: Psychosocial  Goal: Patient's level of anxiety will decrease  Outcome: Progressing     Problem: Communication  Goal: The ability to communicate needs accurately and effectively will improve  Outcome: Progressing     Problem: Discharge Barriers/Planning  Goal: Patient's continuum of care needs are met  Outcome: Progressing     Problem: Hemodynamics  Goal: Patient's hemodynamics, fluid balance and neurologic status will be stable or improve  Outcome: Progressing     Problem: Mobility  Goal: Patient's capacity to carry out activities will improve  Outcome: Progressing

## 2022-06-06 NOTE — PROGRESS NOTES
Bedside report received from ER nurse. Assumed care of pt. PT transferred to T738 on ZOLL. Pt awake, laying in bed. A/Ox4, VSS.Pt oriented to unit and educated to call before getting out of bed. POC reviewed and white board updated. Tele box on. Call light in reach. Bed locked in lowest position with 2 upper bed rails up. Bed alarm on. Pt is a HIGH fall risk with assistance of one person necessary.

## 2022-06-06 NOTE — FACE TO FACE
Face to Face Supporting Documentation - Home Health    The encounter with this patient was in whole or in part the primary reason for home health admission.    Date of encounter:   Patient:                    MRN:                       YOB: 2022  Heidi Navas  [id number]  1955     Home health to see patient for:  Skilled Nursing care for assessment, interventions & education, Physical Therapy evaluation and treatment and Occupational therapy evaluation and treatment    Skilled need for:  Medication Management diabetes    Skilled nursing interventions to include:  Comment: as above    Homebound status evidenced by:  Needs the assistance of another person in order to leave the home. Leaving home requires a considerable and taxing effort. There is a normal inability to leave the home.    Community Physician to provide follow up care: Viviana Thorne M.D.     Optional Interventions? No      I certify the face to face encounter for this home health care referral meets the CMS requirements and the encounter/clinical assessment with the patient was, in whole, or in part, for the medical condition(s) listed above, which is the primary reason for home health care. Based on my clinical findings: the service(s) are medically necessary, support the need for home health care, and the homebound criteria are met.  I certify that this patient has had a face to face encounter by myself.  Osmin Potts M.D. - NPI: [id number]

## 2022-06-06 NOTE — PROGRESS NOTES
Monitor Summary    Rhythm: SR  Rate: 86-94  Ectopy: (R) PVC, (R) PAC  Measurement: .17/.08/.37

## 2022-06-06 NOTE — PROGRESS NOTES
4 Eyes Skin Assessment Completed by YOLIS Hardwick and YOLIS Garcia.    Head WDL  Ears Redness and Blanching  Nose Redness and Blanching  Mouth WDL  Neck WDL  Breast/Chest WDL  Shoulder Blades WDL  Spine WDL  (R) Arm/Elbow/Hand WDL  (L) Arm/Elbow/Hand WDL  Abdomen WDL  Groin WDL  Scrotum/Coccyx/Buttocks Redness, Non-Blanching, Excoriation and Discoloration  (R) Leg WDL  (L) Leg WDL  (R) Heel/Foot/Toe WDL  (L) Heel/Foot/Toe WDL          Devices In Places Tele Box, Blood Pressure Cuff and Pulse Ox      Interventions In Place Pillows and Barrier Cream    Possible Skin Injury Yes    Pictures Uploaded Into Epic Yes  Wound Consult Placed Yes  RN Wound Prevention Protocol Ordered Yes

## 2022-06-07 ENCOUNTER — HOME HEALTH ADMISSION (OUTPATIENT)
Dept: HOME HEALTH SERVICES | Facility: HOME HEALTHCARE | Age: 67
End: 2022-06-07
Payer: MEDICARE

## 2022-06-07 LAB
ALBUMIN SERPL BCP-MCNC: 3 G/DL (ref 3.2–4.9)
BUN SERPL-MCNC: 9 MG/DL (ref 8–22)
CALCIUM SERPL-MCNC: 8.7 MG/DL (ref 8.5–10.5)
CHLORIDE SERPL-SCNC: 101 MMOL/L (ref 96–112)
CO2 SERPL-SCNC: 26 MMOL/L (ref 20–33)
CREAT SERPL-MCNC: 0.49 MG/DL (ref 0.5–1.4)
ERYTHROCYTE [DISTWIDTH] IN BLOOD BY AUTOMATED COUNT: 51.7 FL (ref 35.9–50)
GFR SERPLBLD CREATININE-BSD FMLA CKD-EPI: 103 ML/MIN/1.73 M 2
GLUCOSE BLD STRIP.AUTO-MCNC: 126 MG/DL (ref 65–99)
GLUCOSE BLD STRIP.AUTO-MCNC: 187 MG/DL (ref 65–99)
GLUCOSE BLD STRIP.AUTO-MCNC: 292 MG/DL (ref 65–99)
GLUCOSE SERPL-MCNC: 131 MG/DL (ref 65–99)
HAPTOGLOB SERPL-MCNC: 346 MG/DL (ref 30–200)
HCT VFR BLD AUTO: 28.6 % (ref 37–47)
HGB BLD-MCNC: 8.5 G/DL (ref 12–16)
MCH RBC QN AUTO: 20.1 PG (ref 27–33)
MCHC RBC AUTO-ENTMCNC: 29.7 G/DL (ref 33.6–35)
MCV RBC AUTO: 67.6 FL (ref 81.4–97.8)
PHOSPHATE SERPL-MCNC: 4.3 MG/DL (ref 2.5–4.5)
PLATELET # BLD AUTO: 501 K/UL (ref 164–446)
PMV BLD AUTO: 9.4 FL (ref 9–12.9)
POTASSIUM SERPL-SCNC: 3.6 MMOL/L (ref 3.6–5.5)
RBC # BLD AUTO: 4.23 M/UL (ref 4.2–5.4)
SODIUM SERPL-SCNC: 137 MMOL/L (ref 135–145)
WBC # BLD AUTO: 9.8 K/UL (ref 4.8–10.8)

## 2022-06-07 PROCEDURE — 97162 PT EVAL MOD COMPLEX 30 MIN: CPT

## 2022-06-07 PROCEDURE — A9270 NON-COVERED ITEM OR SERVICE: HCPCS | Performed by: INTERNAL MEDICINE

## 2022-06-07 PROCEDURE — 99232 SBSQ HOSP IP/OBS MODERATE 35: CPT | Performed by: HOSPITALIST

## 2022-06-07 PROCEDURE — A9270 NON-COVERED ITEM OR SERVICE: HCPCS | Performed by: HOSPITALIST

## 2022-06-07 PROCEDURE — 700102 HCHG RX REV CODE 250 W/ 637 OVERRIDE(OP): Performed by: INTERNAL MEDICINE

## 2022-06-07 PROCEDURE — 700105 HCHG RX REV CODE 258: Performed by: STUDENT IN AN ORGANIZED HEALTH CARE EDUCATION/TRAINING PROGRAM

## 2022-06-07 PROCEDURE — 97602 WOUND(S) CARE NON-SELECTIVE: CPT

## 2022-06-07 PROCEDURE — 700102 HCHG RX REV CODE 250 W/ 637 OVERRIDE(OP): Performed by: HOSPITALIST

## 2022-06-07 PROCEDURE — 770001 HCHG ROOM/CARE - MED/SURG/GYN PRIV*

## 2022-06-07 PROCEDURE — 82962 GLUCOSE BLOOD TEST: CPT

## 2022-06-07 PROCEDURE — 97165 OT EVAL LOW COMPLEX 30 MIN: CPT

## 2022-06-07 PROCEDURE — 700102 HCHG RX REV CODE 250 W/ 637 OVERRIDE(OP): Performed by: STUDENT IN AN ORGANIZED HEALTH CARE EDUCATION/TRAINING PROGRAM

## 2022-06-07 PROCEDURE — 85027 COMPLETE CBC AUTOMATED: CPT

## 2022-06-07 PROCEDURE — 80069 RENAL FUNCTION PANEL: CPT

## 2022-06-07 PROCEDURE — 700111 HCHG RX REV CODE 636 W/ 250 OVERRIDE (IP): Performed by: INTERNAL MEDICINE

## 2022-06-07 PROCEDURE — 700111 HCHG RX REV CODE 636 W/ 250 OVERRIDE (IP): Performed by: STUDENT IN AN ORGANIZED HEALTH CARE EDUCATION/TRAINING PROGRAM

## 2022-06-07 PROCEDURE — A9270 NON-COVERED ITEM OR SERVICE: HCPCS | Performed by: STUDENT IN AN ORGANIZED HEALTH CARE EDUCATION/TRAINING PROGRAM

## 2022-06-07 RX ORDER — MICONAZOLE NITRATE 20 MG/G
CREAM TOPICAL 2 TIMES DAILY
Status: DISCONTINUED | OUTPATIENT
Start: 2022-06-07 | End: 2022-06-08 | Stop reason: HOSPADM

## 2022-06-07 RX ADMIN — OXYCODONE 5 MG: 5 TABLET ORAL at 14:55

## 2022-06-07 RX ADMIN — OXYCODONE 5 MG: 5 TABLET ORAL at 21:05

## 2022-06-07 RX ADMIN — FOLIC ACID 1 MG: 1 TABLET ORAL at 05:30

## 2022-06-07 RX ADMIN — ZOLPIDEM TARTRATE 5 MG: 5 TABLET ORAL at 21:13

## 2022-06-07 RX ADMIN — SODIUM CHLORIDE 250 MG: 9 INJECTION, SOLUTION INTRAVENOUS at 16:48

## 2022-06-07 RX ADMIN — SERTRALINE 25 MG: 50 TABLET, FILM COATED ORAL at 05:31

## 2022-06-07 RX ADMIN — ACETAMINOPHEN 1000 MG: 500 TABLET, FILM COATED ORAL at 05:30

## 2022-06-07 RX ADMIN — OXYCODONE 5 MG: 5 TABLET ORAL at 03:09

## 2022-06-07 RX ADMIN — ACETAMINOPHEN 1000 MG: 500 TABLET, FILM COATED ORAL at 17:55

## 2022-06-07 RX ADMIN — INSULIN LISPRO 3 UNITS: 100 INJECTION, SOLUTION INTRAVENOUS; SUBCUTANEOUS at 12:26

## 2022-06-07 RX ADMIN — MICONAZOLE NITRATE: 20 CREAM TOPICAL at 12:24

## 2022-06-07 RX ADMIN — OMEPRAZOLE 20 MG: 20 CAPSULE, DELAYED RELEASE ORAL at 05:30

## 2022-06-07 RX ADMIN — HYDROMORPHONE HYDROCHLORIDE 0.25 MG: 1 INJECTION, SOLUTION INTRAMUSCULAR; INTRAVENOUS; SUBCUTANEOUS at 22:04

## 2022-06-07 RX ADMIN — SENNOSIDES AND DOCUSATE SODIUM 2 TABLET: 50; 8.6 TABLET ORAL at 05:30

## 2022-06-07 RX ADMIN — OXYCODONE 5 MG: 5 TABLET ORAL at 09:39

## 2022-06-07 RX ADMIN — SENNOSIDES AND DOCUSATE SODIUM 2 TABLET: 50; 8.6 TABLET ORAL at 17:55

## 2022-06-07 RX ADMIN — LISINOPRIL 5 MG: 10 TABLET ORAL at 05:31

## 2022-06-07 RX ADMIN — ROSUVASTATIN CALCIUM 10 MG: 20 TABLET, FILM COATED ORAL at 17:55

## 2022-06-07 RX ADMIN — OMEPRAZOLE 20 MG: 20 CAPSULE, DELAYED RELEASE ORAL at 17:55

## 2022-06-07 RX ADMIN — MICONAZOLE NITRATE: 20 CREAM TOPICAL at 16:49

## 2022-06-07 RX ADMIN — OXYCODONE 5 MG: 5 TABLET ORAL at 17:55

## 2022-06-07 RX ADMIN — ACETAMINOPHEN 1000 MG: 500 TABLET, FILM COATED ORAL at 12:19

## 2022-06-07 RX ADMIN — THIAMINE HCL TAB 100 MG 100 MG: 100 TAB at 05:30

## 2022-06-07 RX ADMIN — INSULIN LISPRO 7 UNITS: 100 INJECTION, SOLUTION INTRAVENOUS; SUBCUTANEOUS at 16:55

## 2022-06-07 ASSESSMENT — ACTIVITIES OF DAILY LIVING (ADL): TOILETING: INDEPENDENT

## 2022-06-07 ASSESSMENT — FIBROSIS 4 INDEX: FIB4 SCORE: 0.73

## 2022-06-07 ASSESSMENT — COGNITIVE AND FUNCTIONAL STATUS - GENERAL
MOBILITY SCORE: 23
SUGGESTED CMS G CODE MODIFIER MOBILITY: CI
DAILY ACTIVITIY SCORE: 21
CLIMB 3 TO 5 STEPS WITH RAILING: A LITTLE
SUGGESTED CMS G CODE MODIFIER DAILY ACTIVITY: CJ
HELP NEEDED FOR BATHING: A LITTLE
DRESSING REGULAR LOWER BODY CLOTHING: A LITTLE
TOILETING: A LITTLE

## 2022-06-07 ASSESSMENT — PAIN DESCRIPTION - PAIN TYPE
TYPE: ACUTE PAIN
TYPE: ACUTE PAIN;CHRONIC PAIN
TYPE: ACUTE PAIN;CHRONIC PAIN
TYPE: ACUTE PAIN
TYPE: ACUTE PAIN
TYPE: ACUTE PAIN;CHRONIC PAIN
TYPE: ACUTE PAIN;CHRONIC PAIN
TYPE: ACUTE PAIN

## 2022-06-07 ASSESSMENT — GAIT ASSESSMENTS
DISTANCE (FEET): 500
GAIT LEVEL OF ASSIST: MODIFIED INDEPENDENT
ASSISTIVE DEVICE: FRONT WHEEL WALKER

## 2022-06-07 ASSESSMENT — ENCOUNTER SYMPTOMS
FEVER: 0
CHILLS: 0

## 2022-06-07 NOTE — CARE PLAN
The patient is Watcher - Medium risk of patient condition declining or worsening    Shift Goals  Clinical Goals: Pain control, remain hemodynamically stable  Patient Goals: Pain control, comfort    Progress made toward(s) clinical / shift goals:    Problem: Knowledge Deficit - Standard  Goal: Patient and family/care givers will demonstrate understanding of plan of care, disease process/condition, diagnostic tests and medications  Outcome: Progressing     Problem: Fall Risk  Goal: Patient will remain free from falls  Outcome: Progressing       Patient is not progressing towards the following goals: N/A

## 2022-06-07 NOTE — THERAPY
Physical Therapy   Initial Evaluation     Patient Name: Heidi Navas  Age:  67 y.o., Sex:  female  Medical Record #: [id number]  Today's Date: 6/7/2022     Precautions  Precautions: Fall Risk    Assessment  Patient is 67 y.o. female with a diagnosis of GIB, depression, hyponatremia, and elevated BG of 895 upon admission. Pt demonstrates generalized weakness, but completes transfers and ambulation  (500') mod(I) w/FWW (underlying safety considerations with residual balance deficits and medical impairments). Pt reports performing currently at a higher level of function than previously, and acute PT not indicated in the hospital. Recommend continued mobility under direction of nursing. Pt could benefit from HHPT if available upon D/C for continued strengthening/balance interventions.    Plan    Recommend Physical Therapy for Evaluation only .    DC Equipment Recommendations: None (has cane and walker at home)  Discharge Recommendations: Recommend home health for continued physical therapy services       Subjective  Pt reported feeling and moving better than recent baseline. Pt states she wants to go home, and does not have any mobility concerns at this time. Pt stated the only thing she wants to improve is being able to control her bladder.     Objective     06/07/22 1357   Initial Contact Note    Initial Contact Note Order Received and Verified, Evaluation Only - Patient Does Not Require Further Acute Physical Therapy at this Time.  However, May Benefit from Post Acute Therapy for Higher Level Functional Deficits.   Precautions   Precautions Fall Risk   Vitals   Room Air Oximetry 94   O2 Delivery Device None - Room Air   Pain 0 - 10 Group   Location Shoulder;Hip   Location Orientation Left;Right   Pain Rating Scale (NPRS) 9   Therapist Pain Assessment   (no pain behaviors observed)   Prior Living Situation   [facility]   Housing / Facility [address] [facility]   Steps Into Home 1   Steps In  "Home 0   Equipment Owned Front-Wheel Walker;Tub / Shower Seat;Quad Cane   Lives with - Patient's Self Care Capacity Unrelated Adult  (Pt reported renting a room from an old friend, and has a caregiver from [employer] atleast 2hrs/day, 7 days/wk)   Prior Level of Functional Mobility   Bed Mobility Independent   Transfer Status Independent   Ambulation Independent   Distance Ambulation (Feet)   (household)   Assistive Devices Used Quad Cane   Comments Pt reported not moving around much and losing 50 pounds in recent months. Pt states roommate cooks meals, and caregiver sometimes assists with dressing/showering   History of Falls   History of Falls No   Cognition    Level of Consciousness Alert   Comments pleasant and cooperative   Passive ROM Lower Body   Passive ROM Lower Body WDL   Active ROM Lower Body    Active ROM Lower Body  WDL   Strength Lower Body   Lower Body Strength  X   Rt Hip Flexion Strength 4- (G-)   Rt Knee Flexion Strength 4- (G-)   Lt Hip Flexion Strength 4- (G-)   Lt Knee Flexion Strength 4- (G-)   Comments Grossly 4+/5   Sensation Lower Body   Lower Extremity Sensation   WDL   Lower Body Muscle Tone   Lower Body Muscle Tone  WDL   Strength Upper Body   Comments Pt reported chronic tingling in both hands   Upper Body Muscle Tone   Upper Body Muscle Tone  WDL   Balance Assessment   Sitting Balance (Static) Fair +   Standing Balance (Static) Fair +   Gait Analysis   Gait Level Of Assist Modified Independent  (safety considerations given underlying mobility and medical impairments)   Assistive Device Front Wheel Walker   Distance (Feet) 500   # of Times Distance was Traveled 1   Deviation   (Decreased lenka, short step length, slight swivel tendency of walker)   # of Stairs Climbed 1  (2\" step)   Level of Assist with Stairs Standby Assist   Bed Mobility    Supine to Sit   (sitting EOB prior to arrival)   Scooting Independent   Functional Mobility   Sit to Stand Modified Independent  (FWW) "   Mobility completed bathroom ADLs with OT   How much difficulty does the patient currently have...   Turning over in bed (including adjusting bedclothes, sheets and blankets)? 4   Sitting down on and standing up from a chair with arms (e.g., wheelchair, bedside commode, etc.) 4   Moving from lying on back to sitting on the side of the bed? 4   How much help from another person does the patient currently need...   Moving to and from a bed to a chair (including a wheelchair)? 4   Need to walk in a hospital room? 4   Climbing 3-5 steps with a railing? 3   6 clicks Mobility Score 23   Patient / Family Goals    Patient / Family Goal #1 To improve urinary incontinence   Education Group   Education Provided Role of Physical Therapist   Role of Physical Therapist Patient Response Verbal Demonstration   Problem List    Problems Impaired Balance   Anticipated Discharge Equipment and Recommendations   DC Equipment Recommendations None  (has cane and walker at home)   Discharge Recommendations Recommend home health for continued physical therapy services   Interdisciplinary Plan of Care Collaboration   IDT Collaboration with  Occupational Therapist;Nursing   Patient Position at End of Therapy Seated;Chair Alarm On;Call Light within Reach;Tray Table within Reach   Session Information   Date / Session Number  6/7- eval only

## 2022-06-07 NOTE — CARE PLAN
The patient is Stable - Low risk of patient condition declining or worsening    Shift Goals  Clinical Goals: Pain control  Patient Goals: Pain control    Progress made toward(s) clinical / shift goals:    Problem: Knowledge Deficit - Standard  Goal: Patient and family/care givers will demonstrate understanding of plan of care, disease process/condition, diagnostic tests and medications  Outcome: Progressing     Problem: Pain - Standard  Goal: Alleviation of pain or a reduction in pain to the patient’s comfort goal  Outcome: Progressing       Patient is not progressing towards the following goals:

## 2022-06-07 NOTE — DISCHARGE PLANNING
Care Transition Team Assessment    LSW met with pt at bedside to complete assessment. Pt A&Ox4 and able to verify the information on the face sheet. Pt lives with a roommate in a single story house that has one step to enter. Pt has a good support system including her roommate and two caregivers, Amara and Ana. Pt's roommate works from home and mainly assists her with meals. Pt's caregivers assist her with all ADLs and IADLs, including dressing bathing, shopping, laundry, transportation, etc. Pt has a cane, walker, shower chair, and grab bars at home.    Pt is currently retired and receives SSI/correction. Pt denies any financial, SA, or MH concerns at this time. Pt is unsure if she has an advance directive and stated that Ana is in charge of all of her paperwork. Pt declined AD packet at this time. Pt confirmed one of her caregivers will be able to provide transportation home upon DC.    Information Source  Orientation Level: Oriented X4  Information Given By: Patient  Informant's Name: Heidi Navas  Who is responsible for making decisions for patient? : Patient    Readmission Evaluation  Is this a readmission?: No    Elopement Risk  Legal Hold: No  Ambulatory or Self Mobile in Wheelchair: Yes  Disoriented: No  Psychiatric Symptoms: None  History of Wandering: No  Elopement this Admit: No  Vocalizing Wanting to Leave: No  Displays Behaviors, Body Language Wanting to Leave: No-Not at Risk for Elopement  Elopement Risk: Not at Risk for Elopement    Interdisciplinary Discharge Planning  Patient or legal guardian wants to designate a caregiver: Yes  Caregiver name: Naye Truong  Durable Medical Equipment: Walker, Other - Specify (cane, shower chair, grabbars)    Discharge Preparedness  What is your plan after discharge?: Home with help, Home health care  What are your discharge supports?: Other (comment) (caregivers, roommate)  Prior Functional Level: Ambulatory, Needs Assist with Activities of Daily  Living, Needs Assist with Medication Management, Uses Cane, Uses Walker  Difficulity with ADLs: Bathing, Dressing, Toileting  Difficulity with IADLs: Cooking, Driving, Laundry, Shopping    Functional Assesment  Prior Functional Level: Ambulatory, Needs Assist with Activities of Daily Living, Needs Assist with Medication Management, Uses Cane, Uses Walker    Finances  Financial Barriers to Discharge: No  Prescription Coverage: Yes    Vision / Hearing Impairment  Vision Impairment : Yes  Right Eye Vision: Wears Glasses, Impaired  Left Eye Vision: Wears Glasses, Impaired  Hearing Impairment : No    Advance Directive  Advance Directive?: None  Advance Directive offered?: AD Booklet refused    Domestic Abuse  Have you ever been the victim of abuse or violence?: No  Physical Abuse or Sexual Abuse: No  Verbal Abuse or Emotional Abuse: No  Possible Abuse/Neglect Reported to:: Not Applicable    Psychological Assessment  History of Substance Abuse: None  History of Psychiatric Problems: No  Non-compliant with Treatment: No  Newly Diagnosed Illness: No    Discharge Risks or Barriers  Discharge risks or barriers?: No    Anticipated Discharge Information  Discharge Disposition: D/T to home under [facility] care in anticipation of covered skilled care (06)

## 2022-06-07 NOTE — DISCHARGE PLANNING
[facility] declined via Epic due to no open beds.     [facility] declined via Epic- need PT/OT eval.     1218  Agency/Facility Name: [facility]   Outcome: Cyndy left Vmail regarding referral. Cyndy requesting PT/OT eval, and will keep referral in pending status.     1535  Received Choice form at 1531  Agency/Facility Name: [facility]    Referral sent per Choice form @ 153

## 2022-06-07 NOTE — DISCHARGE PLANNING
ATTN: Case Management  RE: Referral for Home Health    As of 06.07.2022, we have accepted the Home Health referral for the patient listed above.    A [facility] clinician will be out to see the patient within 48 hours. If you have any questions or concerns regarding the patient’s transition to Home Health, please do not hesitate to contact us at [phone].      We look forward to collaborating with you,  [facility] Health Team

## 2022-06-07 NOTE — PROGRESS NOTES
Monitor summary:        Rhythm: SR   Rate: 81-94  Ectopy: (O)PVC  Measurements: 18./.07/.36        12hr chart check

## 2022-06-07 NOTE — THERAPY
"Occupational Therapy   Initial Evaluation     Patient Name: Heidi Navas  Age:  67 y.o., Sex:  female  Medical Record #: [id number]  Today's Date: 6/7/2022     Precautions  Precautions: (P) Fall Risk    Assessment  Patient is 67 y.o. female who presents to [facility] for GIB, depression, and hyponatremia. Pt reports at baseline she rents a room from someone who assists w/ meals, and receives assist from caregiver daily. Pt demo'd BADLs at SPV/SBA level. Recommend DC home w/ HH to ensure safe transition back to natural environment. No further acute OT needs at this time.     Plan    Recommend Occupational Therapy DC needs only    DC Equipment Recommendations: (P) None  Discharge Recommendations: (P) Recommend home health for continued occupational therapy services     Subjective    \"I am ready to go home\"     Objective       06/07/22 1403   Charge Group   OT Evaluation OT Evaluation Low   Total Time Spent   OT Time Spent Yes   OT Evaluation (Minutes) 20   OT Total Time Spent (Calculated) 20   Initial Contact Note    Initial Contact Note Order Received and Verified, Occupational Therapy Evaluation in Progress with Full Report to Follow.   Prior Living Situation   Prior Services Intermittent Physical Support for ADL Per Service   Housing / Facility [facility]   Bathroom Set up Bathtub / Shower Combination;Shower Chair   Equipment Owned Front-Wheel Walker;Single Point Cane;Tub / Shower Seat   Lives with - Patient's Self Care Capacity Unrelated Adult   Comments Reports she lives w/ roommate who assists w/ meal prep, caregiver comes over daily and assists w/ bathing and IADLs   Prior Level of ADL Function   Self Feeding Independent   Grooming / Hygiene Independent   Bathing Requires Assist   Dressing Independent   Toileting Independent   Comments at baseline wears a brief due to urinary urgency   Prior Level of IADL Function   Medication Management Requires Assist   Laundry Requires Assist   Kitchen Mobility Requires Assist "   Home Management Requires Assist   Shopping Requires Assist   Prior Level Of Mobility Independent With Device in Community;Independent With Device in Home   Precautions   Precautions Fall Risk   Pain 0 - 10 Group   Therapist Pain Assessment Nurse Notified;Post Activity Pain Same as Prior to Activity  (c/o pain in shoulder and hip, agreeable to session)   Cognition    Cognition / Consciousness WDL   Level of Consciousness Alert   Comments pleasent and cooperative   Active ROM Upper Body   Active ROM Upper Body  WDL   Strength Upper Body   Upper Body Strength  WDL   Coordination Upper Body   Coordination WDL   Balance Assessment   Sitting Balance (Static) Fair +   Sitting Balance (Dynamic) Fair   Standing Balance (Static) Fair   Standing Balance (Dynamic) Fair -   Weight Shift Sitting Good   Weight Shift Standing Fair   Comments w/ FWW, no [place] LOB   Bed Mobility    Supine to Sit   (seated EOB upon arrival)   Sit to Supine   (in chair post)   Scooting Supervised   ADL Assessment   Grooming Supervision;Standing   Upper Body Dressing Supervision   Toileting Supervision   How much help from another person does the patient currently need...   Putting on and taking off regular lower body clothing? 3   Bathing (including washing, rinsing, and drying)? 3   Toileting, which includes using a toilet, bedpan, or urinal? 3   Putting on and taking off regular upper body clothing? 4   Taking care of personal grooming such as brushing teeth? 4   Eating meals? 4   6 Clicks Daily Activity Score 21   Functional Mobility   Sit to Stand Supervised   Bed, Chair, Wheelchair Transfer Supervised   Toilet Transfers Supervised   Mobility within room and bathroom w/ FWW   Activity Tolerance   Sitting in Chair 10+ min (up post)   Sitting Edge of Bed 5 min   Standing 12 min   Patient / Family Goals   Patient / Family Goal #1 To go home   Short Term Goals   Short Term Goal # 1 Pt will have no further acute OT needs by time of DC   Education  Group   Role of Occupational Therapist Patient Response Patient;Acceptance;Explanation;Demonstration;Verbal Demonstration;Action Demonstration   Problem List   Problem List Decreased Active Daily Living Skills;Decreased Homemaking Skills   Anticipated Discharge Equipment and Recommendations   DC Equipment Recommendations None   Discharge Recommendations Recommend home health for continued occupational therapy services   Interdisciplinary Plan of Care Collaboration   IDT Collaboration with  Nursing   Patient Position at End of Therapy Seated;Chair Alarm On;Call Light within Reach;Tray Table within Reach;Phone within Reach   Collaboration Comments report given   Session Information   Date / Session Number  6/7, DC needs only

## 2022-06-07 NOTE — PROGRESS NOTES
[facility] Medicine Daily Progress Note    Date of Service  6/7/2022    Chief Complaint  Heidi Navas is a 67 y.o. female admitted 6/4/2022 with hyperglycemia and weakness    Hospital Course  67-year-old female with a history of diabetes, dyslipidemia with chronic and continuous presented 6/4 with generalized weakness, patient states she has had generalized weakness with fatigue and depression, denies any significant bleeding or fever and no other symptoms or urinary symptoms, patient states she has had drinking so much sugar and juice, when we asked her about the reason she states she stops caring about her diabetes, on admission blood sugar was more than 800, no anion sixto, A1c 17 hemoglobin 9.1, IV fluid was started.     Interval Problem Update  Seen and examined. Chart reviewed, including labs and any pertinent imaging. Laying in bed, comfortable. No complaints this AM. Reviewed labs and CT.     I have personally seen and examined the patient at bedside. I discussed the plan of care with patient and bedside RN.    Consultants/Specialty  None     Code Status  DNAR/DNI    Disposition  Patient is medically cleared for discharge.   Anticipate discharge to to skilled nursing facility.  I have placed the appropriate orders for post-discharge needs.    Review of Systems  Review of Systems   Constitutional: Negative for chills and fever.      Physical Exam  Temp:  [36 °C (96.8 °F)-37.2 °C (98.9 °F)] 36 °C (96.8 °F)  Pulse:  [91-97] 92  Resp:  [16-18] 18  BP: (127-154)/(64-75) 127/64  SpO2:  [91 %-97 %] 92 %    Physical Exam  Constitutional:       Appearance: She is ill-appearing.      Comments: Cachectic appearance  Older appearing   HENT:      Head: Normocephalic and atraumatic.      Comments: Temporal wasting  Eyes:      General: No scleral icterus.  Cardiovascular:      Rate and Rhythm: Normal rate.      Heart sounds: No murmur heard.  Pulmonary:      Effort: No respiratory distress.      Breath sounds: No rales.    Abdominal:      General: There is no distension.      Palpations: Abdomen is soft.      Tenderness: There is no abdominal tenderness.   Genitourinary:     Rectum: Normal.   Musculoskeletal:      Right lower leg: No edema.      Left lower leg: No edema.   Skin:     General: Skin is dry.      Coloration: Skin is pale. Skin is not jaundiced.      Findings: No bruising, erythema or rash.   Neurological:      Mental Status: She is alert and oriented to person, place, and time. Mental status is at baseline.      Cranial Nerves: No cranial nerve deficit.      Motor: No weakness.      Gait: Gait normal.   Psychiatric:         Judgment: Judgment normal.      Comments: Patient is depressed         Fluids    Intake/Output Summary (Last 24 hours) at 6/7/2022 0736  Last data filed at 6/7/2022 0300  Gross per 24 hour   Intake 1320 ml   Output --   Net 1320 ml       Laboratory  Recent Labs     06/05/22  0513 06/05/22  1710 06/06/22  1525 06/07/22  0417   WBC 11.8*  --  11.2* 9.8   RBC 4.17*  --  4.09* 4.23   HEMOGLOBIN 8.4* 8.2* 8.2* 8.5*   HEMATOCRIT 28.4* 27.7* 27.7* 28.6*   MCV 68.1*  --  67.7* 67.6*   MCH 20.1*  --  20.0* 20.1*   MCHC 29.6*  --  29.6* 29.7*   RDW 51.9*  --  52.1* 51.7*   PLATELETCT 451*  --  471* 501*   MPV 9.4  --  9.7 9.4     Recent Labs     06/04/22  1457 06/05/22  0513 06/07/22  0417   SODIUM 124* 136 137   POTASSIUM 4.8 3.6 3.6   CHLORIDE 88* 102 101   CO2 24 23 26   GLUCOSE 895* 190* 131*   BUN 15 11 9   CREATININE 0.87 0.42* 0.49*   CALCIUM 9.0 8.6 8.7     Recent Labs     06/04/22  1457   APTT 36.2*   INR 1.11         Recent Labs     06/05/22  0513   TRIGLYCERIDE 111   HDL 34*   LDL 48       Imaging  CT-ABDOMEN-PELVIS WITH   Final Result      1.  No evidence of active arterial extravasation into the GI tract on this single phase venous study. No abdominopelvic hematoma.   2.  Tiny distal colonic diverticula.   3.  Nonobstructing 5 mm stone in the right kidney. No hydronephrosis.      DX-CHEST-PORTABLE  "(1 VIEW)   Final Result      No acute cardiac or pulmonary abnormalities are identified.           Assessment/Plan  * Hyperosmolar hyperglycemic state (HHS), pressure ulcer, anemia (HCC)  Assessment & Plan  Patient is not following her diet   glucose 895, HbA1c 17, normal AG at admission  Uncontrolled BGs in the 300s.  Lantus 15 units BID  Daibetes education    Depression  Assessment & Plan  Patient has low mood, insomnia and hopelessness  no SI or HI  start to Zoloft   for social issues    Pressure ulcer, sacrum  Assessment & Plan  Wound care    Dehydration  Assessment & Plan  Continue IV fluid  improving    Hyponatremia  Assessment & Plan  Pseudohyponatremia in setting of current hyperglycemia  IVF    ACP (advance care planning)  Assessment & Plan  Patient wanted DNR DNI    Severely underweight adult  Assessment & Plan  PO intake as tolerated  Ensure  Body mass index is 17.73 kg/m².      Severe protein-calorie malnutrition (HCC)  Assessment & Plan  PO intake as tolerated  Ensure\"    Supplements ordered    DM (diabetes mellitus), secondary uncontrolled (HCC)  Assessment & Plan  As noted above    Acute on chronic blood loss anemia  Assessment & Plan  Iron deficiency anemia   patient states she has rectal prolapse   rectal exam did not show any mass or bleeding   patient refused colonoscopy, holding on G.I. consult   Transfuse for Hgb<7 or hemodynamic instability  IV iron was ordered  consider CT scan for abdomen\"    CT as above    GIB (gastrointestinal bleeding)- (present on admission)  Assessment & Plan  Suspected  PPI    Adult failure to thrive- (present on admission)  Assessment & Plan  Patient has significant depression   denied any SI or HI   supportive care  PT and OT  start with Zoloft\"    Body mass index is 18.94 kg/m².  Supplements ordered    Iron deficiency anemia due to chronic blood loss  Assessment & Plan  Iron 19  IV iron infusion  rectal exam did not show any bleeding  patient refused " colonoscopy, holding on G.I. consult    CT findings:  1.  No evidence of active arterial extravasation into the GI tract on this single phase venous study. No abdominopelvic hematoma.  2.  Tiny distal colonic diverticula.  3.  Nonobstructing 5 mm stone in the right kidney. No hydronephrosis.    Outpatient GI followup; consult if actively bleeding, Hb drops or symptomatic anemia    Leukocytosis  Assessment & Plan  Likely reactive  U/A, CXR, CT Ab show no UTI or pneumonia  Resolved    Vitamin D insufficiency- (present on admission)  Assessment & Plan  Continue supplementation      VTE prophylaxis: SCDs/TEDs xarelto     I have performed a physical exam and reviewed and updated ROS and Plan today (6/7/2022). In review of yesterday's note (6/6/2022), there are no changes except as documented above.

## 2022-06-08 ENCOUNTER — PHARMACY VISIT (OUTPATIENT)
Dept: PHARMACY | Facility: MEDICAL CENTER | Age: 67
End: 2022-06-08
Payer: MEDICARE

## 2022-06-08 VITALS
TEMPERATURE: 98 F | RESPIRATION RATE: 18 BRPM | WEIGHT: 116.4 LBS | HEIGHT: 68 IN | HEART RATE: 88 BPM | DIASTOLIC BLOOD PRESSURE: 63 MMHG | SYSTOLIC BLOOD PRESSURE: 121 MMHG | BODY MASS INDEX: 17.64 KG/M2 | OXYGEN SATURATION: 91 %

## 2022-06-08 LAB
ACETONE SERPL-MCNC: <5 MG/DL
GLUCOSE BLD STRIP.AUTO-MCNC: 105 MG/DL (ref 65–99)
GLUCOSE BLD STRIP.AUTO-MCNC: 171 MG/DL (ref 65–99)
GLUCOSE BLD STRIP.AUTO-MCNC: 96 MG/DL (ref 65–99)

## 2022-06-08 PROCEDURE — A9270 NON-COVERED ITEM OR SERVICE: HCPCS | Performed by: STUDENT IN AN ORGANIZED HEALTH CARE EDUCATION/TRAINING PROGRAM

## 2022-06-08 PROCEDURE — 82962 GLUCOSE BLOOD TEST: CPT

## 2022-06-08 PROCEDURE — 700102 HCHG RX REV CODE 250 W/ 637 OVERRIDE(OP): Performed by: INTERNAL MEDICINE

## 2022-06-08 PROCEDURE — RXMED WILLOW AMBULATORY MEDICATION CHARGE: Performed by: HOSPITALIST

## 2022-06-08 PROCEDURE — 99239 HOSP IP/OBS DSCHRG MGMT >30: CPT | Performed by: HOSPITALIST

## 2022-06-08 PROCEDURE — A9270 NON-COVERED ITEM OR SERVICE: HCPCS | Performed by: INTERNAL MEDICINE

## 2022-06-08 PROCEDURE — 700102 HCHG RX REV CODE 250 W/ 637 OVERRIDE(OP): Performed by: STUDENT IN AN ORGANIZED HEALTH CARE EDUCATION/TRAINING PROGRAM

## 2022-06-08 RX ORDER — DIPHENHYDRAMINE HYDROCHLORIDE 25 MG/1
CAPSULE, LIQUID FILLED ORAL
Qty: 1 KIT | Refills: 0 | Status: SHIPPED | OUTPATIENT
Start: 2022-06-08

## 2022-06-08 RX ORDER — OMEPRAZOLE 20 MG/1
20 CAPSULE, DELAYED RELEASE ORAL 2 TIMES DAILY
Qty: 60 CAPSULE | Refills: 0 | Status: SHIPPED | OUTPATIENT
Start: 2022-06-08 | End: 2022-10-14 | Stop reason: SDUPTHER

## 2022-06-08 RX ORDER — LANCETS
EACH MISCELLANEOUS
Qty: 100 EACH | Refills: 0 | Status: SHIPPED | OUTPATIENT
Start: 2022-06-08

## 2022-06-08 RX ORDER — UBIQUINOL 100 MG
CAPSULE ORAL
Qty: 100 EACH | Refills: 0 | Status: SHIPPED | OUTPATIENT
Start: 2022-06-08

## 2022-06-08 RX ORDER — OXYCODONE HYDROCHLORIDE 5 MG/1
5 TABLET ORAL EVERY 8 HOURS PRN
Qty: 15 TABLET | Refills: 0 | Status: SHIPPED | OUTPATIENT
Start: 2022-06-08 | End: 2022-06-13

## 2022-06-08 RX ORDER — ROSUVASTATIN CALCIUM 10 MG/1
10 TABLET, COATED ORAL EVERY EVENING
Qty: 90 TABLET | Refills: 0 | Status: SHIPPED | OUTPATIENT
Start: 2022-06-08 | End: 2022-09-09 | Stop reason: SDUPTHER

## 2022-06-08 RX ORDER — SERTRALINE HYDROCHLORIDE 25 MG/1
25 TABLET, FILM COATED ORAL DAILY
Qty: 90 TABLET | Refills: 0 | Status: SHIPPED | OUTPATIENT
Start: 2022-06-09 | End: 2022-09-09 | Stop reason: SDUPTHER

## 2022-06-08 RX ORDER — LISINOPRIL 5 MG/1
5 TABLET ORAL DAILY
Qty: 90 TABLET | Refills: 0 | Status: SHIPPED | OUTPATIENT
Start: 2022-06-09 | End: 2022-09-14

## 2022-06-08 RX ADMIN — THIAMINE HCL TAB 100 MG 100 MG: 100 TAB at 05:06

## 2022-06-08 RX ADMIN — MICONAZOLE NITRATE: 20 CREAM TOPICAL at 05:15

## 2022-06-08 RX ADMIN — OXYCODONE 5 MG: 5 TABLET ORAL at 05:05

## 2022-06-08 RX ADMIN — ACETAMINOPHEN 1000 MG: 500 TABLET, FILM COATED ORAL at 05:06

## 2022-06-08 RX ADMIN — OXYCODONE 5 MG: 5 TABLET ORAL at 00:16

## 2022-06-08 RX ADMIN — FOLIC ACID 1 MG: 1 TABLET ORAL at 05:06

## 2022-06-08 RX ADMIN — ACETAMINOPHEN 1000 MG: 500 TABLET, FILM COATED ORAL at 00:16

## 2022-06-08 RX ADMIN — INSULIN LISPRO 3 UNITS: 100 INJECTION, SOLUTION INTRAVENOUS; SUBCUTANEOUS at 00:20

## 2022-06-08 RX ADMIN — OMEPRAZOLE 20 MG: 20 CAPSULE, DELAYED RELEASE ORAL at 05:06

## 2022-06-08 RX ADMIN — SENNOSIDES AND DOCUSATE SODIUM 2 TABLET: 50; 8.6 TABLET ORAL at 05:06

## 2022-06-08 RX ADMIN — OXYCODONE 5 MG: 5 TABLET ORAL at 08:46

## 2022-06-08 RX ADMIN — SERTRALINE 25 MG: 50 TABLET, FILM COATED ORAL at 05:06

## 2022-06-08 ASSESSMENT — PAIN DESCRIPTION - PAIN TYPE
TYPE: ACUTE PAIN;CHRONIC PAIN

## 2022-06-08 NOTE — CARE PLAN
Problem: Knowledge Deficit - Standard  Goal: Patient and family/care givers will demonstrate understanding of plan of care, disease process/condition, diagnostic tests and medications  Outcome: Progressing     Problem: Fall Risk  Goal: Patient will remain free from falls  Outcome: Progressing     Problem: Pain - Standard  Goal: Alleviation of pain or a reduction in pain to the patient’s comfort goal  Outcome: Progressing     Problem: Psychosocial  Goal: Patient's level of anxiety will decrease  Outcome: Progressing  Goal: Patient's ability to verbalize feelings about condition will improve  Outcome: Progressing  Goal: Patient's ability to re-evaluate and adapt role responsibilities will improve  Outcome: Progressing  Goal: Patient and family will demonstrate ability to cope with life altering diagnosis and/or procedure  Outcome: Progressing  Goal: Spiritual and cultural needs incorporated into hospitalization  Outcome: Progressing     Problem: Communication  Goal: The ability to communicate needs accurately and effectively will improve  Outcome: Progressing     Problem: Discharge Barriers/Planning  Goal: Patient's continuum of care needs are met  Outcome: Progressing     Problem: Hemodynamics  Goal: Patient's hemodynamics, fluid balance and neurologic status will be stable or improve  Outcome: Progressing     Problem: Respiratory  Goal: Patient will achieve/maintain optimum respiratory ventilation and gas exchange  Outcome: Progressing     Problem: Chest Tube Management  Goal: Complications related to chest tube will be avoided or minimized  Outcome: Progressing     Problem: Fluid Volume  Goal: Fluid volume balance will be maintained  Outcome: Progressing     Problem: Mechanical Ventilation  Goal: Safe management of artificial airway and ventilation  Outcome: Progressing  Goal: Successful weaning off mechanical ventilator, spontaneously maintains adequate gas exchange  Outcome: Progressing  Goal: Patient will be  able to express needs and understand communication  Outcome: Progressing     Problem: Dysphagia  Goal: Dysphagia will improve  Outcome: Progressing     Problem: Risk for Aspiration  Goal: Patient's risk for aspiration will be absent or decrease  Outcome: Progressing     Problem: Nutrition  Goal: Patient's nutritional and fluid intake will be adequate or improve  Outcome: Progressing  Goal: Enteral nutrition will be maintained or improve  Outcome: Progressing  Goal: Enteral nutrition will be maintained or improve  Outcome: Progressing     Problem: Urinary Elimination  Goal: Establish and maintain regular urinary output  Outcome: Progressing     Problem: Bowel Elimination  Goal: Establish and maintain regular bowel function  Outcome: Progressing     Problem: Gastrointestinal Irritability  Goal: Nausea and vomiting will be absent or improve  Outcome: Progressing  Goal: Diarrhea will be absent or improved  Outcome: Progressing     Problem: Rectal Tube  Goal: Fecal output will be contained and skin will remain free from irritation  Outcome: Progressing     Problem: Mobility  Goal: Patient's capacity to carry out activities will improve  Outcome: Progressing     Problem: Self Care  Goal: Patient will have the ability to perform ADLs independently or with assistance (bathe, groom, dress, toilet and feed)  Outcome: Progressing     Problem: Infection - Standard  Goal: Patient will remain free from infection  Outcome: Progressing     Problem: Wound/ / Incision Healing  Goal: Patient's wound/surgical incision will decrease in size and heals properly  Outcome: Progressing   The patient is Stable - Low risk of patient condition declining or worsening    Shift Goals  Clinical Goals: Pain managment  Patient Goals: Rest    Progress made toward(s) clinical / shift goals:      Patient is not progressing towards the following goals:

## 2022-06-08 NOTE — DISCHARGE INSTRUCTIONS
Discharge Instructions    Discharged to home by car with relative. Discharged via wheelchair, hospital escort: Yes.  Special equipment needed: Not Applicable    Be sure to schedule a follow-up appointment with your primary care doctor or any specialists as instructed.     Discharge Plan:   Diet Plan: Discussed  Activity Level: Discussed  Confirmed Follow up Appointment: Patient to Call and Schedule Appointment  Confirmed Symptoms Management: Discussed  Medication Reconciliation Updated: Yes    I understand that a diet low in cholesterol, fat, and sodium is recommended for good health. Unless I have been given specific instructions below for another diet, I accept this instruction as my diet prescription.   Other diet: Diabetic/Cardiac    Special Instructions: None    Is patient discharged on Warfarin / Coumadin?   No     Depression / Suicide Risk    As you are discharged from this [facility] facility, it is important to learn how to keep safe from harming yourself.    Recognize the warning signs:  Abrupt changes in personality, positive or negative- including increase in energy   Giving away possessions  Change in eating patterns- significant weight changes-  positive or negative  Change in sleeping patterns- unable to sleep or sleeping all the time   Unwillingness or inability to communicate  Depression  Unusual sadness, discouragement and loneliness  Talk of wanting to die  Neglect of personal appearance   Rebelliousness- reckless behavior  Withdrawal from people/activities they love  Confusion- inability to concentrate     If you or a loved one observes any of these behaviors or has concerns about self-harm, here's what you can do:  Talk about it- your feelings and reasons for harming yourself  Remove any means that you might use to hurt yourself (examples: pills, rope, extension cords, firearm)  Get professional help from the community (Mental Health, Substance Abuse, psychological counseling)  Do not be  alone:Call your Safe Contact- someone whom you trust who will be there for you.  Call your local CRISIS HOTLINE 623-5004 or [phone]  Call your local Children's Mobile Crisis Response Team [place] ([phone] 040-7795 or www.[facility]  Call the toll free National Suicide Prevention Hotlines   National Suicide Prevention Lifeline 087-939-RGOQ (0104)  [facility] Line Network 800-SUICIDE (182-3842)    Type 2 Diabetes Mellitus, Self Care, Adult  When you have type 2 diabetes (type 2 diabetes mellitus), you must make sure your blood sugar (glucose) stays in a healthy range. You can do this with:  Nutrition.  Exercise.  Lifestyle changes.  Medicines or insulin, if needed.  Support from your doctors and others.  How to stay aware of blood sugar    Check your blood sugar level every day, as often as told.  Have your A1c (hemoglobin A1c) level checked two or more times a year. Have it checked more often if your doctor tells you to.  Your doctor will set personal treatment goals for you. Generally, you should have these blood sugar levels:  Before meals (preprandial):  mg/dL (4.4-7.2 mmol/L).  After meals (postprandial): below 180 mg/dL (10 mmol/L).  A1c level: less than 7%.  How to manage high and low blood sugar  Signs of high blood sugar  High blood sugar is called hyperglycemia. Know the signs of high blood sugar. Signs may include:  Feeling:  Thirsty.  Hungry.  Very tired.  Needing to pee (urinate) more than usual.  Blurry vision.  Signs of low blood sugar  Low blood sugar is called hypoglycemia. This is when blood sugar is at or below 70 mg/dL (3.9 mmol/L). Signs may include:  Feeling:  Hungry.  Worried or nervous (anxious).  Sweaty and clammy.  Confused.  Dizzy.  Sleepy.  Sick to your stomach (nauseous).  Having:  A fast heartbeat.  A headache.  A change in your vision.  Jerky movements that you cannot control (seizure).  Tingling or no feeling (numbness) around your mouth, lips, or tongue.  Having  trouble with:  Moving (coordination).  Sleeping.  Passing out (fainting).  Getting upset easily (irritability).  Treating low blood sugar  To treat low blood sugar, eat or drink something sugary right away. If you can think clearly and swallow safely, follow the 15:15 rule:  Take 15 grams of a fast-acting carb (carbohydrate). Talk with your doctor about how much you should take.  Some fast-acting carbs are:  Sugar tablets (glucose pills). Take 3-4 pills.  6-8 pieces of hard candy.  4-6 oz (120-150 mL) of fruit juice.  4-6 oz (120-150 mL) of regular (not diet) soda.  1 Tbsp (15 mL) honey or sugar.  Check your blood sugar 15 minutes after you take the carb.  If your blood sugar is still at or below 70 mg/dL (3.9 mmol/L), take 15 grams of a carb again.  If your blood sugar does not go above 70 mg/dL (3.9 mmol/L) after 3 tries, get help right away.  After your blood sugar goes back to normal, eat a meal or a snack within 1 hour.  Treating very low blood sugar  If your blood sugar is at or below 54 mg/dL (3 mmol/L), you have very low blood sugar (severe hypoglycemia). This is an emergency. Do not wait to see if the symptoms will go away. Get medical help right away. Call your local emergency services (911 in the [place]  If you have very low blood sugar and you cannot eat or drink, you may need a glucagon shot (injection). A family member or friend should learn how to check your blood sugar and how to give you a glucagon shot. Ask your doctor if you need to have a glucagon shot kit at home.  Follow these instructions at home:  Medicine  Take insulin and diabetes medicines as told.  If your doctor says you should take more or less insulin and medicines, do this exactly as told.  Do not run out of insulin or medicines.  Having diabetes can raise your risk for other long-term conditions. These include heart disease and kidney disease. Your doctor may prescribe medicines to help you not have these problems.  Food    Make  healthy food choices. These include:  Chicken, fish, egg whites, and beans.  Oats, whole wheat, bulgur, brown rice, quinoa, and millet.  Fresh fruits and vegetables.  Low-fat dairy products.  Nuts, avocado, olive oil, and canola oil.  Meet with a  (dietitian). He or she can help you make an eating plan that is right for you.  Follow instructions from your doctor about what you cannot eat or drink.  Drink enough fluid to keep your pee (urine) pale yellow.  Keep track of carbs that you eat. Do this by reading food labels and learning food serving sizes.  Follow your sick day plan when you cannot eat or drink normally. Make this plan with your doctor so it is ready to use.  Activity  Exercise 3 or more times a week.  Do not go more than 2 days without exercising.  Talk with your doctor before you start a new exercise. Your doctor may need to tell you to change:  How much insulin or medicines you take.  How much food you eat.  Lifestyle  Do not use any tobacco products. These include cigarettes, chewing tobacco, and e-cigarettes. If you need help quitting, ask your doctor.  Ask your doctor how much alcohol is safe for you.  Learn to deal with stress. If you need help with this, ask your doctor.  Body care    Stay up to date with your shots (immunizations).  Have your eyes and feet checked by a doctor as often as told.  Check your skin and feet every day. Check for cuts, bruises, redness, blisters, or sores.  Brush your teeth and gums two times a day. Floss one or more times a day.  Go to the dentist one or more times every 6 months.  Stay at a healthy weight.  General instructions  Take over-the-counter and prescription medicines only as told by your doctor.  Share your diabetes care plan with:  Your work or school.  People you live with.  Carry a card or wear jewelry that says you have diabetes.  Keep all follow-up visits as told by your doctor. This is important.  Questions to ask your doctor  Do I need  to meet with a diabetes educator?  Where can I find a support group for people with diabetes?  Where to find more information  To learn more about diabetes, visit:  American Diabetes Association: www.diabetes.org  American Association of Diabetes Educators: www.diabeteseducator.org  Summary  When you have type 2 diabetes, you must make sure your blood sugar (glucose) stays in a healthy range.  Check your blood sugar every day, as often as told.  Having diabetes can raise your risk for other conditions. Your doctor may prescribe medicines to help you not have these problems.  Keep all follow-up visits as told by your doctor. This is important.  This information is not intended to replace advice given to you by your health care provider. Make sure you discuss any questions you have with your health care provider.  Document Released: 04/10/2017 Document Revised: 06/10/2019 Document Reviewed: 01/20/2017  Elsevier Patient Education © 2020 [employer]    Diabetes Basics    Diabetes (diabetes mellitus) is a long-term (chronic) disease. It occurs when the body does not properly use sugar (glucose) that is released from food after you eat.  Diabetes may be caused by one or both of these problems:  Your pancreas does not make enough of a hormone called insulin.  Your body does not react in a normal way to insulin that it makes.  Insulin lets sugars (glucose) go into cells in your body. This gives you energy. If you have diabetes, sugars cannot get into cells. This causes high blood sugar (hyperglycemia).  Follow these instructions at home:  How is diabetes treated?  You may need to take insulin or other diabetes medicines daily to keep your blood sugar in balance. Take your diabetes medicines every day as told by your doctor. List your diabetes medicines here:  Diabetes medicines  Name of medicine: ______________________________  Amount (dose): _______________ Time (a.m./p.m.): _______________ Notes:  ___________________________________  Name of medicine: ______________________________  Amount (dose): _______________ Time (a.m./p.m.): _______________ Notes: ___________________________________  Name of medicine: ______________________________  Amount (dose): _______________ Time (a.m./p.m.): _______________ Notes: ___________________________________  If you use insulin, you will learn how to give yourself insulin by injection. You may need to adjust the amount based on the food that you eat. List the types of insulin you use here:  Insulin  Insulin type: ______________________________  Amount (dose): _______________ Time (a.m./p.m.): _______________ Notes: ___________________________________  Insulin type: ______________________________  Amount (dose): _______________ Time (a.m./p.m.): _______________ Notes: ___________________________________  Insulin type: ______________________________  Amount (dose): _______________ Time (a.m./p.m.): _______________ Notes: ___________________________________  Insulin type: ______________________________  Amount (dose): _______________ Time (a.m./p.m.): _______________ Notes: ___________________________________  Insulin type: ______________________________  Amount (dose): _______________ Time (a.m./p.m.): _______________ Notes: ___________________________________  How do I manage my blood sugar?    Check your blood sugar levels using a blood glucose monitor as directed by your doctor.  Your doctor will set treatment goals for you. Generally, you should have these blood sugar levels:  Before meals (preprandial):  mg/dL (4.4-7.2 mmol/L).  After meals (postprandial): below 180 mg/dL (10 mmol/L).  A1c level: less than 7%.  Write down the times that you will check your blood sugar levels:  Blood sugar checks  Time: _______________ Notes: ___________________________________  Time: _______________ Notes: ___________________________________  Time: _______________ Notes:  ___________________________________  Time: _______________ Notes: ___________________________________  Time: _______________ Notes: ___________________________________  Time: _______________ Notes: ___________________________________    What do I need to know about low blood sugar?  Low blood sugar is called hypoglycemia. This is when blood sugar is at or below 70 mg/dL (3.9 mmol/L). Symptoms may include:  Feeling:  Hungry.  Worried or nervous (anxious).  Sweaty and clammy.  Confused.  Dizzy.  Sleepy.  Sick to your stomach (nauseous).  Having:  A fast heartbeat.  A headache.  A change in your vision.  Tingling or no feeling (numbness) around the mouth, lips, or tongue.  Jerky movements that you cannot control (seizure).  Having trouble with:  Moving (coordination).  Sleeping.  Passing out (fainting).  Getting upset easily (irritability).  Treating low blood sugar  To treat low blood sugar, eat or drink something sugary right away. If you can think clearly and swallow safely, follow the 15:15 rule:  Take 15 grams of a fast-acting carb (carbohydrate). Talk with your doctor about how much you should take.  Some fast-acting carbs are:  Sugar tablets (glucose pills). Take 3-4 glucose pills.  6-8 pieces of hard candy.  4-6 oz (120-150 mL) of fruit juice.  4-6 oz (120-150 mL) of regular (not diet) soda.  1 Tbsp (15 mL) honey or sugar.  Check your blood sugar 15 minutes after you take the carb.  If your blood sugar is still at or below 70 mg/dL (3.9 mmol/L), take 15 grams of a carb again.  If your blood sugar does not go above 70 mg/dL (3.9 mmol/L) after 3 tries, get help right away.  After your blood sugar goes back to normal, eat a meal or a snack within 1 hour.  Treating very low blood sugar  If your blood sugar is at or below 54 mg/dL (3 mmol/L), you have very low blood sugar (severe hypoglycemia). This is an emergency. Do not wait to see if the symptoms will go away. Get medical help right away. Call your local  emergency services (911 in the [place] Do not drive yourself to the hospital.  Questions to ask your health care provider  Do I need to meet with a diabetes educator?  What equipment will I need to care for myself at home?  What diabetes medicines do I need? When should I take them?  How often do I need to check my blood sugar?  What number can I call if I have questions?  When is my next doctor's visit?  Where can I find a support group for people with diabetes?  Where to find more information  American Diabetes Association: www.diabetes.org  American Association of Diabetes Educators: www.diabeteseducator.org/patient-resources  Contact a doctor if:  Your blood sugar is at or above 240 mg/dL (13.3 mmol/L) for 2 days in a row.  You have been sick or have had a fever for 2 days or more, and you are not getting better.  You have any of these problems for more than 6 hours:  You cannot eat or drink.  You feel sick to your stomach (nauseous).  You throw up (vomit).  You have watery poop (diarrhea).  Get help right away if:  Your blood sugar is lower than 54 mg/dL (3 mmol/L).  You get confused.  You have trouble:  Thinking clearly.  Breathing.  Summary  Diabetes (diabetes mellitus) is a long-term (chronic) disease. It occurs when the body does not properly use sugar (glucose) that is released from food after digestion.  Take insulin and diabetes medicines as told.  Check your blood sugar every day, as often as told.  Keep all follow-up visits as told by your doctor. This is important.  This information is not intended to replace advice given to you by your health care provider. Make sure you discuss any questions you have with your health care provider.  Document Released: 03/22/2019 Document Revised: 02/07/2020 Document Reviewed: 03/22/2019  Elsevier Patient Education © 2020 [employer]    Insulin Injection Instructions, Using Insulin Pens, Adult  A subcutaneous injection is a shot of medicine that is injected into the  layer of fat and tissue between skin and muscle. People with type 1 diabetes must take insulin because their bodies do not make it. People with type 2 diabetes may need to take insulin.  There are many different types of insulin. The type of insulin that you take may determine how many injections you give yourself and when you need to give the injections.  Supplies needed:  Soap and water to wash hands.  Your insulin pen.  A new, unused needle.  Alcohol wipes.  A disposal container that is meant for sharp items (sharps container), such as an empty plastic bottle with a cover.  How to choose a site for injection  The body absorbs insulin differently, depending on where the insulin is injected (injection site). It is best to inject insulin into the same body area each time (for example, always in the abdomen), but you should use a different spot in that area for each injection. Do not inject the insulin in the same spot each time. There are five main areas that can be used for injecting. These areas include:  Abdomen. This is the preferred area.  Front of thigh.  Upper, outer side of thigh.  Upper, outer side of arm.  Upper, outer part of buttock.  How to use an insulin pen    First, follow the steps for Get ready, then continue with the steps for Inject the insulin.  Get ready  Wash your hands with soap and water. If soap and water are not available, use hand .  Before you give yourself an insulin injection, be sure to test your blood sugar level (blood glucose level) and write down that number. Follow any instructions from your health care provider about what to do if your blood glucose level is higher or lower than your normal range.  Check the expiration date and the type of insulin that is in the pen.  If you are using CLEAR insulin, check to see that it is clear and free of clumps.  If you are using CLOUDY insulin, do not shake the pen to get the injection ready. Instead, get it ready in one of these  ways:  Gently roll the pen between your palms several times.  Tip the pen up and down several times.  Remove the cap from the insulin pen.  Use an alcohol wipe to clean the rubber tip of the pen.  Remove the protective paper tab from the disposable needle. Do not let the needle touch anything.  Screw a new, unused needle onto the pen.  Remove the outer plastic needle cover. Do not throw away the outer plastic cover yet.  If the pen uses a special safety needle, leave the inner needle shield in place.  If the pen does not use a special safety needle, remove the inner plastic cover from the needle.  Follow the 's instructions to prime the insulin pen with the volume of insulin needed. Hold the pen with the needle pointing up, and push the button on the opposite end of the pen until a drop of insulin appears at the needle tip. If no insulin appears, repeat this step.  Turn the button (dial) to the number of units of insulin that you will be injecting.  Inject the insulin  Use an alcohol wipe to clean the site where you will be injecting the needle. Let the site air-dry.  Hold the pen in the palm of your writing hand like a pencil.  If directed by your health care provider, use your other hand to pinch and hold about an inch (2.5 cm) of skin at the injection site. Do not directly touch the cleaned part of the skin.  Gently but quickly, use your writing hand to put the needle straight into the skin. The needle should be at a 90-degree angle (perpendicular) to the skin.  When the needle is completely inserted into the skin, use your thumb or index finger of your writing hand to push the top button of the pen down all the way to inject the insulin.  Let go of the skin that you are pinching. Continue to hold the pen in place with your writing hand.  Wait 10 seconds, then pull the needle straight out of the skin. This will allow all of the insulin to go from the pen and needle into your body.  Carefully put the  larger (outer) plastic cover of the needle back over the needle, then unscrew the capped needle and discard it in a sharps container, such as an empty plastic bottle with a cover.  Put the plastic cap back on the insulin pen.  How to throw away supplies  Discard all used needles in a puncture-proof sharps disposal container. You can ask your local pharmacy about where you can get this kind of disposal container, or you can use an empty plastic liquid laundry detergent bottle that has a cover.  Follow the disposal regulations for the area where you live. Do not use any needle more than one time.  Throw away empty disposable pens in the regular trash.  Questions to ask your health care provider  How often should I be taking insulin?  How often should I check my blood glucose?  What amount of insulin should I be taking at each time?  What are the side effects?  What should I do if my blood glucose is too high?  What should I do if my blood glucose is too low?  What should I do if I forget to take my insulin?  What number should I call if I have questions?  Where to find more information  American Diabetes Association (ADA): www.diabetes.org  American Association of Diabetes Educators (AADE) Patient Resources: https://www.diabeteseducator.org  Summary  A subcutaneous injection is a shot of medicine that is injected into the layer of fat and tissue between skin and muscle.  Before you give yourself an insulin injection, be sure to test your blood sugar level (blood glucose level) and write down that number.  Check the expiration date and the type of insulin that is in the pen. The type of insulin that you take may determine how many injections you give yourself and when you need to give the injections.  It is best to inject insulin into the same body area each time (for example, always in the abdomen), but you should use a different spot in that area for each injection.  This information is not intended to replace advice  given to you by your health care provider. Make sure you discuss any questions you have with your health care provider.  Document Released: 01/20/2017 Document Revised: 01/07/2019 Document Reviewed: 01/20/2017  Elsevier Patient Education © 2020 [employer] Inc[employer]

## 2022-06-08 NOTE — DISCHARGE PLANNING
HTH/SCP TCN chart review completed. Collaborated with HALEY John. Note that at time of writing this note, pt is in dc lounge awaiting ride home with family/caregivers. Would also note that pt has been accepted by [facility]. Note that PT and OT evaluated pt yesterday afternoon with HH recs as she had notable improvements with re: mobility and essentially appears to be close to her functional baseline (see PT and OT notes for specific information; initial plan was for SNF).  TCN will continue to follow and collaborate with discharge planning team as additional post acute needs arise. Thank you.     Previously completed:  - Orders received for: PT and OT consults -> rec HH  - Choice forms: HH, SNF  - [facility] introduced (Y), referral (sent)

## 2022-06-08 NOTE — PROGRESS NOTES
Discharge RN Note:  Discharge order received. Pt informed of discharge; ride home with family confirmed. PIV and tele box removed. Discharge lounge notified that pt is ready for . Meds to beds pending, pharmacy filling meds.

## 2022-06-08 NOTE — PROGRESS NOTES
Diabetes education: Met with pt briefly this evening. Please see consult note.  Plan: Please order Lantus solostar pens, Novolog flex pens, ( if fast acting please write out sliding scale and make it ac only), insulin pen needles, Slick Contour next meter, test strips and lancets [facility]. Please use diabetes supplies, F2 for correct meter, supplies and dx code.  Please have pt give her insulin with nursing.

## 2022-06-08 NOTE — WOUND TEAM
Wound team consulted for patient's sacrum and groin area.  Skin appears to be intact, but patient is frequently incontinent and skin is very painful.  Skin appears to be rash like and similar to yeast.  Antifungal ordered and applied, viscopaste at bedside.  No advance wound care needed.  Please continue to moisture manage and apply antifungal medication.

## 2022-06-08 NOTE — PROGRESS NOTES
Received bedside report from YOLIS rubio, pt care assumed. VS stable, pt assessment complete. Pt A&Ox4, chronic c/o back pain at this time. POC discussed with pt and verbalizes no questions. Pt denies any additional needs at this time. Bed locked and in lowest position, bed alarm off. Pt educated on fall risk and verbalized understanding, call light within reach, hourly rounding initiated. In a sinus rhythm.

## 2022-06-08 NOTE — CARE PLAN
The patient is Watcher - Medium risk of patient condition declining or worsening    Shift Goals  Clinical Goals: Pain managment  Patient Goals: Rest    Progress made toward(s) clinical / shift goals:    Problem: Knowledge Deficit - Standard  Goal: Patient and family/care givers will demonstrate understanding of plan of care, disease process/condition, diagnostic tests and medications  Outcome: Progressing     Problem: Fall Risk  Goal: Patient will remain free from falls  Outcome: Progressing     Problem: Pain - Standard  Goal: Alleviation of pain or a reduction in pain to the patient’s comfort goal  Outcome: Progressing       Patient is not progressing towards the following goals:

## 2022-06-08 NOTE — PROGRESS NOTES
Report received at bedside, pt care assumed, pt is medical. Pt aaox4, no signs of distress noted at this time. Patient resting comfortably in bed. POC discussed with pt and verbalizes no questions. Pt denies any additional needs at this time. Bed in lowest position, pt educated on fall risk and verbalized understanding, call light within reach, bed alarm plugged in and on.

## 2022-06-08 NOTE — CONSULTS
Diabetes education: Pt has a hx of diabetes, with no medications listed. Pt was admitted with blood sugar of 895, and Hg a1c of 17%.   Pt is currently on Glargine 15 units BID with Lispro per sliding scale every six hours ( please change to ac and hs as pt is eating). Blood sugars are 126, 187 ( 3 units), and 292  ( 7 units).  Met with pt while she was eating dinner. Pt is agreeable to learn to give insulin. Pt needs to give her insulin with nursing. CDE to follow up tomorrow to teach insulin pens and give a new meter.  Plan: Please order Lantus solostar pens, Novolog flex pens, ( if fast acting please write out sliding scale and make it ac only), insulin pen needles, Slick Contour next meter, test strips and lancets all sent to [facility]. Please use diabetes supplies, F2 for correct meter, supplies and dx code.  Please have pt give her insulin with nursing.

## 2022-06-09 NOTE — ED NOTES
Patient continues to be monitored and treated. Plan of care reviewed with patient and patient's mom.  Cardiac monitor in place. Patient continues to run normal sinus.  Patient frequently complains of extreme discomfort (alleviated via PRN Norco and passive repositioning in the bed) and tenderness across her body. Patient strongly encouraged to participate in physical therapy activities and to perform various exercises in the bed throughout the day. Patient refuses to allow nurse to perform passive range of motion exercises.  Dose for PRN hydralazine obtained this shift; however, patient BP dropped significantly following administration of oral norco and klonopin. Klonopin order modified by hospitalist to be available twice a day to patient.   Patient and patient's mom encouraged to follow up with the hospitalist overseeing the patient's care regarding the plan of care in relation to the patient's significant generalized weakness & tenderness.  Will continue to monitor and treat.    Report to YOLIS Issa.

## 2022-06-09 NOTE — DISCHARGE SUMMARY
"Discharge Summary    CHIEF COMPLAINT ON ADMISSION  Chief Complaint   Patient presents with   • Fatigue   • Weight Loss     50lbs over approx 6 months, unintentional   • Incontinence     With skin breakdown/incontinence dermatitis, pt states this is new   • Sent by MD     PT seen at GP office and sent for concerns over Hgb levels. Pt's practitioner states her blood levels have been \"borderline anemia and could be worsening.\"       Reason for Admission  sent by md     Admission Date  6/4/2022    CODE STATUS  Prior    HPI & HOSPITAL COURSE    67-year-old female with a history of diabetes, dyslipidemia with chronic and continuous presented 6/4 with generalized weakness, patient states she has had generalized weakness with fatigue and depression, denies any significant bleeding or fever and no other symptoms or urinary symptoms, patient states she has had drinking so much sugar and juice, when we asked her about the reason she states she stops caring about her diabetes, on admission blood sugar was more than 800, no anion sixto, A1c 17 hemoglobin 9.1, IV fluid was started.     Patient's sugars improved, she was provided new supplies as well. She has recovered and cleared for discharge.     Therefore, she is discharged in fair and stable condition to home with close outpatient follow-up.    The patient met 2-midnight criteria for an inpatient stay at the time of discharge.    Discharge Date  6/8/2022    FOLLOW UP ITEMS POST DISCHARGE  6/8    DISCHARGE DIAGNOSES  Principal Problem:    Hyperosmolar hyperglycemic state (HHS), pressure ulcer, anemia (HCC) POA: Unknown  Active Problems:    Vitamin D insufficiency POA: Yes    Leukocytosis POA: Unknown    Iron deficiency anemia due to chronic blood loss POA: Unknown    Adult failure to thrive POA: Yes    GIB (gastrointestinal bleeding) POA: Yes    Acute on chronic blood loss anemia POA: Unknown    DM (diabetes mellitus), secondary uncontrolled (HCC) POA: Unknown    Severe " protein-calorie malnutrition (HCC) POA: Unknown    Severely underweight adult POA: Unknown    ACP (advance care planning) POA: Unknown    Hyponatremia POA: Unknown    Dehydration POA: Unknown    Pressure ulcer, sacrum POA: Unknown    Depression POA: Unknown  Resolved Problems:    * No resolved hospital problems. *      FOLLOW UP  Future Appointments   Date Time Provider Department Center   6/17/2022  4:20 PM Viviana Thorne M.D. [facility] None     Viviana Thorne M.D.  [address]   [place] [id number] [phone]    Follow up in 1 week(s)  Hospital follow up    [facility]  [address] [facility] [address]  [place] [address]  [phone]          MEDICATIONS ON DISCHARGE     Medication List      START taking these medications      Instructions   Alcohol Prep 70 % Pads   Doctor's comments: Per formulary preference. ICD-10 code: E11.65 Uncontrolled type 2 Diabetes Mellitus  Wipe site with prep pad prior to injection     BD Pen Needle Sridevi U/F  Generic drug: Insulin Pen Needle 32 G x 4 mm   Doctor's comments: Per patient/formulary preference. ICD-10 code: E11.65 Uncontrolled type 2 Diabetes Mellitus  Use one pen needle in pen device to inject insulin three times daily.     Blood Glucose Monitor System w/Device Kit   Doctor's comments: Or per formulary preference. ICD-10 code: E11.65 Uncontrolled type 2 Diabetes Mellitus  Test blood sugar as recommended by provider. [employer] Contour Next blood glucose monitoring kit.     Contour Next Test strip  Generic drug: glucose blood   Doctor's comments: Or per formulary preference. ICD-10 code: E11.65 Uncontrolled type 2 Diabetes Mellitus  Use one strip to test blood sugar three times daily before meals.     Lantus SoloStar 100 UNIT/ML Sopn injection  Generic drug: insulin glargine   Inject 15 Units under the skin 2 times a day.  Dose: 15 Units     lisinopril 5 MG Tabs  Commonly known as: PRINIVIL   Take 1 Tablet by mouth every day.  Dose: 5 mg     Microlet Lancets Misc    Doctor's comments: Or per formulary preference. ICD-10 code: E11.65 Uncontrolled type 2 Diabetes Mellitus  Use one lancet to test blood sugar three times daily before meals.     omeprazole 20 MG delayed-release capsule  Commonly known as: PRILOSEC   Take 1 Capsule by mouth 2 times a day.  Dose: 20 mg     oxyCODONE immediate-release 5 MG Tabs  Commonly known as: ROXICODONE   Take 1 Tablet by mouth every 8 hours as needed for Severe Pain for up to 5 days. Indications: Acute Pain  Dose: 5 mg     rosuvastatin 10 MG Tabs  Commonly known as: CRESTOR   Take 1 Tablet by mouth every evening.  Dose: 10 mg     sertraline 25 MG tablet  Commonly known as: Zoloft   Take 1 Tablet by mouth every day.  Dose: 25 mg            Allergies  Allergies   Allergen Reactions   • Aspartame Nausea     headache   • Latex Rash and Itching   • Lipitor [Atorvastatin Calcium] Swelling     Patient denies allergy 06/04/22   • Other Misc Vomiting     Bug spray   • Saccharin Nausea     Nausea and headache       DIET  No orders of the defined types were placed in this encounter.      ACTIVITY  As tolerated.  Weight bearing as tolerated    CONSULTATIONS  DM educator    PROCEDURES      LABORATORY  Lab Results   Component Value Date    SODIUM 137 06/07/2022    POTASSIUM 3.6 06/07/2022    CHLORIDE 101 06/07/2022    CO2 26 06/07/2022    GLUCOSE 131 (H) 06/07/2022    BUN 9 06/07/2022    CREATININE 0.49 (L) 06/07/2022    CREATININE 0.8 04/09/2009        Lab Results   Component Value Date    WBC 9.8 06/07/2022    HEMOGLOBIN 8.5 (L) 06/07/2022    HEMATOCRIT 28.6 (L) 06/07/2022    PLATELETCT 501 (H) 06/07/2022        Total time of the discharge process exceeds 41 minutes.

## 2022-06-09 NOTE — PROGRESS NOTES
Diabetes education: Met with pt prior to discharge at discharge [facility]. Pt was given an Slick contour next meter and instructed on it's use.  Pt was taught to use insulin pens and practiced with saline pens and practice device. Reviewed insulin, storage, shelf life and site rotation. Handout given.

## 2022-06-17 ENCOUNTER — OFFICE VISIT (OUTPATIENT)
Dept: MEDICAL GROUP | Facility: IMAGING CENTER | Age: 67
End: 2022-06-17
Payer: MEDICARE

## 2022-06-17 VITALS
SYSTOLIC BLOOD PRESSURE: 112 MMHG | OXYGEN SATURATION: 96 % | WEIGHT: 124.6 LBS | DIASTOLIC BLOOD PRESSURE: 52 MMHG | RESPIRATION RATE: 14 BRPM | HEART RATE: 80 BPM | TEMPERATURE: 98.3 F | BODY MASS INDEX: 19.56 KG/M2 | HEIGHT: 67 IN

## 2022-06-17 DIAGNOSIS — G89.29 OTHER CHRONIC PAIN: ICD-10-CM

## 2022-06-17 DIAGNOSIS — I10 ESSENTIAL HYPERTENSION: ICD-10-CM

## 2022-06-17 DIAGNOSIS — Z09 HOSPITAL DISCHARGE FOLLOW-UP: ICD-10-CM

## 2022-06-17 DIAGNOSIS — F06.31 MOOD DISORDER WITH MAJOR DEPRESSIVE-LIKE EPISODE DUE TO GENERAL MEDICAL CONDITION: ICD-10-CM

## 2022-06-17 DIAGNOSIS — E11.9 DIABETES MELLITUS TYPE 2 IN NONOBESE (HCC): ICD-10-CM

## 2022-06-17 DIAGNOSIS — G47.9 DIFFICULTY SLEEPING: ICD-10-CM

## 2022-06-17 DIAGNOSIS — Z87.19 HISTORY OF CONSTIPATION: ICD-10-CM

## 2022-06-17 DIAGNOSIS — R53.83 FATIGUE, UNSPECIFIED TYPE: ICD-10-CM

## 2022-06-17 DIAGNOSIS — D64.9 ANEMIA, UNSPECIFIED TYPE: ICD-10-CM

## 2022-06-17 DIAGNOSIS — R63.4 WEIGHT LOSS, UNINTENTIONAL: ICD-10-CM

## 2022-06-17 PROCEDURE — 99214 OFFICE O/P EST MOD 30 MIN: CPT | Performed by: FAMILY MEDICINE

## 2022-06-17 RX ORDER — FERROUS SULFATE 325(65) MG
325 TABLET ORAL DAILY
Qty: 30 TABLET | Refills: 3 | Status: SHIPPED | OUTPATIENT
Start: 2022-06-17 | End: 2022-06-17 | Stop reason: SDUPTHER

## 2022-06-17 RX ORDER — TRAZODONE HYDROCHLORIDE 50 MG/1
50 TABLET ORAL NIGHTLY
Qty: 30 TABLET | Refills: 3 | Status: SHIPPED | OUTPATIENT
Start: 2022-06-17 | End: 2022-06-17 | Stop reason: SDUPTHER

## 2022-06-17 RX ORDER — TRAZODONE HYDROCHLORIDE 50 MG/1
50 TABLET ORAL NIGHTLY
Qty: 30 TABLET | Refills: 3 | Status: SHIPPED | OUTPATIENT
Start: 2022-06-17 | End: 2022-07-08 | Stop reason: SDUPTHER

## 2022-06-17 RX ORDER — FERROUS SULFATE 325(65) MG
325 TABLET ORAL DAILY
Qty: 30 TABLET | Refills: 3 | Status: SHIPPED | OUTPATIENT
Start: 2022-06-17 | End: 2022-07-08 | Stop reason: SDUPTHER

## 2022-06-17 ASSESSMENT — FIBROSIS 4 INDEX: FIB4 SCORE: 0.73

## 2022-06-17 ASSESSMENT — PAIN SCALES - GENERAL: PAINLEVEL: 8=MODERATE-SEVERE PAIN

## 2022-06-17 NOTE — PROGRESS NOTES
SUBJECTIVE:    Chief Complaint   Patient presents with   • Hospital Follow-up   • Diabetes     Checking glucose frequently at home    • Medication Management     Questions regarding iron and potassium and celebrex    • Difficulty Sleeping     Questions regarding sleep medication        HPI:     Heidi Navas is a 67 y.o. female with chronic pain, OA, fibromyalgia, DM type 2, anxiety, COPD, hypertension, hyperlipidemia and prior anemia due to GI bleed  here with care giver, Amara. Here for hospital follow up visit.   6/4/22- ER evaluation with hospital admission, sent in for failure to thrive. Noted to have significant abnormal labs with hyperglycemia.   Patient was started on insulin therapy.  She is now eating better and healthier. Eating 4-5 times a day.   She is also living in a new place currently, which has been better for her.   When glucose in 80s-headache in morning. Was in pool swimming.   140-150 mostly but has had some in the 80s.   She is on lantus 15 units twice daily.   After eating, glucose 300s.   Watered down juice. Cherry juice.     Constipated thus started on Metamucil.  Metamucil- started-   Tends to be constipated.     Anemia noted on prior labs.  Last hemoglobin 8.5 g/dL June 2022.  No black or bloody stools.  She does have a past history of GI bleed.  She is on iron.  Some improvement in fatigue.    Cannot sleep- trouble falling a sleep.  Tried melatonin melatonin.   In the past she worked [employer] for years. 3 am sleeps, then up at 7 am.     Chronic pain-history of osteoarthritis.  Requesting Celebrex.  Used to be followed by pain management in the past.  Pain of shoulder, hip and tailbone areas.  Pain of hands.    She has been taking Tylenol 650 mg twice daily which helps some.    Denies current depressive symptoms.  Hx of OA- requesting.   Shoulder pain, hip and tail bone.   Hands.   No current depression.  Stable on Zoloft 25 mg daily.  Tylenol 650 mg twice a day, helps some.   Blood  pressure 122/60.  Has not been taking lisinopril.    Notes she got new glasses from Dr. Simmons.      ROS:  No recent fevers or chills. No nausea or vomiting. No diarrhea. No chest pains or shortness of breath. No lower extremity edema.    Current Outpatient Medications on File Prior to Visit   Medication Sig Dispense Refill   • Blood Glucose Monitoring Suppl (BLOOD GLUCOSE MONITOR SYSTEM) w/Device Kit Test blood sugar as recommended by provider. Slick Contour Next blood glucose monitoring kit. 1 Kit 0   • glucose blood strip Use one strip to test blood sugar three times daily before meals. 100 Strip 0   • Microlet Lancets Misc Use one lancet to test blood sugar three times daily before meals. 100 Each 0   • Alcohol Swabs (ALCOHOL PREP) 70 % Pads Wipe site with prep pad prior to injection 100 Each 0   • Insulin Pen Needle 32 G x 4 mm Use one pen needle in pen device to inject insulin three times daily. 100 Each 0   • lisinopril (PRINIVIL) 5 MG Tab Take 1 Tablet by mouth every day. 90 Tablet 0   • omeprazole (PRILOSEC) 20 MG delayed-release capsule Take 1 Capsule by mouth 2 times a day. 60 Capsule 0   • rosuvastatin (CRESTOR) 10 MG Tab Take 1 Tablet by mouth every evening. 90 Tablet 0   • sertraline (ZOLOFT) 25 MG tablet Take 1 Tablet by mouth every day. 90 Tablet 0   • insulin glargine (LANTUS) 100 UNIT/ML Solution Pen-injector injection Inject 15 Units under the skin 2 times a day. 15 mL 11     No current facility-administered medications on file prior to visit.       Allergies   Allergen Reactions   • Aspartame Nausea     headache   • Latex Rash and Itching   • Lipitor [Atorvastatin Calcium] Swelling     Patient denies allergy 06/04/22   • Other Misc Vomiting     Bug spray   • Saccharin Nausea     Nausea and headache       Patient Active Problem List    Diagnosis Date Noted   • Depression 06/05/2022   • GIB (gastrointestinal bleeding) 06/04/2022   • Acute on chronic blood loss anemia 06/04/2022   • DM (diabetes  mellitus), secondary uncontrolled ([facility]) 06/04/2022   • Severe protein-calorie malnutrition (HCC) 06/04/2022   • Severely underweight adult 06/04/2022   • ACP (advance care planning) 06/04/2022   • Hyponatremia 06/04/2022   • Hyperosmolar hyperglycemic state (HHS), pressure ulcer, anemia (HCC) 06/04/2022   • Dehydration 06/04/2022   • Pressure ulcer, sacrum 06/04/2022   • Fatigue 06/03/2022   • Weight loss, unintentional 06/03/2022   • Urinary incontinence 06/03/2022   • Adult failure to thrive 06/03/2022   • Iron deficiency anemia due to chronic blood loss 02/09/2020   • Cellulitis 01/08/2020   • Anemia 05/19/2019   • Atherosclerosis 05/19/2019   • LVH (left ventricular hypertrophy) 05/19/2019   • Arthritis of left shoulder region 07/12/2017   • CVD (cardiovascular disease) 12/04/2015   • Pulmonary nodule 12/01/2015   • Renal stone 12/01/2015   • Diverticulosis 12/01/2015   • Chest pain 07/16/2015   • Nausea & vomiting 07/13/2015   • Opiate withdrawal ([facility]) 07/13/2015   • Diabetes mellitus, type 2 ([facility]) 10/03/2014   • Brachial neuritis or radiculitis 09/10/2014   • History of total hip arthroplasty 07/22/2014   • Leukocytosis 03/30/2014   • COPD (chronic obstructive pulmonary disease) ([facility]) 03/30/2014   • Arthritis 12/26/2013   • Chronic pain 02/21/2012   • Anxiety 01/18/2012   • Vitamin D insufficiency 04/28/2011   • Hypertension 03/22/2010   • Fibromyalgia 10/09/2009   • Swelling, mass, or lump in head and neck 07/21/2009   • Tobacco use disorder 07/21/2009   • Chronic ischemic heart disease 07/21/2009   • Esophageal reflux 07/21/2009   • Chronic airway obstruction, not elsewhere classified 07/21/2009   • Allergic rhinitis 07/21/2009   • Controlled type 2 diabetes mellitus without complication, without long-term current use of insulin ([facility]) 07/21/2009   • Mixed hyperlipidemia 07/21/2009   • Other atopic dermatitis and related conditions 07/21/2009   • Deficiency anemia 07/21/2009       Past Medical History:  "  Diagnosis Date   • Anemia    • Arthritis     OA and  not RA per rheumatology   • Breath shortness     oxygen PRN    • Bronchitis 2008   • Colon polyps 2015   • Convulsions (HCC) 7/21/2009     ICD-10 transition   • COPD (chronic obstructive pulmonary disease) ([facility])    • Dental disorder     full dentures   • Depression     depression, anxiety    • Diverticulosis 5/10     colonoscopy   • Hemorrhoid 7/29/2009   • History of colonoscopy   2005   • Leukocytosis 3/30/2014   • Lymphocytosis (symptomatic) 7/21/2009   • MI (myocardial infarction) (HCC) 4/29/2007   • Other specified disorder of intestines     constipation   • Pain     shoulders, neck, lower back   • Pancreatitis    • Pap smear 4/2006   • Pneumonia 2013   • Pulmonary nodule    • Renal lesion    • Renal stone    • S/P colonoscopy 5/2010    will need repeat in 5 years   • Screening mammogram never   • Seizure (HCC)     x1 in 2008   • Shingles    • Symptomatic menopausal or female climacteric states 7/21/2009   • Type II or unspecified type diabetes mellitus without mention of complication, uncontrolled 7/21/2009    diet controlled    • Unspecified urinary incontinence          OBJECTIVE:   /52   Pulse 80   Temp 36.8 °C (98.3 °F)   Resp 14   Ht 1.702 m (5' 7\")   Wt 56.5 kg (124 lb 9.6 oz)   LMP 01/01/2007   SpO2 96%   BMI 19.52 kg/m²   General: Well-developed well-nourished female, no acute distress  Neck: supple, no lymphadenopathy- cervical or supraclavicular, no thyromegaly  Cardiovascular: regular rate and rhythm, no murmurs, gallops, rubs  Lungs: clear to auscultation bilaterally, no wheezes, crackles, or rhonchi  Abdomen: +bowel sounds, soft, nontender, nondistended, no rebound, no guarding, no hepatosplenomegaly  Extremities: no cyanosis, clubbing, edema  Skin: Warm and dry  Psych: appropriate mood and affect  Neuro: 2+ DTR throughout, 5/5 strength throughout      ASSESSMENT/PLAN:    67 y.o.female with chronic pain, OA, fibromyalgia, DM type " 2, anxiety, COPD, hypertension, hyperlipidemia and prior anemia due to GI bleed.    1. Hospital discharge follow-up     2. Diabetes mellitus type 2 in nonobese (HCC)-has been started on insulin therapy.  Does appear to have some low levels in the morning.   Will adjust Lantus to 15 units in the morning and 10 units in the evening.  Continue to monitor glucose routinely.  Recommend diabetic diet and routine exercise as tolerated.  Monitor.    3. Adult BMI <19 kg/sq m -stable weight.  Appears improvement in p.o. intake.  Monitor.    4. Weight loss, unintentional -has had improvement and weight with improved p.o. intake.    5. History of constipation   She will continue on Metamucil.  Recommend adequate hydration and routine exercise.    6. Anemia, unspecified type -she is on iron therapy, continue current therapy.  Monitor labs in future.    7. Fatigue, unspecified type -overall stable.  Anemia and sleep issues likely contributing.   We will continue to monitor.    8. Difficulty sleeping   Reviewed options of therapy.  Sleep hygiene.  We will give trial of trazodone.  trazodone 50 mg   9. Other chronic pain -advised do not recommend Celebrex at this time due to history of GI bleed and current anemia.  Recommend use of Tylenol as needed.  We will continue monitor.  Consider further follow-up with pain specialist.    10. 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  Continue on Zoloft 25 mg daily.    11. Essential hypertension-stable.  Continue to monitor at this time.    Recommend routine balance exercises.      Return in about 3 weeks (around 7/8/2022).    This medical record contains text that has been entered with the assistance of computer voice recognition and dictation software.  Therefore, it may contain unintended errors in text, spelling, punctuation, or grammar.

## 2022-07-08 ENCOUNTER — OFFICE VISIT (OUTPATIENT)
Dept: MEDICAL GROUP | Facility: IMAGING CENTER | Age: 67
End: 2022-07-08
Payer: MEDICARE

## 2022-07-08 VITALS
WEIGHT: 126 LBS | DIASTOLIC BLOOD PRESSURE: 54 MMHG | HEART RATE: 83 BPM | HEIGHT: 67 IN | TEMPERATURE: 98.1 F | OXYGEN SATURATION: 96 % | SYSTOLIC BLOOD PRESSURE: 116 MMHG | BODY MASS INDEX: 19.78 KG/M2

## 2022-07-08 DIAGNOSIS — G47.01 INSOMNIA DUE TO MEDICAL CONDITION: ICD-10-CM

## 2022-07-08 DIAGNOSIS — E11.9 DIABETES MELLITUS TYPE 2 IN NONOBESE (HCC): ICD-10-CM

## 2022-07-08 DIAGNOSIS — R89.9 ABNORMAL LABORATORY TEST RESULT: ICD-10-CM

## 2022-07-08 DIAGNOSIS — D64.9 ANEMIA, UNSPECIFIED TYPE: ICD-10-CM

## 2022-07-08 DIAGNOSIS — F33.0 MILD EPISODE OF RECURRENT MAJOR DEPRESSIVE DISORDER (HCC): ICD-10-CM

## 2022-07-08 DIAGNOSIS — G89.29 OTHER CHRONIC PAIN: ICD-10-CM

## 2022-07-08 PROCEDURE — 99214 OFFICE O/P EST MOD 30 MIN: CPT | Performed by: FAMILY MEDICINE

## 2022-07-08 RX ORDER — TRAZODONE HYDROCHLORIDE 50 MG/1
150 TABLET ORAL NIGHTLY
Qty: 90 TABLET | Refills: 3 | Status: SHIPPED | OUTPATIENT
Start: 2022-07-08 | End: 2022-08-01

## 2022-07-08 RX ORDER — FERROUS SULFATE 325(65) MG
325 TABLET ORAL DAILY
Qty: 30 TABLET | Refills: 3 | Status: SHIPPED | OUTPATIENT
Start: 2022-07-08 | End: 2022-10-06

## 2022-07-08 ASSESSMENT — PAIN SCALES - GENERAL: PAINLEVEL: 7=MODERATE-SEVERE PAIN

## 2022-07-08 ASSESSMENT — FIBROSIS 4 INDEX: FIB4 SCORE: 0.73

## 2022-07-08 NOTE — PROGRESS NOTES
SUBJECTIVE:    Chief Complaint   Patient presents with   • Medication Management     Requesting Insulin refill and new RX for sleep medication as trazodone is not working    • Other     Trouble sleeping due to pain        HPI:     Heidi Navas is a 67 y.o. female with chronic pain, OA,  fibromyalgia, DM type 2, anxiety, COPD, hypertension, hyperlipidemia and prior anemia due to GI bleed  here with care giver, Amara.     She has been using glargine 15 units in am and 10 units in the evening.   Glucose 130-180s, only one low to 90s, but otherwise improved compared to prior.   Tends to eat a night time snack.     Insomnia- she has been using Trazodone 50 mg, up to 2 tabs at night, but does not feel it has been effective for sleep, feels it may seems to make her sweat at night.     Chronic pain- has seen pain management in the past and was on other pain medications. Does not desire to see pain specialist at this time.   She is taking glucosamine chondroitin.   Right hand arthritis pain. Tylenol for pain. Helps dull pain.   Used tramadol in past.   Declined cymbalta therapy prefers to stay on zoloft 25 mg daily for depression, has been stable.   She would like to use celebrex therapy, has had anemia. No blood in stools, but feels darker stools may be from iron therapy. Prior labs with anemia noted. Has past prior history of GI bleed.     Weight has improved. No abdominal pain.       ROS:  No recent fevers or chills. No nausea or vomiting. No diarrhea. No chest pains or shortness of breath. No lower extremity edema.    Current Outpatient Medications on File Prior to Visit   Medication Sig Dispense Refill   • Blood Glucose Monitoring Suppl (BLOOD GLUCOSE MONITOR SYSTEM) w/Device Kit Test blood sugar as recommended by provider. Slick Contour Next blood glucose monitoring kit. 1 Kit 0   • Microlet Lancets Misc Use one lancet to test blood sugar three times daily before meals. 100 Each 0   • Alcohol Swabs (ALCOHOL PREP)  70 % Pads Wipe site with prep pad prior to injection 100 Each 0   • omeprazole (PRILOSEC) 20 MG delayed-release capsule Take 1 Capsule by mouth 2 times a day. 60 Capsule 0   • rosuvastatin (CRESTOR) 10 MG Tab Take 1 Tablet by mouth every evening. 90 Tablet 0   • sertraline (ZOLOFT) 25 MG tablet Take 1 Tablet by mouth every day. 90 Tablet 0   • lisinopril (PRINIVIL) 5 MG Tab Take 1 Tablet by mouth every day. (Patient not taking: Reported on 7/8/2022) 90 Tablet 0     No current facility-administered medications on file prior to visit.       Allergies   Allergen Reactions   • Aspartame Nausea     headache   • Latex Rash and Itching   • Lipitor [Atorvastatin Calcium] Swelling     Patient denies allergy 06/04/22   • Other Misc Vomiting     Bug spray   • Saccharin Nausea     Nausea and headache       Patient Active Problem List    Diagnosis Date Noted   • Insomnia due to medical condition 07/11/2022   • Depression 06/05/2022   • GIB (gastrointestinal bleeding) 06/04/2022   • Acute on chronic blood loss anemia 06/04/2022   • DM (diabetes mellitus), secondary uncontrolled (HCC) 06/04/2022   • Severe protein-calorie malnutrition (HCC) 06/04/2022   • Severely underweight adult 06/04/2022   • ACP (advance care planning) 06/04/2022   • Hyponatremia 06/04/2022   • Hyperosmolar hyperglycemic state (HHS), pressure ulcer, anemia (HCC) 06/04/2022   • Dehydration 06/04/2022   • Pressure ulcer, sacrum 06/04/2022   • Fatigue 06/03/2022   • Weight loss, unintentional 06/03/2022   • Urinary incontinence 06/03/2022   • Adult failure to thrive 06/03/2022   • Iron deficiency anemia due to chronic blood loss 02/09/2020   • Cellulitis 01/08/2020   • Anemia 05/19/2019   • Atherosclerosis 05/19/2019   • LVH (left ventricular hypertrophy) 05/19/2019   • Arthritis of left shoulder region 07/12/2017   • CVD (cardiovascular disease) 12/04/2015   • Pulmonary nodule 12/01/2015   • Renal stone 12/01/2015   • Diverticulosis 12/01/2015   • Chest pain  07/16/2015   • Nausea & vomiting 07/13/2015   • Opiate withdrawal ([facility]) 07/13/2015   • Diabetes mellitus type 2 in nonobese ([facility]) 10/03/2014   • Brachial neuritis or radiculitis 09/10/2014   • History of total hip arthroplasty 07/22/2014   • Leukocytosis 03/30/2014   • COPD (chronic obstructive pulmonary disease) ([facility]) 03/30/2014   • Arthritis 12/26/2013   • Chronic pain 02/21/2012   • Anxiety 01/18/2012   • Vitamin D insufficiency 04/28/2011   • Hypertension 03/22/2010   • Fibromyalgia 10/09/2009   • Swelling, mass, or lump in head and neck 07/21/2009   • Tobacco use disorder 07/21/2009   • Chronic ischemic heart disease 07/21/2009   • Esophageal reflux 07/21/2009   • Chronic airway obstruction, not elsewhere classified 07/21/2009   • Allergic rhinitis 07/21/2009   • Controlled type 2 diabetes mellitus without complication, without long-term current use of insulin ([facility]) 07/21/2009   • Mixed hyperlipidemia 07/21/2009   • Other atopic dermatitis and related conditions 07/21/2009   • Deficiency anemia 07/21/2009       Past Medical History:   Diagnosis Date   • Anemia    • Arthritis     OA and  not RA per rheumatology   • Breath shortness     oxygen PRN    • Bronchitis 2008   • Colon polyps 2015   • Convulsions ([facility]) 7/21/2009     ICD-10 transition   • COPD (chronic obstructive pulmonary disease) ([facility])    • Dental disorder     full dentures   • Depression     depression, anxiety    • Diverticulosis 5/10     colonoscopy   • Hemorrhoid 7/29/2009   • History of colonoscopy   2005   • Leukocytosis 3/30/2014   • Lymphocytosis (symptomatic) 7/21/2009   • MI (myocardial infarction) ([facility]) 4/29/2007   • Other specified disorder of intestines     constipation   • Pain     shoulders, neck, lower back   • Pancreatitis    • Pap smear 4/2006   • Pneumonia 2013   • Pulmonary nodule    • Renal lesion    • Renal stone    • S/P colonoscopy 5/2010    will need repeat in 5 years   • Screening mammogram never   • Seizure ([facility])     x1 in  "2008   • Shingles    • Symptomatic menopausal or female climacteric states 7/21/2009   • Type II or unspecified type diabetes mellitus without mention of complication, uncontrolled 7/21/2009    diet controlled    • Unspecified urinary incontinence          OBJECTIVE:   /54 (BP Location: Left arm, Patient Position: Sitting, BP Cuff Size: Adult)   Pulse 83   Temp 36.7 °C (98.1 °F) (Temporal)   Ht 1.702 m (5' 7\")   Wt 57.2 kg (126 lb)   LMP 01/01/2007   SpO2 96%   BMI 19.73 kg/m²   General: Well-developed well-nourished female, no acute distress  Neck: supple, no lymphadenopathy- cervical or supraclavicular, no thyromegaly  Cardiovascular: regular rate and rhythm, no murmurs, gallops, rubs  Lungs: clear to auscultation bilaterally, no wheezes, crackles, or rhonchi  Abdomen: +bowel sounds, soft, nontender, nondistended, no rebound, no guarding, no hepatosplenomegaly  Extremities: no cyanosis, clubbing, edema  Skin: Warm and dry  Psych: appropriate mood and affect      ASSESSMENT/PLAN:    67 y.o. female with chronic pain, OA,  fibromyalgia, DM type 2, anxiety, COPD, hypertension, hyperlipidemia and prior anemia due to GI bleed.    1. Diabetes mellitus type 2 in nonobese (HCC) - stable, appears less low glucose levels.   Continue current medication dose. Will continue to monitor.  insulin glargine (LANTUS) 100 UNIT/ML Solution Pen-injector injection    Insulin Pen Needle 32 G x 4 mm    Comp Metabolic Panel    glucose blood strip   2. Insomnia due to medical condition - multifactorial, mood and pain likely contributing.   Reviewed possible options of therapy. Will give trial of increased trazodone dose, will keep zoloft at lower dose. Discussed possible symptoms for interactions, reviewed with patient. Monitor closely.  traZODone (DESYREL) 50 MG Tab   3. Other chronic pain - multiple areas. Hx of chronic pain medication use. Discussed limited therapies available at this time due to current medical findings, " recommend seeing pain specialist. Declined pain referral at this time. Advised do not recommend celebrex use at this time due to anemia and need for further monitoring. Due to current medications, do not recommend tramadol therapy due to possible further interactions. Continue current therapy with tylenol, glucosamine chondroitin and supplements. May consider turmeric therapy in future or other topical therapy. Monitor.     4. Mild episode of recurrent major depressive disorder (HCC)   Discussed consider cymbalta therapy, patient prefer to stay on zoloft therapy. Monitor.     5. Anemia, unspecified type - she is on iron therapy. Previously with nutritional component.   Monitor.  CBC WITH DIFFERENTIAL   6. Abnormal laboratory test result - repeat lipase and CMP. Monitor.  Comp Metabolic Panel    LIPASE   7. Body mass index (BMI) 19.9 or less, adult - improving weight, stable. Continue to monitor.       Follow up in one month, sooner as needed.       This medical record contains text that has been entered with the assistance of computer voice recognition and dictation software.  Therefore, it may contain unintended errors in text, spelling, punctuation, or grammar.

## 2022-07-11 PROBLEM — G47.01 INSOMNIA DUE TO MEDICAL CONDITION: Status: ACTIVE | Noted: 2022-07-11

## 2022-07-13 DIAGNOSIS — E11.9 DIABETES MELLITUS TYPE 2 IN NONOBESE (HCC): ICD-10-CM

## 2022-07-21 NOTE — PROGRESS NOTES
Assumed care of patient, bedside report received from NOC RN. Updated on POC, call light within reach and fall precautions in place. Bed locked and in lowest position. Patient instructed to call for assistance before getting out of bed. All questions answered, no other needs at this time.      36.8

## 2022-08-03 ENCOUNTER — HOSPITAL ENCOUNTER (OUTPATIENT)
Dept: LAB | Facility: MEDICAL CENTER | Age: 67
End: 2022-08-03
Attending: FAMILY MEDICINE
Payer: MEDICARE

## 2022-08-03 DIAGNOSIS — R89.9 ABNORMAL LABORATORY TEST RESULT: ICD-10-CM

## 2022-08-03 DIAGNOSIS — E11.9 DIABETES MELLITUS TYPE 2 IN NONOBESE (HCC): ICD-10-CM

## 2022-08-03 DIAGNOSIS — D64.9 ANEMIA, UNSPECIFIED TYPE: ICD-10-CM

## 2022-08-03 LAB
ALBUMIN SERPL BCP-MCNC: 3.6 G/DL (ref 3.2–4.9)
ALBUMIN/GLOB SERPL: 1 G/DL
ALP SERPL-CCNC: 88 U/L (ref 30–99)
ALT SERPL-CCNC: 11 U/L (ref 2–50)
ANION GAP SERPL CALC-SCNC: 12 MMOL/L (ref 7–16)
ANISOCYTOSIS BLD QL SMEAR: ABNORMAL
AST SERPL-CCNC: 23 U/L (ref 12–45)
BASOPHILS # BLD AUTO: 0.9 % (ref 0–1.8)
BASOPHILS # BLD: 0.1 K/UL (ref 0–0.12)
BILIRUB SERPL-MCNC: <0.2 MG/DL (ref 0.1–1.5)
BUN SERPL-MCNC: 10 MG/DL (ref 8–22)
CALCIUM SERPL-MCNC: 9 MG/DL (ref 8.5–10.5)
CHLORIDE SERPL-SCNC: 105 MMOL/L (ref 96–112)
CO2 SERPL-SCNC: 24 MMOL/L (ref 20–33)
COMMENT 1642: NORMAL
CREAT SERPL-MCNC: 0.57 MG/DL (ref 0.5–1.4)
EOSINOPHIL # BLD AUTO: 0.12 K/UL (ref 0–0.51)
EOSINOPHIL NFR BLD: 1 % (ref 0–6.9)
ERYTHROCYTE [DISTWIDTH] IN BLOOD BY AUTOMATED COUNT: 57.5 FL (ref 35.9–50)
FASTING STATUS PATIENT QL REPORTED: NORMAL
GFR SERPLBLD CREATININE-BSD FMLA CKD-EPI: 99 ML/MIN/1.73 M 2
GLOBULIN SER CALC-MCNC: 3.6 G/DL (ref 1.9–3.5)
GLUCOSE SERPL-MCNC: 197 MG/DL (ref 65–99)
HCT VFR BLD AUTO: 29.3 % (ref 37–47)
HGB BLD-MCNC: 8.6 G/DL (ref 12–16)
HYPOCHROMIA BLD QL SMEAR: ABNORMAL
IMM GRANULOCYTES # BLD AUTO: 0.04 K/UL (ref 0–0.11)
IMM GRANULOCYTES NFR BLD AUTO: 0.3 % (ref 0–0.9)
LIPASE SERPL-CCNC: 23 U/L (ref 11–82)
LYMPHOCYTES # BLD AUTO: 1.32 K/UL (ref 1–4.8)
LYMPHOCYTES NFR BLD: 11.5 % (ref 22–41)
MCH RBC QN AUTO: 22.6 PG (ref 27–33)
MCHC RBC AUTO-ENTMCNC: 29.4 G/DL (ref 33.6–35)
MCV RBC AUTO: 76.9 FL (ref 81.4–97.8)
MICROCYTES BLD QL SMEAR: ABNORMAL
MONOCYTES # BLD AUTO: 0.56 K/UL (ref 0–0.85)
MONOCYTES NFR BLD AUTO: 4.9 % (ref 0–13.4)
MORPHOLOGY BLD-IMP: NORMAL
NEUTROPHILS # BLD AUTO: 9.33 K/UL (ref 2–7.15)
NEUTROPHILS NFR BLD: 81.4 % (ref 44–72)
NRBC # BLD AUTO: 0 K/UL
NRBC BLD-RTO: 0 /100 WBC
PLATELET # BLD AUTO: 661 K/UL (ref 164–446)
PLATELET BLD QL SMEAR: NORMAL
PMV BLD AUTO: 8.8 FL (ref 9–12.9)
POLYCHROMASIA BLD QL SMEAR: NORMAL
POTASSIUM SERPL-SCNC: 4.9 MMOL/L (ref 3.6–5.5)
PROT SERPL-MCNC: 7.2 G/DL (ref 6–8.2)
RBC # BLD AUTO: 3.81 M/UL (ref 4.2–5.4)
RBC BLD AUTO: PRESENT
SODIUM SERPL-SCNC: 141 MMOL/L (ref 135–145)
WBC # BLD AUTO: 11.5 K/UL (ref 4.8–10.8)

## 2022-08-03 PROCEDURE — 80053 COMPREHEN METABOLIC PANEL: CPT

## 2022-08-03 PROCEDURE — 36415 COLL VENOUS BLD VENIPUNCTURE: CPT

## 2022-08-03 PROCEDURE — 83690 ASSAY OF LIPASE: CPT

## 2022-08-03 PROCEDURE — 85025 COMPLETE CBC W/AUTO DIFF WBC: CPT

## 2022-08-10 ENCOUNTER — OFFICE VISIT (OUTPATIENT)
Dept: MEDICAL GROUP | Facility: IMAGING CENTER | Age: 67
End: 2022-08-10
Payer: MEDICARE

## 2022-08-10 VITALS
WEIGHT: 127.2 LBS | OXYGEN SATURATION: 96 % | TEMPERATURE: 97.3 F | SYSTOLIC BLOOD PRESSURE: 120 MMHG | RESPIRATION RATE: 16 BRPM | HEIGHT: 67 IN | BODY MASS INDEX: 19.97 KG/M2 | DIASTOLIC BLOOD PRESSURE: 66 MMHG | HEART RATE: 86 BPM

## 2022-08-10 DIAGNOSIS — R20.0 BILATERAL HAND NUMBNESS: ICD-10-CM

## 2022-08-10 DIAGNOSIS — G47.01 INSOMNIA DUE TO MEDICAL CONDITION: ICD-10-CM

## 2022-08-10 DIAGNOSIS — F33.0 MILD EPISODE OF RECURRENT MAJOR DEPRESSIVE DISORDER (HCC): ICD-10-CM

## 2022-08-10 DIAGNOSIS — R53.83 FATIGUE, UNSPECIFIED TYPE: ICD-10-CM

## 2022-08-10 DIAGNOSIS — G89.29 OTHER CHRONIC PAIN: ICD-10-CM

## 2022-08-10 DIAGNOSIS — M15.9 OSTEOARTHRITIS OF MULTIPLE JOINTS, UNSPECIFIED OSTEOARTHRITIS TYPE: ICD-10-CM

## 2022-08-10 DIAGNOSIS — E11.9 DIABETES MELLITUS TYPE 2 IN NONOBESE (HCC): ICD-10-CM

## 2022-08-10 DIAGNOSIS — L82.1 SK (SEBORRHEIC KERATOSIS): ICD-10-CM

## 2022-08-10 DIAGNOSIS — Z12.11 SCREEN FOR COLON CANCER: ICD-10-CM

## 2022-08-10 DIAGNOSIS — D50.9 IRON DEFICIENCY ANEMIA, UNSPECIFIED IRON DEFICIENCY ANEMIA TYPE: ICD-10-CM

## 2022-08-10 DIAGNOSIS — Z98.890 PERSONAL HISTORY OF SPINE SURGERY: ICD-10-CM

## 2022-08-10 DIAGNOSIS — D72.829 LEUKOCYTOSIS, UNSPECIFIED TYPE: ICD-10-CM

## 2022-08-10 PROCEDURE — 99214 OFFICE O/P EST MOD 30 MIN: CPT | Performed by: FAMILY MEDICINE

## 2022-08-10 ASSESSMENT — FIBROSIS 4 INDEX: FIB4 SCORE: 0.7

## 2022-08-10 ASSESSMENT — PAIN SCALES - GENERAL: PAINLEVEL: 8=MODERATE-SEVERE PAIN

## 2022-08-10 NOTE — PROGRESS NOTES
SUBJECTIVE:    Chief Complaint   Patient presents with    Lab Results    Shoulder Pain     R>L, no change       HPI:     Heidi Navas is a 67 y.o. female with chronic pain, OA,  fibromyalgia, DM type 2, anxiety, COPD, hypertension, hyperlipidemia and prior anemia due to history of GI bleed here with care giver, Amara. She is here for follow up of trazodone therapy and to review labs.     Sleeping better. Trazodone 150 mg nightly. Tolerating well.   Fatigue but somewhat better.   Depression-mood is up and down. Caregiver feels she is overall doing a lot better.   Current living place has more light, which has been better for her.     Chronic pain issues. Hx of DJD.  Patient has had long-term chronic pain issues.  No changes in shoulder pain. Has seen pain specialist in the past and has been on pain medications in the past as well.  Currently she is not on any narcotic pain medications.  Takes Tylenol for pain as needed. She is willing to see pain management at this time.     Hands feel going numb all fingers- past couple days. Has limited movement of fingers and wrists. Usually right thumb only. Forearms hurt.   No interested in procedure at this time.   Hx of of cervical spine surgery.     Diabetes mellitus type 2- Glucose within range.   197 nonfasting on last.     H/H-on iron therapy. Stable labs. 8.6/29.3, 2 months prior 8.5/28.6.  Dark stool, but on iron.     WBC slightly elevated.   Possibly a little thrush last week, but better.   No fevers/chills. No dysuria. No cough. No other symptoms of illness she has noted.     ROS:  No recent fevers or chills. No nausea or vomiting. No diarrhea. No chest pains or shortness of breath. No lower extremity edema.    Current Outpatient Medications on File Prior to Visit   Medication Sig Dispense Refill    ASPIRIN 81 PO Take  by mouth.      IBUPROFEN PO Take  by mouth.      traZODone (DESYREL) 50 MG Tab TAKE 3 TABLETS BY MOUTH EVERY EVENING 90 Tablet 0    glucose  blood strip 1 Strip by Other route in the morning, at noon, and at bedtime. Use one strip to test blood sugar three times daily before meals. 300 Strip 3    insulin glargine (LANTUS) 100 UNIT/ML Solution Pen-injector injection 15 unit in am and 10 units in everning 3 Each 3    Insulin Pen Needle 32 G x 4 mm Use one pen needle in pen device to inject insulin three times daily. 100 Each 3    ferrous sulfate 325 (65 Fe) MG tablet Take 1 Tablet by mouth every day. 30 Tablet 3    Blood Glucose Monitoring Suppl (BLOOD GLUCOSE MONITOR SYSTEM) w/Device Kit Test blood sugar as recommended by provider. Sverve Contour Next blood glucose monitoring kit. 1 Kit 0    Microlet Lancets Misc Use one lancet to test blood sugar three times daily before meals. 100 Each 0    Alcohol Swabs (ALCOHOL PREP) 70 % Pads Wipe site with prep pad prior to injection 100 Each 0    omeprazole (PRILOSEC) 20 MG delayed-release capsule Take 1 Capsule by mouth 2 times a day. 60 Capsule 0    rosuvastatin (CRESTOR) 10 MG Tab Take 1 Tablet by mouth every evening. 90 Tablet 0    sertraline (ZOLOFT) 25 MG tablet Take 1 Tablet by mouth every day. 90 Tablet 0    lisinopril (PRINIVIL) 5 MG Tab Take 1 Tablet by mouth every day. (Patient not taking: No sig reported) 90 Tablet 0     No current facility-administered medications on file prior to visit.       Allergies   Allergen Reactions    Aspartame Nausea     headache    Latex Rash and Itching    Lipitor [Atorvastatin Calcium] Swelling     Patient denies allergy 06/04/22    Other Misc Vomiting     Bug spray    Saccharin Nausea     Nausea and headache       Patient Active Problem List    Diagnosis Date Noted    Insomnia due to medical condition 07/11/2022    Depression 06/05/2022    GIB (gastrointestinal bleeding) 06/04/2022    Acute on chronic blood loss anemia 06/04/2022    DM (diabetes mellitus), secondary uncontrolled (HCC) 06/04/2022    Severe protein-calorie malnutrition (HCC) 06/04/2022    Severely  underweight adult 06/04/2022    ACP (advance care planning) 06/04/2022    Hyponatremia 06/04/2022    Hyperosmolar hyperglycemic state (HHS), pressure ulcer, anemia ([facility]) 06/04/2022    Dehydration 06/04/2022    Pressure ulcer, sacrum 06/04/2022    Fatigue 06/03/2022    Weight loss, unintentional 06/03/2022    Urinary incontinence 06/03/2022    Adult failure to thrive 06/03/2022    Iron deficiency anemia due to chronic blood loss 02/09/2020    Cellulitis 01/08/2020    Anemia 05/19/2019    Atherosclerosis 05/19/2019    LVH (left ventricular hypertrophy) 05/19/2019    Arthritis of left shoulder region 07/12/2017    CVD (cardiovascular disease) 12/04/2015    Pulmonary nodule 12/01/2015    Renal stone 12/01/2015    Diverticulosis 12/01/2015    Chest pain 07/16/2015    Nausea & vomiting 07/13/2015    Opiate withdrawal ([facility]) 07/13/2015    Diabetes mellitus type 2 in nonobese ([facility]) 10/03/2014    Brachial neuritis or radiculitis 09/10/2014    History of total hip arthroplasty 07/22/2014    Leukocytosis 03/30/2014    COPD (chronic obstructive pulmonary disease) ([facility]) 03/30/2014    Arthritis 12/26/2013    Chronic pain 02/21/2012    Anxiety 01/18/2012    Vitamin D insufficiency 04/28/2011    Hypertension 03/22/2010    Fibromyalgia 10/09/2009    Swelling, mass, or lump in head and neck 07/21/2009    Tobacco use disorder 07/21/2009    Chronic ischemic heart disease 07/21/2009    Esophageal reflux 07/21/2009    Chronic airway obstruction, not elsewhere classified 07/21/2009    Allergic rhinitis 07/21/2009    Controlled type 2 diabetes mellitus without complication, without long-term current use of insulin ([facility]) 07/21/2009    Mixed hyperlipidemia 07/21/2009    Other atopic dermatitis and related conditions 07/21/2009    Deficiency anemia 07/21/2009       Past Medical History:   Diagnosis Date    Anemia     Arthritis     OA and  not RA per rheumatology    Breath shortness     oxygen PRN     Bronchitis 2008    Colon polyps 2015     "Convulsions ([facility]) 7/21/2009     ICD-10 transition    COPD (chronic obstructive pulmonary disease) ([facility])     Dental disorder     full dentures    Depression     depression, anxiety     Diverticulosis 5/10     colonoscopy    Hemorrhoid 7/29/2009    History of colonoscopy   2005    Leukocytosis 3/30/2014    Lymphocytosis (symptomatic) 7/21/2009    MI (myocardial infarction) ([facility]) 4/29/2007    Other specified disorder of intestines     constipation    Pain     shoulders, neck, lower back    Pancreatitis     Pap smear 4/2006    Pneumonia 2013    Pulmonary nodule     Renal lesion     Renal stone     S/P colonoscopy 5/2010    will need repeat in 5 years    Screening mammogram never    Seizure ([facility])     x1 in 2008    Shingles     Symptomatic menopausal or female climacteric states 7/21/2009    Type II or unspecified type diabetes mellitus without mention of complication, uncontrolled 7/21/2009    diet controlled     Unspecified urinary incontinence          OBJECTIVE:   /66   Pulse 86   Temp 36.3 °C (97.3 °F)   Resp 16   Ht 1.702 m (5' 7\")   Wt 57.7 kg (127 lb 3.2 oz)   LMP 01/01/2007   SpO2 96%   BMI 19.92 kg/m²   General: Well-developed well-nourished female, no acute distress  HEENT: oropharynx clear.   Neck: supple, no lymphadenopathy- cervical or supraclavicular, no thyromegaly. Posterior neck surgical scar, limited ROM of neck.   Cardiovascular: regular rate and rhythm, no murmurs, gallops, rubs  Lungs: clear to auscultation bilaterally, no wheezes, crackles, or rhonchi  Abdomen: +bowel sounds, soft, nontender, nondistended, no rebound, no guarding, no hepatosplenomegaly  Extremities: no cyanosis, clubbing, edema. 2+ DTR upper extremities bilaterally. Lack of ROM of wrist, adequate  strength bilaterally although limited movement of fingers.   Skin: Warm and dry. Left upper chest ~3 mm light brown lesion with stuck on appearance.   Psych: appropriate mood and affect, slight improvement of mood noted. "      Latest Reference Range & Units 8/3/22 12:00   WBC 4.8 - 10.8 K/uL 11.5 (H)   RBC 4.20 - 5.40 M/uL 3.81 (L)   Hemoglobin 12.0 - 16.0 g/dL 8.6 (L)   Hematocrit 37.0 - 47.0 % 29.3 (L)   MCV 81.4 - 97.8 fL 76.9 (L)   MCH 27.0 - 33.0 pg 22.6 (L)   MCHC 33.6 - 35.0 g/dL 29.4 (L)   RDW 35.9 - 50.0 fL 57.5 (H)   Platelet Count 164 - 446 K/uL 661 (H)   MPV 9.0 - 12.9 fL 8.8 (L)   Neutrophils-Polys 44.00 - 72.00 % 81.40 (H)   Neutrophils (Absolute) 2.00 - 7.15 K/uL 9.33 (H)   Lymphocytes 22.00 - 41.00 % 11.50 (L)   Lymphs (Absolute) 1.00 - 4.80 K/uL 1.32   Monocytes 0.00 - 13.40 % 4.90   Monos (Absolute) 0.00 - 0.85 K/uL 0.56   Eosinophils 0.00 - 6.90 % 1.00   Eos (Absolute) 0.00 - 0.51 K/uL 0.12   Basophils 0.00 - 1.80 % 0.90   Baso (Absolute) 0.00 - 0.12 K/uL 0.10   Immature Granulocytes 0.00 - 0.90 % 0.30   Immature Granulocytes (abs) 0.00 - 0.11 K/uL 0.04   Nucleated RBC /100 WBC 0.00   NRBC (Absolute) K/uL 0.00   Plt Estimation  Increased   RBC Morphology  Present   Hypochromia  1+   Anisocytosis  1+   Microcytosis  1+   Polychromia  1+   Comments-Diff  see below   Peripheral Smear Review  see below   Sodium 135 - 145 mmol/L 141   Potassium 3.6 - 5.5 mmol/L 4.9   Chloride 96 - 112 mmol/L 105   Co2 20 - 33 mmol/L 24   Anion Gap 7.0 - 16.0  12.0   Glucose 65 - 99 mg/dL 197 (H)   Bun 8 - 22 mg/dL 10   Creatinine 0.50 - 1.40 mg/dL 0.57   GFR (CKD-EPI) >60 mL/min/1.73 m 2 99   Calcium 8.5 - 10.5 mg/dL 9.0   AST(SGOT) 12 - 45 U/L 23   ALT(SGPT) 2 - 50 U/L 11   Alkaline Phosphatase 30 - 99 U/L 88   Total Bilirubin 0.1 - 1.5 mg/dL <0.2   Albumin 3.2 - 4.9 g/dL 3.6   Total Protein 6.0 - 8.2 g/dL 7.2   Globulin 1.9 - 3.5 g/dL 3.6 (H)   A-G Ratio g/dL 1.0   Lipase 11 - 82 U/L 23   Fasting Status  Non-Fasting   (H): Data is abnormally high  (L): Data is abnormally low    ASSESSMENT/PLAN:    67 y.o. female with chronic pain, OA,  fibromyalgia, DM type 2, anxiety, COPD, hypertension, hyperlipidemia and prior anemia due to history  of GI bleed.     1. Insomnia due to medical condition        2. Mild episode of recurrent major depressive disorder (HCC)        3. Other chronic pain  Referral to Pain Management      4. Osteoarthritis of multiple joints, unspecified osteoarthritis type        5. Personal history of spine surgery        6. Bilateral hand numbness        7. Diabetes mellitus type 2 in nonobese (HCC)  Comp Metabolic Panel    Lipid Profile    HEMOGLOBIN A1C    MICROALBUMIN CREAT RATIO URINE      8. Iron deficiency anemia, unspecified iron deficiency anemia type  CBC WITH DIFFERENTIAL      9. Fatigue, unspecified type  Comp Metabolic Panel    VITAMIN D,25 HYDROXY      10. Leukocytosis, unspecified type  Referral to Hematology Oncology      11. SK (seborrheic keratosis)        12. Screen for colon cancer  OCCULT BLOOD FECES IMMUNOASSAY      -Insomnia-chronic.  Improved with trazodone 150 mg daily.  - MDD-mild.  Also improving.  Counseled on management of symptoms with self-care activities.  She will continue on Zoloft 25 mg daily.  -Chronic pain-long-term issue.  History of DJD. Refer to physiatry/pain management. Tylenol as needed.   -Bilateral hand numbness-patient has almost no movement in the wrist bilaterally.    Will send to physiatry/pain management for further management.   Due to patient's MDD would like to limit use of other medications such as lyrica/gabapentin, if possible.  - Diabetes mellitus type 2-stable on current insulin regimen.  Continue monitor labs.  Recommend diabetic diet and routine exercise as tolerated. Monitor.   - Anemia-she is on iron therapy. She has had prior past history of GI bleed. Component of current anemia appears associated with limited po intake.   H/H stable at this time.   Patient to complete fit test.  Monitor labs.  -Fatigue-improving.  Overall likely multifactorial.    Will continue to monitor.   -Leukocytosis-appears intermittent and mild.  Unclear etiology at this time. No other signs for  infection at this time. Prior negative cxr, urine culture and blood cultures.   Refer to hematology for further evaluation.   Patient to report if any new symptoms occur.   -SK- benign skin lesion on chest noted. Monitor.       Return in about 1 month (around 9/10/2022).    This medical record contains text that has been entered with the assistance of computer voice recognition and dictation software.  Therefore, it may contain unintended errors in text, spelling, punctuation, or grammar.

## 2022-08-13 ENCOUNTER — HOSPITAL ENCOUNTER (OUTPATIENT)
Facility: MEDICAL CENTER | Age: 67
End: 2022-08-13
Attending: FAMILY MEDICINE
Payer: MEDICARE

## 2022-08-13 PROCEDURE — 82274 ASSAY TEST FOR BLOOD FECAL: CPT

## 2022-08-17 DIAGNOSIS — Z12.11 SCREEN FOR COLON CANCER: ICD-10-CM

## 2022-08-19 LAB — IMM ASSAY OCC BLD FITOB: POSITIVE

## 2022-08-23 DIAGNOSIS — D50.0 IRON DEFICIENCY ANEMIA DUE TO CHRONIC BLOOD LOSS: ICD-10-CM

## 2022-08-23 DIAGNOSIS — R19.5 POSITIVE OCCULT STOOL BLOOD TEST: ICD-10-CM

## 2022-08-25 ENCOUNTER — OFFICE VISIT (OUTPATIENT)
Dept: MEDICAL GROUP | Facility: IMAGING CENTER | Age: 67
End: 2022-08-25
Payer: MEDICARE

## 2022-08-25 VITALS
HEIGHT: 67 IN | RESPIRATION RATE: 14 BRPM | OXYGEN SATURATION: 97 % | BODY MASS INDEX: 20.65 KG/M2 | SYSTOLIC BLOOD PRESSURE: 122 MMHG | HEART RATE: 74 BPM | TEMPERATURE: 97.7 F | WEIGHT: 131.6 LBS | DIASTOLIC BLOOD PRESSURE: 62 MMHG

## 2022-08-25 DIAGNOSIS — G89.29 OTHER CHRONIC PAIN: ICD-10-CM

## 2022-08-25 DIAGNOSIS — M25.552 CHRONIC PAIN OF BOTH HIPS: ICD-10-CM

## 2022-08-25 DIAGNOSIS — M25.512 CHRONIC PAIN OF BOTH SHOULDERS: ICD-10-CM

## 2022-08-25 DIAGNOSIS — K62.3 RECTAL PROLAPSE: ICD-10-CM

## 2022-08-25 DIAGNOSIS — D50.0 IRON DEFICIENCY ANEMIA DUE TO CHRONIC BLOOD LOSS: ICD-10-CM

## 2022-08-25 DIAGNOSIS — M79.643 CHRONIC HAND PAIN, UNSPECIFIED LATERALITY: ICD-10-CM

## 2022-08-25 DIAGNOSIS — R19.5 POSITIVE OCCULT STOOL BLOOD TEST: ICD-10-CM

## 2022-08-25 DIAGNOSIS — M25.551 CHRONIC PAIN OF BOTH HIPS: ICD-10-CM

## 2022-08-25 DIAGNOSIS — M25.59 PAIN IN OTHER JOINT: ICD-10-CM

## 2022-08-25 DIAGNOSIS — D72.829 LEUKOCYTOSIS, UNSPECIFIED TYPE: ICD-10-CM

## 2022-08-25 DIAGNOSIS — G89.29 CHRONIC PAIN OF BOTH SHOULDERS: ICD-10-CM

## 2022-08-25 DIAGNOSIS — M15.9 OSTEOARTHRITIS OF MULTIPLE JOINTS, UNSPECIFIED OSTEOARTHRITIS TYPE: ICD-10-CM

## 2022-08-25 DIAGNOSIS — M54.2 CHRONIC NECK PAIN: ICD-10-CM

## 2022-08-25 DIAGNOSIS — G89.29 CHRONIC PAIN OF BOTH HIPS: ICD-10-CM

## 2022-08-25 DIAGNOSIS — G89.29 CHRONIC NECK PAIN: ICD-10-CM

## 2022-08-25 DIAGNOSIS — G89.29 CHRONIC HAND PAIN, UNSPECIFIED LATERALITY: ICD-10-CM

## 2022-08-25 DIAGNOSIS — M25.511 CHRONIC PAIN OF BOTH SHOULDERS: ICD-10-CM

## 2022-08-25 DIAGNOSIS — F33.0 MILD EPISODE OF RECURRENT MAJOR DEPRESSIVE DISORDER (HCC): ICD-10-CM

## 2022-08-25 PROCEDURE — 99214 OFFICE O/P EST MOD 30 MIN: CPT | Performed by: FAMILY MEDICINE

## 2022-08-25 ASSESSMENT — PAIN SCALES - GENERAL: PAINLEVEL: 7=MODERATE-SEVERE PAIN

## 2022-08-25 ASSESSMENT — FIBROSIS 4 INDEX: FIB4 SCORE: 0.7

## 2022-08-25 NOTE — PROGRESS NOTES
SUBJECTIVE:    Chief Complaint   Patient presents with    Lab Results       HPI:     Heidi Navas is a 67 y.o. female  with chronic pain, OA,  fibromyalgia, DM type 2, anxiety, COPD, hypertension, hyperlipidemia and prior anemia due to history of GI bleed here with care giver, Amara.  She is here to follow-up on lab results.    Hemoglobin hematocrit are stable on recent labs.  Her fit test did come back positive and she does have a referral to gastroenterology.  She does have the phone number to call to schedule with them.  She is currently on iron therapy and tolerating well.  Does not necessarily notice blood in her stools but may have some darker stools due to her iron.  She does notice a protrusion in her rectal area sometimes which is soft and she can press back inside.    She does have chronic pain issues which has been a long-term issue.  She has seen pain specialist in the past.  She currently not on any narcotic pain medications.  She has been using Tylenol as needed and sometimes takes aspirin or ibuprofen.  She has had a history of cervical spine surgery with fusion, hip arthroplasty, left shoulder arthroplasty and has stiffening joints of her wrists and hands.  Main areas of pain are neck, shoulder, hands and hips.  She was started on Zoloft since being in the hospital.  Mood has been stable but last few days she has been worried about her lab results.      ROS:  No recent fevers or chills. No nausea or vomiting. No diarrhea. No chest pains or shortness of breath. No lower extremity edema.    Current Outpatient Medications on File Prior to Visit   Medication Sig Dispense Refill    ASPIRIN 81 PO Take  by mouth.      IBUPROFEN PO Take  by mouth.      traZODone (DESYREL) 50 MG Tab TAKE 3 TABLETS BY MOUTH EVERY EVENING 90 Tablet 0    glucose blood strip 1 Strip by Other route in the morning, at noon, and at bedtime. Use one strip to test blood sugar three times daily before meals. 300 Strip 3     insulin glargine (LANTUS) 100 UNIT/ML Solution Pen-injector injection 15 unit in am and 10 units in everning 3 Each 3    Insulin Pen Needle 32 G x 4 mm Use one pen needle in pen device to inject insulin three times daily. 100 Each 3    ferrous sulfate 325 (65 Fe) MG tablet Take 1 Tablet by mouth every day. 30 Tablet 3    Blood Glucose Monitoring Suppl (BLOOD GLUCOSE MONITOR SYSTEM) w/Device Kit Test blood sugar as recommended by provider. Slick Contour Next blood glucose monitoring kit. 1 Kit 0    Microlet Lancets Misc Use one lancet to test blood sugar three times daily before meals. 100 Each 0    Alcohol Swabs (ALCOHOL PREP) 70 % Pads Wipe site with prep pad prior to injection 100 Each 0    omeprazole (PRILOSEC) 20 MG delayed-release capsule Take 1 Capsule by mouth 2 times a day. 60 Capsule 0    rosuvastatin (CRESTOR) 10 MG Tab Take 1 Tablet by mouth every evening. 90 Tablet 0    sertraline (ZOLOFT) 25 MG tablet Take 1 Tablet by mouth every day. 90 Tablet 0    lisinopril (PRINIVIL) 5 MG Tab Take 1 Tablet by mouth every day. (Patient not taking: No sig reported) 90 Tablet 0     No current facility-administered medications on file prior to visit.       Allergies   Allergen Reactions    Aspartame Nausea     headache    Latex Rash and Itching    Lipitor [Atorvastatin Calcium] Swelling     Patient denies allergy 06/04/22    Other Misc Vomiting     Bug spray    Saccharin Nausea     Nausea and headache       Patient Active Problem List    Diagnosis Date Noted    Insomnia due to medical condition 07/11/2022    Depression 06/05/2022    GIB (gastrointestinal bleeding) 06/04/2022    Acute on chronic blood loss anemia 06/04/2022    DM (diabetes mellitus), secondary uncontrolled (HCC) 06/04/2022    Severe protein-calorie malnutrition (HCC) 06/04/2022    Severely underweight adult 06/04/2022    ACP (advance care planning) 06/04/2022    Hyponatremia 06/04/2022    Hyperosmolar hyperglycemic state (HHS), pressure ulcer, anemia  ([facility]) 06/04/2022    Dehydration 06/04/2022    Pressure ulcer, sacrum 06/04/2022    Fatigue 06/03/2022    Weight loss, unintentional 06/03/2022    Urinary incontinence 06/03/2022    Adult failure to thrive 06/03/2022    Iron deficiency anemia due to chronic blood loss 02/09/2020    Cellulitis 01/08/2020    Anemia 05/19/2019    Atherosclerosis 05/19/2019    LVH (left ventricular hypertrophy) 05/19/2019    Arthritis of left shoulder region 07/12/2017    CVD (cardiovascular disease) 12/04/2015    Pulmonary nodule 12/01/2015    Renal stone 12/01/2015    Diverticulosis 12/01/2015    Chest pain 07/16/2015    Nausea & vomiting 07/13/2015    Opiate withdrawal ([facility]) 07/13/2015    Diabetes mellitus type 2 in nonobese ([facility]) 10/03/2014    Brachial neuritis or radiculitis 09/10/2014    History of total hip arthroplasty 07/22/2014    Leukocytosis 03/30/2014    COPD (chronic obstructive pulmonary disease) ([facility]) 03/30/2014    Arthritis 12/26/2013    Chronic pain 02/21/2012    Anxiety 01/18/2012    Vitamin D insufficiency 04/28/2011    Hypertension 03/22/2010    Fibromyalgia 10/09/2009    Swelling, mass, or lump in head and neck 07/21/2009    Tobacco use disorder 07/21/2009    Chronic ischemic heart disease 07/21/2009    Esophageal reflux 07/21/2009    Chronic airway obstruction, not elsewhere classified 07/21/2009    Allergic rhinitis 07/21/2009    Controlled type 2 diabetes mellitus without complication, without long-term current use of insulin ([facility]) 07/21/2009    Mixed hyperlipidemia 07/21/2009    Other atopic dermatitis and related conditions 07/21/2009    Deficiency anemia 07/21/2009       Past Medical History:   Diagnosis Date    Anemia     Arthritis     OA and  not RA per rheumatology    Breath shortness     oxygen PRN     Bronchitis 2008    Colon polyps 2015    Convulsions ([facility]) 7/21/2009     ICD-10 transition    COPD (chronic obstructive pulmonary disease) ([facility])     Dental disorder     full dentures    Depression      "depression, anxiety     Diverticulosis 5/10     colonoscopy    Hemorrhoid 7/29/2009    History of colonoscopy   2005    Leukocytosis 3/30/2014    Lymphocytosis (symptomatic) 7/21/2009    MI (myocardial infarction) (HCC) 4/29/2007    Other specified disorder of intestines     constipation    Pain     shoulders, neck, lower back    Pancreatitis     Pap smear 4/2006    Pneumonia 2013    Pulmonary nodule     Renal lesion     Renal stone     S/P colonoscopy 5/2010    will need repeat in 5 years    Screening mammogram never    Seizure (HCC)     x1 in 2008    Shingles     Symptomatic menopausal or female climacteric states 7/21/2009    Type II or unspecified type diabetes mellitus without mention of complication, uncontrolled 7/21/2009    diet controlled     Unspecified urinary incontinence          OBJECTIVE:   /62   Pulse 74   Temp 36.5 °C (97.7 °F)   Resp 14   Ht 1.702 m (5' 7\")   Wt 59.7 kg (131 lb 9.6 oz)   LMP 01/01/2007   SpO2 97%   BMI 20.61 kg/m²   General: Well-developed well-nourished female, no acute distress  Neck: supple, no lymphadenopathy- cervical or supraclavicular, no thyromegaly.  Limited range of motion of the neck, surgical incision scar well-healed posterior region.  Cardiovascular: regular rate and rhythm, no murmurs, gallops, rubs  Lungs: clear to auscultation bilaterally, no wheezes, crackles, or rhonchi  Abdomen: +bowel sounds, soft, nontender, nondistended, no rebound, no guarding, no hepatosplenomegaly  Extremities: no cyanosis, clubbing, edema.  Limited range of motion of wrists and hands.  Skin: Warm and dry  Psych: appropriate mood and affect     Latest Reference Range & Units 8/3/22 12:00 8/13/22 15:00   WBC 4.8 - 10.8 K/uL 11.5 (H)    RBC 4.20 - 5.40 M/uL 3.81 (L)    Hemoglobin 12.0 - 16.0 g/dL 8.6 (L)    Hematocrit 37.0 - 47.0 % 29.3 (L)    MCV 81.4 - 97.8 fL 76.9 (L)    MCH 27.0 - 33.0 pg 22.6 (L)    MCHC 33.6 - 35.0 g/dL 29.4 (L)    RDW 35.9 - 50.0 fL 57.5 (H)    Platelet " Count 164 - 446 K/uL 661 (H)    MPV 9.0 - 12.9 fL 8.8 (L)    Neutrophils-Polys 44.00 - 72.00 % 81.40 (H)    Neutrophils (Absolute) 2.00 - 7.15 K/uL 9.33 (H)    Lymphocytes 22.00 - 41.00 % 11.50 (L)    Lymphs (Absolute) 1.00 - 4.80 K/uL 1.32    Monocytes 0.00 - 13.40 % 4.90    Monos (Absolute) 0.00 - 0.85 K/uL 0.56    Eosinophils 0.00 - 6.90 % 1.00    Eos (Absolute) 0.00 - 0.51 K/uL 0.12    Basophils 0.00 - 1.80 % 0.90    Baso (Absolute) 0.00 - 0.12 K/uL 0.10    Immature Granulocytes 0.00 - 0.90 % 0.30    Immature Granulocytes (abs) 0.00 - 0.11 K/uL 0.04    Nucleated RBC /100 WBC 0.00    NRBC (Absolute) K/uL 0.00    Plt Estimation  Increased    RBC Morphology  Present    Hypochromia  1+    Anisocytosis  1+    Microcytosis  1+    Polychromia  1+    Comments-Diff  see below    Peripheral Smear Review  see below    Sodium 135 - 145 mmol/L 141    Potassium 3.6 - 5.5 mmol/L 4.9    Chloride 96 - 112 mmol/L 105    Co2 20 - 33 mmol/L 24    Anion Gap 7.0 - 16.0  12.0    Glucose 65 - 99 mg/dL 197 (H)    Bun 8 - 22 mg/dL 10    Creatinine 0.50 - 1.40 mg/dL 0.57    GFR (CKD-EPI) >60 mL/min/1.73 m 2 99    Calcium 8.5 - 10.5 mg/dL 9.0    AST(SGOT) 12 - 45 U/L 23    ALT(SGPT) 2 - 50 U/L 11    Alkaline Phosphatase 30 - 99 U/L 88    Total Bilirubin 0.1 - 1.5 mg/dL <0.2    Albumin 3.2 - 4.9 g/dL 3.6    Total Protein 6.0 - 8.2 g/dL 7.2    Globulin 1.9 - 3.5 g/dL 3.6 (H)    A-G Ratio g/dL 1.0    Lipase 11 - 82 U/L 23    Fasting Status  Non-Fasting    Occult Blood, IA   Positive !   (H): Data is abnormally high  (L): Data is abnormally low  !: Data is abnormal    ASSESSMENT/PLAN:    67 y.o.female with chronic pain, OA,  fibromyalgia, DM type 2, anxiety, COPD, hypertension, hyperlipidemia and prior anemia due to history of GI bleed in the past.    1. Iron deficiency anemia due to chronic blood loss -H/H overall stable.  Patient to schedule with GI for further evaluation.  She will continue on iron therapy at this time. Monitor.        2.  Positive occult stool blood test -patient to follow-up with GI at this time.  Recommend avoid use of aspirin and NSAIDs at this time.       3. Rectal prolapse -patient will follow-up with GI at this time.       4. Other chronic pain -multiple areas of pain.  Patient with history of multiple surgeries and history of DJD.  She does have limited range of motion of multiple joints.  Limited to use of Tylenol for pain at this time. Referral to Pain Management      5. Pain in other joint  Referral to Pain Management      6. Osteoarthritis of multiple joints, unspecified osteoarthritis type  Referral to pain management      7. Chronic neck pain  Referral to Pain Management      8. Chronic pain of both shoulders  Referral to Pain Management      9. Chronic hand pain, unspecified laterality  Referral to Pain Management      10. Chronic pain of both hips  Referral to Pain Management      11. Mild episode of recurrent major depressive disorder (HCC)-stable.  Continue current medication.  Counseled on management of symptoms.       12. Leukocytosis, unspecified type-hematology referral was denied at this time as likely suspect reactive leukocytosis.  Patient was advised to follow-up with GI for further evaluation of iron deficiency anemia at this time.  Monitor.            Return in about 2 weeks (around 9/8/2022).  Patient will follow-up as scheduled after lab work complete.    This medical record contains text that has been entered with the assistance of computer voice recognition and dictation software.  Therefore, it may contain unintended errors in text, spelling, punctuation, or grammar.

## 2022-09-06 ENCOUNTER — HOSPITAL ENCOUNTER (OUTPATIENT)
Dept: LAB | Facility: MEDICAL CENTER | Age: 67
End: 2022-09-06
Payer: MEDICARE

## 2022-09-06 ENCOUNTER — HOSPITAL ENCOUNTER (OUTPATIENT)
Dept: LAB | Facility: MEDICAL CENTER | Age: 67
End: 2022-09-06
Attending: FAMILY MEDICINE
Payer: MEDICARE

## 2022-09-06 DIAGNOSIS — D50.9 IRON DEFICIENCY ANEMIA, UNSPECIFIED IRON DEFICIENCY ANEMIA TYPE: ICD-10-CM

## 2022-09-06 DIAGNOSIS — R53.83 FATIGUE, UNSPECIFIED TYPE: ICD-10-CM

## 2022-09-06 DIAGNOSIS — E11.9 DIABETES MELLITUS TYPE 2 IN NONOBESE (HCC): ICD-10-CM

## 2022-09-06 LAB
25(OH)D3 SERPL-MCNC: 57 NG/ML (ref 30–100)
ALBUMIN SERPL BCP-MCNC: 4.1 G/DL (ref 3.2–4.9)
ALBUMIN/GLOB SERPL: 1.2 G/DL
ALP SERPL-CCNC: 92 U/L (ref 30–99)
ALT SERPL-CCNC: 11 U/L (ref 2–50)
ANION GAP SERPL CALC-SCNC: 12 MMOL/L (ref 7–16)
ANISOCYTOSIS BLD QL SMEAR: ABNORMAL
ANISOCYTOSIS BLD QL SMEAR: ABNORMAL
AST SERPL-CCNC: 18 U/L (ref 12–45)
BASOPHILS # BLD AUTO: 1.3 % (ref 0–1.8)
BASOPHILS # BLD AUTO: 2.6 % (ref 0–1.8)
BASOPHILS # BLD: 0.14 K/UL (ref 0–0.12)
BASOPHILS # BLD: 0.28 K/UL (ref 0–0.12)
BILIRUB SERPL-MCNC: <0.2 MG/DL (ref 0.1–1.5)
BUN SERPL-MCNC: 15 MG/DL (ref 8–22)
CALCIUM SERPL-MCNC: 9.6 MG/DL (ref 8.5–10.5)
CHLORIDE SERPL-SCNC: 102 MMOL/L (ref 96–112)
CHOLEST SERPL-MCNC: 92 MG/DL (ref 100–199)
CO2 SERPL-SCNC: 27 MMOL/L (ref 20–33)
COMMENT 1642: NORMAL
CREAT SERPL-MCNC: 0.6 MG/DL (ref 0.5–1.4)
CREAT UR-MCNC: 99.76 MG/DL
EOSINOPHIL # BLD AUTO: 0.18 K/UL (ref 0–0.51)
EOSINOPHIL # BLD AUTO: 0.28 K/UL (ref 0–0.51)
EOSINOPHIL NFR BLD: 1.7 % (ref 0–6.9)
EOSINOPHIL NFR BLD: 2.6 % (ref 0–6.9)
ERYTHROCYTE [DISTWIDTH] IN BLOOD BY AUTOMATED COUNT: 55.7 FL (ref 35.9–50)
ERYTHROCYTE [DISTWIDTH] IN BLOOD BY AUTOMATED COUNT: 56.4 FL (ref 35.9–50)
EST. AVERAGE GLUCOSE BLD GHB EST-MCNC: 154 MG/DL
FASTING STATUS PATIENT QL REPORTED: NORMAL
FERRITIN SERPL-MCNC: 50.1 NG/ML (ref 10–291)
GFR SERPLBLD CREATININE-BSD FMLA CKD-EPI: 98 ML/MIN/1.73 M 2
GLOBULIN SER CALC-MCNC: 3.5 G/DL (ref 1.9–3.5)
GLUCOSE SERPL-MCNC: 96 MG/DL (ref 65–99)
HBA1C MFR BLD: 7 % (ref 4–5.6)
HCT VFR BLD AUTO: 33.5 % (ref 37–47)
HCT VFR BLD AUTO: 34.1 % (ref 37–47)
HDLC SERPL-MCNC: 35 MG/DL
HGB BLD-MCNC: 9.7 G/DL (ref 12–16)
HGB BLD-MCNC: 9.7 G/DL (ref 12–16)
HYPOCHROMIA BLD QL SMEAR: ABNORMAL
IMM GRANULOCYTES # BLD AUTO: 0.05 K/UL (ref 0–0.11)
IMM GRANULOCYTES NFR BLD AUTO: 0.5 % (ref 0–0.9)
IRON SATN MFR SERPL: 62 % (ref 15–55)
IRON SERPL-MCNC: 206 UG/DL (ref 40–170)
LDLC SERPL CALC-MCNC: 42 MG/DL
LG PLATELETS BLD QL SMEAR: NORMAL
LYMPHOCYTES # BLD AUTO: 1.66 K/UL (ref 1–4.8)
LYMPHOCYTES # BLD AUTO: 1.82 K/UL (ref 1–4.8)
LYMPHOCYTES NFR BLD: 15.7 % (ref 22–41)
LYMPHOCYTES NFR BLD: 17.4 % (ref 22–41)
MACROCYTES BLD QL SMEAR: ABNORMAL
MANUAL DIFF BLD: NORMAL
MCH RBC QN AUTO: 22.4 PG (ref 27–33)
MCH RBC QN AUTO: 22.7 PG (ref 27–33)
MCHC RBC AUTO-ENTMCNC: 28.4 G/DL (ref 33.6–35)
MCHC RBC AUTO-ENTMCNC: 29 G/DL (ref 33.6–35)
MCV RBC AUTO: 78.3 FL (ref 81.4–97.8)
MCV RBC AUTO: 78.8 FL (ref 81.4–97.8)
MICROALBUMIN UR-MCNC: 6.3 MG/DL
MICROALBUMIN/CREAT UR: 63 MG/G (ref 0–30)
MICROCYTES BLD QL SMEAR: ABNORMAL
MICROCYTES BLD QL SMEAR: ABNORMAL
MONOCYTES # BLD AUTO: 0.46 K/UL (ref 0–0.85)
MONOCYTES # BLD AUTO: 0.54 K/UL (ref 0–0.85)
MONOCYTES NFR BLD AUTO: 4.3 % (ref 0–13.4)
MONOCYTES NFR BLD AUTO: 5.2 % (ref 0–13.4)
MORPHOLOGY BLD-IMP: NORMAL
MORPHOLOGY BLD-IMP: NORMAL
NEUTROPHILS # BLD AUTO: 7.71 K/UL (ref 2–7.15)
NEUTROPHILS # BLD AUTO: 7.93 K/UL (ref 2–7.15)
NEUTROPHILS NFR BLD: 73.9 % (ref 44–72)
NEUTROPHILS NFR BLD: 74.8 % (ref 44–72)
NRBC # BLD AUTO: 0 K/UL
NRBC # BLD AUTO: 0 K/UL
NRBC BLD-RTO: 0 /100 WBC
NRBC BLD-RTO: 0 /100 WBC
PLATELET # BLD AUTO: 601 K/UL (ref 164–446)
PLATELET # BLD AUTO: 607 K/UL (ref 164–446)
PLATELET BLD QL SMEAR: NORMAL
PLATELET BLD QL SMEAR: NORMAL
PMV BLD AUTO: 8.9 FL (ref 9–12.9)
PMV BLD AUTO: 9.2 FL (ref 9–12.9)
POTASSIUM SERPL-SCNC: 4.5 MMOL/L (ref 3.6–5.5)
PROT SERPL-MCNC: 7.6 G/DL (ref 6–8.2)
RBC # BLD AUTO: 4.28 M/UL (ref 4.2–5.4)
RBC # BLD AUTO: 4.33 M/UL (ref 4.2–5.4)
RBC BLD AUTO: PRESENT
RBC BLD AUTO: PRESENT
SODIUM SERPL-SCNC: 141 MMOL/L (ref 135–145)
TIBC SERPL-MCNC: 330 UG/DL (ref 250–450)
TRIGL SERPL-MCNC: 76 MG/DL (ref 0–149)
UIBC SERPL-MCNC: 124 UG/DL (ref 110–370)
WBC # BLD AUTO: 10.4 K/UL (ref 4.8–10.8)
WBC # BLD AUTO: 10.6 K/UL (ref 4.8–10.8)

## 2022-09-06 PROCEDURE — 82043 UR ALBUMIN QUANTITATIVE: CPT

## 2022-09-06 PROCEDURE — 80053 COMPREHEN METABOLIC PANEL: CPT

## 2022-09-06 PROCEDURE — 83036 HEMOGLOBIN GLYCOSYLATED A1C: CPT

## 2022-09-06 PROCEDURE — 83550 IRON BINDING TEST: CPT

## 2022-09-06 PROCEDURE — 80061 LIPID PANEL: CPT

## 2022-09-06 PROCEDURE — 82728 ASSAY OF FERRITIN: CPT

## 2022-09-06 PROCEDURE — 82306 VITAMIN D 25 HYDROXY: CPT

## 2022-09-06 PROCEDURE — 85025 COMPLETE CBC W/AUTO DIFF WBC: CPT

## 2022-09-06 PROCEDURE — 85025 COMPLETE CBC W/AUTO DIFF WBC: CPT | Mod: 91

## 2022-09-06 PROCEDURE — 82570 ASSAY OF URINE CREATININE: CPT

## 2022-09-06 PROCEDURE — 85007 BL SMEAR W/DIFF WBC COUNT: CPT

## 2022-09-06 PROCEDURE — 36415 COLL VENOUS BLD VENIPUNCTURE: CPT

## 2022-09-06 PROCEDURE — 83540 ASSAY OF IRON: CPT

## 2022-09-09 DIAGNOSIS — R07.9 CHEST PAIN, UNSPECIFIED TYPE: ICD-10-CM

## 2022-09-09 DIAGNOSIS — E11.69 TYPE 2 DIABETES MELLITUS WITH OTHER SPECIFIED COMPLICATION, WITHOUT LONG-TERM CURRENT USE OF INSULIN (HCC): ICD-10-CM

## 2022-09-09 RX ORDER — ROSUVASTATIN CALCIUM 10 MG/1
10 TABLET, COATED ORAL EVERY EVENING
Qty: 90 TABLET | Refills: 3 | Status: SHIPPED | OUTPATIENT
Start: 2022-09-09 | End: 2023-05-02 | Stop reason: SDUPTHER

## 2022-09-09 RX ORDER — SERTRALINE HYDROCHLORIDE 25 MG/1
25 TABLET, FILM COATED ORAL DAILY
Qty: 90 TABLET | Refills: 0 | Status: SHIPPED | OUTPATIENT
Start: 2022-09-09 | End: 2022-12-08

## 2022-09-14 ENCOUNTER — OFFICE VISIT (OUTPATIENT)
Dept: MEDICAL GROUP | Facility: IMAGING CENTER | Age: 67
End: 2022-09-14
Payer: MEDICARE

## 2022-09-14 VITALS
HEART RATE: 83 BPM | WEIGHT: 133 LBS | HEIGHT: 67 IN | OXYGEN SATURATION: 99 % | DIASTOLIC BLOOD PRESSURE: 64 MMHG | BODY MASS INDEX: 20.88 KG/M2 | TEMPERATURE: 97.4 F | SYSTOLIC BLOOD PRESSURE: 128 MMHG | RESPIRATION RATE: 18 BRPM

## 2022-09-14 DIAGNOSIS — Z78.0 POSTMENOPAUSAL: ICD-10-CM

## 2022-09-14 DIAGNOSIS — Z12.31 ENCOUNTER FOR SCREENING MAMMOGRAM FOR MALIGNANT NEOPLASM OF BREAST: ICD-10-CM

## 2022-09-14 DIAGNOSIS — I10 PRIMARY HYPERTENSION: ICD-10-CM

## 2022-09-14 DIAGNOSIS — E11.9 DIABETES MELLITUS TYPE 2 IN NONOBESE (HCC): ICD-10-CM

## 2022-09-14 DIAGNOSIS — D50.0 IRON DEFICIENCY ANEMIA DUE TO CHRONIC BLOOD LOSS: ICD-10-CM

## 2022-09-14 DIAGNOSIS — R80.9 MICROALBUMINURIA: ICD-10-CM

## 2022-09-14 DIAGNOSIS — F33.0 MILD EPISODE OF RECURRENT MAJOR DEPRESSIVE DISORDER (HCC): ICD-10-CM

## 2022-09-14 DIAGNOSIS — E78.2 MIXED HYPERLIPIDEMIA: ICD-10-CM

## 2022-09-14 DIAGNOSIS — F41.9 ANXIETY: ICD-10-CM

## 2022-09-14 DIAGNOSIS — M79.7 FIBROMYALGIA: ICD-10-CM

## 2022-09-14 DIAGNOSIS — G89.29 OTHER CHRONIC PAIN: ICD-10-CM

## 2022-09-14 PROCEDURE — 99214 OFFICE O/P EST MOD 30 MIN: CPT | Performed by: FAMILY MEDICINE

## 2022-09-14 RX ORDER — POLYETHYLENE GLYCOL-3350 AND ELECTROLYTES 236; 6.74; 5.86; 2.97; 22.74 G/274.31G; G/274.31G; G/274.31G; G/274.31G; G/274.31G
POWDER, FOR SOLUTION ORAL
COMMUNITY
Start: 2022-09-06 | End: 2023-01-12

## 2022-09-14 RX ORDER — LOSARTAN POTASSIUM 25 MG/1
25 TABLET ORAL DAILY
Qty: 30 TABLET | Refills: 3 | Status: SHIPPED | OUTPATIENT
Start: 2022-09-14 | End: 2022-10-10

## 2022-09-14 RX ORDER — SERTRALINE HYDROCHLORIDE 25 MG/1
25 TABLET, FILM COATED ORAL DAILY
Qty: 90 TABLET | Refills: 0 | Status: CANCELLED | OUTPATIENT
Start: 2022-09-14

## 2022-09-14 RX ORDER — ROSUVASTATIN CALCIUM 10 MG/1
10 TABLET, COATED ORAL EVERY EVENING
Qty: 90 TABLET | Refills: 3 | Status: CANCELLED | OUTPATIENT
Start: 2022-09-14

## 2022-09-14 ASSESSMENT — FIBROSIS 4 INDEX: FIB4 SCORE: 0.6

## 2022-09-14 ASSESSMENT — PAIN SCALES - GENERAL: PAINLEVEL: NO PAIN

## 2022-09-14 NOTE — PROGRESS NOTES
SUBJECTIVE:    Chief Complaint   Patient presents with    Follow-Up    Lab Results     09/06/2022    Medication Refill     Sertraline, rosuvastatin        HPI:     Heidi Navas is a 67 y.o. female with chronic pain, OA,  fibromyalgia, DM type 2, anxiety, COPD, hypertension, hyperlipidemia and prior anemia due to history of GI bleed here with care giver, Amara.  She is here to follow-up for lab results.    Iron deficiency anemia-H/H stable.  She did see GI and is scheduled for scope evaluation next week.    Continues on iron supplements at this time.    Depression/anxiety-Sertraline 25 mg daily-mood overall stable, but did not get her medication until last night.    States rosuvastatin not given per pharmacy due to concerns for possible medication interaction with trazodone.  Lipids have been on lower end.  Patient will remain off of rosuvastatin at this time.    Pain referral-has future scheduled appointment with pain specialist.    Diabetes mellitus type 2-has been on Lantus 10 mg in the evening and 50 mg in the morning.  Fasting glucose 119.  A couple of weeks ago had 1 low to 80s but otherwise has been stable.    Microalbumin/creatinine levels elevated.  Previously prescribed lisinopril but thinks medicine may have given her rash in the past.  Not on blood pressure medication.  Blood pressure has otherwise been stable.    Patient notes she had an eye exam October of last year.  Advised to have records sent and plan for follow-up visit for routine annual eye exam.    Patient states she will not be getting COVID-vaccine as she previously had COVID.  Reviewed recommendations for vaccinations.    ROS:  No recent fevers or chills. No nausea or vomiting. No diarrhea. No chest pains or shortness of breath. No lower extremity edema.    Current Outpatient Medications on File Prior to Visit   Medication Sig Dispense Refill    GAVILYTE-G 236 g Recon Soln       rosuvastatin (CRESTOR) 10 MG Tab Take 1 Tablet by mouth  every evening. 90 Tablet 3    sertraline (ZOLOFT) 25 MG tablet Take 1 Tablet by mouth every day. 90 Tablet 0    insulin glargine (LANTUS SOLOSTAR) 100 UNIT/ML Solution Pen-injector injection INJECT 15 UNITS IN THE MORNING AND 10 UNITS IN THE AFTERNOON 3 Each 3    traZODone (DESYREL) 50 MG Tab TAKE 3 TABLETS BY MOUTH EVERY EVENING 90 Tablet 0    IBUPROFEN PO Take  by mouth.      glucose blood strip 1 Strip by Other route in the morning, at noon, and at bedtime. Use one strip to test blood sugar three times daily before meals. 300 Strip 3    Insulin Pen Needle 32 G x 4 mm Use one pen needle in pen device to inject insulin three times daily. 100 Each 3    ferrous sulfate 325 (65 Fe) MG tablet Take 1 Tablet by mouth every day. 30 Tablet 3    Blood Glucose Monitoring Suppl (BLOOD GLUCOSE MONITOR SYSTEM) w/Device Kit Test blood sugar as recommended by provider. Slick Contour Next blood glucose monitoring kit. 1 Kit 0    Microlet Lancets Misc Use one lancet to test blood sugar three times daily before meals. 100 Each 0    Alcohol Swabs (ALCOHOL PREP) 70 % Pads Wipe site with prep pad prior to injection 100 Each 0    omeprazole (PRILOSEC) 20 MG delayed-release capsule Take 1 Capsule by mouth 2 times a day. 60 Capsule 0    lisinopril (PRINIVIL) 5 MG Tab Take 1 Tablet by mouth every day. (Patient not taking: Reported on 9/14/2022) 90 Tablet 0     No current facility-administered medications on file prior to visit.       Allergies   Allergen Reactions    Aspartame Nausea     headache    Atorvastatin Calcium Unspecified and Swelling    Latex Rash and Itching    Lipitor [Atorvastatin Calcium] Swelling     Patient denies allergy 06/04/22    Other Misc Vomiting     Bug spray    Saccharin Nausea     Nausea and headache       Patient Active Problem List    Diagnosis Date Noted    Insomnia due to medical condition 07/11/2022    Depression 06/05/2022    GIB (gastrointestinal bleeding) 06/04/2022    Acute on chronic blood loss anemia  06/04/2022    DM (diabetes mellitus), secondary uncontrolled ([facility]) 06/04/2022    Severe protein-calorie malnutrition (HCC) 06/04/2022    Severely underweight adult 06/04/2022    ACP (advance care planning) 06/04/2022    Hyponatremia 06/04/2022    Hyperosmolar hyperglycemic state (HHS), pressure ulcer, anemia ([facility]) 06/04/2022    Dehydration 06/04/2022    Pressure ulcer, sacrum 06/04/2022    Fatigue 06/03/2022    Weight loss, unintentional 06/03/2022    Urinary incontinence 06/03/2022    Adult failure to thrive 06/03/2022    Iron deficiency anemia due to chronic blood loss 02/09/2020    Cellulitis 01/08/2020    Anemia 05/19/2019    Atherosclerosis 05/19/2019    LVH (left ventricular hypertrophy) 05/19/2019    Arthritis of left shoulder region 07/12/2017    CVD (cardiovascular disease) 12/04/2015    Pulmonary nodule 12/01/2015    Renal stone 12/01/2015    Diverticulosis 12/01/2015    Chest pain 07/16/2015    Nausea & vomiting 07/13/2015    Opiate withdrawal ([facility]) 07/13/2015    Diabetes mellitus type 2 in nonobese ([facility]) 10/03/2014    Brachial neuritis or radiculitis 09/10/2014    History of total hip arthroplasty 07/22/2014    Leukocytosis 03/30/2014    COPD (chronic obstructive pulmonary disease) ([facility]) 03/30/2014    Arthritis 12/26/2013    Chronic pain 02/21/2012    Anxiety 01/18/2012    Vitamin D insufficiency 04/28/2011    Hypertension 03/22/2010    Fibromyalgia 10/09/2009    Swelling, mass, or lump in head and neck 07/21/2009    Tobacco use disorder 07/21/2009    Chronic ischemic heart disease 07/21/2009    Esophageal reflux 07/21/2009    Chronic airway obstruction, not elsewhere classified 07/21/2009    Allergic rhinitis 07/21/2009    Controlled type 2 diabetes mellitus without complication, without long-term current use of insulin ([facility]) 07/21/2009    Mixed hyperlipidemia 07/21/2009    Other atopic dermatitis and related conditions 07/21/2009    Deficiency anemia 07/21/2009       Past Medical History:   Diagnosis  "Date    Anemia     Arthritis     OA and  not RA per rheumatology    Breath shortness     oxygen PRN     Bronchitis 2008    Colon polyps 2015    Convulsions (HCC) 7/21/2009     ICD-10 transition    COPD (chronic obstructive pulmonary disease) ([facility])     Dental disorder     full dentures    Depression     depression, anxiety     Diverticulosis 5/10     colonoscopy    Hemorrhoid 7/29/2009    History of colonoscopy   2005    Leukocytosis 3/30/2014    Lymphocytosis (symptomatic) 7/21/2009    MI (myocardial infarction) (HCC) 4/29/2007    Other specified disorder of intestines     constipation    Pain     shoulders, neck, lower back    Pancreatitis     Pap smear 4/2006    Pneumonia 2013    Pulmonary nodule     Renal lesion     Renal stone     S/P colonoscopy 5/2010    will need repeat in 5 years    Screening mammogram never    Seizure ([facility])     x1 in 2008    Shingles     Symptomatic menopausal or female climacteric states 7/21/2009    Type II or unspecified type diabetes mellitus without mention of complication, uncontrolled 7/21/2009    diet controlled     Unspecified urinary incontinence        OBJECTIVE:   /64 (BP Location: Left arm, Patient Position: Sitting, BP Cuff Size: Adult)   Pulse 83   Temp 36.3 °C (97.4 °F) (Temporal)   Resp 18   Ht 1.702 m (5' 7\")   Wt 60.3 kg (133 lb)   LMP 01/01/2007   SpO2 99%   BMI 20.83 kg/m²   General: Well-developed well-nourished female, no acute distress  Neck: supple, no lymphadenopathy- cervical or supraclavicular, no thyromegaly  Cardiovascular: regular rate and rhythm, no murmurs, gallops, rubs  Lungs: clear to auscultation bilaterally, no wheezes, crackles, or rhonchi  Abdomen: +bowel sounds, soft, nontender, nondistended, no rebound, no guarding, no hepatosplenomegaly  Extremities: no cyanosis, clubbing, edema  Skin: Warm and dry  Psych: appropriate mood and affect  Diabetic foot exam: slightly diminished right PT, otherwise 2+ pedal pulses, no lesions noted, " sensation intact with 10 out of 10 monofilament test. Varicosities of ankles R/L- trace edema of ankles.      Latest Reference Range & Units 9/6/22 11:00   Micro Alb Creat Ratio 0 - 30 mg/g 63 (H)   Creatinine, Urine mg/dL 99.76   Microalbumin, Urine Random mg/dL 6.3        Latest Reference Range & Units 9/6/22 10:59   WBC 4.8 - 10.8 K/uL 10.6   RBC 4.20 - 5.40 M/uL 4.28   Hemoglobin 12.0 - 16.0 g/dL 9.7 (L)   Hematocrit 37.0 - 47.0 % 33.5 (L)   MCV 81.4 - 97.8 fL 78.3 (L)   MCH 27.0 - 33.0 pg 22.7 (L)   MCHC 33.6 - 35.0 g/dL 29.0 (L)   RDW 35.9 - 50.0 fL 56.4 (H)   Platelet Count 164 - 446 K/uL 601 (H)   MPV 9.0 - 12.9 fL 8.9 (L)   Neutrophils-Polys 44.00 - 72.00 % 74.80 (H)   Neutrophils (Absolute) 2.00 - 7.15 K/uL 7.93 (H)   Lymphocytes 22.00 - 41.00 % 15.70 (L)   Lymphs (Absolute) 1.00 - 4.80 K/uL 1.66   Monocytes 0.00 - 13.40 % 4.30   Monos (Absolute) 0.00 - 0.85 K/uL 0.46   Eosinophils 0.00 - 6.90 % 2.60   Eos (Absolute) 0.00 - 0.51 K/uL 0.28   Basophils 0.00 - 1.80 % 2.60 (H)   Baso (Absolute) 0.00 - 0.12 K/uL 0.28 (H)   Nucleated RBC /100 WBC 0.00   NRBC (Absolute) K/uL 0.00   Plt Estimation  Increased   Large Platelets  1+   RBC Morphology  Present   Hypochromia  1+   Anisocytosis  1+   Microcytosis  1+   Peripheral Smear Review  see below   Manual Diff Status  PERFORMED   Sodium 135 - 145 mmol/L 141   Potassium 3.6 - 5.5 mmol/L 4.5   Chloride 96 - 112 mmol/L 102   Co2 20 - 33 mmol/L 27   Anion Gap 7.0 - 16.0  12.0   Glucose 65 - 99 mg/dL 96   Bun 8 - 22 mg/dL 15   Creatinine 0.50 - 1.40 mg/dL 0.60   GFR (CKD-EPI) >60 mL/min/1.73 m 2 98   Calcium 8.5 - 10.5 mg/dL 9.6   AST(SGOT) 12 - 45 U/L 18   ALT(SGPT) 2 - 50 U/L 11   Alkaline Phosphatase 30 - 99 U/L 92   Total Bilirubin 0.1 - 1.5 mg/dL <0.2   Albumin 3.2 - 4.9 g/dL 4.1   Total Protein 6.0 - 8.2 g/dL 7.6   Globulin 1.9 - 3.5 g/dL 3.5   A-G Ratio g/dL 1.2   Glycohemoglobin 4.0 - 5.6 % 7.0 (H)   Estim. Avg Glu mg/dL 154   Fasting Status  Fasting    Cholesterol,Tot 100 - 199 mg/dL 92 (L)   Triglycerides 0 - 149 mg/dL 76   HDL >=40 mg/dL 35 !   LDL <100 mg/dL 42   25-Hydroxy   Vitamin D 25 30 - 100 ng/mL 57   (L): Data is abnormally low  (H): Data is abnormally high  !: Data is abnormal      ASSESSMENT/PLAN:    67 y.o. female with chronic pain, OA,  fibromyalgia, DM type 2, anxiety, COPD, hypertension, hyperlipidemia.     1. Iron deficiency anemia due to chronic blood loss -overall stable H/H.  She will follow-up with GI for scope.  Continue on iron supplements at this time.  Monitor.       2. Mild episode of recurrent major depressive disorder (HCC) -stable.  Continue sertraline 25 mg daily.       3. Anxiety -stable.  Continue sertraline 25 mg daily.       4. Diabetes mellitus type 2 in nonobese (HCC) -improvement in hemoglobin A1c noted.  Had 1 level to 80s 2 weeks ago but no recurrent issues.  Continue on current Lantus dose 15 units in the morning and 10 units in the evening.  Patient to report if has any further low levels, advised we will need to further modify Lantus dose.  Continue diabetic diet and routine exercise as tolerated. Diabetic Monofilament LE Exam      5. Microalbuminuria   Recommend start losartan 25 mg daily as tolerated.  Monitor blood pressure.       6. Mixed hyperlipidemia -patient will remain off rosuvastatin at this time.    Per review appears trazodone may elevate rosuvastatin levels.  Monitor labs in future.       7. Primary hypertension-blood pressure has been stable off of medication.  As per above medication recommendation.       8. Fibromyalgia -stable.  Follow-up with pain management.       9. Other chronic pain -stable.  Follow-up with pain management.       10. Encounter for screening mammogram for malignant neoplasm of breast  MA-SCREENING MAMMO BILAT W/TOMOSYNTHESIS W/CAD      11. Postmenopausal  DS-BONE DENSITY STUDY (DEXA)          Return in about 1 month (around 10/14/2022).    This medical record contains text that has  been entered with the assistance of computer voice recognition and dictation software.  Therefore, it may contain unintended errors in text, spelling, punctuation, or grammar.

## 2022-10-14 ENCOUNTER — OFFICE VISIT (OUTPATIENT)
Dept: MEDICAL GROUP | Facility: IMAGING CENTER | Age: 67
End: 2022-10-14
Payer: MEDICARE

## 2022-10-14 VITALS
SYSTOLIC BLOOD PRESSURE: 102 MMHG | WEIGHT: 139.4 LBS | HEART RATE: 94 BPM | BODY MASS INDEX: 21.88 KG/M2 | DIASTOLIC BLOOD PRESSURE: 50 MMHG | OXYGEN SATURATION: 94 % | RESPIRATION RATE: 17 BRPM | TEMPERATURE: 97.2 F | HEIGHT: 67 IN

## 2022-10-14 DIAGNOSIS — R19.8 GI PROBLEM: ICD-10-CM

## 2022-10-14 DIAGNOSIS — D75.838 REACTIVE THROMBOCYTOSIS: ICD-10-CM

## 2022-10-14 DIAGNOSIS — F33.0 MILD EPISODE OF RECURRENT MAJOR DEPRESSIVE DISORDER (HCC): ICD-10-CM

## 2022-10-14 DIAGNOSIS — D50.0 IRON DEFICIENCY ANEMIA DUE TO CHRONIC BLOOD LOSS: ICD-10-CM

## 2022-10-14 DIAGNOSIS — K62.3 RECTAL PROLAPSE: ICD-10-CM

## 2022-10-14 DIAGNOSIS — F41.9 ANXIETY: ICD-10-CM

## 2022-10-14 DIAGNOSIS — G47.01 INSOMNIA DUE TO MEDICAL CONDITION: ICD-10-CM

## 2022-10-14 DIAGNOSIS — G89.29 OTHER CHRONIC PAIN: ICD-10-CM

## 2022-10-14 PROCEDURE — 99214 OFFICE O/P EST MOD 30 MIN: CPT | Performed by: FAMILY MEDICINE

## 2022-10-14 RX ORDER — OMEPRAZOLE 20 MG/1
20 CAPSULE, DELAYED RELEASE ORAL 2 TIMES DAILY
Qty: 180 CAPSULE | Refills: 0 | Status: SHIPPED | OUTPATIENT
Start: 2022-10-14 | End: 2023-01-17

## 2022-10-14 RX ORDER — GABAPENTIN 100 MG/1
100-300 CAPSULE ORAL NIGHTLY PRN
Qty: 90 CAPSULE | Refills: 1 | Status: SHIPPED | OUTPATIENT
Start: 2022-10-14 | End: 2022-12-16

## 2022-10-14 ASSESSMENT — PAIN SCALES - GENERAL: PAINLEVEL: NO PAIN

## 2022-10-14 ASSESSMENT — FIBROSIS 4 INDEX: FIB4 SCORE: 0.6

## 2022-10-14 NOTE — PROGRESS NOTES
SUBJECTIVE:    Chief Complaint   Patient presents with    Follow-Up     Pt states she is feeling better.     Medication Refill     Omeprazole    Medication Management     Neurontin        HPI:     Heidi Navas is a 67 y.o. female with chronic pain, OA,  fibromyalgia, DM type 2, anxiety, COPD, hypertension, hyperlipidemia and prior anemia due to history of GI bleed here for routine follow-up.  She is here with her caregiver, Amara.    Depression/anxiety-overall patient mood has improved.  She continues on Zoloft 25 mg daily.  Caregiver also notes her mood has improved.  She is also making efforts to take care of her self.    Insomnia-does not use trazodone nightly but only few times a week.  She also found to be Neurontin will help with pain and sleep.  She did find some from a prior prescription and has taken 300 mg in the evening as needed.  Pain management has requested our office prescribed medication.  She has otherwise tolerated well.    She is currently on omeprazole 20 mg twice daily.  Does need refill of medication.  She does feel her rectal prolapse has improved as she has been doing her Kegel exercises.  Notes her stools are more brown now than darker.  She did not complete her GI procedure as she had difficulty getting the prep for the procedure.  She will plan to reschedule.  She continues on iron therapy.  Elevated platelets- previously improving on labs.   Prior elevated wbc improving.     2015 imaging atherosclerosis noted.     ROS:  No recent fevers or chills. No nausea or vomiting. No diarrhea. No chest pains or shortness of breath. No lower extremity edema. No dizziness. No specific areas of bleeding noted.     Current Outpatient Medications on File Prior to Visit   Medication Sig Dispense Refill    traZODone (DESYREL) 50 MG Tab TAKE 3 TABLETS BY MOUTH EVERY EVENING 90 Tablet 0    losartan (COZAAR) 25 MG Tab TAKE 1 TABLET BY MOUTH EVERY DAY 90 Tablet 3    ferrous sulfate 325 (65 Fe) MG  tablet TAKE 1 TABLET BY MOUTH EVERY DAY 90 Tablet 0    GAVILYTE-G 236 g Recon Soln       rosuvastatin (CRESTOR) 10 MG Tab Take 1 Tablet by mouth every evening. 90 Tablet 3    sertraline (ZOLOFT) 25 MG tablet Take 1 Tablet by mouth every day. 90 Tablet 0    insulin glargine (LANTUS SOLOSTAR) 100 UNIT/ML Solution Pen-injector injection INJECT 15 UNITS IN THE MORNING AND 10 UNITS IN THE AFTERNOON 3 Each 3    IBUPROFEN PO Take  by mouth.      glucose blood strip 1 Strip by Other route in the morning, at noon, and at bedtime. Use one strip to test blood sugar three times daily before meals. 300 Strip 3    Insulin Pen Needle 32 G x 4 mm Use one pen needle in pen device to inject insulin three times daily. 100 Each 3    Blood Glucose Monitoring Suppl (BLOOD GLUCOSE MONITOR SYSTEM) w/Device Kit Test blood sugar as recommended by provider. Slick Contour Next blood glucose monitoring kit. 1 Kit 0    Microlet Lancets Misc Use one lancet to test blood sugar three times daily before meals. 100 Each 0    Alcohol Swabs (ALCOHOL PREP) 70 % Pads Wipe site with prep pad prior to injection 100 Each 0     No current facility-administered medications on file prior to visit.       Allergies   Allergen Reactions    Aspartame Nausea     headache    Atorvastatin Calcium Unspecified and Swelling    Latex Rash and Itching    Lipitor [Atorvastatin Calcium] Swelling     Patient denies allergy 06/04/22    Other Misc Vomiting     Bug spray    Saccharin Nausea     Nausea and headache       Patient Active Problem List    Diagnosis Date Noted    Insomnia due to medical condition 07/11/2022    Depression 06/05/2022    GIB (gastrointestinal bleeding) 06/04/2022    Acute on chronic blood loss anemia 06/04/2022    DM (diabetes mellitus), secondary uncontrolled 06/04/2022    Severe protein-calorie malnutrition (HCC) 06/04/2022    Severely underweight adult 06/04/2022    ACP (advance care planning) 06/04/2022    Hyponatremia 06/04/2022    Hyperosmolar  hyperglycemic state (HHS), pressure ulcer, anemia ([facility]) 06/04/2022    Dehydration 06/04/2022    Pressure ulcer, sacrum 06/04/2022    Fatigue 06/03/2022    Weight loss, unintentional 06/03/2022    Urinary incontinence 06/03/2022    Adult failure to thrive 06/03/2022    Iron deficiency anemia due to chronic blood loss 02/09/2020    Cellulitis 01/08/2020    Anemia 05/19/2019    Atherosclerosis 05/19/2019    LVH (left ventricular hypertrophy) 05/19/2019    Arthritis of left shoulder region 07/12/2017    CVD (cardiovascular disease) 12/04/2015    Pulmonary nodule 12/01/2015    Renal stone 12/01/2015    Diverticulosis 12/01/2015    Chest pain 07/16/2015    Nausea & vomiting 07/13/2015    Opiate withdrawal ([facility]) 07/13/2015    Diabetes mellitus type 2 in nonobese ([facility]) 10/03/2014    Brachial neuritis or radiculitis 09/10/2014    History of total hip arthroplasty 07/22/2014    Leukocytosis 03/30/2014    COPD (chronic obstructive pulmonary disease) ([facility]) 03/30/2014    Arthritis 12/26/2013    Chronic pain 02/21/2012    Anxiety 01/18/2012    Vitamin D insufficiency 04/28/2011    Hypertension 03/22/2010    Fibromyalgia 10/09/2009    Swelling, mass, or lump in head and neck 07/21/2009    Tobacco use disorder 07/21/2009    Chronic ischemic heart disease 07/21/2009    Esophageal reflux 07/21/2009    Chronic airway obstruction, not elsewhere classified 07/21/2009    Allergic rhinitis 07/21/2009    Controlled type 2 diabetes mellitus without complication, without long-term current use of insulin ([facility]) 07/21/2009    Mixed hyperlipidemia 07/21/2009    Other atopic dermatitis and related conditions 07/21/2009    Deficiency anemia 07/21/2009       Past Medical History:   Diagnosis Date    Anemia     Arthritis     OA and  not RA per rheumatology    Breath shortness     oxygen PRN     Bronchitis 2008    Colon polyps 2015    Convulsions ([facility]) 7/21/2009     ICD-10 transition    COPD (chronic obstructive pulmonary disease) ([facility])     Dental  "disorder     full dentures    Depression     depression, anxiety     Diverticulosis 5/10     colonoscopy    Hemorrhoid 7/29/2009    History of colonoscopy   2005    Leukocytosis 3/30/2014    Lymphocytosis (symptomatic) 7/21/2009    MI (myocardial infarction) (HCC) 4/29/2007    Other specified disorder of intestines     constipation    Pain     shoulders, neck, lower back    Pancreatitis     Pap smear 4/2006    Pneumonia 2013    Pulmonary nodule     Renal lesion     Renal stone     S/P colonoscopy 5/2010    will need repeat in 5 years    Screening mammogram never    Seizure (HCC)     x1 in 2008    Shingles     Symptomatic menopausal or female climacteric states 7/21/2009    Type II or unspecified type diabetes mellitus without mention of complication, uncontrolled 7/21/2009    diet controlled     Unspecified urinary incontinence          OBJECTIVE:   /50 (BP Location: Left arm, Patient Position: Sitting, BP Cuff Size: Adult)   Pulse 94   Temp 36.2 °C (97.2 °F) (Temporal)   Resp 17   Ht 1.702 m (5' 7\")   Wt 63.2 kg (139 lb 6.4 oz)   LMP 01/01/2007   SpO2 94%   BMI 21.83 kg/m²   General: Well-developed well-nourished female, no acute distress. Appears in good spirits.   Neck: supple, no lymphadenopathy- cervical or supraclavicular, no thyromegaly  Cardiovascular: regular rate and rhythm, no murmurs, gallops, rubs  Lungs: clear to auscultation bilaterally, no wheezes, crackles, or rhonchi  Abdomen: +bowel sounds, soft, nontender, nondistended, no rebound, no guarding, no hepatosplenomegaly  Extremities: no cyanosis, clubbing, edema  Skin: Warm and dry  Psych: appropriate mood and affect        ASSESSMENT/PLAN:    67 y.o. female with chronic pain, OA,  fibromyalgia, DM type 2, anxiety, COPD, hypertension, hyperlipidemia and prior anemia due to history of GI bleed.      1. Mild episode of recurrent major depressive disorder (HCC)-overall some improvements noted.  Otherwise stable.  Continue on Zoloft 25 mg " daily.  Continue on routine self-care activities.       2. Anxiety-stable.  As above.       3. Insomnia due to medical condition-has noted some improvements.  She will use trazodone as needed.  Continue sleep hygiene.       4. Other chronic pain   Reviewed potential side effects of medication.  Will reduce gabapentin to 100 mg dose but may increase as needed. gabapentin (NEURONTIN) 100 MG Cap      5. GI problem -stable.  Continue omeprazole 20 mg twice daily.  Monitor. omeprazole (PRILOSEC) 20 MG delayed-release capsule      6. Rectal prolapse-note some improvement.  Recommend continue Kegel exercises.  Patient to follow-up with GI.       7. Iron deficiency anemia due to chronic blood loss -last labs stable.  Continue on iron supplements.  Monitor labs.  Complete lab work prior to next visit. CBC WITHOUT DIFFERENTIAL           8. Reactive thrombocytosis- improving on last labs. Monitor.  CBC with diff          Return in about 1 month (around 11/14/2022).    This medical record contains text that has been entered with the assistance of computer voice recognition and dictation software.  Therefore, it may contain unintended errors in text, spelling, punctuation, or grammar.

## 2022-10-26 ENCOUNTER — HOSPITAL ENCOUNTER (OUTPATIENT)
Dept: LAB | Facility: MEDICAL CENTER | Age: 67
End: 2022-10-26
Attending: FAMILY MEDICINE
Payer: MEDICARE

## 2022-10-26 DIAGNOSIS — D50.0 IRON DEFICIENCY ANEMIA DUE TO CHRONIC BLOOD LOSS: ICD-10-CM

## 2022-10-26 LAB
ERYTHROCYTE [DISTWIDTH] IN BLOOD BY AUTOMATED COUNT: 54 FL (ref 35.9–50)
HCT VFR BLD AUTO: 32 % (ref 37–47)
HGB BLD-MCNC: 9.1 G/DL (ref 12–16)
MCH RBC QN AUTO: 21.5 PG (ref 27–33)
MCHC RBC AUTO-ENTMCNC: 28.4 G/DL (ref 33.6–35)
MCV RBC AUTO: 75.5 FL (ref 81.4–97.8)
PLATELET # BLD AUTO: 572 K/UL (ref 164–446)
PMV BLD AUTO: 8.8 FL (ref 9–12.9)
RBC # BLD AUTO: 4.24 M/UL (ref 4.2–5.4)
WBC # BLD AUTO: 10.7 K/UL (ref 4.8–10.8)

## 2022-10-26 PROCEDURE — 85027 COMPLETE CBC AUTOMATED: CPT

## 2022-10-26 PROCEDURE — 36415 COLL VENOUS BLD VENIPUNCTURE: CPT

## 2022-11-03 ENCOUNTER — PATIENT MESSAGE (OUTPATIENT)
Dept: HEALTH INFORMATION MANAGEMENT | Facility: OTHER | Age: 67
End: 2022-11-03

## 2022-11-17 ENCOUNTER — OFFICE VISIT (OUTPATIENT)
Dept: MEDICAL GROUP | Facility: IMAGING CENTER | Age: 67
End: 2022-11-17
Payer: MEDICARE

## 2022-11-17 VITALS
SYSTOLIC BLOOD PRESSURE: 138 MMHG | BODY MASS INDEX: 21.41 KG/M2 | DIASTOLIC BLOOD PRESSURE: 60 MMHG | RESPIRATION RATE: 19 BRPM | OXYGEN SATURATION: 97 % | TEMPERATURE: 97.1 F | WEIGHT: 136.4 LBS | HEIGHT: 67 IN | HEART RATE: 104 BPM

## 2022-11-17 DIAGNOSIS — F33.0 MILD EPISODE OF RECURRENT MAJOR DEPRESSIVE DISORDER (HCC): ICD-10-CM

## 2022-11-17 DIAGNOSIS — E11.9 DIABETES MELLITUS TYPE 2 IN NONOBESE (HCC): ICD-10-CM

## 2022-11-17 DIAGNOSIS — M25.551 RIGHT HIP PAIN: ICD-10-CM

## 2022-11-17 DIAGNOSIS — Z96.643 HISTORY OF TOTAL REPLACEMENT OF BOTH HIP JOINTS: ICD-10-CM

## 2022-11-17 DIAGNOSIS — E78.2 MIXED HYPERLIPIDEMIA: ICD-10-CM

## 2022-11-17 DIAGNOSIS — G89.29 CHRONIC RIGHT SHOULDER PAIN: ICD-10-CM

## 2022-11-17 DIAGNOSIS — R19.8 GI PROBLEM: ICD-10-CM

## 2022-11-17 DIAGNOSIS — J44.9 CHRONIC OBSTRUCTIVE PULMONARY DISEASE, UNSPECIFIED COPD TYPE (HCC): ICD-10-CM

## 2022-11-17 DIAGNOSIS — D50.0 IRON DEFICIENCY ANEMIA DUE TO CHRONIC BLOOD LOSS: ICD-10-CM

## 2022-11-17 DIAGNOSIS — G89.29 OTHER CHRONIC PAIN: ICD-10-CM

## 2022-11-17 DIAGNOSIS — D75.838 REACTIVE THROMBOCYTOSIS: ICD-10-CM

## 2022-11-17 DIAGNOSIS — K62.3 RECTAL PROLAPSE: ICD-10-CM

## 2022-11-17 DIAGNOSIS — M25.511 CHRONIC RIGHT SHOULDER PAIN: ICD-10-CM

## 2022-11-17 PROCEDURE — 99214 OFFICE O/P EST MOD 30 MIN: CPT | Performed by: FAMILY MEDICINE

## 2022-11-17 ASSESSMENT — PAIN SCALES - GENERAL: PAINLEVEL: NO PAIN

## 2022-11-17 ASSESSMENT — FIBROSIS 4 INDEX: FIB4 SCORE: 0.64

## 2022-11-17 NOTE — PROGRESS NOTES
SUBJECTIVE:    Chief Complaint   Patient presents with    Follow-Up     Pt states she is doing better.     Hip Pain     Started Sunday from constant walking.       HPI:     Heidi Navas is a 67 y.o. female  with chronic pain, OA,  fibromyalgia, DM type 2, anxiety, COPD, hypertension, hyperlipidemia and prior anemia due to history of GI bleed here for follow up of medical issues.   Here with caregiver, Amara.     Anemia- previously GI, did not complete GI scope as she has had issues with getting prep. Has rectal prolapse which may contribute to her anemia when irritated.  She has been advised to follow up with GI.     Chronic pain- has been a long term issue. Has seen pain specialists in past and was on pain medication in past. Currently uses tylenol, but not always adequate. She is interested in physical therapy. Takes occasional asa.   Will connect with [facility] as recommended. Was fired from pain management at her first visit due to meth use in September, states it helps with pain.   Salon pas- 4-5 a day, months after hospital stay.   Also has been on zoloft 25 mg since prior hospital stay. Has helped with mood, currently stable.   Was going for walks, then last month had to stay at [employer]. Walked more that she usually does, thus more pain.   Right lateral hip pain. Hx of replacement surgeries.   Right shoulder/ arm pain,  limited ROM and strength.   No injury recently.     Diabetes and cholesterol- last labs 9/2022.    COPD- past diagnosis, no current medications. Otherwise stable.     ROS:  No recent fevers or chills. No nausea or vomiting. No diarrhea. No chest pains or shortness of breath. No lower extremity edema.    Current Outpatient Medications on File Prior to Visit   Medication Sig Dispense Refill    traZODone (DESYREL) 50 MG Tab TAKE 3 TABLETS BY MOUTH EVERY EVENING 90 Tablet 0    omeprazole (PRILOSEC) 20 MG delayed-release capsule Take 1 Capsule by mouth 2 times a day. 180 Capsule  0    gabapentin (NEURONTIN) 100 MG Cap Take 1-3 Capsules by mouth at bedtime as needed (pain). 90 Capsule 1    losartan (COZAAR) 25 MG Tab TAKE 1 TABLET BY MOUTH EVERY DAY 90 Tablet 3    ferrous sulfate 325 (65 Fe) MG tablet TAKE 1 TABLET BY MOUTH EVERY DAY 90 Tablet 0    rosuvastatin (CRESTOR) 10 MG Tab Take 1 Tablet by mouth every evening. 90 Tablet 3    sertraline (ZOLOFT) 25 MG tablet Take 1 Tablet by mouth every day. 90 Tablet 0    insulin glargine (LANTUS SOLOSTAR) 100 UNIT/ML Solution Pen-injector injection INJECT 15 UNITS IN THE MORNING AND 10 UNITS IN THE AFTERNOON 3 Each 3    glucose blood strip 1 Strip by Other route in the morning, at noon, and at bedtime. Use one strip to test blood sugar three times daily before meals. 300 Strip 3    Insulin Pen Needle 32 G x 4 mm Use one pen needle in pen device to inject insulin three times daily. 100 Each 3    Blood Glucose Monitoring Suppl (BLOOD GLUCOSE MONITOR SYSTEM) w/Device Kit Test blood sugar as recommended by provider. Slick Contour Next blood glucose monitoring kit. 1 Kit 0    Microlet Lancets Misc Use one lancet to test blood sugar three times daily before meals. 100 Each 0    Alcohol Swabs (ALCOHOL PREP) 70 % Pads Wipe site with prep pad prior to injection 100 Each 0    GAVILYTE-G 236 g Recon Soln  (Patient not taking: Reported on 11/17/2022)      IBUPROFEN PO Take  by mouth. (Patient not taking: Reported on 11/17/2022)       No current facility-administered medications on file prior to visit.       Allergies   Allergen Reactions    Aspartame Nausea     headache    Atorvastatin Calcium Unspecified and Swelling    Latex Rash and Itching    Lipitor [Atorvastatin Calcium] Swelling     Patient denies allergy 06/04/22    Other Misc Vomiting     Bug spray    Saccharin Nausea     Nausea and headache       Patient Active Problem List    Diagnosis Date Noted    Reactive thrombocytosis 10/14/2022    Rectal prolapse 10/14/2022    Insomnia due to medical condition  07/11/2022    Depression 06/05/2022    GIB (gastrointestinal bleeding) 06/04/2022    Acute on chronic blood loss anemia 06/04/2022    DM (diabetes mellitus), secondary uncontrolled 06/04/2022    Severe protein-calorie malnutrition (HCC) 06/04/2022    Severely underweight adult 06/04/2022    ACP (advance care planning) 06/04/2022    Hyponatremia 06/04/2022    Hyperosmolar hyperglycemic state (HHS), pressure ulcer, anemia (HCC) 06/04/2022    Dehydration 06/04/2022    Pressure ulcer, sacrum 06/04/2022    Fatigue 06/03/2022    Weight loss, unintentional 06/03/2022    Urinary incontinence 06/03/2022    Adult failure to thrive 06/03/2022    Iron deficiency anemia due to chronic blood loss 02/09/2020    Cellulitis 01/08/2020    Anemia 05/19/2019    Atherosclerosis 05/19/2019    LVH (left ventricular hypertrophy) 05/19/2019    Arthritis of left shoulder region 07/12/2017    CVD (cardiovascular disease) 12/04/2015    Pulmonary nodule 12/01/2015    Renal stone 12/01/2015    Diverticulosis 12/01/2015    Chest pain 07/16/2015    Nausea & vomiting 07/13/2015    Opiate withdrawal ([facility]) 07/13/2015    Diabetes mellitus type 2 in nonobese ([facility]) 10/03/2014    Brachial neuritis or radiculitis 09/10/2014    History of total hip arthroplasty 07/22/2014    Leukocytosis 03/30/2014    COPD (chronic obstructive pulmonary disease) ([facility]) 03/30/2014    Arthritis 12/26/2013    Chronic pain 02/21/2012    Anxiety 01/18/2012    Vitamin D insufficiency 04/28/2011    Hypertension 03/22/2010    Fibromyalgia 10/09/2009    Swelling, mass, or lump in head and neck 07/21/2009    Tobacco use disorder 07/21/2009    Chronic ischemic heart disease 07/21/2009    Esophageal reflux 07/21/2009    Chronic airway obstruction, not elsewhere classified 07/21/2009    Allergic rhinitis 07/21/2009    Controlled type 2 diabetes mellitus without complication, without long-term current use of insulin ([facility]) 07/21/2009    Mixed hyperlipidemia 07/21/2009    Other atopic  "dermatitis and related conditions 07/21/2009    Deficiency anemia 07/21/2009       Past Medical History:   Diagnosis Date    Anemia     Arthritis     OA and  not RA per rheumatology    Breath shortness     oxygen PRN     Bronchitis 2008    Colon polyps 2015    Convulsions ([facility]) 7/21/2009     ICD-10 transition    COPD (chronic obstructive pulmonary disease) ([facility])     Dental disorder     full dentures    Depression     depression, anxiety     Diverticulosis 5/10     colonoscopy    Hemorrhoid 7/29/2009    History of colonoscopy   2005    Leukocytosis 3/30/2014    Lymphocytosis (symptomatic) 7/21/2009    MI (myocardial infarction) ([facility]) 4/29/2007    Other specified disorder of intestines     constipation    Pain     shoulders, neck, lower back    Pancreatitis     Pap smear 4/2006    Pneumonia 2013    Pulmonary nodule     Renal lesion     Renal stone     S/P colonoscopy 5/2010    will need repeat in 5 years    Screening mammogram never    Seizure ([facility])     x1 in 2008    Shingles     Symptomatic menopausal or female climacteric states 7/21/2009    Type II or unspecified type diabetes mellitus without mention of complication, uncontrolled 7/21/2009    diet controlled     Unspecified urinary incontinence          OBJECTIVE:   /60 (BP Location: Left arm, Patient Position: Sitting, BP Cuff Size: Adult)   Pulse (!) 104   Temp 36.2 °C (97.1 °F) (Temporal)   Resp 19   Ht 1.702 m (5' 7\")   Wt 61.9 kg (136 lb 6.4 oz)   LMP 01/01/2007   SpO2 97%   BMI 21.36 kg/m²   General: Well-developed well-nourished female, no acute distress  Neck: supple, no lymphadenopathy- cervical or supraclavicular, no thyromegaly  Cardiovascular: regular rate and rhythm, no murmurs, gallops, rubs  Lungs: clear to auscultation bilaterally, no wheezes, crackles, or rhonchi  Abdomen: +bowel sounds, soft, nontender, nondistended, no rebound, no guarding, no hepatosplenomegaly  Extremities: no cyanosis, clubbing, edema. Right hip and shoulder " with limited ROM, areas TTP.   Skin: Warm and dry  Psych: appropriate mood and affect     Latest Reference Range & Units 10/26/22 12:09   WBC 4.8 - 10.8 K/uL 10.7   RBC 4.20 - 5.40 M/uL 4.24   Hemoglobin 12.0 - 16.0 g/dL 9.1 (L)   Hematocrit 37.0 - 47.0 % 32.0 (L)   MCV 81.4 - 97.8 fL 75.5 (L)   MCH 27.0 - 33.0 pg 21.5 (L)   MCHC 33.6 - 35.0 g/dL 28.4 (L)   RDW 35.9 - 50.0 fL 54.0 (H)   Platelet Count 164 - 446 K/uL 572 (H)   MPV 9.0 - 12.9 fL 8.8 (L)   (L): Data is abnormally low  (H): Data is abnormally high      ASSESSMENT/PLAN:    67 y.o.female  with chronic pain, OA,  fibromyalgia, DM type 2, anxiety, COPD, hypertension, hyperlipidemia and prior anemia due to history of GI bleed.    1. Iron deficiency anemia due to chronic blood loss -slight decrease in h/h, will continue to follow.   Recommend reschedule and follow up with GI for procedure. Continue iron supplement. Monitor labs.  REFERRAL TO CARE MANAGEMENT    CBC WITHOUT DIFFERENTIAL      2. Rectal prolapse - may contribute to anemia.   Follow up with GI.        3. Reactive thrombocytosis -improving, monitor.        4. GI problem - as above, follow up with GI.  REFERRAL TO CARE MANAGEMENT      5. Other chronic pain - hx of multiple joint issues. Previously fired from pain management.   Will assess imaging as below. Referred to physical therapy. Counseled on avoiding street drugs.        6. Chronic right shoulder pain  Referral to Physical Therapy    DX-SHOULDER 2+ RIGHT      7. Right hip pain  Referral to Physical Therapy    DX-HIP-COMPLETE - UNILATERAL 2+ RIGHT      8. History of total replacement of both hip joints        9. Mild episode of recurrent major depressive disorder (HCC) -stable, continue zoloft 25 mg daily. Monitor.  REFERRAL TO CARE MANAGEMENT      10. Diabetes mellitus type 2 in nonobese (HCC)   Continue current medication. Monitor labs in future.  REFERRAL TO CARE MANAGEMENT      11. Mixed hyperlipidemia   Continue current medication.  Monitor labs in future.  REFERRAL TO CARE MANAGEMENT        12. Chronic obstructive pulmonary disease, unspecified COPD type (HCC)- currently not on medication therapy. Stable. Monitor.  REFERRAL TO CARE MANAGEMENT          Patient would benefit from referral to care management for multiple medical issues.     Return in about 6 weeks (around 12/29/2022).    This medical record contains text that has been entered with the assistance of computer voice recognition and dictation software.  Therefore, it may contain unintended errors in text, spelling, punctuation, or grammar.

## 2022-11-29 ENCOUNTER — PATIENT OUTREACH (OUTPATIENT)
Dept: HEALTH INFORMATION MANAGEMENT | Facility: OTHER | Age: 67
End: 2022-11-29
Payer: MEDICARE

## 2022-11-29 DIAGNOSIS — E11.9 DIABETES MELLITUS WITHOUT COMPLICATION (HCC): ICD-10-CM

## 2022-11-29 DIAGNOSIS — I25.10 CVD (CARDIOVASCULAR DISEASE): ICD-10-CM

## 2022-11-29 DIAGNOSIS — I51.7 LVH (LEFT VENTRICULAR HYPERTROPHY): ICD-10-CM

## 2022-11-29 PROCEDURE — 99490 CHRNC CARE MGMT STAFF 1ST 20: CPT | Performed by: FAMILY MEDICINE

## 2022-11-29 SDOH — ECONOMIC STABILITY: HOUSING INSECURITY
IN THE LAST 12 MONTHS, WAS THERE A TIME WHEN YOU DID NOT HAVE A STEADY PLACE TO SLEEP OR SLEPT IN A SHELTER (INCLUDING NOW)?: NO

## 2022-11-29 SDOH — HEALTH STABILITY: MENTAL HEALTH
STRESS IS WHEN SOMEONE FEELS TENSE, NERVOUS, ANXIOUS, OR CAN'T SLEEP AT NIGHT BECAUSE THEIR MIND IS TROUBLED. HOW STRESSED ARE YOU?: NOT AT ALL

## 2022-11-29 SDOH — HEALTH STABILITY: PHYSICAL HEALTH: ON AVERAGE, HOW MANY DAYS PER WEEK DO YOU ENGAGE IN MODERATE TO STRENUOUS EXERCISE (LIKE A BRISK WALK)?: 0 DAYS

## 2022-11-29 SDOH — SOCIAL STABILITY: SOCIAL NETWORK
DO YOU BELONG TO ANY CLUBS OR ORGANIZATIONS SUCH AS CHURCH GROUPS UNIONS, FRATERNAL OR ATHLETIC GROUPS, OR SCHOOL GROUPS?: NO

## 2022-11-29 SDOH — HEALTH STABILITY: MENTAL HEALTH: HOW OFTEN DO YOU HAVE A DRINK CONTAINING ALCOHOL?: NEVER

## 2022-11-29 SDOH — ECONOMIC STABILITY: INCOME INSECURITY: IN THE LAST 12 MONTHS, WAS THERE A TIME WHEN YOU WERE NOT ABLE TO PAY THE MORTGAGE OR RENT ON TIME?: NO

## 2022-11-29 SDOH — ECONOMIC STABILITY: TRANSPORTATION INSECURITY
IN THE PAST 12 MONTHS, HAS LACK OF TRANSPORTATION KEPT YOU FROM MEETINGS, WORK, OR FROM GETTING THINGS NEEDED FOR DAILY LIVING?: NO

## 2022-11-29 SDOH — ECONOMIC STABILITY: FOOD INSECURITY: WITHIN THE PAST 12 MONTHS, YOU WORRIED THAT YOUR FOOD WOULD RUN OUT BEFORE YOU GOT MONEY TO BUY MORE.: NEVER TRUE

## 2022-11-29 SDOH — SOCIAL STABILITY: SOCIAL NETWORK: HOW OFTEN DO YOU GET TOGETHER WITH FRIENDS OR RELATIVES?: ONCE A WEEK

## 2022-11-29 SDOH — SOCIAL STABILITY: SOCIAL NETWORK: ARE YOU MARRIED, WIDOWED, DIVORCED, SEPARATED, NEVER MARRIED, OR LIVING WITH A PARTNER?: DIVORCED

## 2022-11-29 SDOH — ECONOMIC STABILITY: HOUSING INSECURITY: IN THE LAST 12 MONTHS, HOW MANY PLACES HAVE YOU LIVED?: 2

## 2022-11-29 SDOH — HEALTH STABILITY: PHYSICAL HEALTH: ON AVERAGE, HOW MANY MINUTES DO YOU ENGAGE IN EXERCISE AT THIS LEVEL?: 0 MIN

## 2022-11-29 SDOH — ECONOMIC STABILITY: FOOD INSECURITY: WITHIN THE PAST 12 MONTHS, THE FOOD YOU BOUGHT JUST DIDN'T LAST AND YOU DIDN'T HAVE MONEY TO GET MORE.: NEVER TRUE

## 2022-11-29 SDOH — ECONOMIC STABILITY: TRANSPORTATION INSECURITY
IN THE PAST 12 MONTHS, HAS THE LACK OF TRANSPORTATION KEPT YOU FROM MEDICAL APPOINTMENTS OR FROM GETTING MEDICATIONS?: NO

## 2022-11-29 SDOH — SOCIAL STABILITY: SOCIAL NETWORK: HOW OFTEN DO YOU ATTEND CHURCH OR RELIGIOUS SERVICES?: NEVER

## 2022-11-29 SDOH — SOCIAL STABILITY: SOCIAL NETWORK: HOW OFTEN DO YOU ATTENT MEETINGS OF THE CLUB OR ORGANIZATION YOU BELONG TO?: NEVER

## 2022-11-29 SDOH — ECONOMIC STABILITY: INCOME INSECURITY: HOW HARD IS IT FOR YOU TO PAY FOR THE VERY BASICS LIKE FOOD, HOUSING, MEDICAL CARE, AND HEATING?: NOT HARD AT ALL

## 2022-11-29 SDOH — SOCIAL STABILITY: SOCIAL NETWORK: IN A TYPICAL WEEK, HOW MANY TIMES DO YOU TALK ON THE PHONE WITH FAMILY, FRIENDS, OR NEIGHBORS?: NEVER

## 2022-11-29 NOTE — PROGRESS NOTES
INITIAL CARE MANAGEMENT CARE PLAN/ASSESSMENT     Spoke with patient to enroll into personal care management program at the request of pcp. Pt lives with roommate in a private residence.Has a caregiver that assists with setting up her pill box and takes her to appointments. Caregiver lives nearby. No other members of support system identified. Pt requires assistance with some ADLs and IADLs. No longer an active . Pt has a cane and walker at home to use as needed. She also states she acquired an oxygen concentrator that she uses prn when she is sick. Pt reports a poor appetite. She uses almost daily marijuana to help with appetite. She also smokes cigarettes, but reports usage varies and she never finishes a full cigarette. Pt reports she is not interested in quitting smoking. Discussed attempting to cut back by 1-2 cigarettes a day to start with. Pt reports she checks her blood sugar about twice a day now. Morning fasting blood sugars run between . She does have symptoms of hypoglycemia in the 80 ranges, but manages with eating a snack right away. Pt has a bp cuff at home, but does not regularly check her blood pressure because it has been well controlled for a while. Pt reports no feelings of depression, but admits that some days she doesn't feel like getting out of bed. Currently experiencing some hip pain that is interfering with activity. Pt expresses a goal of improving hip pain. Reminded to complete imaging and blood work ordered by Dr. Thorne and provided info to schedule appointment with ortho.     Will send advanced directive packet for patient to review.      Medication Self-Management Goals:     Reviewed medications listed below with patient.      Current Outpatient Medications:     traZODone (DESYREL) 50 MG Tab, TAKE 3 TABLETS BY MOUTH EVERY EVENING, Disp: 90 Tablet, Rfl: 0    omeprazole (PRILOSEC) 20 MG delayed-release capsule, Take 1 Capsule by mouth 2 times a day., Disp: 180 Capsule, Rfl: 0     gabapentin (NEURONTIN) 100 MG Cap, Take 1-3 Capsules by mouth at bedtime as needed (pain)., Disp: 90 Capsule, Rfl: 1    losartan (COZAAR) 25 MG Tab, TAKE 1 TABLET BY MOUTH EVERY DAY, Disp: 90 Tablet, Rfl: 3    ferrous sulfate 325 (65 Fe) MG tablet, TAKE 1 TABLET BY MOUTH EVERY DAY, Disp: 90 Tablet, Rfl: 0    rosuvastatin (CRESTOR) 10 MG Tab, Take 1 Tablet by mouth every evening., Disp: 90 Tablet, Rfl: 3    sertraline (ZOLOFT) 25 MG tablet, Take 1 Tablet by mouth every day., Disp: 90 Tablet, Rfl: 0    insulin glargine (LANTUS SOLOSTAR) 100 UNIT/ML Solution Pen-injector injection, INJECT 15 UNITS IN THE MORNING AND 10 UNITS IN THE AFTERNOON, Disp: 3 Each, Rfl: 3    GAVILYTE-G 236 g Constantin Fajardo, , Disp: , Rfl:     glucose blood strip, 1 Strip by Other route in the morning, at noon, and at bedtime. Use one strip to test blood sugar three times daily before meals., Disp: 300 Strip, Rfl: 3    Insulin Pen Needle 32 G x 4 mm, Use one pen needle in pen device to inject insulin three times daily., Disp: 100 Each, Rfl: 3    Blood Glucose Monitoring Suppl (BLOOD GLUCOSE MONITOR SYSTEM) w/Device Kit, Test blood sugar as recommended by provider. Learnerator Contour Next blood glucose monitoring kit., Disp: 1 Kit, Rfl: 0    Microlet Lancets Misc, Use one lancet to test blood sugar three times daily before meals., Disp: 100 Each, Rfl: 0    Alcohol Swabs (ALCOHOL PREP) 70 % Pads, Wipe site with prep pad prior to injection, Disp: 100 Each, Rfl: 0         Goal: continue taking medications as prescribed        Physical/Functional/Environmental Status:     Activities of Daily Living:  Bathing: independent   Dressing: independent  Grooming: independent  Mouth Care: independent  Toileting: independent  Climbing a Flight of Stairs: needs assistance    Independent Activities of Daily Living:  Shopping: needs assistance  Cooking: total dependence  Managing Medications: needs assistance  Using the phone and looking up numbers: independent  Driving  or using public transportation: total dependence  Managing Finances: needs assistance        11/29/2022     9:44 AM 11/29/2022     9:51 AM   STEADI Fall Risk   STEADI Risk for Falling Score  10   One or more falls in the last year Yes Yes   Advised to use a cane or walker to get around safely  Yes   Feels unsteady when walking  Yes   Steadies self on furniture while walking at home  Yes   Worried about falling  No   Needs to push with hands when rising from a chair  Yes   Has trouble stepping up onto a curb / using stairs  Yes   Often has to rush to the toilet  Yes   Has lost some feeling in feet  No   Takes medicine that makes him/her feel lightheaded or more tired than usual  No   Takes medicine to sleep or improve mood  Yes   Often feels sad or depressed  No               Goal: maintain as much independence as possible. Prevent falls     Financial Status:     No needs     Goal:      Transportation Status:    Relies on caregiver for transportation    Goal:     Mental/Behavioral/Psychosocial Status:        1/8/2021     1:00 PM 6/3/2022     4:00 PM 6/5/2022     8:45 AM   Depression Screen (PHQ-2/PHQ-9)   PHQ-2 Total Score   4   PHQ-2 Total Score 5 6    PHQ-9 Total Score   9   PHQ-9 Total Score 12 20        Interpretation of PHQ-9 Total Score   Score Severity   1-4 No Depression   5-9 Mild Depression   10-14 Moderate Depression   15-19 Moderately Severe Depression   20-27 Severe Depression       Goal:  monitor for changes in mood      Chronic Care Management Care Plan    Goal: improve hip pain   Barriers: relies on caregiver to get to appts  Interventions: complete imaging as ordered. Schedule appointment with ortho (provided contact info to pt)    Start Date: 11/29/22  End Date:      Goal:  a1c below 7  Barriers: irregular eating habits due to poor appetite  Interventions: continue to monitor bs at home. Will send info on diabetic diet    Start Date: 11/29/22  End Date:           Discussion: goals  discussed    Goals: as outlined above     Next Scheduled patient outreach:  1 month

## 2022-12-01 ENCOUNTER — HOSPITAL ENCOUNTER (OUTPATIENT)
Dept: RADIOLOGY | Facility: MEDICAL CENTER | Age: 67
End: 2022-12-01
Attending: FAMILY MEDICINE
Payer: MEDICARE

## 2022-12-01 ENCOUNTER — HOSPITAL ENCOUNTER (OUTPATIENT)
Dept: LAB | Facility: MEDICAL CENTER | Age: 67
End: 2022-12-01
Attending: FAMILY MEDICINE
Payer: MEDICARE

## 2022-12-01 DIAGNOSIS — G89.29 CHRONIC RIGHT SHOULDER PAIN: ICD-10-CM

## 2022-12-01 DIAGNOSIS — D50.0 IRON DEFICIENCY ANEMIA DUE TO CHRONIC BLOOD LOSS: ICD-10-CM

## 2022-12-01 DIAGNOSIS — M25.511 CHRONIC RIGHT SHOULDER PAIN: ICD-10-CM

## 2022-12-01 DIAGNOSIS — M25.551 RIGHT HIP PAIN: ICD-10-CM

## 2022-12-01 PROCEDURE — 85027 COMPLETE CBC AUTOMATED: CPT

## 2022-12-01 PROCEDURE — 36415 COLL VENOUS BLD VENIPUNCTURE: CPT

## 2022-12-01 PROCEDURE — 73502 X-RAY EXAM HIP UNI 2-3 VIEWS: CPT | Mod: RT

## 2022-12-01 PROCEDURE — 73030 X-RAY EXAM OF SHOULDER: CPT | Mod: RT

## 2022-12-02 LAB
ERYTHROCYTE [DISTWIDTH] IN BLOOD BY AUTOMATED COUNT: 57.1 FL (ref 35.9–50)
HCT VFR BLD AUTO: 31 % (ref 37–47)
HGB BLD-MCNC: 8.9 G/DL (ref 12–16)
MCH RBC QN AUTO: 22 PG (ref 27–33)
MCHC RBC AUTO-ENTMCNC: 28.7 G/DL (ref 33.6–35)
MCV RBC AUTO: 76.5 FL (ref 81.4–97.8)
PLATELET # BLD AUTO: 607 K/UL (ref 164–446)
PMV BLD AUTO: 9.4 FL (ref 9–12.9)
RBC # BLD AUTO: 4.05 M/UL (ref 4.2–5.4)
WBC # BLD AUTO: 14.5 K/UL (ref 4.8–10.8)

## 2022-12-13 ENCOUNTER — TELEPHONE (OUTPATIENT)
Dept: MEDICAL GROUP | Facility: IMAGING CENTER | Age: 67
End: 2022-12-13

## 2022-12-13 NOTE — TELEPHONE ENCOUNTER
It appears pt does not have follow up until January at this time.   Please let her know last lab shows slightly worsening anemia and elevated white blood cell and platelet counts, which are likely associated.   Please see if pt is scheduled with GI.   See if she has had any symptoms of illness.

## 2022-12-14 NOTE — TELEPHONE ENCOUNTER
Phone Number Called: [phone] (home)      Call outcome: Spoke to patient regarding message below.    Message: Spoke to pt in regards to Dr. Thorne's message:   It appears pt does not have follow up until January at this time.   Please let her know last lab shows slightly worsening anemia and elevated white blood cell and platelet counts, which are likely associated.   Please see if pt is scheduled with GI.   See if she has had any symptoms of illness.     Pt understood. She will schedule appt with GI and she states no symptoms of illness. No further questions at this time.

## 2022-12-27 PROBLEM — F32.A DEPRESSION: Status: RESOLVED | Noted: 2022-06-05 | Resolved: 2022-12-27

## 2022-12-27 PROBLEM — F33.0 MAJOR DEPRESSIVE DISORDER, RECURRENT EPISODE, MILD (HCC): Status: ACTIVE | Noted: 2022-12-27

## 2022-12-29 ENCOUNTER — PATIENT OUTREACH (OUTPATIENT)
Dept: HEALTH INFORMATION MANAGEMENT | Facility: OTHER | Age: 67
End: 2022-12-29
Payer: MEDICARE

## 2022-12-29 DIAGNOSIS — I25.10 CVD (CARDIOVASCULAR DISEASE): ICD-10-CM

## 2022-12-29 DIAGNOSIS — E11.9 DIABETES MELLITUS WITHOUT COMPLICATION (HCC): ICD-10-CM

## 2022-12-29 DIAGNOSIS — I51.7 LVH (LEFT VENTRICULAR HYPERTROPHY): ICD-10-CM

## 2022-12-29 PROCEDURE — 99999 PR NO CHARGE: CPT | Performed by: FAMILY MEDICINE

## 2022-12-29 NOTE — PROGRESS NOTES
Assessment  Spoke with Heidi for monthly outreach follow up. Heidi voiced concerns about scheduling a completing her colonoscopy. She shares a bathroom with a roommate and is worried about having to rush to the bathroom with her hip pain. She has a follow up PCP appointment on 1/12 and states she wants to talk to Dr. Thorne about her concerns. She has not called to schedule follow up with orthopedics for her hip pain. Encouraged Heidi to call for an appointment. She state she dose have the number. Heidi stated that she generally checks her blood sugar twice a day but has been checking if more often because she has been experiencing hot flashes. She states her blood sugar was 167 today. She denies a fever, denies a cough or congestion. She did receive the education materials on diabetes that were mailed out. She stated she hasn't reviewed the materials but that she would. Denied any other questions or concerns.     Education  Discussed diabetic diet  Discussed making an appointment with orthopedics  Discussed follow up with PCP     Care Plan  Improve hip pain-follow up with orthopedics   A1c below 7-continue to monitor blood sugar levels.     Progress:  Not progress     Next outreach:  1 month

## 2023-01-12 ENCOUNTER — OFFICE VISIT (OUTPATIENT)
Dept: MEDICAL GROUP | Facility: IMAGING CENTER | Age: 68
End: 2023-01-12
Payer: MEDICARE

## 2023-01-12 ENCOUNTER — HOSPITAL ENCOUNTER (OUTPATIENT)
Facility: MEDICAL CENTER | Age: 68
End: 2023-01-12
Attending: FAMILY MEDICINE
Payer: MEDICARE

## 2023-01-12 VITALS
WEIGHT: 142.2 LBS | HEIGHT: 67 IN | DIASTOLIC BLOOD PRESSURE: 60 MMHG | HEART RATE: 98 BPM | RESPIRATION RATE: 18 BRPM | TEMPERATURE: 97.3 F | BODY MASS INDEX: 22.32 KG/M2 | SYSTOLIC BLOOD PRESSURE: 146 MMHG | OXYGEN SATURATION: 99 %

## 2023-01-12 DIAGNOSIS — D72.829 LEUKOCYTOSIS, UNSPECIFIED TYPE: ICD-10-CM

## 2023-01-12 DIAGNOSIS — F33.0 MILD EPISODE OF RECURRENT MAJOR DEPRESSIVE DISORDER (HCC): ICD-10-CM

## 2023-01-12 DIAGNOSIS — K62.3 RECTAL PROLAPSE: ICD-10-CM

## 2023-01-12 DIAGNOSIS — L74.9 SWEATING ABNORMALITY: ICD-10-CM

## 2023-01-12 DIAGNOSIS — D50.0 IRON DEFICIENCY ANEMIA DUE TO CHRONIC BLOOD LOSS: ICD-10-CM

## 2023-01-12 DIAGNOSIS — R51.9 NONINTRACTABLE HEADACHE, UNSPECIFIED CHRONICITY PATTERN, UNSPECIFIED HEADACHE TYPE: ICD-10-CM

## 2023-01-12 DIAGNOSIS — G89.29 OTHER CHRONIC PAIN: ICD-10-CM

## 2023-01-12 DIAGNOSIS — E11.9 DIABETES MELLITUS TYPE 2 IN NONOBESE (HCC): ICD-10-CM

## 2023-01-12 DIAGNOSIS — D75.838 REACTIVE THROMBOCYTOSIS: ICD-10-CM

## 2023-01-12 LAB
APPEARANCE UR: NORMAL
BILIRUB UR STRIP-MCNC: NEGATIVE MG/DL
COLOR UR AUTO: YELLOW
GLUCOSE UR STRIP.AUTO-MCNC: NEGATIVE MG/DL
KETONES UR STRIP.AUTO-MCNC: NEGATIVE MG/DL
LEUKOCYTE ESTERASE UR QL STRIP.AUTO: NEGATIVE
NITRITE UR QL STRIP.AUTO: NEGATIVE
PH UR STRIP.AUTO: 5.5 [PH] (ref 5–8)
PROT UR QL STRIP: 100 MG/DL
RBC UR QL AUTO: NEGATIVE
SP GR UR STRIP.AUTO: 1.02
UROBILINOGEN UR STRIP-MCNC: 0.2 MG/DL

## 2023-01-12 PROCEDURE — 81002 URINALYSIS NONAUTO W/O SCOPE: CPT | Performed by: FAMILY MEDICINE

## 2023-01-12 PROCEDURE — 99214 OFFICE O/P EST MOD 30 MIN: CPT | Performed by: FAMILY MEDICINE

## 2023-01-12 ASSESSMENT — PAIN SCALES - GENERAL: PAINLEVEL: NO PAIN

## 2023-01-12 ASSESSMENT — FIBROSIS 4 INDEX: FIB4 SCORE: 0.6

## 2023-01-12 NOTE — PROGRESS NOTES
SUBJECTIVE:    Chief Complaint   Patient presents with    Sweats     Pt states it started 5 days ago. Happens throughout the day.     Follow-Up     Hip pain is doing a lot better.     Headache     Pt states she has headaches for months just seems to be getting worse.    GI Problem       HPI:     Heidi Navas is a 67 y.o. female with chronic pain, OA, fibromyalgia, DM type 2, anxiety, COPD, hypertension, hyperlipidemia and prior hx of  anemia due to history of GI bleed here for follow up of multiple medical issues. Friend has brought her here today.     Has had some sweats, feels cold after sweats.  States notices at night.   Not sure of any fevers.  No other dysuria or hematuria.   Does have bilateral lower back pain.  Has had chronic intermittent cough.  Occasionally may spit up something.  No chest pains or shortness of breath with symptoms.    Headaches for month- frontal area and top of head. No recent injury.   Sometimes stabbing pain. No dizziness.  No nausea or vomiting with symptoms.  Some blurry vision in right sometimes,  feels left eye sees double vision- past year, comes and goes. Eye glasses. Not like migraine. Not sure what makes better or worse.     Chronic anemia due to chronic blood loss.  Slight further decrease of H&H.  She is on iron once daily. Has seen GI, discussed scope. Has not followed up with GI.   Also with elevated white blood cell count and elevated platelets.    She does not been able to complete colonoscopy and GI evaluation due to concerns for the prep.  She rents a room and is unable to do the bowel prep as she shares a bathroom. She does have a prolapsed rectum and does notice blood of the area.    Chronic pain-has been previously discharged from pain clinic.  Hands - may feel weak/pins/needles. Hips hurt.   On tylenol currently. Pain is twice as bad. Hx for meth use to help with her symptoms in past.   Feels depressed today.  She has been on Zoloft therapy.  Feels like  giving up at times due to above issues.    Diabetes-Lantus 15 units am and 10 units evening.   130-170s, 200 after eating.  Last A1c 7.0%.     ROS:  No recent fevers or chills. No nausea or vomiting. No diarrhea. No chest pains or shortness of breath. No lower extremity edema.    Current Outpatient Medications on File Prior to Visit   Medication Sig Dispense Refill    ferrous sulfate 325 (65 Fe) MG tablet TAKE 1 TABLET BY MOUTH EVERY DAY 90 Tablet 0    traZODone (DESYREL) 50 MG Tab TAKE 3 TABLETS BY MOUTH EVERY EVENING 90 Tablet 0    gabapentin (NEURONTIN) 100 MG Cap TAKE 1-3 CAPSULES BY MOUTH AT BEDTIME AS NEEDED (PAIN). 100 Capsule 1    Insulin Pen Needle 32 G x 4 mm (BD PEN NEEDLE BRITANY 2ND GEN) USE ONE PEN NEEDLE IN PEN DEVICE TO INJECT INSULIN THREE TIMES DAILY. 100 Each 3    sertraline (ZOLOFT) 25 MG tablet Take 1 Tablet by mouth every day. 100 Tablet 1    omeprazole (PRILOSEC) 20 MG delayed-release capsule Take 1 Capsule by mouth 2 times a day. 180 Capsule 0    losartan (COZAAR) 25 MG Tab TAKE 1 TABLET BY MOUTH EVERY DAY 90 Tablet 3    rosuvastatin (CRESTOR) 10 MG Tab Take 1 Tablet by mouth every evening. 90 Tablet 3    insulin glargine (LANTUS SOLOSTAR) 100 UNIT/ML Solution Pen-injector injection INJECT 15 UNITS IN THE MORNING AND 10 UNITS IN THE AFTERNOON 3 Each 3    glucose blood strip 1 Strip by Other route in the morning, at noon, and at bedtime. Use one strip to test blood sugar three times daily before meals. 300 Strip 3    Blood Glucose Monitoring Suppl (BLOOD GLUCOSE MONITOR SYSTEM) w/Device Kit Test blood sugar as recommended by provider. Slick Contour Next blood glucose monitoring kit. 1 Kit 0    Microlet Lancets Misc Use one lancet to test blood sugar three times daily before meals. 100 Each 0    Alcohol Swabs (ALCOHOL PREP) 70 % Pads Wipe site with prep pad prior to injection 100 Each 0     No current facility-administered medications on file prior to visit.       Allergies   Allergen Reactions     Aspartame Nausea     headache    Atorvastatin Calcium Unspecified and Swelling    Latex Rash and Itching    Lipitor [Atorvastatin Calcium] Swelling     Patient denies allergy 06/04/22    Other Misc Vomiting     Bug spray    Saccharin Nausea     Nausea and headache       Patient Active Problem List    Diagnosis Date Noted    Major depressive disorder, recurrent episode, mild ([facility]) 12/27/2022    Reactive thrombocytosis 10/14/2022    Rectal prolapse 10/14/2022    Insomnia due to medical condition 07/11/2022    GIB (gastrointestinal bleeding) 06/04/2022    Acute on chronic blood loss anemia 06/04/2022    DM (diabetes mellitus), secondary uncontrolled 06/04/2022    Severe protein-calorie malnutrition (HCC) 06/04/2022    Severely underweight adult 06/04/2022    ACP (advance care planning) 06/04/2022    Hyponatremia 06/04/2022    Hyperosmolar hyperglycemic state (HHS), pressure ulcer, anemia ([facility]) 06/04/2022    Dehydration 06/04/2022    Pressure ulcer, sacrum 06/04/2022    Fatigue 06/03/2022    Weight loss, unintentional 06/03/2022    Urinary incontinence 06/03/2022    Adult failure to thrive 06/03/2022    Iron deficiency anemia due to chronic blood loss 02/09/2020    Cellulitis 01/08/2020    Anemia 05/19/2019    Atherosclerosis 05/19/2019    LVH (left ventricular hypertrophy) 05/19/2019    Arthritis of left shoulder region 07/12/2017    CVD (cardiovascular disease) 12/04/2015    Pulmonary nodule 12/01/2015    Renal stone 12/01/2015    Diverticulosis 12/01/2015    Chest pain 07/16/2015    Nausea & vomiting 07/13/2015    Opiate withdrawal ([facility]) 07/13/2015    Diabetes mellitus type 2 in nonobese ([facility]) 10/03/2014    Brachial neuritis or radiculitis 09/10/2014    History of total hip arthroplasty 07/22/2014    Leukocytosis 03/30/2014    COPD (chronic obstructive pulmonary disease) ([facility]) 03/30/2014    Arthritis 12/26/2013    Chronic pain 02/21/2012    Anxiety 01/18/2012    Vitamin D insufficiency 04/28/2011    Hypertension  "03/22/2010    Fibromyalgia 10/09/2009    Swelling, mass, or lump in head and neck 07/21/2009    Tobacco use disorder 07/21/2009    Chronic ischemic heart disease 07/21/2009    Esophageal reflux 07/21/2009    Allergic rhinitis 07/21/2009    Controlled type 2 diabetes mellitus without complication, without long-term current use of insulin ([facility]) 07/21/2009    Mixed hyperlipidemia 07/21/2009    Other atopic dermatitis and related conditions 07/21/2009    Deficiency anemia 07/21/2009       Past Medical History:   Diagnosis Date    Anemia     Arthritis     OA and  not RA per rheumatology    Breath shortness     oxygen PRN     Bronchitis 2008    Colon polyps 2015    Convulsions ([facility]) 7/21/2009     ICD-10 transition    COPD (chronic obstructive pulmonary disease) ([facility])     Dental disorder     full dentures    Depression     depression, anxiety     Diverticulosis 5/10     colonoscopy    Hemorrhoid 7/29/2009    History of colonoscopy   2005    Leukocytosis 3/30/2014    Lymphocytosis (symptomatic) 7/21/2009    MI (myocardial infarction) ([facility]) 4/29/2007    Other specified disorder of intestines     constipation    Pain     shoulders, neck, lower back    Pancreatitis     Pap smear 4/2006    Pneumonia 2013    Pulmonary nodule     Renal lesion     Renal stone     S/P colonoscopy 5/2010    will need repeat in 5 years    Screening mammogram never    Seizure ([facility])     x1 in 2008    Shingles     Symptomatic menopausal or female climacteric states 7/21/2009    Type II or unspecified type diabetes mellitus without mention of complication, uncontrolled 7/21/2009    diet controlled     Unspecified urinary incontinence        OBJECTIVE:   BP (!) 146/60 (BP Location: Left arm, Patient Position: Sitting, BP Cuff Size: Adult)   Pulse 98   Temp 36.3 °C (97.3 °F) (Temporal)   Resp 18   Ht 1.702 m (5' 7\")   Wt 64.5 kg (142 lb 3.2 oz)   LMP 01/01/2007   SpO2 99%   BMI 22.27 kg/m²   General: Well-developed well-nourished female, no acute " distress  HEENT: TMs clear and intact. Eyes clear, head is atraumatic/normocephalic, sinuses nontender. CN 2- 12 intact bilaterally.  Neck: supple, no lymphadenopathy- cervical or supraclavicular, no thyromegaly  Cardiovascular: regular rate and rhythm, no murmurs, gallops, rubs  Lungs: clear to auscultation bilaterally, no wheezes, crackles, or rhonchi  Abdomen: +bowel sounds, soft, mid abdominal region with slight tenderness to palpation, nondistended, no rebound, no guarding, no hepatosplenomegaly  Extremities: no cyanosis, clubbing, edema. Normal strength in extremities, joint stiffness noted in several areas.   Skin: Warm and dry  Psych: appropriate mood and affect        ASSESSMENT/PLAN:    67  y.o. female with chronic pain, OA, fibromyalgia, DM type 2, anxiety, COPD, hypertension, hyperlipidemia and hx of anemia.     1. Sweating abnormality -notes symptoms at night.   Will assess further with imaging and labs. Otherwise consider associated with medications.   ER precautions if any worsening symptoms. Monitor.  DX-CHEST-2 VIEWS    CBC WITH DIFFERENTIAL    Sed Rate    CRP QUANTITIVE (NON-CARDIAC)    PROCALCITONIN    Comp Metabolic Panel    LDH    Blood Culture    Blood Culture    URINALYSIS,CULTURE IF INDICATED      2. Nonintractable headache, unspecified chronicity pattern, unspecified headache type - component may be associated with tension type.   Modify activities. Tylenol as needed. Recommend routine eye exam.   If no further improvements, consider further imaging. Continue to monitor.        3. Iron deficiency anemia due to chronic blood loss-h/h slightly decreased. Vitals stable.   She has seen GI, but unable to do bowel prep for scope evaluation.   Recommend follow up back up with GI for further options at this time.   Recommend continue iron therapy. Continue omeprazole therapy. Avoid possible aggravating factors. Monitor labs.  CBC      4. Leukocytosis, unspecified type -stable, monitor.  CBC WITH  DIFFERENTIAL    POCT Urinalysis      5. Reactive thrombocytosis - stable, monitor.  cbc      6. Other chronic pain - has been discharged from pain clinic in past. Multiple areas of djd.   Tylenol as needed. Consider physical therapy as needed and desired by patient.  Monitor.        7. Rectal prolapse - contributing to rectal bleeding and anemia.   Follow up with GI recommended.        8. Diabetes mellitus type 2 in nonobese (HCC) - stable, continue current medication. Continue diabetic diet and routine exercise as tolerated.          9. Mild episode of recurrent major depressive disorder (HCC) - relatively stable, continue current medication. Monitor.            Follow up in 1-2 weeks.     This medical record contains text that has been entered with the assistance of computer voice recognition and dictation software.  Therefore, it may contain unintended errors in text, spelling, punctuation, or grammar.

## 2023-01-13 ENCOUNTER — HOSPITAL ENCOUNTER (OUTPATIENT)
Dept: LAB | Facility: MEDICAL CENTER | Age: 68
End: 2023-01-13
Attending: FAMILY MEDICINE
Payer: MEDICARE

## 2023-01-13 ENCOUNTER — TELEPHONE (OUTPATIENT)
Dept: MEDICAL GROUP | Facility: IMAGING CENTER | Age: 68
End: 2023-01-13
Payer: MEDICARE

## 2023-01-13 ENCOUNTER — HOSPITAL ENCOUNTER (OUTPATIENT)
Dept: RADIOLOGY | Facility: MEDICAL CENTER | Age: 68
End: 2023-01-13
Attending: FAMILY MEDICINE
Payer: MEDICARE

## 2023-01-13 DIAGNOSIS — D72.829 LEUKOCYTOSIS, UNSPECIFIED TYPE: ICD-10-CM

## 2023-01-13 DIAGNOSIS — L74.9 SWEATING ABNORMALITY: ICD-10-CM

## 2023-01-13 LAB
ALBUMIN SERPL BCP-MCNC: 3.8 G/DL (ref 3.2–4.9)
ALBUMIN/GLOB SERPL: 1.1 G/DL
ALP SERPL-CCNC: 90 U/L (ref 30–99)
ALT SERPL-CCNC: 10 U/L (ref 2–50)
ANION GAP SERPL CALC-SCNC: 11 MMOL/L (ref 7–16)
ANISOCYTOSIS BLD QL SMEAR: ABNORMAL
AST SERPL-CCNC: 19 U/L (ref 12–45)
BASOPHILS # BLD AUTO: 0 % (ref 0–1.8)
BASOPHILS # BLD: 0 K/UL (ref 0–0.12)
BILIRUB SERPL-MCNC: <0.2 MG/DL (ref 0.1–1.5)
BUN SERPL-MCNC: 12 MG/DL (ref 8–22)
CALCIUM ALBUM COR SERPL-MCNC: 9.5 MG/DL (ref 8.5–10.5)
CALCIUM SERPL-MCNC: 9.3 MG/DL (ref 8.5–10.5)
CHLORIDE SERPL-SCNC: 101 MMOL/L (ref 96–112)
CO2 SERPL-SCNC: 28 MMOL/L (ref 20–33)
CREAT SERPL-MCNC: 0.58 MG/DL (ref 0.5–1.4)
CRP SERPL HS-MCNC: 4.05 MG/DL (ref 0–0.75)
EOSINOPHIL # BLD AUTO: 0.07 K/UL (ref 0–0.51)
EOSINOPHIL NFR BLD: 0.8 % (ref 0–6.9)
ERYTHROCYTE [DISTWIDTH] IN BLOOD BY AUTOMATED COUNT: 53.5 FL (ref 35.9–50)
ERYTHROCYTE [SEDIMENTATION RATE] IN BLOOD BY WESTERGREN METHOD: 26 MM/HOUR (ref 0–25)
GFR SERPLBLD CREATININE-BSD FMLA CKD-EPI: 99 ML/MIN/1.73 M 2
GLOBULIN SER CALC-MCNC: 3.6 G/DL (ref 1.9–3.5)
GLUCOSE SERPL-MCNC: 183 MG/DL (ref 65–99)
HCT VFR BLD AUTO: 35.5 % (ref 37–47)
HGB BLD-MCNC: 10.3 G/DL (ref 12–16)
HYPOCHROMIA BLD QL SMEAR: ABNORMAL
LDH SERPL L TO P-CCNC: 229 U/L (ref 107–266)
LYMPHOCYTES # BLD AUTO: 1.55 K/UL (ref 1–4.8)
LYMPHOCYTES NFR BLD: 17.6 % (ref 22–41)
MANUAL DIFF BLD: NORMAL
MCH RBC QN AUTO: 21.9 PG (ref 27–33)
MCHC RBC AUTO-ENTMCNC: 29 G/DL (ref 33.6–35)
MCV RBC AUTO: 75.5 FL (ref 81.4–97.8)
MICROCYTES BLD QL SMEAR: ABNORMAL
MONOCYTES # BLD AUTO: 0.3 K/UL (ref 0–0.85)
MONOCYTES NFR BLD AUTO: 3.4 % (ref 0–13.4)
MORPHOLOGY BLD-IMP: NORMAL
NEUTROPHILS # BLD AUTO: 6.88 K/UL (ref 2–7.15)
NEUTROPHILS NFR BLD: 78.2 % (ref 44–72)
NRBC # BLD AUTO: 0 K/UL
NRBC BLD-RTO: 0 /100 WBC
OVALOCYTES BLD QL SMEAR: NORMAL
PLATELET # BLD AUTO: 450 K/UL (ref 164–446)
PLATELET BLD QL SMEAR: NORMAL
PMV BLD AUTO: 9.7 FL (ref 9–12.9)
POIKILOCYTOSIS BLD QL SMEAR: NORMAL
POTASSIUM SERPL-SCNC: 4.5 MMOL/L (ref 3.6–5.5)
PROCALCITONIN SERPL-MCNC: 0.14 NG/ML
PROT SERPL-MCNC: 7.4 G/DL (ref 6–8.2)
RBC # BLD AUTO: 4.7 M/UL (ref 4.2–5.4)
RBC BLD AUTO: PRESENT
SODIUM SERPL-SCNC: 140 MMOL/L (ref 135–145)
TARGETS BLD QL SMEAR: NORMAL
WBC # BLD AUTO: 8.8 K/UL (ref 4.8–10.8)

## 2023-01-13 PROCEDURE — 84145 PROCALCITONIN (PCT): CPT

## 2023-01-13 PROCEDURE — 80053 COMPREHEN METABOLIC PANEL: CPT

## 2023-01-13 PROCEDURE — 85007 BL SMEAR W/DIFF WBC COUNT: CPT

## 2023-01-13 PROCEDURE — 86140 C-REACTIVE PROTEIN: CPT

## 2023-01-13 PROCEDURE — 85652 RBC SED RATE AUTOMATED: CPT

## 2023-01-13 PROCEDURE — 71046 X-RAY EXAM CHEST 2 VIEWS: CPT

## 2023-01-13 PROCEDURE — 83615 LACTATE (LD) (LDH) ENZYME: CPT

## 2023-01-13 PROCEDURE — 87040 BLOOD CULTURE FOR BACTERIA: CPT

## 2023-01-13 PROCEDURE — 85025 COMPLETE CBC W/AUTO DIFF WBC: CPT

## 2023-01-13 PROCEDURE — 36415 COLL VENOUS BLD VENIPUNCTURE: CPT

## 2023-01-18 DIAGNOSIS — E11.9 DIABETES MELLITUS TYPE 2 IN NONOBESE (HCC): ICD-10-CM

## 2023-01-30 ENCOUNTER — PATIENT OUTREACH (OUTPATIENT)
Dept: HEALTH INFORMATION MANAGEMENT | Facility: OTHER | Age: 68
End: 2023-01-30
Payer: MEDICARE

## 2023-01-30 DIAGNOSIS — I25.10 CVD (CARDIOVASCULAR DISEASE): ICD-10-CM

## 2023-01-30 DIAGNOSIS — E11.9 DIABETES MELLITUS WITHOUT COMPLICATION (HCC): ICD-10-CM

## 2023-01-30 DIAGNOSIS — I51.7 LVH (LEFT VENTRICULAR HYPERTROPHY): ICD-10-CM

## 2023-01-30 PROCEDURE — 99999 PR NO CHARGE: CPT | Performed by: FAMILY MEDICINE

## 2023-01-31 NOTE — PROGRESS NOTES
Assessment  Spoke with Heidi for monthly outreach follow up. Heidi states she is doing okay. She reports her Lantus is no longer covered by insurance. She has two days left. Discussed the need for pre-authorization. Heidi reports she is checking her blood sugar twice a day. She reports her average is 168. She has a PCP appointment on 2/1. RN Care Coordinator to follow up after PCP appointment to ensure insulin coverage. She states her hip pain continues. She has not made an appointment with orthopedics. Contact information for orthopedics referral given. Heidi denies any other questions or concerns.     Education  Discussed Orthopedics appointment  Discussed pre-auth for Lantus-sent message to Dr. Thorne.     Care Plan  Continue to improve hip pain/schedule Orthopedics referral  Continue to improve A1c below 7-continue to monitor blood sugar levels    Progress:  Progressing     Next outreach:  1 month

## 2023-02-01 ENCOUNTER — OFFICE VISIT (OUTPATIENT)
Dept: MEDICAL GROUP | Facility: IMAGING CENTER | Age: 68
End: 2023-02-01
Payer: MEDICARE

## 2023-02-01 VITALS
DIASTOLIC BLOOD PRESSURE: 50 MMHG | HEART RATE: 76 BPM | BODY MASS INDEX: 23.13 KG/M2 | OXYGEN SATURATION: 96 % | TEMPERATURE: 97.6 F | WEIGHT: 147.4 LBS | RESPIRATION RATE: 19 BRPM | SYSTOLIC BLOOD PRESSURE: 102 MMHG | HEIGHT: 67 IN

## 2023-02-01 DIAGNOSIS — F33.0 MILD EPISODE OF RECURRENT MAJOR DEPRESSIVE DISORDER (HCC): ICD-10-CM

## 2023-02-01 DIAGNOSIS — D50.0 IRON DEFICIENCY ANEMIA DUE TO CHRONIC BLOOD LOSS: ICD-10-CM

## 2023-02-01 DIAGNOSIS — G89.29 OTHER CHRONIC PAIN: ICD-10-CM

## 2023-02-01 DIAGNOSIS — R79.82 CRP ELEVATED: ICD-10-CM

## 2023-02-01 DIAGNOSIS — I10 PRIMARY HYPERTENSION: ICD-10-CM

## 2023-02-01 DIAGNOSIS — E11.9 DIABETES MELLITUS TYPE 2 IN NONOBESE (HCC): ICD-10-CM

## 2023-02-01 DIAGNOSIS — D72.829 LEUKOCYTOSIS, UNSPECIFIED TYPE: ICD-10-CM

## 2023-02-01 DIAGNOSIS — D75.838 REACTIVE THROMBOCYTOSIS: ICD-10-CM

## 2023-02-01 DIAGNOSIS — R70.0 ESR RAISED: ICD-10-CM

## 2023-02-01 DIAGNOSIS — L74.9 SWEATING ABNORMALITY: ICD-10-CM

## 2023-02-01 DIAGNOSIS — R51.9 NONINTRACTABLE HEADACHE, UNSPECIFIED CHRONICITY PATTERN, UNSPECIFIED HEADACHE TYPE: ICD-10-CM

## 2023-02-01 DIAGNOSIS — K62.3 RECTAL PROLAPSE: ICD-10-CM

## 2023-02-01 PROCEDURE — 99214 OFFICE O/P EST MOD 30 MIN: CPT | Performed by: FAMILY MEDICINE

## 2023-02-01 ASSESSMENT — PATIENT HEALTH QUESTIONNAIRE - PHQ9: CLINICAL INTERPRETATION OF PHQ2 SCORE: 0

## 2023-02-01 ASSESSMENT — FIBROSIS 4 INDEX: FIB4 SCORE: 0.89

## 2023-02-01 ASSESSMENT — PAIN SCALES - GENERAL: PAINLEVEL: NO PAIN

## 2023-02-01 NOTE — PROGRESS NOTES
SUBJECTIVE:    Chief Complaint   Patient presents with    Lab Results     LDH       HPI:     Heidi Navas is a 67 y.o. female with chronic pain, OA,  fibromyalgia, DM type 2, anxiety, COPD, hypertension, hyperlipidemia and prior anemia due to history of GI bleed here for follow up labs for anemia and sweating.  Here with friend, Pamela.     H/h improved compared to prior.   Reduced stress.   Eating fine, iron therapy.   States her rectal prolapse staying up. When out can cause bleeding.   Previously noted sweats, more so at night.   Wbc improved. Platelets improving .  Elevated esr and crp.   Humming headache. Top of head.   On and off a long time. Not predominant pain.   No fevers or chills. No injury. No dizziness.   Unclear of triggering factors.     DM type 2-she is on lantus insulin 15 units in morning, 10 units in evaning usually.  Still awaiting refill per pharmacy and insurance.   Has one dose left. Has held evening dose past week or two.   Glucose is 105 sometimes. 140-160s mostly. Sometimes 200s.     BP on lower end today. States she stopped BP medication- losartan. 2 weeks. Has bp cuff at home. She will monitor.     Chronic pain- hx of DJD. Discharged from pain clinic.   MDD- stable on current therapy.     ROS:  No recent fevers or chills. No nausea or vomiting. No diarrhea. No chest pains or shortness of breath. No lower extremity edema.    Current Outpatient Medications on File Prior to Visit   Medication Sig Dispense Refill    Insulin Glargine-yfgn (SEMGLEE, YFGN,) 100 UNIT/ML Solution Pen-injector Inject 15 units in morning and 10 units in evening. 3 mL 3    omeprazole (PRILOSEC) 20 MG delayed-release capsule TAKE 1 CAPSULE BY MOUTH TWICE A  Capsule 0    ferrous sulfate 325 (65 Fe) MG tablet TAKE 1 TABLET BY MOUTH EVERY DAY 90 Tablet 0    traZODone (DESYREL) 50 MG Tab TAKE 3 TABLETS BY MOUTH EVERY EVENING 90 Tablet 0    gabapentin (NEURONTIN) 100 MG Cap TAKE 1-3 CAPSULES BY MOUTH AT  BEDTIME AS NEEDED (PAIN). 100 Capsule 1    Insulin Pen Needle 32 G x 4 mm (BD PEN NEEDLE BRITANY 2ND GEN) USE ONE PEN NEEDLE IN PEN DEVICE TO INJECT INSULIN THREE TIMES DAILY. 100 Each 3    sertraline (ZOLOFT) 25 MG tablet Take 1 Tablet by mouth every day. 100 Tablet 1    losartan (COZAAR) 25 MG Tab TAKE 1 TABLET BY MOUTH EVERY DAY 90 Tablet 3    rosuvastatin (CRESTOR) 10 MG Tab Take 1 Tablet by mouth every evening. 90 Tablet 3    insulin glargine (LANTUS SOLOSTAR) 100 UNIT/ML Solution Pen-injector injection INJECT 15 UNITS IN THE MORNING AND 10 UNITS IN THE AFTERNOON 3 Each 3    glucose blood strip 1 Strip by Other route in the morning, at noon, and at bedtime. Use one strip to test blood sugar three times daily before meals. 300 Strip 3    Blood Glucose Monitoring Suppl (BLOOD GLUCOSE MONITOR SYSTEM) w/Device Kit Test blood sugar as recommended by provider. Slick Contour Next blood glucose monitoring kit. 1 Kit 0    Microlet Lancets Misc Use one lancet to test blood sugar three times daily before meals. 100 Each 0    Alcohol Swabs (ALCOHOL PREP) 70 % Pads Wipe site with prep pad prior to injection 100 Each 0     No current facility-administered medications on file prior to visit.       Allergies   Allergen Reactions    Aspartame Nausea     headache    Atorvastatin Calcium Unspecified and Swelling    Latex Rash and Itching    Lipitor [Atorvastatin Calcium] Swelling     Patient denies allergy 06/04/22    Other Misc Vomiting     Bug spray    Saccharin Nausea     Nausea and headache       Patient Active Problem List    Diagnosis Date Noted    Major depressive disorder, recurrent episode, mild (HCC) 12/27/2022    Reactive thrombocytosis 10/14/2022    Rectal prolapse 10/14/2022    Insomnia due to medical condition 07/11/2022    GIB (gastrointestinal bleeding) 06/04/2022    Acute on chronic blood loss anemia 06/04/2022    DM (diabetes mellitus), secondary uncontrolled 06/04/2022    Severe protein-calorie malnutrition (HCC)  06/04/2022    Severely underweight adult 06/04/2022    ACP (advance care planning) 06/04/2022    Hyponatremia 06/04/2022    Hyperosmolar hyperglycemic state (HHS), pressure ulcer, anemia ([facility]) 06/04/2022    Dehydration 06/04/2022    Pressure ulcer, sacrum 06/04/2022    Fatigue 06/03/2022    Weight loss, unintentional 06/03/2022    Urinary incontinence 06/03/2022    Adult failure to thrive 06/03/2022    Iron deficiency anemia due to chronic blood loss 02/09/2020    Cellulitis 01/08/2020    Anemia 05/19/2019    Atherosclerosis 05/19/2019    LVH (left ventricular hypertrophy) 05/19/2019    Arthritis of left shoulder region 07/12/2017    CVD (cardiovascular disease) 12/04/2015    Pulmonary nodule 12/01/2015    Renal stone 12/01/2015    Diverticulosis 12/01/2015    Chest pain 07/16/2015    Nausea & vomiting 07/13/2015    Opiate withdrawal ([facility]) 07/13/2015    Diabetes mellitus type 2 in nonobese ([facility]) 10/03/2014    Brachial neuritis or radiculitis 09/10/2014    History of total hip arthroplasty 07/22/2014    Leukocytosis 03/30/2014    COPD (chronic obstructive pulmonary disease) ([facility]) 03/30/2014    Arthritis 12/26/2013    Chronic pain 02/21/2012    Anxiety 01/18/2012    Vitamin D insufficiency 04/28/2011    Hypertension 03/22/2010    Fibromyalgia 10/09/2009    Swelling, mass, or lump in head and neck 07/21/2009    Tobacco use disorder 07/21/2009    Chronic ischemic heart disease 07/21/2009    Esophageal reflux 07/21/2009    Allergic rhinitis 07/21/2009    Controlled type 2 diabetes mellitus without complication, without long-term current use of insulin ([facility]) 07/21/2009    Mixed hyperlipidemia 07/21/2009    Other atopic dermatitis and related conditions 07/21/2009    Deficiency anemia 07/21/2009       Past Medical History:   Diagnosis Date    Anemia     Arthritis     OA and  not RA per rheumatology    Breath shortness     oxygen PRN     Bronchitis 2008    Colon polyps 2015    Convulsions ([facility]) 7/21/2009     ICD-10  "transition    COPD (chronic obstructive pulmonary disease) ([facility])     Dental disorder     full dentures    Depression     depression, anxiety     Diverticulosis 5/10     colonoscopy    Hemorrhoid 7/29/2009    History of colonoscopy   2005    Leukocytosis 3/30/2014    Lymphocytosis (symptomatic) 7/21/2009    MI (myocardial infarction) ([facility]) 4/29/2007    Other specified disorder of intestines     constipation    Pain     shoulders, neck, lower back    Pancreatitis     Pap smear 4/2006    Pneumonia 2013    Pulmonary nodule     Renal lesion     Renal stone     S/P colonoscopy 5/2010    will need repeat in 5 years    Screening mammogram never    Seizure ([facility])     x1 in 2008    Shingles     Symptomatic menopausal or female climacteric states 7/21/2009    Type II or unspecified type diabetes mellitus without mention of complication, uncontrolled 7/21/2009    diet controlled     Unspecified urinary incontinence        OBJECTIVE:   /50 (BP Location: Left arm, Patient Position: Sitting, BP Cuff Size: Adult)   Pulse 76   Temp 36.4 °C (97.6 °F) (Temporal)   Resp 19   Ht 1.702 m (5' 7\")   Wt 66.9 kg (147 lb 6.4 oz)   LMP 01/01/2007   SpO2 96%   BMI 23.09 kg/m²   General: Well-developed well-nourished female, no acute distress  HEENT: oropharynx clear, eyes clear, TMs clear and intact, perrl, eomi  Neck: supple, no lymphadenopathy- cervical or supraclavicular, no thyromegaly  Cardiovascular: regular rate and rhythm, no murmurs, gallops, rubs  Lungs: clear to auscultation bilaterally, no wheezes, crackles, or rhonchi  Abdomen: +bowel sounds, soft, nontender, nondistended, no rebound, no guarding, no hepatosplenomegaly  Extremities: no cyanosis, clubbing, edema. Normal strength throughout, 2+ DTR throughout, some stiffness of joint noted.   Skin: Warm and dry  Psych: appropriate mood and affect       Latest Reference Range & Units 01/13/23 14:01   WBC 4.8 - 10.8 K/uL 8.8   RBC 4.20 - 5.40 M/uL 4.70   Hemoglobin 12.0 - " 16.0 g/dL 10.3 (L)   Hematocrit 37.0 - 47.0 % 35.5 (L)   MCV 81.4 - 97.8 fL 75.5 (L)   MCH 27.0 - 33.0 pg 21.9 (L)   MCHC 33.6 - 35.0 g/dL 29.0 (L)   RDW 35.9 - 50.0 fL 53.5 (H)   Platelet Count 164 - 446 K/uL 450 (H)   MPV 9.0 - 12.9 fL 9.7   Neutrophils-Polys 44.00 - 72.00 % 78.20 (H)   Neutrophils (Absolute) 2.00 - 7.15 K/uL 6.88   Lymphocytes 22.00 - 41.00 % 17.60 (L)   Lymphs (Absolute) 1.00 - 4.80 K/uL 1.55   Monocytes 0.00 - 13.40 % 3.40   Monos (Absolute) 0.00 - 0.85 K/uL 0.30   Eosinophils 0.00 - 6.90 % 0.80   Eos (Absolute) 0.00 - 0.51 K/uL 0.07   Basophils 0.00 - 1.80 % 0.00   Baso (Absolute) 0.00 - 0.12 K/uL 0.00   Nucleated RBC /100 WBC 0.00   NRBC (Absolute) K/uL 0.00   Plt Estimation  Normal   RBC Morphology  Present   Hypochromia  1+   Anisocytosis  1+   Microcytosis  1+   Poikilocytosis  1+   Ovalocytes  1+   Target Cells  1+   Peripheral Smear Review  see below   Manual Diff Status  PERFORMED   Sed Rate Westergren 0 - 25 mm/hour 26 (H)   Sodium 135 - 145 mmol/L 140   Potassium 3.6 - 5.5 mmol/L 4.5   Chloride 96 - 112 mmol/L 101   Co2 20 - 33 mmol/L 28   Anion Gap 7.0 - 16.0  11.0   Glucose 65 - 99 mg/dL 183 (H)   Bun 8 - 22 mg/dL 12   Creatinine 0.50 - 1.40 mg/dL 0.58   GFR (CKD-EPI) >60 mL/min/1.73 m 2 99   Calcium 8.5 - 10.5 mg/dL 9.3   Correct Calcium 8.5 - 10.5 mg/dL 9.5   AST(SGOT) 12 - 45 U/L 19   ALT(SGPT) 2 - 50 U/L 10   Alkaline Phosphatase 30 - 99 U/L 90   Total Bilirubin 0.1 - 1.5 mg/dL <0.2   Albumin 3.2 - 4.9 g/dL 3.8   Total Protein 6.0 - 8.2 g/dL 7.4   Globulin 1.9 - 3.5 g/dL 3.6 (H)   A-G Ratio g/dL 1.1   LDH Total 107 - 266 U/L 229   Procalcitonin <0.25 ng/mL 0.14   Stat C-Reactive Protein 0.00 - 0.75 mg/dL 4.05 (H)   Significant Indicator  NEG   Site  PERIPHERAL   Source  BLD   (L): Data is abnormally low  (H): Data is abnormally high      Narrative & Impression     1/13/2023 2:26 PM     HISTORY/REASON FOR EXAM:  Cough        TECHNIQUE/EXAM DESCRIPTION AND NUMBER OF VIEWS:  Two  views of the chest.     COMPARISON:  6/4/2022.     FINDINGS:     No pulmonary infiltrates or consolidations are noted.  No pleural effusions, no pneumothorax are appreciated.  Normal cardiopericardial silhouette.     IMPRESSION:        1. No active cardiopulmonary abnormalities are identified.           Exam Ended: 01/13/23  2:30 PM Last Resulted: 01/13/23  2:40 PM             ASSESSMENT/PLAN:    67 y.o.female with chronic pain, OA, fibromyalgia, DM type 2, anxiety, COPD, hypertension, hyperlipidemia and prior hx of  anemia due to history of GI bleed.     1. Iron deficiency anemia due to chronic blood loss - currently h/h improved. Patient has been unable to complete bowel prep for GI scope. Recommend she schedule with GI to discuss consideration of hospital admission for bowel prep and GI scope.   Continue iron therapy. Monitor.        2. Rectal prolapse - stable. Appears contributes to anemia with bleeding at times when protruding.   Recommend scheduled follow up with GI. Monitor.        3. ESR raised - Improving. Monitor.        4. CRP elevated - monitor.        5. Sweating abnormality -stable. Reviewed labs. Consider medication modification. Monitor.        6. Leukocytosis, unspecified type- improved. Monitor.         7. Reactive thrombocytosis - improving. Monitor.        8. Nonintractable headache, unspecified chronicity pattern, unspecified headache type   Will assess with brain MRI.  MR-BRAIN-WITH & W/O      9. Diabetes mellitus type 2 in nonobese (HCC) - stable, continue current medication. Patient to check with pharmacy.        10. Primary hypertension - BP low, she has been off losartan. Continue to monitor at this time.        11. Other chronic pain -hx of djd, stable. However, may consider further lab evaluation in future, esr and crp elevated. Hx of RF with slight elevation, ccp normal, renetta negative. Discharged from pain specialist in past.   Continue tylenol as needed. Monitor.     12.     Mild  recurrent MDD- stable. Continue zoloft therapy. Monitor.      Follow up after imaging sooner as needed.   Return in about 6 weeks (around 3/15/2023).     This medical record contains text that has been entered with the assistance of computer voice recognition and dictation software.  Therefore, it may contain unintended errors in text, spelling, punctuation, or grammar.

## 2023-02-02 ENCOUNTER — TELEPHONE (OUTPATIENT)
Dept: MEDICAL GROUP | Facility: IMAGING CENTER | Age: 68
End: 2023-02-02
Payer: MEDICARE

## 2023-02-03 ENCOUNTER — TELEPHONE (OUTPATIENT)
Dept: HEALTH INFORMATION MANAGEMENT | Facility: OTHER | Age: 68
End: 2023-02-03
Payer: MEDICARE

## 2023-02-03 ENCOUNTER — DOCUMENTATION (OUTPATIENT)
Dept: MEDICAL GROUP | Facility: IMAGING CENTER | Age: 68
End: 2023-02-03
Payer: MEDICARE

## 2023-02-03 NOTE — TELEPHONE ENCOUNTER
Received message from Heidi that she was out of Lantus insulin. Reviewed Heidi's last PCP visit and Notes from Dr. Thorne. A prescription for Semglee has been called to Heidi's pharmacy. Followed up with [facility], prescription has been received. Called Heidi and reviewed this medication change with her. Julius denied any questions. Encouraged to call with any concerns.

## 2023-02-03 NOTE — TELEPHONE ENCOUNTER
Caller Name: Heidi Navas   Call Back Number: [phone] (home)      How would the patient prefer to be contacted with a response: Phone call do NOT leave a detailed message    Pt called stating she was unable to get insulin glargine because she changed insurance and it is no longer covered.      I will start a PA and I informed the pt that it may take 3-5 business days for insurance to respond.     Is there an alternative as the patient is very concerned about running out. Dr. Thorne please advise.    operating room

## 2023-02-03 NOTE — PROGRESS NOTES
MEDICATION PRIOR AUTHORIZATION STARTED:    1. Name of Medication: Insulin Glargine     2. Requested By (Name of Pharmacy): [facility]      3. Is insurance on file current? Yes     4. What is the name & phone number of the 3rd party payor? [phone] (home)

## 2023-02-27 DIAGNOSIS — E11.9 DIABETES MELLITUS TYPE 2 IN NONOBESE (HCC): ICD-10-CM

## 2023-02-27 RX ORDER — PEN NEEDLE, DIABETIC 32GX 5/32"
NEEDLE, DISPOSABLE MISCELLANEOUS
Qty: 100 EACH | Refills: 3 | Status: SHIPPED | OUTPATIENT
Start: 2023-02-27 | End: 2023-05-02 | Stop reason: SDUPTHER

## 2023-02-27 NOTE — TELEPHONE ENCOUNTER
Received request via: Patient    Was the patient seen in the last year in this department? Yes    Does the patient have an active prescription (recently filled or refills available) for medication(s) requested? No    Does the patient have FDC Plus and need 100 day supply (blood pressure, diabetes and cholesterol meds only)? Patient does not have SCP

## 2023-02-28 ENCOUNTER — PATIENT OUTREACH (OUTPATIENT)
Dept: HEALTH INFORMATION MANAGEMENT | Facility: OTHER | Age: 68
End: 2023-02-28
Payer: MEDICARE

## 2023-02-28 DIAGNOSIS — E11.9 DIABETES MELLITUS WITHOUT COMPLICATION (HCC): ICD-10-CM

## 2023-02-28 DIAGNOSIS — I51.7 LVH (LEFT VENTRICULAR HYPERTROPHY): ICD-10-CM

## 2023-02-28 DIAGNOSIS — I25.10 CVD (CARDIOVASCULAR DISEASE): ICD-10-CM

## 2023-02-28 PROCEDURE — 99490 CHRNC CARE MGMT STAFF 1ST 20: CPT | Performed by: FAMILY MEDICINE

## 2023-02-28 NOTE — PROGRESS NOTES
"Assessment  Spoke with Heidi for monthly outreach follow up. Heidi states she is doing okay. She states she was able to get onto her MyChart yesterday. Heidi stated she needed to schedule with imaging, contact number given. She states her blood sugars have been \"pretty good.\" Reports it was as low as 79 last week. States she has been checking her blood sugar more often and is learning to notice the physical symptoms of high and low blood sugars. Heidi denied any questions or concerns. Encouraged to call with any needs.     Education  Discussed orders for MRI, given number to imaging scheduling   Discussed MyChart    Care Plan  Continue to improve A1c below 7-continue to monitor blood sugar levels    Progress:  Progressing     Next outreach:  1 month     Chart reviewed for Quarterly Assessment, patient continues to qualify and benefit from [facility] Program.   "

## 2023-03-09 ENCOUNTER — TELEPHONE (OUTPATIENT)
Dept: HEALTH INFORMATION MANAGEMENT | Facility: OTHER | Age: 68
End: 2023-03-09
Payer: MEDICARE

## 2023-03-14 ENCOUNTER — HOSPITAL ENCOUNTER (OUTPATIENT)
Dept: RADIOLOGY | Facility: MEDICAL CENTER | Age: 68
End: 2023-03-14
Attending: FAMILY MEDICINE
Payer: MEDICARE

## 2023-03-14 DIAGNOSIS — R51.9 NONINTRACTABLE HEADACHE, UNSPECIFIED CHRONICITY PATTERN, UNSPECIFIED HEADACHE TYPE: ICD-10-CM

## 2023-03-14 PROCEDURE — 700117 HCHG RX CONTRAST REV CODE 255: Performed by: FAMILY MEDICINE

## 2023-03-14 PROCEDURE — A9579 GAD-BASE MR CONTRAST NOS,1ML: HCPCS | Performed by: FAMILY MEDICINE

## 2023-03-14 PROCEDURE — 70553 MRI BRAIN STEM W/O & W/DYE: CPT

## 2023-03-14 RX ADMIN — GADOTERIDOL 15 ML: 279.3 INJECTION, SOLUTION INTRAVENOUS at 08:27

## 2023-03-24 ENCOUNTER — APPOINTMENT (OUTPATIENT)
Dept: MEDICAL GROUP | Facility: IMAGING CENTER | Age: 68
End: 2023-03-24
Payer: MEDICARE

## 2023-03-30 ENCOUNTER — PATIENT OUTREACH (OUTPATIENT)
Dept: HEALTH INFORMATION MANAGEMENT | Facility: OTHER | Age: 68
End: 2023-03-30
Payer: MEDICARE

## 2023-03-30 DIAGNOSIS — I25.10 CVD (CARDIOVASCULAR DISEASE): ICD-10-CM

## 2023-03-30 DIAGNOSIS — G89.29 OTHER CHRONIC PAIN: ICD-10-CM

## 2023-03-30 DIAGNOSIS — E11.9 DIABETES MELLITUS WITHOUT COMPLICATION (HCC): ICD-10-CM

## 2023-03-30 PROCEDURE — 99999 PR NO CHARGE: CPT | Performed by: FAMILY MEDICINE

## 2023-03-30 RX ORDER — GABAPENTIN 100 MG/1
100-300 CAPSULE ORAL NIGHTLY PRN
Qty: 100 CAPSULE | Refills: 0 | Status: SHIPPED | OUTPATIENT
Start: 2023-03-30 | End: 2023-05-02 | Stop reason: SDUPTHER

## 2023-03-30 NOTE — TELEPHONE ENCOUNTER
Received request via: Patient    Was the patient seen in the last year in this department? Yes    Does the patient have an active prescription (recently filled or refills available) for medication(s) requested? No    Does the patient have USP Plus and need 100 day supply (blood pressure, diabetes and cholesterol meds only)? Medication is not for cholesterol, blood pressure or diabetes

## 2023-03-30 NOTE — PROGRESS NOTES
"Assessment: Spoke to patient for monthly outreach. Pt reports she has been \"down in the dumps\" the last couple of days. She reports she is frustrated about her last appointment with Dr. Thorne. She was not told that she should arrive 15 minutes prior to her appointment time, so the office told her she was late. Pt states she ended up leaving the office and not seeing Dr. Thorne that day. She is eager to know her MRI results. Pt also states she has been out of her gabapentin for a couple of weeks. She thinks being out of this medicine is contributing to her feeling down. She said the pharmacy would not fill it. Advised pt that it does not look like she has any refills on her current rx. Will send refill request to Dr. Thorne and call pt back to set up follow up.     Refill sent in by covering provider. Pt assisted to schedule follow up. Pt inquired about having prescriptions mailed to home. She will discuss with Dr. Thorne at next visit.     Education: medication management, follow up          Care Plan: continue plan of care          Progress: progressing          Next Outreach: 1 month   "

## 2023-04-06 ENCOUNTER — OFFICE VISIT (OUTPATIENT)
Dept: MEDICAL GROUP | Facility: IMAGING CENTER | Age: 68
End: 2023-04-06
Payer: MEDICARE

## 2023-04-06 ENCOUNTER — TELEPHONE (OUTPATIENT)
Dept: MEDICAL GROUP | Facility: IMAGING CENTER | Age: 68
End: 2023-04-06

## 2023-04-06 VITALS
HEIGHT: 67 IN | TEMPERATURE: 97.8 F | WEIGHT: 161 LBS | BODY MASS INDEX: 25.27 KG/M2 | DIASTOLIC BLOOD PRESSURE: 70 MMHG | RESPIRATION RATE: 18 BRPM | OXYGEN SATURATION: 95 % | HEART RATE: 99 BPM | SYSTOLIC BLOOD PRESSURE: 150 MMHG

## 2023-04-06 DIAGNOSIS — J44.9 CHRONIC OBSTRUCTIVE PULMONARY DISEASE, UNSPECIFIED COPD TYPE (HCC): ICD-10-CM

## 2023-04-06 DIAGNOSIS — R51.9 NONINTRACTABLE HEADACHE, UNSPECIFIED CHRONICITY PATTERN, UNSPECIFIED HEADACHE TYPE: ICD-10-CM

## 2023-04-06 DIAGNOSIS — R80.9 MICROALBUMINURIA: ICD-10-CM

## 2023-04-06 DIAGNOSIS — E55.9 VITAMIN D INSUFFICIENCY: ICD-10-CM

## 2023-04-06 DIAGNOSIS — F33.0 MAJOR DEPRESSIVE DISORDER, RECURRENT EPISODE, MILD (HCC): ICD-10-CM

## 2023-04-06 DIAGNOSIS — E78.2 MIXED HYPERLIPIDEMIA: ICD-10-CM

## 2023-04-06 DIAGNOSIS — G47.01 INSOMNIA DUE TO MEDICAL CONDITION: ICD-10-CM

## 2023-04-06 DIAGNOSIS — I70.0 ATHEROSCLEROSIS OF AORTA (HCC): ICD-10-CM

## 2023-04-06 DIAGNOSIS — M25.50 ARTHRALGIA, UNSPECIFIED JOINT: ICD-10-CM

## 2023-04-06 DIAGNOSIS — D50.0 IRON DEFICIENCY ANEMIA DUE TO CHRONIC BLOOD LOSS: ICD-10-CM

## 2023-04-06 DIAGNOSIS — G89.4 CHRONIC PAIN DISORDER: ICD-10-CM

## 2023-04-06 DIAGNOSIS — E11.8 DM (DIABETES MELLITUS), TYPE 2 WITH COMPLICATIONS (HCC): ICD-10-CM

## 2023-04-06 DIAGNOSIS — I10 PRIMARY HYPERTENSION: ICD-10-CM

## 2023-04-06 DIAGNOSIS — R03.0 ELEVATED BLOOD PRESSURE READING: ICD-10-CM

## 2023-04-06 DIAGNOSIS — R11.0 NAUSEA: ICD-10-CM

## 2023-04-06 DIAGNOSIS — M79.7 FIBROMYALGIA: ICD-10-CM

## 2023-04-06 DIAGNOSIS — F41.9 ANXIETY: ICD-10-CM

## 2023-04-06 DIAGNOSIS — Z78.9 DEFICIT IN ACTIVITIES OF DAILY LIVING (ADL): ICD-10-CM

## 2023-04-06 PROCEDURE — 99214 OFFICE O/P EST MOD 30 MIN: CPT | Performed by: FAMILY MEDICINE

## 2023-04-06 RX ORDER — TRAMADOL HYDROCHLORIDE 50 MG/1
50 TABLET ORAL EVERY 6 HOURS PRN
Qty: 30 TABLET | Refills: 0 | Status: SHIPPED | OUTPATIENT
Start: 2023-04-06 | End: 2023-06-16 | Stop reason: SDUPTHER

## 2023-04-06 ASSESSMENT — PAIN SCALES - GENERAL: PAINLEVEL: NO PAIN

## 2023-04-06 ASSESSMENT — FIBROSIS 4 INDEX: FIB4 SCORE: 0.89
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
## 2023-04-06 NOTE — TELEPHONE ENCOUNTER
Dr Thorne,  Pt called wanting to know about her Ct result. She says today visit she forgot to talk to you about it. She would like a call from you.

## 2023-04-07 RX ORDER — ONDANSETRON 4 MG/1
4 TABLET, FILM COATED ORAL EVERY 8 HOURS PRN
Qty: 20 TABLET | Refills: 1 | Status: SHIPPED | OUTPATIENT
Start: 2023-04-07 | End: 2023-05-02 | Stop reason: SDUPTHER

## 2023-04-08 NOTE — TELEPHONE ENCOUNTER
Spoke with pt about brain MRI results.   Will order new labs.   Please advise pt to fast prior to labs and let her know I have added further labs to re-evaluate for her pain symptoms.

## 2023-04-11 ENCOUNTER — TELEPHONE (OUTPATIENT)
Dept: HEALTH INFORMATION MANAGEMENT | Facility: OTHER | Age: 68
End: 2023-04-11
Payer: MEDICARE

## 2023-04-11 NOTE — TELEPHONE ENCOUNTER
1st attempt Care Gap Outreach- member stated that she will discuss her care gaps at her next PCP visit, she declined to take this call and has no further needs or questions

## 2023-04-13 ENCOUNTER — PATIENT OUTREACH (OUTPATIENT)
Dept: HEALTH INFORMATION MANAGEMENT | Facility: OTHER | Age: 68
End: 2023-04-13
Payer: MEDICARE

## 2023-04-13 DIAGNOSIS — E11.9 DIABETES MELLITUS WITHOUT COMPLICATION (HCC): ICD-10-CM

## 2023-04-13 DIAGNOSIS — I25.10 CVD (CARDIOVASCULAR DISEASE): ICD-10-CM

## 2023-04-13 PROCEDURE — 99490 CHRNC CARE MGMT STAFF 1ST 20: CPT | Performed by: FAMILY MEDICINE

## 2023-04-13 NOTE — PROGRESS NOTES
Attempted to call pt for monthly outreach for personal care management program. Left VM requesting call back.  PCP requesting check in on pain and mental health as well.

## 2023-04-24 ENCOUNTER — TELEPHONE (OUTPATIENT)
Dept: HEALTH INFORMATION MANAGEMENT | Facility: OTHER | Age: 68
End: 2023-04-24
Payer: MEDICARE

## 2023-04-25 NOTE — PROGRESS NOTES
Assessment: Spoke to patient for monthly outreach. Pt reports she is doing ok. Tramadol has helped with her pain. Rates pain 3/10 today, which she reports is a significant improvement. She is tolerating this medication well. Inquired about mental health. Pt reports she has some good days and bad days. States today is a bad day, but her caregiver is coming this afternoon and she is looking forward to that. No needs at this time. Reminded of follow up appointment and pt requested to reschedule due to transportation and she would also like to do the lab work prior to her follow up. Rescheduled for the next week.        Education: pain management          Care Plan: continue plan of care          Progress: progressing           Next Outreach: 1 month

## 2023-04-28 ENCOUNTER — HOSPITAL ENCOUNTER (OUTPATIENT)
Dept: LAB | Facility: MEDICAL CENTER | Age: 68
End: 2023-04-28
Attending: FAMILY MEDICINE
Payer: MEDICARE

## 2023-04-28 DIAGNOSIS — D50.0 IRON DEFICIENCY ANEMIA DUE TO CHRONIC BLOOD LOSS: ICD-10-CM

## 2023-04-28 DIAGNOSIS — E78.2 MIXED HYPERLIPIDEMIA: ICD-10-CM

## 2023-04-28 DIAGNOSIS — R52 PAIN: ICD-10-CM

## 2023-04-28 DIAGNOSIS — E11.9 DIABETES MELLITUS TYPE 2 IN NONOBESE (HCC): ICD-10-CM

## 2023-04-28 LAB
ALBUMIN SERPL BCP-MCNC: 3.9 G/DL (ref 3.2–4.9)
ALBUMIN/GLOB SERPL: 1 G/DL
ALP SERPL-CCNC: 128 U/L (ref 30–99)
ALT SERPL-CCNC: 7 U/L (ref 2–50)
ANION GAP SERPL CALC-SCNC: 16 MMOL/L (ref 7–16)
AST SERPL-CCNC: 11 U/L (ref 12–45)
BILIRUB SERPL-MCNC: 0.2 MG/DL (ref 0.1–1.5)
BUN SERPL-MCNC: 11 MG/DL (ref 8–22)
CALCIUM ALBUM COR SERPL-MCNC: 9.3 MG/DL (ref 8.5–10.5)
CALCIUM SERPL-MCNC: 9.2 MG/DL (ref 8.5–10.5)
CHLORIDE SERPL-SCNC: 99 MMOL/L (ref 96–112)
CHOLEST SERPL-MCNC: 121 MG/DL (ref 100–199)
CK SERPL-CCNC: 33 U/L (ref 0–154)
CO2 SERPL-SCNC: 27 MMOL/L (ref 20–33)
CREAT SERPL-MCNC: 0.63 MG/DL (ref 0.5–1.4)
CRP SERPL HS-MCNC: 5.68 MG/DL (ref 0–0.75)
ERYTHROCYTE [DISTWIDTH] IN BLOOD BY AUTOMATED COUNT: 50 FL (ref 35.9–50)
ERYTHROCYTE [SEDIMENTATION RATE] IN BLOOD BY WESTERGREN METHOD: 13 MM/HOUR (ref 0–25)
EST. AVERAGE GLUCOSE BLD GHB EST-MCNC: 194 MG/DL
FASTING STATUS PATIENT QL REPORTED: NORMAL
GFR SERPLBLD CREATININE-BSD FMLA CKD-EPI: 97 ML/MIN/1.73 M 2
GLOBULIN SER CALC-MCNC: 4 G/DL (ref 1.9–3.5)
GLUCOSE SERPL-MCNC: 130 MG/DL (ref 65–99)
HBA1C MFR BLD: 8.4 % (ref 4–5.6)
HCT VFR BLD AUTO: 41.3 % (ref 37–47)
HDLC SERPL-MCNC: 45 MG/DL
HGB BLD-MCNC: 12.4 G/DL (ref 12–16)
LDLC SERPL CALC-MCNC: 63 MG/DL
MCH RBC QN AUTO: 23 PG (ref 27–33)
MCHC RBC AUTO-ENTMCNC: 30 G/DL (ref 33.6–35)
MCV RBC AUTO: 76.5 FL (ref 81.4–97.8)
PLATELET # BLD AUTO: 534 K/UL (ref 164–446)
PMV BLD AUTO: 9.5 FL (ref 9–12.9)
POTASSIUM SERPL-SCNC: 3.5 MMOL/L (ref 3.6–5.5)
PROT SERPL-MCNC: 7.9 G/DL (ref 6–8.2)
RBC # BLD AUTO: 5.4 M/UL (ref 4.2–5.4)
RHEUMATOID FACT SER IA-ACNC: 19 IU/ML (ref 0–14)
SODIUM SERPL-SCNC: 142 MMOL/L (ref 135–145)
TRIGL SERPL-MCNC: 66 MG/DL (ref 0–149)
WBC # BLD AUTO: 11.2 K/UL (ref 4.8–10.8)

## 2023-04-28 PROCEDURE — 86140 C-REACTIVE PROTEIN: CPT

## 2023-04-28 PROCEDURE — 82043 UR ALBUMIN QUANTITATIVE: CPT

## 2023-04-28 PROCEDURE — 86038 ANTINUCLEAR ANTIBODIES: CPT

## 2023-04-28 PROCEDURE — 85027 COMPLETE CBC AUTOMATED: CPT

## 2023-04-28 PROCEDURE — 80061 LIPID PANEL: CPT

## 2023-04-28 PROCEDURE — 36415 COLL VENOUS BLD VENIPUNCTURE: CPT

## 2023-04-28 PROCEDURE — 82570 ASSAY OF URINE CREATININE: CPT

## 2023-04-28 PROCEDURE — 86200 CCP ANTIBODY: CPT

## 2023-04-28 PROCEDURE — 80053 COMPREHEN METABOLIC PANEL: CPT

## 2023-04-28 PROCEDURE — 83036 HEMOGLOBIN GLYCOSYLATED A1C: CPT

## 2023-04-28 PROCEDURE — 86431 RHEUMATOID FACTOR QUANT: CPT

## 2023-04-28 PROCEDURE — 82550 ASSAY OF CK (CPK): CPT

## 2023-04-28 PROCEDURE — 85652 RBC SED RATE AUTOMATED: CPT

## 2023-04-29 LAB
CREAT UR-MCNC: 142.3 MG/DL
MICROALBUMIN UR-MCNC: 66.6 MG/DL
MICROALBUMIN/CREAT UR: 468 MG/G (ref 0–30)

## 2023-04-30 LAB
CCP IGG SERPL-ACNC: 2 UNITS (ref 0–19)
NUCLEAR IGG SER QL IA: NORMAL

## 2023-05-02 ENCOUNTER — PATIENT OUTREACH (OUTPATIENT)
Dept: HEALTH INFORMATION MANAGEMENT | Facility: OTHER | Age: 68
End: 2023-05-02
Payer: MEDICARE

## 2023-05-02 DIAGNOSIS — G89.4 CHRONIC PAIN DISORDER: ICD-10-CM

## 2023-05-02 DIAGNOSIS — E11.9 DIABETES MELLITUS WITHOUT COMPLICATION (HCC): ICD-10-CM

## 2023-05-02 DIAGNOSIS — R19.8 GI PROBLEM: ICD-10-CM

## 2023-05-02 DIAGNOSIS — I25.10 CVD (CARDIOVASCULAR DISEASE): ICD-10-CM

## 2023-05-02 DIAGNOSIS — I51.7 LVH (LEFT VENTRICULAR HYPERTROPHY): ICD-10-CM

## 2023-05-02 DIAGNOSIS — G89.29 OTHER CHRONIC PAIN: ICD-10-CM

## 2023-05-02 DIAGNOSIS — E11.69 TYPE 2 DIABETES MELLITUS WITH OTHER SPECIFIED COMPLICATION, WITHOUT LONG-TERM CURRENT USE OF INSULIN (HCC): ICD-10-CM

## 2023-05-02 DIAGNOSIS — R80.9 MICROALBUMINURIA: ICD-10-CM

## 2023-05-02 DIAGNOSIS — R11.0 NAUSEA: ICD-10-CM

## 2023-05-02 DIAGNOSIS — R07.9 CHEST PAIN, UNSPECIFIED TYPE: ICD-10-CM

## 2023-05-02 DIAGNOSIS — G47.01 INSOMNIA DUE TO MEDICAL CONDITION: ICD-10-CM

## 2023-05-02 DIAGNOSIS — E11.9 DIABETES MELLITUS TYPE 2 IN NONOBESE (HCC): ICD-10-CM

## 2023-05-02 PROCEDURE — 99999 PR NO CHARGE: CPT | Performed by: FAMILY MEDICINE

## 2023-05-02 NOTE — PROGRESS NOTES
Assessment: pt called to request assistance with changing pcp appointment due to a conflict with her ride. Completed monthly outreach. Pt states she is having a good day today and is feeling better. She was able to get her lab work done for Dr. Thorne last week. Pt is requesting assistance with getting her prescriptions transferred to the [facility] so she can have them delivered to her home. No other needs at this time.        Education: medication management          Care Plan: continue plan of care          Progress: progressing          Next Outreach: 1 month     Quarterly chart review completed. Pt continues to qualify for and benefit from PCM program. Will continue to monitor.

## 2023-05-08 RX ORDER — TRAMADOL HYDROCHLORIDE 50 MG/1
50 TABLET ORAL EVERY 6 HOURS PRN
Qty: 30 TABLET | Refills: 0 | OUTPATIENT
Start: 2023-05-08 | End: 2023-05-22

## 2023-05-08 RX ORDER — GABAPENTIN 100 MG/1
100-300 CAPSULE ORAL NIGHTLY PRN
Qty: 100 CAPSULE | Refills: 0 | OUTPATIENT
Start: 2023-05-08 | End: 2023-06-15 | Stop reason: SDUPTHER

## 2023-05-08 RX ORDER — PEN NEEDLE, DIABETIC 32GX 5/32"
NEEDLE, DISPOSABLE MISCELLANEOUS
Qty: 100 EACH | Refills: 3 | OUTPATIENT
Start: 2023-05-08 | End: 2023-06-15 | Stop reason: SDUPTHER

## 2023-05-08 RX ORDER — TRAZODONE HYDROCHLORIDE 50 MG/1
150 TABLET ORAL NIGHTLY
Qty: 90 TABLET | Refills: 0 | Status: SHIPPED | OUTPATIENT
Start: 2023-05-08 | End: 2023-05-10

## 2023-05-08 RX ORDER — INSULIN GLARGINE-YFGN 100 [IU]/ML
INJECTION, SOLUTION SUBCUTANEOUS
Qty: 3 ML | Refills: 3 | OUTPATIENT
Start: 2023-05-08 | End: 2023-06-15 | Stop reason: SDUPTHER

## 2023-05-08 RX ORDER — ROSUVASTATIN CALCIUM 10 MG/1
10 TABLET, COATED ORAL EVERY EVENING
Qty: 100 TABLET | Refills: 3 | OUTPATIENT
Start: 2023-05-08 | End: 2023-06-15 | Stop reason: SDUPTHER

## 2023-05-08 RX ORDER — OMEPRAZOLE 20 MG/1
20 CAPSULE, DELAYED RELEASE ORAL 2 TIMES DAILY
Qty: 180 CAPSULE | Refills: 3 | OUTPATIENT
Start: 2023-05-08 | End: 2023-06-15 | Stop reason: SDUPTHER

## 2023-05-08 RX ORDER — LOSARTAN POTASSIUM 25 MG/1
25 TABLET ORAL DAILY
Qty: 100 TABLET | Refills: 3 | OUTPATIENT
Start: 2023-05-08 | End: 2023-06-15 | Stop reason: SDUPTHER

## 2023-05-08 RX ORDER — FERROUS SULFATE 325(65) MG
325 TABLET ORAL DAILY
Qty: 90 TABLET | Refills: 0 | OUTPATIENT
Start: 2023-05-08 | End: 2023-06-15 | Stop reason: SDUPTHER

## 2023-05-08 RX ORDER — ONDANSETRON 4 MG/1
4 TABLET, FILM COATED ORAL EVERY 8 HOURS PRN
Qty: 20 TABLET | Refills: 1 | Status: SHIPPED | OUTPATIENT
Start: 2023-05-08 | End: 2023-06-16 | Stop reason: SDUPTHER

## 2023-05-08 RX ORDER — SERTRALINE HYDROCHLORIDE 25 MG/1
25 TABLET, FILM COATED ORAL DAILY
Qty: 100 TABLET | Refills: 3 | OUTPATIENT
Start: 2023-05-08 | End: 2023-06-15 | Stop reason: SDUPTHER

## 2023-05-10 ENCOUNTER — OFFICE VISIT (OUTPATIENT)
Dept: MEDICAL GROUP | Facility: IMAGING CENTER | Age: 68
End: 2023-05-10
Payer: MEDICARE

## 2023-05-10 VITALS
SYSTOLIC BLOOD PRESSURE: 142 MMHG | BODY MASS INDEX: 25.71 KG/M2 | HEART RATE: 90 BPM | OXYGEN SATURATION: 95 % | DIASTOLIC BLOOD PRESSURE: 60 MMHG | WEIGHT: 163.8 LBS | RESPIRATION RATE: 19 BRPM | TEMPERATURE: 97.8 F | HEIGHT: 67 IN

## 2023-05-10 DIAGNOSIS — D72.829 LEUKOCYTOSIS, UNSPECIFIED TYPE: ICD-10-CM

## 2023-05-10 DIAGNOSIS — R80.9 MICROALBUMINURIA: ICD-10-CM

## 2023-05-10 DIAGNOSIS — G89.29 OTHER CHRONIC PAIN: ICD-10-CM

## 2023-05-10 DIAGNOSIS — R89.9 ABNORMAL LABORATORY TEST RESULT: ICD-10-CM

## 2023-05-10 DIAGNOSIS — D50.0 IRON DEFICIENCY ANEMIA DUE TO CHRONIC BLOOD LOSS: ICD-10-CM

## 2023-05-10 DIAGNOSIS — R74.8 ALKALINE PHOSPHATASE ELEVATION: ICD-10-CM

## 2023-05-10 DIAGNOSIS — R11.0 NAUSEA: ICD-10-CM

## 2023-05-10 DIAGNOSIS — R76.8 ELEVATED RHEUMATOID FACTOR: ICD-10-CM

## 2023-05-10 DIAGNOSIS — I10 PRIMARY HYPERTENSION: ICD-10-CM

## 2023-05-10 DIAGNOSIS — E11.9 DIABETES MELLITUS TYPE 2 IN NONOBESE (HCC): ICD-10-CM

## 2023-05-10 DIAGNOSIS — D75.838 REACTIVE THROMBOCYTOSIS: ICD-10-CM

## 2023-05-10 DIAGNOSIS — J30.89 ENVIRONMENTAL AND SEASONAL ALLERGIES: ICD-10-CM

## 2023-05-10 DIAGNOSIS — R77.1 ELEVATED SERUM GLOBULIN LEVEL: ICD-10-CM

## 2023-05-10 DIAGNOSIS — R79.82 CRP ELEVATED: ICD-10-CM

## 2023-05-10 DIAGNOSIS — R19.8 GI PROBLEM: ICD-10-CM

## 2023-05-10 DIAGNOSIS — F41.9 ANXIETY: ICD-10-CM

## 2023-05-10 DIAGNOSIS — G47.01 INSOMNIA DUE TO MEDICAL CONDITION: ICD-10-CM

## 2023-05-10 PROCEDURE — 3078F DIAST BP <80 MM HG: CPT | Performed by: FAMILY MEDICINE

## 2023-05-10 PROCEDURE — 3077F SYST BP >= 140 MM HG: CPT | Performed by: FAMILY MEDICINE

## 2023-05-10 PROCEDURE — 99214 OFFICE O/P EST MOD 30 MIN: CPT | Performed by: FAMILY MEDICINE

## 2023-05-10 RX ORDER — OMEPRAZOLE 20 MG/1
20 CAPSULE, DELAYED RELEASE ORAL 2 TIMES DAILY
Qty: 180 CAPSULE | Refills: 3 | Status: CANCELLED | OUTPATIENT
Start: 2023-05-10

## 2023-05-10 RX ORDER — SERTRALINE HYDROCHLORIDE 25 MG/1
25 TABLET, FILM COATED ORAL DAILY
Qty: 100 TABLET | Refills: 3 | Status: CANCELLED | OUTPATIENT
Start: 2023-05-10

## 2023-05-10 RX ORDER — GABAPENTIN 100 MG/1
100-300 CAPSULE ORAL NIGHTLY PRN
Qty: 100 CAPSULE | Refills: 0 | Status: CANCELLED | OUTPATIENT
Start: 2023-05-10

## 2023-05-10 RX ORDER — ONDANSETRON 4 MG/1
4 TABLET, FILM COATED ORAL EVERY 8 HOURS PRN
Qty: 20 TABLET | Refills: 1 | Status: CANCELLED | OUTPATIENT
Start: 2023-05-10 | End: 2023-06-09

## 2023-05-10 RX ORDER — TRAZODONE HYDROCHLORIDE 50 MG/1
150 TABLET ORAL NIGHTLY
Qty: 90 TABLET | Refills: 0 | Status: CANCELLED | OUTPATIENT
Start: 2023-05-10

## 2023-05-10 RX ORDER — ROSUVASTATIN CALCIUM 10 MG/1
10 TABLET, COATED ORAL EVERY EVENING
Qty: 100 TABLET | Refills: 3 | Status: CANCELLED | OUTPATIENT
Start: 2023-05-10

## 2023-05-10 RX ORDER — INSULIN GLARGINE-YFGN 100 [IU]/ML
INJECTION, SOLUTION SUBCUTANEOUS
Qty: 3 ML | Refills: 3 | Status: CANCELLED | OUTPATIENT
Start: 2023-05-10

## 2023-05-10 RX ORDER — FERROUS SULFATE 325(65) MG
325 TABLET ORAL DAILY
Qty: 90 TABLET | Refills: 0 | Status: CANCELLED | OUTPATIENT
Start: 2023-05-10

## 2023-05-10 ASSESSMENT — PATIENT HEALTH QUESTIONNAIRE - PHQ9
8. MOVING OR SPEAKING SO SLOWLY THAT OTHER PEOPLE COULD HAVE NOTICED. OR THE OPPOSITE, BEING SO FIGETY OR RESTLESS THAT YOU HAVE BEEN MOVING AROUND A LOT MORE THAN USUAL: NOT AT ALL
2. FEELING DOWN, DEPRESSED, IRRITABLE, OR HOPELESS: NOT AT ALL
1. LITTLE INTEREST OR PLEASURE IN DOING THINGS: NOT AT ALL
5. POOR APPETITE OR OVEREATING: NOT AT ALL
SUM OF ALL RESPONSES TO PHQ9 QUESTIONS 1 AND 2: 0
9. THOUGHTS THAT YOU WOULD BE BETTER OFF DEAD, OR OF HURTING YOURSELF: NOT AT ALL
3. TROUBLE FALLING OR STAYING ASLEEP OR SLEEPING TOO MUCH: NOT AT ALL
SUM OF ALL RESPONSES TO PHQ QUESTIONS 1-9: 0
6. FEELING BAD ABOUT YOURSELF - OR THAT YOU ARE A FAILURE OR HAVE LET YOURSELF OR YOUR FAMILY DOWN: NOT AL ALL
7. TROUBLE CONCENTRATING ON THINGS, SUCH AS READING THE NEWSPAPER OR WATCHING TELEVISION: NOT AT ALL
4. FEELING TIRED OR HAVING LITTLE ENERGY: NOT AT ALL

## 2023-05-10 ASSESSMENT — PAIN SCALES - GENERAL: PAINLEVEL: NO PAIN

## 2023-05-10 ASSESSMENT — FIBROSIS 4 INDEX: FIB4 SCORE: 0.53

## 2023-05-10 NOTE — PROGRESS NOTES
SUBJECTIVE:    Chief Complaint   Patient presents with    Lab Results     Mixed hyperlipidemia        HPI:     Heidi Navas is a 68 y.o. female with chronic pain, OA, fibromyalgia, DM type 2, anxiety, COPD, hypertension, hyperlipidemia and prior anemia due to history of GI bleed here for follow up of labs.     Anxiety - more so past few days due to her drunk roommate.   Otherwise stable on zoloft 25 mg daily.   Nausea- anxiety related. Has taken xanax in past    H/h improved. Rectal prolaspe better lately.   Has bleeding from area, when she has more issues with rectal prolapse. Has not get followed up with GI.     Chronic pain- multiple area. She still has her medication left, tramadol. Tomatoes and weather trigger pain. Preservative in foods.   Hx of multiple surgeries.   Elevated RF and CRP. ESR normal.     DM type 2- semglee now.  200 glucose. 10 am, noon and at night-thus 30 units daily.   Since February.   A1c elevated 8.4%.     Microalbuminuria.   Has not been taking losartan.   Does not feel needs trazodone for insomnia-causes dry mouth.   BP elevated due to anxiety.   Chocolate liquor.   Hx of pancreatitis in past.   Generally does not drink alcohol.   Hot flash at night.   Allergies- lately.       ROS:  No recent fevers or chills. No nausea or vomiting. No diarrhea. No chest pains or shortness of breath. No lower extremity edema.    Current Outpatient Medications on File Prior to Visit   Medication Sig Dispense Refill    traZODone (DESYREL) 50 MG Tab Take 3 Tablets by mouth every evening. 90 Tablet 0    ondansetron (ZOFRAN) 4 MG Tab tablet Take 1 Tablet by mouth every 8 hours as needed for Nausea/Vomiting for up to 30 days. 20 Tablet 1    gabapentin (NEURONTIN) 100 MG Cap Take 1-3 Capsules by mouth at bedtime as needed (pain). 100 Capsule 0    Insulin Pen Needle 32 G x 4 mm (BD PEN NEEDLE BRITANY 2ND GEN) USE ONE PEN NEEDLE IN PEN DEVICE TO INJECT INSULIN THREE TIMES DAILY. 100 Each 3    Insulin  Glargine-yfgn (SEMGLEE, YFGN,) 100 UNIT/ML Solution Pen-injector Inject 15 units in morning and 10 units in evening. 3 mL 3    omeprazole (PRILOSEC) 20 MG delayed-release capsule Take 1 Capsule by mouth 2 times a day. 180 Capsule 3    ferrous sulfate 325 (65 Fe) MG tablet Take 1 Tablet by mouth every day. 90 Tablet 0    sertraline (ZOLOFT) 25 MG tablet Take 1 Tablet by mouth every day. 100 Tablet 3    losartan (COZAAR) 25 MG Tab Take 1 Tablet by mouth every day. 100 Tablet 3    rosuvastatin (CRESTOR) 10 MG Tab Take 1 Tablet by mouth every evening. 100 Tablet 3    glucose blood strip 1 Strip by Other route in the morning, at noon, and at bedtime. Use one strip to test blood sugar three times daily before meals. 300 Strip 3    insulin glargine (LANTUS SOLOSTAR) 100 UNIT/ML Solution Pen-injector injection INJECT 15 UNITS IN THE MORNING AND 10 UNITS IN THE AFTERNOON 3 Each 3    Blood Glucose Monitoring Suppl (BLOOD GLUCOSE MONITOR SYSTEM) w/Device Kit Test blood sugar as recommended by provider. Slick Contour Next blood glucose monitoring kit. 1 Kit 0    Microlet Lancets Misc Use one lancet to test blood sugar three times daily before meals. 100 Each 0    Alcohol Swabs (ALCOHOL PREP) 70 % Pads Wipe site with prep pad prior to injection 100 Each 0     No current facility-administered medications on file prior to visit.       Allergies   Allergen Reactions    Aspartame Nausea     headache    Atorvastatin Calcium Unspecified and Swelling    Latex Rash and Itching    Lipitor [Atorvastatin Calcium] Swelling     Patient denies allergy 06/04/22    Other Misc Vomiting     Bug spray    Saccharin Nausea     Nausea and headache       Patient Active Problem List    Diagnosis Date Noted    Major depressive disorder, recurrent episode, mild (HCC) 12/27/2022    Reactive thrombocytosis 10/14/2022    Rectal prolapse 10/14/2022    Insomnia due to medical condition 07/11/2022    GIB (gastrointestinal bleeding) 06/04/2022    Acute on  chronic blood loss anemia 06/04/2022    DM (diabetes mellitus), secondary uncontrolled 06/04/2022    Severely underweight adult 06/04/2022    ACP (advance care planning) 06/04/2022    Hyponatremia 06/04/2022    Hyperosmolar hyperglycemic state (HHS), pressure ulcer, anemia (HCC) 06/04/2022    Dehydration 06/04/2022    Pressure ulcer, sacrum 06/04/2022    Fatigue 06/03/2022    Weight loss, unintentional 06/03/2022    Urinary incontinence 06/03/2022    Adult failure to thrive 06/03/2022    Iron deficiency anemia due to chronic blood loss 02/09/2020    Cellulitis 01/08/2020    Anemia 05/19/2019    Atherosclerosis 05/19/2019    LVH (left ventricular hypertrophy) 05/19/2019    Arthritis of left shoulder region 07/12/2017    CVD (cardiovascular disease) 12/04/2015    Pulmonary nodule 12/01/2015    Renal stone 12/01/2015    Diverticulosis 12/01/2015    Chest pain 07/16/2015    Nausea & vomiting 07/13/2015    Opiate withdrawal ([facility]) 07/13/2015    Diabetes mellitus type 2 in nonobese ([facility]) 10/03/2014    Brachial neuritis or radiculitis 09/10/2014    History of total hip arthroplasty 07/22/2014    Leukocytosis 03/30/2014    COPD (chronic obstructive pulmonary disease) ([facility]) 03/30/2014    Arthritis 12/26/2013    Chronic pain 02/21/2012    Anxiety 01/18/2012    Vitamin D insufficiency 04/28/2011    Hypertension 03/22/2010    Fibromyalgia 10/09/2009    Swelling, mass, or lump in head and neck 07/21/2009    Tobacco use disorder 07/21/2009    Chronic ischemic heart disease 07/21/2009    Esophageal reflux 07/21/2009    Allergic rhinitis 07/21/2009    Mixed hyperlipidemia 07/21/2009    Other atopic dermatitis and related conditions 07/21/2009    Deficiency anemia 07/21/2009       Past Medical History:   Diagnosis Date    Anemia     Arthritis     OA and  not RA per rheumatology    Breath shortness     oxygen PRN     Bronchitis 2008    Colon polyps 2015    Convulsions ([facility]) 7/21/2009     ICD-10 transition    COPD (chronic obstructive  "pulmonary disease) ([facility])     Dental disorder     full dentures    Depression     depression, anxiety     Diverticulosis 5/10     colonoscopy    Hemorrhoid 7/29/2009    History of colonoscopy   2005    Leukocytosis 3/30/2014    Lymphocytosis (symptomatic) 7/21/2009    MI (myocardial infarction) (HCC) 4/29/2007    Other specified disorder of intestines     constipation    Pain     shoulders, neck, lower back    Pancreatitis     Pap smear 4/2006    Pneumonia 2013    Pulmonary nodule     Renal lesion     Renal stone     S/P colonoscopy 5/2010    will need repeat in 5 years    Screening mammogram never    Seizure (HCC)     x1 in 2008    Shingles     Symptomatic menopausal or female climacteric states 7/21/2009    Type II or unspecified type diabetes mellitus without mention of complication, uncontrolled 7/21/2009    diet controlled     Unspecified urinary incontinence          OBJECTIVE:   BP (!) 142/60 (BP Location: Left arm, Patient Position: Sitting, BP Cuff Size: Adult)   Pulse 90   Temp 36.6 °C (97.8 °F) (Temporal)   Resp 19   Ht 1.702 m (5' 7\")   Wt 74.3 kg (163 lb 12.8 oz)   LMP 01/01/2007   SpO2 95%   BMI 25.65 kg/m²   General: Well-developed well-nourished female, no acute distress  Neck: supple, no lymphadenopathy- cervical or supraclavicular, no thyromegaly  Cardiovascular: regular rate and rhythm, no murmurs, gallops, rubs  Lungs: clear to auscultation bilaterally, no wheezes, crackles, or rhonchi  Abdomen: +bowel sounds, soft, nontender, nondistended, no rebound, no guarding, no hepatosplenomegaly  Extremities: no cyanosis, clubbing, edema  Skin: Warm and dry  Psych: appropriate mood and affect     Latest Reference Range & Units 04/28/23 11:53   WBC 4.8 - 10.8 K/uL 11.2 (H)   RBC 4.20 - 5.40 M/uL 5.40   Hemoglobin 12.0 - 16.0 g/dL 12.4   Hematocrit 37.0 - 47.0 % 41.3   MCV 81.4 - 97.8 fL 76.5 (L)   MCH 27.0 - 33.0 pg 23.0 (L)   MCHC 33.6 - 35.0 g/dL 30.0 (L)   RDW 35.9 - 50.0 fL 50.0   Platelet Count " 164 - 446 K/uL 534 (H)   MPV 9.0 - 12.9 fL 9.5   Sed Rate Westergren 0 - 25 mm/hour 13   Sodium 135 - 145 mmol/L 142   Potassium 3.6 - 5.5 mmol/L 3.5 (L)   Chloride 96 - 112 mmol/L 99   Co2 20 - 33 mmol/L 27   Anion Gap 7.0 - 16.0  16.0   Glucose 65 - 99 mg/dL 130 (H)   Bun 8 - 22 mg/dL 11   Creatinine 0.50 - 1.40 mg/dL 0.63   GFR (CKD-EPI) >60 mL/min/1.73 m 2 97   Calcium 8.5 - 10.5 mg/dL 9.2   Correct Calcium 8.5 - 10.5 mg/dL 9.3   AST(SGOT) 12 - 45 U/L 11 (L)   ALT(SGPT) 2 - 50 U/L 7   Alkaline Phosphatase 30 - 99 U/L 128 (H)   Total Bilirubin 0.1 - 1.5 mg/dL 0.2   Albumin 3.2 - 4.9 g/dL 3.9   Total Protein 6.0 - 8.2 g/dL 7.9   Globulin 1.9 - 3.5 g/dL 4.0 (H)   A-G Ratio g/dL 1.0   CPK Total 0 - 154 U/L 33   Glycohemoglobin 4.0 - 5.6 % 8.4 (H)   Estim. Avg Glu mg/dL 194   Fasting Status  Fasting   Cholesterol,Tot 100 - 199 mg/dL 121   Triglycerides 0 - 149 mg/dL 66   HDL >=40 mg/dL 45   LDL <100 mg/dL 63   Micro Alb Creat Ratio 0 - 30 mg/g 468 (H)   Creatinine, Urine mg/dL 142.30   Microalbumin, Urine Random mg/dL 66.6   Rheumatoid Factor -Neph- 0 - 14 IU/mL 19 (H)   Stat C-Reactive Protein 0.00 - 0.75 mg/dL 5.68 (H)   Ccp Antibodies 0 - 19 Units 2   Antinuclear Antibody None Detected  None Detected   (H): Data is abnormally high  (L): Data is abnormally low    ASSESSMENT/PLAN:    68 y.o.female       1. Anxiety - more so with current living situation. Otherwise has been stable.   Continue on zoloft 25 mg daily. Monitor.        2. Iron deficiency anemia due to chronic blood loss- appear associated with rectal prolapse. Stable.   Monitor. Continue iron therapy. Recommend follow up with GI.   CBC      3. Other chronic pain - multifactorial, multiple joint issues, hx of multiple surgeries. Has seen pain management in past.   Has tramadol to take as needed. Reviewed precautions and potential side effects of medication therapy. Monitor.  Referral to Rheumatology      4. Elevated rheumatoid factor  Referral to  Rheumatology      5. CRP elevated  Referral to Rheumatology      6. Diabetes mellitus type 2 in nonobese (HCC) - stable, continue current medication. Monitor. Diabetic diet and routine exercise as tolerated.  Comp Metabolic Panel      7. Microalbuminuria -stable, has not been on losartan. Recommend take medication. Monitor.        8. GI problem -nausea associated with anxiety. Zofran as needed. Follow up with GI.        9. Nausea - as above.        10. Insomnia due to medical condition - stable, off trazodone at this time. Monitor.        11. Primary hypertension - stable.  Losartan 25 mg daily. Monitor.        12. Abnormal laboratory test result   Recommend adequate po intake and potassium intake. Monitor.  Comp Metabolic Panel      13. Alkaline phosphatase elevation - monitor. Comp Metabolic Panel      14. Elevated serum globulin level -monitor.  Comp Metabolic Panel      15. Leukocytosis, unspecified type - monitor.  CBC WITH DIFFERENTIAL      16. Environmental and seasonal allergies   OTC medication as needed. Monitor.     17.     Reactive thrombocytosis- monitor.        Return in about 1 month (around 6/10/2023).    This medical record contains text that has been entered with the assistance of computer voice recognition and dictation software.  Therefore, it may contain unintended errors in text, spelling, punctuation, or grammar.

## 2023-06-15 ENCOUNTER — PATIENT OUTREACH (OUTPATIENT)
Dept: HEALTH INFORMATION MANAGEMENT | Facility: OTHER | Age: 68
End: 2023-06-15
Payer: MEDICARE

## 2023-06-15 DIAGNOSIS — G47.01 INSOMNIA DUE TO MEDICAL CONDITION: ICD-10-CM

## 2023-06-15 DIAGNOSIS — E11.9 DIABETES MELLITUS TYPE 2 IN NONOBESE (HCC): ICD-10-CM

## 2023-06-15 DIAGNOSIS — E11.69 TYPE 2 DIABETES MELLITUS WITH OTHER SPECIFIED COMPLICATION, WITHOUT LONG-TERM CURRENT USE OF INSULIN (HCC): ICD-10-CM

## 2023-06-15 DIAGNOSIS — R19.8 GI PROBLEM: ICD-10-CM

## 2023-06-15 DIAGNOSIS — R80.9 MICROALBUMINURIA: ICD-10-CM

## 2023-06-15 DIAGNOSIS — G89.4 CHRONIC PAIN DISORDER: ICD-10-CM

## 2023-06-15 DIAGNOSIS — I51.7 LVH (LEFT VENTRICULAR HYPERTROPHY): ICD-10-CM

## 2023-06-15 DIAGNOSIS — R07.9 CHEST PAIN, UNSPECIFIED TYPE: ICD-10-CM

## 2023-06-15 DIAGNOSIS — G89.29 OTHER CHRONIC PAIN: ICD-10-CM

## 2023-06-15 DIAGNOSIS — R11.0 NAUSEA: ICD-10-CM

## 2023-06-15 PROCEDURE — 99490 CHRNC CARE MGMT STAFF 1ST 20: CPT | Performed by: FAMILY MEDICINE

## 2023-06-15 RX ORDER — INSULIN GLARGINE-YFGN 100 [IU]/ML
INJECTION, SOLUTION SUBCUTANEOUS
Qty: 3 ML | Refills: 3 | Status: SHIPPED | OUTPATIENT
Start: 2023-06-15 | End: 2023-06-30 | Stop reason: SDUPTHER

## 2023-06-15 RX ORDER — GABAPENTIN 100 MG/1
100-300 CAPSULE ORAL NIGHTLY PRN
Qty: 100 CAPSULE | Refills: 0 | Status: SHIPPED | OUTPATIENT
Start: 2023-06-15 | End: 2023-08-30 | Stop reason: SDUPTHER

## 2023-06-15 RX ORDER — TRAMADOL HYDROCHLORIDE 50 MG/1
50 TABLET ORAL EVERY 6 HOURS PRN
Qty: 30 TABLET | Refills: 0 | Status: CANCELLED | OUTPATIENT
Start: 2023-06-15 | End: 2023-06-29

## 2023-06-15 RX ORDER — SERTRALINE HYDROCHLORIDE 25 MG/1
25 TABLET, FILM COATED ORAL DAILY
Qty: 100 TABLET | Refills: 3 | Status: SHIPPED | OUTPATIENT
Start: 2023-06-15 | End: 2024-02-08

## 2023-06-15 RX ORDER — TRAZODONE HYDROCHLORIDE 50 MG/1
150 TABLET ORAL NIGHTLY
Qty: 90 TABLET | Refills: 0 | Status: CANCELLED | OUTPATIENT
Start: 2023-06-15

## 2023-06-15 RX ORDER — PEN NEEDLE, DIABETIC 32GX 5/32"
NEEDLE, DISPOSABLE MISCELLANEOUS
Qty: 100 EACH | Refills: 3 | Status: SHIPPED | OUTPATIENT
Start: 2023-06-15 | End: 2024-02-02 | Stop reason: SDUPTHER

## 2023-06-15 RX ORDER — LOSARTAN POTASSIUM 25 MG/1
25 TABLET ORAL DAILY
Qty: 100 TABLET | Refills: 3 | OUTPATIENT
Start: 2023-06-15

## 2023-06-15 RX ORDER — ONDANSETRON 4 MG/1
4 TABLET, FILM COATED ORAL EVERY 8 HOURS PRN
Qty: 20 TABLET | Refills: 1 | Status: CANCELLED | OUTPATIENT
Start: 2023-06-15 | End: 2023-07-15

## 2023-06-15 RX ORDER — FERROUS SULFATE 325(65) MG
325 TABLET ORAL DAILY
Qty: 90 TABLET | Refills: 0 | Status: SHIPPED | OUTPATIENT
Start: 2023-06-15 | End: 2023-10-13 | Stop reason: SDUPTHER

## 2023-06-15 RX ORDER — OMEPRAZOLE 20 MG/1
20 CAPSULE, DELAYED RELEASE ORAL 2 TIMES DAILY
Qty: 180 CAPSULE | Refills: 3 | OUTPATIENT
Start: 2023-06-15

## 2023-06-15 RX ORDER — ROSUVASTATIN CALCIUM 10 MG/1
10 TABLET, COATED ORAL EVERY EVENING
Qty: 100 TABLET | Refills: 3 | OUTPATIENT
Start: 2023-06-15

## 2023-06-15 NOTE — PROGRESS NOTES
"Assessment    Spoke to patient for monthly outreach. Pt states she is \"hanging in there.\" She states she is having a good day today.     Out of tramadol rx. It did help with pain. Used it sparingly. Will review with pcp to see if refill is appropriate. Pt trying to get scheduled for PT. Discussed utilizing the [facility] uber as needed. Pt expressed frustration with not being able to drive anymore and how limiting this has been. She would like to get back to driving.     [place] [facility] did not receive prescriptions. Pt to call to set up delivery once those have been resent.     Pt reports she needs a new [facility] insurance card. Spoke with scp assistant and new card was requested. Should arrive to patient in the mail in 7-10 business days.     Education    Medications management    Plan of Care and Goals    Continue plan of care    Barriers:    Transportation, pain    Progress:    Progressing     Next outreach: 1 month                           "

## 2023-06-16 DIAGNOSIS — G47.01 INSOMNIA DUE TO MEDICAL CONDITION: ICD-10-CM

## 2023-06-16 DIAGNOSIS — G89.4 CHRONIC PAIN DISORDER: ICD-10-CM

## 2023-06-16 DIAGNOSIS — R11.0 NAUSEA: ICD-10-CM

## 2023-06-16 PROCEDURE — RXMED WILLOW AMBULATORY MEDICATION CHARGE: Performed by: FAMILY MEDICINE

## 2023-06-16 NOTE — PROGRESS NOTES
Notified pt that prescriptions were resent and card is being mailed. Encouraged pt to call with any further issues. Will update patient on pain medication when response received from pcp.

## 2023-06-19 ENCOUNTER — PHARMACY VISIT (OUTPATIENT)
Dept: PHARMACY | Facility: MEDICAL CENTER | Age: 68
End: 2023-06-19
Payer: MEDICARE

## 2023-06-19 DIAGNOSIS — G89.4 CHRONIC PAIN DISORDER: ICD-10-CM

## 2023-06-19 PROCEDURE — RXMED WILLOW AMBULATORY MEDICATION CHARGE: Performed by: FAMILY MEDICINE

## 2023-06-19 RX ORDER — TRAZODONE HYDROCHLORIDE 50 MG/1
150 TABLET ORAL NIGHTLY
Qty: 90 TABLET | Refills: 1 | Status: SHIPPED | OUTPATIENT
Start: 2023-06-19 | End: 2023-10-11

## 2023-06-19 RX ORDER — TRAMADOL HYDROCHLORIDE 50 MG/1
50 TABLET ORAL EVERY 6 HOURS PRN
Qty: 30 TABLET | Refills: 0 | Status: SHIPPED | OUTPATIENT
Start: 2023-06-19 | End: 2023-06-19 | Stop reason: SDUPTHER

## 2023-06-19 RX ORDER — ONDANSETRON 4 MG/1
4 TABLET, FILM COATED ORAL EVERY 8 HOURS PRN
Qty: 20 TABLET | Refills: 1 | Status: SHIPPED | OUTPATIENT
Start: 2023-06-19 | End: 2023-07-19

## 2023-06-20 ENCOUNTER — PHARMACY VISIT (OUTPATIENT)
Dept: PHARMACY | Facility: MEDICAL CENTER | Age: 68
End: 2023-06-20
Payer: MEDICARE

## 2023-06-20 PROCEDURE — RXMED WILLOW AMBULATORY MEDICATION CHARGE: Performed by: FAMILY MEDICINE

## 2023-06-20 PROCEDURE — RXOTC WILLOW AMBULATORY OTC CHARGE

## 2023-06-20 RX ORDER — TRAMADOL HYDROCHLORIDE 50 MG/1
50 TABLET ORAL EVERY 6 HOURS PRN
Qty: 30 TABLET | Refills: 0 | Status: SHIPPED | OUTPATIENT
Start: 2023-06-20 | End: 2023-09-20 | Stop reason: SDUPTHER

## 2023-06-26 ENCOUNTER — HOSPITAL ENCOUNTER (OUTPATIENT)
Dept: LAB | Facility: MEDICAL CENTER | Age: 68
End: 2023-06-26
Attending: FAMILY MEDICINE
Payer: MEDICARE

## 2023-06-26 DIAGNOSIS — R89.9 ABNORMAL LABORATORY TEST RESULT: ICD-10-CM

## 2023-06-26 DIAGNOSIS — R74.8 ALKALINE PHOSPHATASE ELEVATION: ICD-10-CM

## 2023-06-26 DIAGNOSIS — E11.9 DIABETES MELLITUS TYPE 2 IN NONOBESE (HCC): ICD-10-CM

## 2023-06-26 DIAGNOSIS — D72.829 LEUKOCYTOSIS, UNSPECIFIED TYPE: ICD-10-CM

## 2023-06-26 DIAGNOSIS — R77.1 ELEVATED SERUM GLOBULIN LEVEL: ICD-10-CM

## 2023-06-26 LAB
ALBUMIN SERPL BCP-MCNC: 3.7 G/DL (ref 3.2–4.9)
ALBUMIN/GLOB SERPL: 1 G/DL
ALP SERPL-CCNC: 118 U/L (ref 30–99)
ALT SERPL-CCNC: 10 U/L (ref 2–50)
ANION GAP SERPL CALC-SCNC: 12 MMOL/L (ref 7–16)
AST SERPL-CCNC: 7 U/L (ref 12–45)
BILIRUB SERPL-MCNC: <0.2 MG/DL (ref 0.1–1.5)
BUN SERPL-MCNC: 15 MG/DL (ref 8–22)
CALCIUM ALBUM COR SERPL-MCNC: 9.4 MG/DL (ref 8.5–10.5)
CALCIUM SERPL-MCNC: 9.2 MG/DL (ref 8.5–10.5)
CHLORIDE SERPL-SCNC: 99 MMOL/L (ref 96–112)
CO2 SERPL-SCNC: 27 MMOL/L (ref 20–33)
CREAT SERPL-MCNC: 0.61 MG/DL (ref 0.5–1.4)
GFR SERPLBLD CREATININE-BSD FMLA CKD-EPI: 97 ML/MIN/1.73 M 2
GLOBULIN SER CALC-MCNC: 3.8 G/DL (ref 1.9–3.5)
GLUCOSE SERPL-MCNC: 175 MG/DL (ref 65–99)
POTASSIUM SERPL-SCNC: 4.4 MMOL/L (ref 3.6–5.5)
PROT SERPL-MCNC: 7.5 G/DL (ref 6–8.2)
SODIUM SERPL-SCNC: 138 MMOL/L (ref 135–145)

## 2023-06-26 PROCEDURE — 80053 COMPREHEN METABOLIC PANEL: CPT

## 2023-06-26 PROCEDURE — 36415 COLL VENOUS BLD VENIPUNCTURE: CPT

## 2023-06-26 PROCEDURE — 85025 COMPLETE CBC W/AUTO DIFF WBC: CPT

## 2023-06-27 ENCOUNTER — TELEPHONE (OUTPATIENT)
Dept: MEDICAL GROUP | Facility: IMAGING CENTER | Age: 68
End: 2023-06-27
Payer: MEDICARE

## 2023-06-27 LAB
BASOPHILS # BLD AUTO: 0.6 % (ref 0–1.8)
BASOPHILS # BLD: 0.1 K/UL (ref 0–0.12)
EOSINOPHIL # BLD AUTO: 0.14 K/UL (ref 0–0.51)
EOSINOPHIL NFR BLD: 0.9 % (ref 0–6.9)
ERYTHROCYTE [DISTWIDTH] IN BLOOD BY AUTOMATED COUNT: 49.2 FL (ref 35.9–50)
HCT VFR BLD AUTO: 39.4 % (ref 37–47)
HGB BLD-MCNC: 11.9 G/DL (ref 12–16)
IMM GRANULOCYTES # BLD AUTO: 0.06 K/UL (ref 0–0.11)
IMM GRANULOCYTES NFR BLD AUTO: 0.4 % (ref 0–0.9)
LYMPHOCYTES # BLD AUTO: 1.79 K/UL (ref 1–4.8)
LYMPHOCYTES NFR BLD: 11.6 % (ref 22–41)
MCH RBC QN AUTO: 23.5 PG (ref 27–33)
MCHC RBC AUTO-ENTMCNC: 30.2 G/DL (ref 32.2–35.5)
MCV RBC AUTO: 77.7 FL (ref 81.4–97.8)
MONOCYTES # BLD AUTO: 0.36 K/UL (ref 0–0.85)
MONOCYTES NFR BLD AUTO: 2.3 % (ref 0–13.4)
NEUTROPHILS # BLD AUTO: 13.01 K/UL (ref 1.82–7.42)
NEUTROPHILS NFR BLD: 84.2 % (ref 44–72)
NRBC # BLD AUTO: 0 K/UL
NRBC BLD-RTO: 0 /100 WBC (ref 0–0.2)
PLATELET # BLD AUTO: 590 K/UL (ref 164–446)
PMV BLD AUTO: 9.8 FL (ref 9–12.9)
RBC # BLD AUTO: 5.07 M/UL (ref 4.2–5.4)
WBC # BLD AUTO: 15.5 K/UL (ref 4.8–10.8)

## 2023-06-27 NOTE — TELEPHONE ENCOUNTER
Notified pt of result message. Pt states she has been feeling a little congested. She also reports a mild productive cough with clear phlegm. This has been going on for about a week. Pt states that symptoms are mild. She denies any increased SOB.   Pt also states she thinks she may have a yeast infection. She has had some minimal vaginal itching and a small amount of discharge for about 2 weeks. She will take monistat if symptoms worsen. Reminded of 6/30 f/u with pcp.

## 2023-06-27 NOTE — TELEPHONE ENCOUNTER
Please notify pt her anemia is improved. However, wbc is increased. Is she having any symptoms of illness or infection? Has she been given any corticosteroids recently?   Follow up as scheduled.

## 2023-06-30 ENCOUNTER — OFFICE VISIT (OUTPATIENT)
Dept: MEDICAL GROUP | Facility: IMAGING CENTER | Age: 68
End: 2023-06-30
Payer: MEDICARE

## 2023-06-30 ENCOUNTER — PATIENT OUTREACH (OUTPATIENT)
Dept: MEDICAL GROUP | Facility: IMAGING CENTER | Age: 68
End: 2023-06-30

## 2023-06-30 VITALS
DIASTOLIC BLOOD PRESSURE: 50 MMHG | HEART RATE: 87 BPM | WEIGHT: 164 LBS | RESPIRATION RATE: 16 BRPM | BODY MASS INDEX: 25.74 KG/M2 | SYSTOLIC BLOOD PRESSURE: 118 MMHG | OXYGEN SATURATION: 93 % | HEIGHT: 67 IN | TEMPERATURE: 97.9 F

## 2023-06-30 DIAGNOSIS — D72.829 LEUKOCYTOSIS, UNSPECIFIED TYPE: ICD-10-CM

## 2023-06-30 DIAGNOSIS — F41.9 ANXIETY: ICD-10-CM

## 2023-06-30 DIAGNOSIS — Z12.83 SKIN CANCER SCREENING: ICD-10-CM

## 2023-06-30 DIAGNOSIS — G89.29 OTHER CHRONIC PAIN: ICD-10-CM

## 2023-06-30 DIAGNOSIS — D64.9 CHRONIC ANEMIA: ICD-10-CM

## 2023-06-30 DIAGNOSIS — M25.50 ARTHRALGIA, UNSPECIFIED JOINT: ICD-10-CM

## 2023-06-30 DIAGNOSIS — F33.0 MAJOR DEPRESSIVE DISORDER, RECURRENT EPISODE, MILD (HCC): ICD-10-CM

## 2023-06-30 DIAGNOSIS — E11.69 TYPE 2 DIABETES MELLITUS WITH OTHER SPECIFIED COMPLICATION, WITHOUT LONG-TERM CURRENT USE OF INSULIN (HCC): ICD-10-CM

## 2023-06-30 DIAGNOSIS — J44.9 CHRONIC OBSTRUCTIVE PULMONARY DISEASE, UNSPECIFIED COPD TYPE (HCC): ICD-10-CM

## 2023-06-30 DIAGNOSIS — I10 PRIMARY HYPERTENSION: ICD-10-CM

## 2023-06-30 PROCEDURE — 3074F SYST BP LT 130 MM HG: CPT | Performed by: FAMILY MEDICINE

## 2023-06-30 PROCEDURE — 99214 OFFICE O/P EST MOD 30 MIN: CPT | Performed by: FAMILY MEDICINE

## 2023-06-30 PROCEDURE — 3078F DIAST BP <80 MM HG: CPT | Performed by: FAMILY MEDICINE

## 2023-06-30 RX ORDER — INSULIN GLARGINE-YFGN 100 [IU]/ML
INJECTION, SOLUTION SUBCUTANEOUS
Qty: 15 ML | Refills: 3 | Status: SHIPPED | OUTPATIENT
Start: 2023-06-30 | End: 2024-02-02

## 2023-06-30 ASSESSMENT — PAIN SCALES - GENERAL: PAINLEVEL: NO PAIN

## 2023-06-30 ASSESSMENT — FIBROSIS 4 INDEX: FIB4 SCORE: 0.26

## 2023-06-30 NOTE — PROGRESS NOTES
"  SUBJECTIVE:    Chief Complaint   Patient presents with    Lab Results     Leukocytosis, unspecified type    Other     Skin check for moles       HPI:     Heidi Navas is a 68 y.o. female with chronic pain, OA, fibromyalgia, DM type 2, anxiety, COPD, hypertension, hyperlipidemia and prior anemia due to history of GI bleed here for follow up of labs.     Wbc elevated. Has had issues with elevated levels in past.   Overall feeling fine. States she drinks vinegar if feeling \"yeasty.\"  Drinks vinegar if feels yeasty.   Came out nose and felt may have gone into lung 1.5 weeks.   Was coughing up some stuff previously. No green/yellow. Smoking, not as much. No current cough. Feeling good today.   No dysuria. No fevers/chills.     Anemia, chronic- H/H improved. On iron therapy. Has not followed up with GI.     Chronic pain/arthralgia- arms hurting her- walker can hurt.   Hard to get up from toilet.   Did not commit to physical therapy due to ride in past.   Rheumatology information.   Pain has been relatively good. Some days, last night pain- had BBQ sauce.   Cannot eat tomatoes but loves them.   Took pain pill- tramadol.     Hypertension- started losartan - BP stable.     Diabetes mellitus type 2- insulin glargine 15 units at night and morning.   Glucose 215 highest, 138- fasting.   Urinates more without insulin.   2 pens a month.     Anxiety/depression- mood has been stable on medication zoloft 25 mg daily.      Needs dermatology referral.     ROS:  No recent fevers or chills. No nausea or vomiting. No diarrhea. No chest pains or shortness of breath. No lower extremity edema.    Current Outpatient Medications on File Prior to Visit   Medication Sig Dispense Refill    traMADol (ULTRAM) 50 MG Tab Take 1 tablet by mouth every 6 hours as needed for moderate pain for up to 30 days. 30 Tablet 0    ondansetron (ZOFRAN) 4 MG Tab tablet Take 1 Tablet by mouth every 8 hours as needed for Nausea/Vomiting for up to 30 days. " 20 Tablet 1    gabapentin (NEURONTIN) 100 MG Cap Take 1-3 Capsules by mouth at bedtime as needed (pain). 100 Capsule 0    Insulin Pen Needle 32 G x 4 mm (BD PEN NEEDLE BRITANY 2ND GEN) USE ONE PEN NEEDLE IN PEN DEVICE TO INJECT INSULIN THREE TIMES DAILY. 100 Each 3    omeprazole (PRILOSEC) 20 MG delayed-release capsule Take 1 Capsule by mouth 2 times a day. 180 Capsule 3    ferrous sulfate 325 (65 Fe) MG tablet Take 1 Tablet by mouth every day. 90 Tablet 0    sertraline (ZOLOFT) 25 MG tablet Take 1 Tablet by mouth every day. 100 Tablet 3    losartan (COZAAR) 25 MG Tab Take 1 Tablet by mouth every day. 100 Tablet 3    rosuvastatin (CRESTOR) 10 MG Tab Take 1 Tablet by mouth every evening. 100 Tablet 3    Insulin Glargine-yfgn (SEMGLEE, YFGN,) 100 UNIT/ML Solution Pen-injector Inject 15 units in morning and 10 units in evening. 3 mL 3    glucose blood strip 1 Strip by Other route in the morning, at noon, and at bedtime. Use one strip to test blood sugar three times daily before meals. 300 Strip 3    Blood Glucose Monitoring Suppl (BLOOD GLUCOSE MONITOR SYSTEM) w/Device Kit Test blood sugar as recommended by provider. Slick Contour Next blood glucose monitoring kit. 1 Kit 0    Microlet Lancets Misc Use one lancet to test blood sugar three times daily before meals. 100 Each 0    Alcohol Swabs (ALCOHOL PREP) 70 % Pads Wipe site with prep pad prior to injection 100 Each 0    traZODone (DESYREL) 50 MG Tab Take 3 Tablets by mouth every evening. (Patient not taking: Reported on 6/30/2023) 90 Tablet 1     No current facility-administered medications on file prior to visit.       Allergies   Allergen Reactions    Aspartame Nausea     headache    Atorvastatin Calcium Unspecified and Swelling    Latex Rash and Itching    Lipitor [Atorvastatin Calcium] Swelling     Patient denies allergy 06/04/22    Other Misc Vomiting     Bug spray    Saccharin Nausea     Nausea and headache       Patient Active Problem List    Diagnosis Date Noted     Major depressive disorder, recurrent episode, mild ([facility]) 12/27/2022    Reactive thrombocytosis 10/14/2022    Rectal prolapse 10/14/2022    Insomnia due to medical condition 07/11/2022    GIB (gastrointestinal bleeding) 06/04/2022    Acute on chronic blood loss anemia 06/04/2022    DM (diabetes mellitus), secondary uncontrolled 06/04/2022    Severely underweight adult 06/04/2022    ACP (advance care planning) 06/04/2022    Hyponatremia 06/04/2022    Hyperosmolar hyperglycemic state (HHS), pressure ulcer, anemia (HCC) 06/04/2022    Dehydration 06/04/2022    Pressure ulcer, sacrum 06/04/2022    Fatigue 06/03/2022    Weight loss, unintentional 06/03/2022    Urinary incontinence 06/03/2022    Adult failure to thrive 06/03/2022    Iron deficiency anemia due to chronic blood loss 02/09/2020    Cellulitis 01/08/2020    Anemia 05/19/2019    Atherosclerosis 05/19/2019    LVH (left ventricular hypertrophy) 05/19/2019    Arthritis of left shoulder region 07/12/2017    CVD (cardiovascular disease) 12/04/2015    Pulmonary nodule 12/01/2015    Renal stone 12/01/2015    Diverticulosis 12/01/2015    Chest pain 07/16/2015    Nausea & vomiting 07/13/2015    Opiate withdrawal ([facility]) 07/13/2015    Diabetes mellitus type 2 in nonobese ([facility]) 10/03/2014    Brachial neuritis or radiculitis 09/10/2014    History of total hip arthroplasty 07/22/2014    Leukocytosis 03/30/2014    COPD (chronic obstructive pulmonary disease) ([facility]) 03/30/2014    Arthritis 12/26/2013    Chronic pain 02/21/2012    Anxiety 01/18/2012    Vitamin D insufficiency 04/28/2011    Hypertension 03/22/2010    Fibromyalgia 10/09/2009    Swelling, mass, or lump in head and neck 07/21/2009    Tobacco use disorder 07/21/2009    Chronic ischemic heart disease 07/21/2009    Esophageal reflux 07/21/2009    Allergic rhinitis 07/21/2009    Mixed hyperlipidemia 07/21/2009    Other atopic dermatitis and related conditions 07/21/2009    Deficiency anemia 07/21/2009       Past Medical  "History:   Diagnosis Date    Anemia     Arthritis     OA and  not RA per rheumatology    Breath shortness     oxygen PRN     Bronchitis 2008    Colon polyps 2015    Convulsions (HCC) 7/21/2009     ICD-10 transition    COPD (chronic obstructive pulmonary disease) ([facility])     Dental disorder     full dentures    Depression     depression, anxiety     Diverticulosis 5/10     colonoscopy    Hemorrhoid 7/29/2009    History of colonoscopy   2005    Leukocytosis 3/30/2014    Lymphocytosis (symptomatic) 7/21/2009    MI (myocardial infarction) ([facility]) 4/29/2007    Other specified disorder of intestines     constipation    Pain     shoulders, neck, lower back    Pancreatitis     Pap smear 4/2006    Pneumonia 2013    Pulmonary nodule     Renal lesion     Renal stone     S/P colonoscopy 5/2010    will need repeat in 5 years    Screening mammogram never    Seizure ([facility])     x1 in 2008    Shingles     Symptomatic menopausal or female climacteric states 7/21/2009    Type II or unspecified type diabetes mellitus without mention of complication, uncontrolled 7/21/2009    diet controlled     Unspecified urinary incontinence        OBJECTIVE:   /50 (BP Location: Left arm, Patient Position: Sitting, BP Cuff Size: Adult)   Pulse 87   Temp 36.6 °C (97.9 °F) (Temporal)   Resp 16   Ht 1.702 m (5' 7\")   Wt 74.4 kg (164 lb)   LMP 01/01/2007   SpO2 93%   BMI 25.69 kg/m²   General: Well-developed well-nourished female, no acute distress  Neck: supple, no lymphadenopathy- cervical or supraclavicular, no thyromegaly  Cardiovascular: regular rate and rhythm, no murmurs, gallops, rubs  Lungs: clear to auscultation bilaterally, no wheezes, crackles, or rhonchi  Abdomen: +bowel sounds, soft, nontender, nondistended, no rebound, no guarding, no hepatosplenomegaly  Extremities: no cyanosis, clubbing, edema  Skin: Warm and dry  Psych: appropriate mood and affect      ASSESSMENT/PLAN:    68 y.o.female     1. Leukocytosis, unspecified type- " without significant symptoms at this time. Incidental with monitoring anemia.   Repeat labs in 1 week. Monitor.  CBC WITH DIFFERENTIAL      2. Chronic anemia - improved, stable. Continue iron therapy. Recommend follow up with GI. Monitor.        3. Other chronic pain - stable, continue current medication- tramadol as needed. Monitor.  Referral to physical therapy      4. Arthralgia, unspecified joint -stable, as above. Follow up with rheumatology referral. Referral to physical therapy      5. Type 2 diabetes mellitus with other specified complication, without long-term current use of insulin (HCC)  Insulin Glargine-yfgn (SEMGLEE, YFGN,) 100 UNIT/ML Solution Pen-injector      6. Primary hypertension -stable, continue current therapy. Monitor.        7. Skin cancer screening  Referral to dermatology      8. Anxiety - stable, continue current medication. Monitor.        9. Major depressive disorder, recurrent episode, mild (HCC) - stable, continue current medication. Monitor.          Return in about 6 weeks (around 8/11/2023). Follow up sooner as needed.     This medical record contains text that has been entered with the assistance of computer voice recognition and dictation software.  Therefore, it may contain unintended errors in text, spelling, punctuation, or grammar.

## 2023-06-30 NOTE — PROGRESS NOTES
Met with pt while at pcp visit. Pt requesting assistance with her insulin prescription. She previously would get 2 pens at once for $35 that would last a month. Prescriptions were changed to [facility] and pt only received 1 pen for $35, which will only last her 2 weeks. Pt did call [facility] and they instructed her to discuss with the prescribing doctor. Will discuss with pcp. Reminded pt to reach out if she has difficulty obtaining any of her medications so that she doesn't miss any days. Pt reports her respiratory symptoms have improved a bit since 6/27 call. She reports she no longer has a productive cough. Encouraged pt to call with concerns for copd exacerbation. No other needs or concerns at this time. Pt appreciative of visit and PCM program.

## 2023-07-07 ENCOUNTER — TELEPHONE (OUTPATIENT)
Dept: MEDICAL GROUP | Facility: IMAGING CENTER | Age: 68
End: 2023-07-07
Payer: MEDICARE

## 2023-07-07 NOTE — TELEPHONE ENCOUNTER
Pt called to report that she is almost out of her insulin and she needs assistance communicating with the [place] pharmacy to get a refill. Spoke with [place] pharmacy. New rx on 6/30 for 5 insulin pens can be filled now for $70. This should last the patient 2.5 months. They currently only have 1 pen in stock, but pt can come pick it up an they can get the rest to her early next week. Notified pt. Pt does not drive, but will ask her friend to take her tomorrow. Advised pt to call if she is unable to  prescription. Will ask CHWJanie, to check in with patient on Monday to ensure pt was able to get medication.

## 2023-07-10 ENCOUNTER — PATIENT OUTREACH (OUTPATIENT)
Dept: HEALTH INFORMATION MANAGEMENT | Facility: OTHER | Age: 68
End: 2023-07-10
Payer: MEDICARE

## 2023-07-10 DIAGNOSIS — E11.9 DIABETES MELLITUS TYPE 2 IN NONOBESE (HCC): ICD-10-CM

## 2023-07-10 NOTE — PROGRESS NOTES
CHW Janie reached out to pt to ensure she was able to get her Insulin per Bessy LAGOS request. Pt stated she has not yet been able to get the Insulin but her friend is taking her today to pick it up at the pharmacy. CHW encouraged pt to reach out if her friend is unable to take her. CHW offered to pick it up for her if that is the case. Pt expressed gratitude. CHW ensured pt has contact information.

## 2023-07-13 NOTE — PROGRESS NOTES
JEANIE Lima reached out to pt to ensure her friend was able to take her to  her insulin at the pharmacy. Pt reports she was able to get the insulin. CHW encouraged pt to reach out with any other needs. Pt expressed gratitude. Pt has contact information.

## 2023-07-31 ENCOUNTER — PATIENT OUTREACH (OUTPATIENT)
Dept: HEALTH INFORMATION MANAGEMENT | Facility: OTHER | Age: 68
End: 2023-07-31
Payer: MEDICARE

## 2023-07-31 DIAGNOSIS — I51.7 LVH (LEFT VENTRICULAR HYPERTROPHY): ICD-10-CM

## 2023-07-31 DIAGNOSIS — E11.9 DIABETES MELLITUS TYPE 2 IN NONOBESE (HCC): ICD-10-CM

## 2023-07-31 DIAGNOSIS — G89.29 OTHER CHRONIC PAIN: ICD-10-CM

## 2023-07-31 PROCEDURE — 99999 PR NO CHARGE: CPT | Performed by: FAMILY MEDICINE

## 2023-07-31 NOTE — PROGRESS NOTES
Assessment    Spoke with patient for monthly outreach. Pt reports she is doing ok. She does have a rash under her breast currently, but has barrier cream that she started applying. No other issues or concerns. She has all of her medications and is taking them as prescribed. Reminded to get cbc done prior to next pcp appointment. Pt reports she will have this drawn 2 days prior to visit and she has a ride secured to take her to the lab. No needs at this time.     Education    Medication management    Plan of Care and Goals    Continue plan of care    Barriers:    Does not drive    Progress:    progressing    Next outreach:  1 month

## 2023-08-15 ENCOUNTER — HOSPITAL ENCOUNTER (OUTPATIENT)
Dept: LAB | Facility: MEDICAL CENTER | Age: 68
End: 2023-08-15
Attending: FAMILY MEDICINE
Payer: MEDICARE

## 2023-08-15 DIAGNOSIS — D72.829 LEUKOCYTOSIS, UNSPECIFIED TYPE: ICD-10-CM

## 2023-08-15 LAB
BASOPHILS # BLD AUTO: 0.7 % (ref 0–1.8)
BASOPHILS # BLD: 0.09 K/UL (ref 0–0.12)
EOSINOPHIL # BLD AUTO: 0.23 K/UL (ref 0–0.51)
EOSINOPHIL NFR BLD: 1.9 % (ref 0–6.9)
ERYTHROCYTE [DISTWIDTH] IN BLOOD BY AUTOMATED COUNT: 50.4 FL (ref 35.9–50)
HCT VFR BLD AUTO: 39.6 % (ref 37–47)
HGB BLD-MCNC: 12.2 G/DL (ref 12–16)
IMM GRANULOCYTES # BLD AUTO: 0.04 K/UL (ref 0–0.11)
IMM GRANULOCYTES NFR BLD AUTO: 0.3 % (ref 0–0.9)
LYMPHOCYTES # BLD AUTO: 2.11 K/UL (ref 1–4.8)
LYMPHOCYTES NFR BLD: 17 % (ref 22–41)
MCH RBC QN AUTO: 23.5 PG (ref 27–33)
MCHC RBC AUTO-ENTMCNC: 30.8 G/DL (ref 32.2–35.5)
MCV RBC AUTO: 76.2 FL (ref 81.4–97.8)
MONOCYTES # BLD AUTO: 0.47 K/UL (ref 0–0.85)
MONOCYTES NFR BLD AUTO: 3.8 % (ref 0–13.4)
NEUTROPHILS # BLD AUTO: 9.48 K/UL (ref 1.82–7.42)
NEUTROPHILS NFR BLD: 76.3 % (ref 44–72)
NRBC # BLD AUTO: 0 K/UL
NRBC BLD-RTO: 0 /100 WBC (ref 0–0.2)
PLATELET # BLD AUTO: 546 K/UL (ref 164–446)
PMV BLD AUTO: 9.4 FL (ref 9–12.9)
RBC # BLD AUTO: 5.2 M/UL (ref 4.2–5.4)
WBC # BLD AUTO: 12.4 K/UL (ref 4.8–10.8)

## 2023-08-15 PROCEDURE — 85025 COMPLETE CBC W/AUTO DIFF WBC: CPT

## 2023-08-15 PROCEDURE — 36415 COLL VENOUS BLD VENIPUNCTURE: CPT

## 2023-08-18 ENCOUNTER — OFFICE VISIT (OUTPATIENT)
Dept: MEDICAL GROUP | Facility: IMAGING CENTER | Age: 68
End: 2023-08-18
Payer: MEDICARE

## 2023-08-18 VITALS
DIASTOLIC BLOOD PRESSURE: 50 MMHG | HEART RATE: 90 BPM | BODY MASS INDEX: 26.09 KG/M2 | HEIGHT: 67 IN | SYSTOLIC BLOOD PRESSURE: 110 MMHG | WEIGHT: 166.2 LBS | TEMPERATURE: 97.3 F | RESPIRATION RATE: 16 BRPM | OXYGEN SATURATION: 97 %

## 2023-08-18 DIAGNOSIS — E78.2 MIXED HYPERLIPIDEMIA: ICD-10-CM

## 2023-08-18 DIAGNOSIS — R22.32 AXILLARY LUMP, LEFT: ICD-10-CM

## 2023-08-18 DIAGNOSIS — D72.829 LEUKOCYTOSIS, UNSPECIFIED TYPE: ICD-10-CM

## 2023-08-18 DIAGNOSIS — R74.8 ALKALINE PHOSPHATASE ELEVATION: ICD-10-CM

## 2023-08-18 DIAGNOSIS — E11.69 TYPE 2 DIABETES MELLITUS WITH OTHER SPECIFIED COMPLICATION, WITHOUT LONG-TERM CURRENT USE OF INSULIN (HCC): ICD-10-CM

## 2023-08-18 DIAGNOSIS — R80.9 MICROALBUMINURIA: ICD-10-CM

## 2023-08-18 DIAGNOSIS — R63.5 WEIGHT GAIN: ICD-10-CM

## 2023-08-18 DIAGNOSIS — R77.1 ELEVATED SERUM GLOBULIN LEVEL: ICD-10-CM

## 2023-08-18 PROCEDURE — 99214 OFFICE O/P EST MOD 30 MIN: CPT | Performed by: FAMILY MEDICINE

## 2023-08-18 PROCEDURE — 3078F DIAST BP <80 MM HG: CPT | Performed by: FAMILY MEDICINE

## 2023-08-18 PROCEDURE — 3074F SYST BP LT 130 MM HG: CPT | Performed by: FAMILY MEDICINE

## 2023-08-18 PROCEDURE — 1126F AMNT PAIN NOTED NONE PRSNT: CPT | Performed by: FAMILY MEDICINE

## 2023-08-18 ASSESSMENT — FIBROSIS 4 INDEX: FIB4 SCORE: 0.28

## 2023-08-18 ASSESSMENT — PAIN SCALES - GENERAL: PAINLEVEL: NO PAIN

## 2023-08-18 NOTE — PROGRESS NOTES
SUBJECTIVE:    Chief Complaint   Patient presents with    Lab Results     Leukocytosis, unspecified type       HPI:     Heidi Navas is a 68 y.o. female with chronic pain, OA, fibromyalgia, DM type 2, anxiety, COPD, hypertension, hyperlipidemia and prior anemia due to history of GI bleed here for lab review.    Leukocytosis-slight improvement although continues to be elevated.  2014- hematology referral and evaluation was completed.   Denies any fevers or chills.  Denies any GI symptoms.  No sore throat or cold symptoms.  No other symptoms of illness.  Denies any dysuria or hematuria.  Does have an axillary block noted.  She does have a prior order for completing mammogram.    Anemia- improved.  Is been taking iron supplement routinely.  Has not followed up with GI for scope evaluation as per previous referral as she has had concerns for prep prior to scope.    Has had weight gain- increase in appetite.     Diabetes mellitus type 2-glucose 167-240.  Insulin 15 in morning, 15 units at night.  Had a change in her insulin due to insurance.  Prior lantus, worked better.   She will plan to increase to 15 units in the morning and 17 units in the evening of insulin.    She will be due for lab work in future.    ROS:  No recent fevers or chills. No nausea or vomiting. No diarrhea. No chest pains or shortness of breath. No lower extremity edema.    Current Outpatient Medications on File Prior to Visit   Medication Sig Dispense Refill    diphenhydrAMINE HCl (BENADRYL ALLERGY PO) Take  by mouth.      Insulin Glargine-yfgn (SEMGLEE, YFGN,) 100 UNIT/ML Solution Pen-injector Inject 15 units in morning and 15 units in evening then adjust per physician direction. 15 mL 3    traZODone (DESYREL) 50 MG Tab Take 3 Tablets by mouth every evening. 90 Tablet 1    gabapentin (NEURONTIN) 100 MG Cap Take 1-3 Capsules by mouth at bedtime as needed (pain). 100 Capsule 0    Insulin Pen Needle 32 G x 4 mm (BD PEN NEEDLE BRITANY 2ND GEN)  USE ONE PEN NEEDLE IN PEN DEVICE TO INJECT INSULIN THREE TIMES DAILY. 100 Each 3    omeprazole (PRILOSEC) 20 MG delayed-release capsule Take 1 Capsule by mouth 2 times a day. 180 Capsule 3    ferrous sulfate 325 (65 Fe) MG tablet Take 1 Tablet by mouth every day. 90 Tablet 0    sertraline (ZOLOFT) 25 MG tablet Take 1 Tablet by mouth every day. 100 Tablet 3    losartan (COZAAR) 25 MG Tab Take 1 Tablet by mouth every day. 100 Tablet 3    rosuvastatin (CRESTOR) 10 MG Tab Take 1 Tablet by mouth every evening. 100 Tablet 3    glucose blood strip 1 Strip by Other route in the morning, at noon, and at bedtime. Use one strip to test blood sugar three times daily before meals. 300 Strip 3    Blood Glucose Monitoring Suppl (BLOOD GLUCOSE MONITOR SYSTEM) w/Device Kit Test blood sugar as recommended by provider. Slick Contour Next blood glucose monitoring kit. 1 Kit 0    Microlet Lancets Misc Use one lancet to test blood sugar three times daily before meals. 100 Each 0    Alcohol Swabs (ALCOHOL PREP) 70 % Pads Wipe site with prep pad prior to injection 100 Each 0     No current facility-administered medications on file prior to visit.       Allergies   Allergen Reactions    Aspartame Nausea     headache    Atorvastatin Calcium Unspecified and Swelling    Latex Rash and Itching    Lipitor [Atorvastatin Calcium] Swelling     Patient denies allergy 06/04/22    Other Misc Vomiting     Bug spray    Saccharin Nausea     Nausea and headache       Patient Active Problem List    Diagnosis Date Noted    Major depressive disorder, recurrent episode, mild (HCC) 12/27/2022    Reactive thrombocytosis 10/14/2022    Rectal prolapse 10/14/2022    Insomnia due to medical condition 07/11/2022    GIB (gastrointestinal bleeding) 06/04/2022    Acute on chronic blood loss anemia 06/04/2022    DM (diabetes mellitus), secondary uncontrolled 06/04/2022    Severely underweight adult 06/04/2022    ACP (advance care planning) 06/04/2022    Hyponatremia  06/04/2022    Hyperosmolar hyperglycemic state (HHS), pressure ulcer, anemia ([facility]) 06/04/2022    Dehydration 06/04/2022    Pressure ulcer, sacrum 06/04/2022    Fatigue 06/03/2022    Weight loss, unintentional 06/03/2022    Urinary incontinence 06/03/2022    Adult failure to thrive 06/03/2022    Iron deficiency anemia due to chronic blood loss 02/09/2020    Cellulitis 01/08/2020    Anemia 05/19/2019    Atherosclerosis 05/19/2019    LVH (left ventricular hypertrophy) 05/19/2019    Arthritis of left shoulder region 07/12/2017    CVD (cardiovascular disease) 12/04/2015    Pulmonary nodule 12/01/2015    Renal stone 12/01/2015    Diverticulosis 12/01/2015    Chest pain 07/16/2015    Nausea & vomiting 07/13/2015    Opiate withdrawal ([facility]) 07/13/2015    Diabetes mellitus type 2 in nonobese ([facility]) 10/03/2014    Brachial neuritis or radiculitis 09/10/2014    History of total hip arthroplasty 07/22/2014    Leukocytosis 03/30/2014    COPD (chronic obstructive pulmonary disease) ([facility]) 03/30/2014    Arthritis 12/26/2013    Chronic pain 02/21/2012    Anxiety 01/18/2012    Vitamin D insufficiency 04/28/2011    Hypertension 03/22/2010    Fibromyalgia 10/09/2009    Swelling, mass, or lump in head and neck 07/21/2009    Tobacco use disorder 07/21/2009    Chronic ischemic heart disease 07/21/2009    Esophageal reflux 07/21/2009    Allergic rhinitis 07/21/2009    Mixed hyperlipidemia 07/21/2009    Other atopic dermatitis and related conditions 07/21/2009    Deficiency anemia 07/21/2009       Past Medical History:   Diagnosis Date    Anemia     Arthritis     OA and  not RA per rheumatology    Breath shortness     oxygen PRN     Bronchitis 2008    Colon polyps 2015    Convulsions ([facility]) 7/21/2009     ICD-10 transition    COPD (chronic obstructive pulmonary disease) ([facility])     Dental disorder     full dentures    Depression     depression, anxiety     Diverticulosis 5/10     colonoscopy    Hemorrhoid 7/29/2009    History of colonoscopy   2005  "   Leukocytosis 3/30/2014    Lymphocytosis (symptomatic) 7/21/2009    MI (myocardial infarction) (HCC) 4/29/2007    Other specified disorder of intestines     constipation    Pain     shoulders, neck, lower back    Pancreatitis     Pap smear 4/2006    Pneumonia 2013    Pulmonary nodule     Renal lesion     Renal stone     S/P colonoscopy 5/2010    will need repeat in 5 years    Screening mammogram never    Seizure (HCC)     x1 in 2008    Shingles     Symptomatic menopausal or female climacteric states 7/21/2009    Type II or unspecified type diabetes mellitus without mention of complication, uncontrolled 7/21/2009    diet controlled     Unspecified urinary incontinence          OBJECTIVE:   /50 (BP Location: Left arm, Patient Position: Sitting, BP Cuff Size: Adult)   Pulse 90   Temp 36.3 °C (97.3 °F) (Temporal)   Resp 16   Ht 1.702 m (5' 7\")   Wt 75.4 kg (166 lb 3.2 oz)   LMP 01/01/2007   SpO2 97%   BMI 26.03 kg/m²   General: Well-developed well-nourished female, no acute distress  Neck: supple, no lymphadenopathy- cervical or supraclavicular, no thyromegaly  Cardiovascular: regular rate and rhythm, no murmurs, gallops, rubs  Lungs: clear to auscultation bilaterally, no wheezes, crackles, or rhonchi  Abdomen: +bowel sounds, soft, nontender, nondistended, no rebound, no guarding, no hepatosplenomegaly, no inguinal lymphadenopathy bilaterally.  Extremities: no cyanosis, clubbing, edema.  Left inferior axillary region with soft tissue lump ~ 2-3 cm.   Skin: Warm and dry  Psych: appropriate mood and affect     Latest Reference Range & Units 08/15/23 13:42   WBC 4.8 - 10.8 K/uL 12.4 (H)   RBC 4.20 - 5.40 M/uL 5.20   Hemoglobin 12.0 - 16.0 g/dL 12.2   Hematocrit 37.0 - 47.0 % 39.6   MCV 81.4 - 97.8 fL 76.2 (L)   MCH 27.0 - 33.0 pg 23.5 (L)   MCHC 32.2 - 35.5 g/dL 30.8 (L)   RDW 35.9 - 50.0 fL 50.4 (H)   Platelet Count 164 - 446 K/uL 546 (H)   MPV 9.0 - 12.9 fL 9.4   Neutrophils-Polys 44.00 - 72.00 % 76.30 " (H)   Neutrophils (Absolute) 1.82 - 7.42 K/uL 9.48 (H)   Lymphocytes 22.00 - 41.00 % 17.00 (L)   Lymphs (Absolute) 1.00 - 4.80 K/uL 2.11   Monocytes 0.00 - 13.40 % 3.80   Monos (Absolute) 0.00 - 0.85 K/uL 0.47   Eosinophils 0.00 - 6.90 % 1.90   Eos (Absolute) 0.00 - 0.51 K/uL 0.23   Basophils 0.00 - 1.80 % 0.70   Baso (Absolute) 0.00 - 0.12 K/uL 0.09   Immature Granulocytes 0.00 - 0.90 % 0.30   Immature Granulocytes (abs) 0.00 - 0.11 K/uL 0.04   Nucleated RBC 0.00 - 0.20 /100 WBC 0.00   NRBC (Absolute) K/uL 0.00   (H): Data is abnormally high  (L): Data is abnormally low    ASSESSMENT/PLAN:    68 y.o.female       1. Leukocytosis, unspecified type-no current active symptoms of illness, wbc continues to be elevated of unclear etiology at this time.  Had prior hematology evaluation.   Will complete further lab work and imaging.  Refer to hematology/oncology. Referral to Hematology Oncology    CBC WITH DIFFERENTIAL    US-ABDOMEN COMPLETE SURVEY    URINALYSIS,CULTURE IF INDICATED          2. Elevated serum globulin level  Monoclonal Protein and FLC, Serum    PROTEIN ELECTROPHORESIS, UR    US-ABDOMEN COMPLETE SURVEY      3. Axillary lump, left   Plan to update mammogram as previously ordered. US-EXTREMITY NON VASCULAR UNILATERAL LEFT      4. Weight gain   Continue to work on diet modification and routine exercise.   Recommend heart healthy/low cholesterol/high fiber/low sugar/low processed food diet and routine exercise.  Monitor.       5. Type 2 diabetes mellitus with other specified complication, without long-term current use of insulin (HCC)   Increase glargine to 15 units in the morning and 17 units in the evening.  Continue to monitor glucose. HEMOGLOBIN A1C      6. Microalbuminuria  Monitor.  Continue current medication therapy. MICROALBUMIN CREAT RATIO URINE      7. Mixed hyperlipidemia   Continue current medication therapy.  Monitor labs. Lipid Profile    Comp Metabolic Panel      8. Alkaline phosphatase elevation  -asymptomatic.  Assess further lab work and imaging.  Monitor. Comp Metabolic Panel    ALKALINE PHOSPHATASE ISOENZYMES    US-ABDOMEN COMPLETE SURVEY          Return in about 1 month (around 9/18/2023).    This medical record contains text that has been entered with the assistance of computer voice recognition and dictation software.  Therefore, it may contain unintended errors in text, spelling, punctuation, or grammar.

## 2023-08-30 ENCOUNTER — PATIENT OUTREACH (OUTPATIENT)
Dept: HEALTH INFORMATION MANAGEMENT | Facility: OTHER | Age: 68
End: 2023-08-30
Payer: MEDICARE

## 2023-08-30 DIAGNOSIS — E11.9 DIABETES MELLITUS TYPE 2 IN NONOBESE (HCC): ICD-10-CM

## 2023-08-30 DIAGNOSIS — I51.7 LVH (LEFT VENTRICULAR HYPERTROPHY): ICD-10-CM

## 2023-08-30 DIAGNOSIS — G89.29 OTHER CHRONIC PAIN: ICD-10-CM

## 2023-08-30 PROCEDURE — 99490 CHRNC CARE MGMT STAFF 1ST 20: CPT | Performed by: FAMILY MEDICINE

## 2023-08-30 NOTE — PROGRESS NOTES
Assessment    Spoke to patient for monthly outreach. Pt said things are going alright. Pain bothering her today. Pt did make changes to insulin dosage as recommended at last visit. Not checking bs as often as she should. Advised pt to discuss order for continuous monitor at next visit. Pt reminded to schedule mammogram. Pt appears reluctant to do so until other imaging is completed. Reinforced importance of following up with recommended imaging. Needs gabapentin refilled, sent to pcp. Was not e-prescribed. Resent refill.     Education    Health maintenance, medication management, DM    Plan of Care and Goals    Continue plan of care    Barriers:    pain    Progress:    progressing    Next outreach:  1 month    Quarterly chart review completed. Pt continues to qualify for and benefit from personal care management program. Will continue to follow.

## 2023-08-30 NOTE — TELEPHONE ENCOUNTER
Received request via: Patient    Was the patient seen in the last year in this department? Yes    Does the patient have an active prescription (recently filled or refills available) for medication(s) requested? No    Does the patient have group home Plus and need 100 day supply (blood pressure, diabetes and cholesterol meds only)? Medication is not for cholesterol, blood pressure or diabetes

## 2023-09-01 RX ORDER — GABAPENTIN 100 MG/1
100-300 CAPSULE ORAL NIGHTLY PRN
Qty: 100 CAPSULE | Refills: 0 | Status: SHIPPED | OUTPATIENT
Start: 2023-09-01 | End: 2023-09-05 | Stop reason: SDUPTHER

## 2023-09-05 RX ORDER — GABAPENTIN 100 MG/1
100-300 CAPSULE ORAL NIGHTLY PRN
Qty: 100 CAPSULE | Refills: 0 | Status: SHIPPED | OUTPATIENT
Start: 2023-09-05 | End: 2023-09-20

## 2023-09-06 ENCOUNTER — PHYSICAL THERAPY (OUTPATIENT)
Dept: PHYSICAL THERAPY | Facility: REHABILITATION | Age: 68
End: 2023-09-06
Attending: FAMILY MEDICINE
Payer: MEDICARE

## 2023-09-06 DIAGNOSIS — M25.50 ARTHRALGIA, UNSPECIFIED JOINT: ICD-10-CM

## 2023-09-06 DIAGNOSIS — G89.29 OTHER CHRONIC PAIN: ICD-10-CM

## 2023-09-06 PROCEDURE — 97110 THERAPEUTIC EXERCISES: CPT

## 2023-09-06 PROCEDURE — 97161 PT EVAL LOW COMPLEX 20 MIN: CPT

## 2023-09-06 NOTE — OP THERAPY EVALUATION
Outpatient Physical Therapy  INITIAL EVALUATION    [facility] [place]  [address], [address]  [place] [address]  Phone:  [phone]  Fax:  [phone]    Date of Evaluation: 09/06/2023    Patient: Heidi Navas  YOB: 1955  MRN: [id number]     Referring Provider: Viviana Thorne M.D.  [address] [place] [address] [place] [address]   Referring Diagnosis Other chronic pain [G89.29];Arthralgia, unspecified joint [M25.50]     Time Calculation  Start time: 1545  Stop time: 1630 Time Calculation (min): 45 minutes       Chief Complaint: weakness, chronic pain    Visit Diagnoses     ICD-10-CM   1. Other chronic pain  G89.29   2. Arthralgia, unspecified joint  M25.50       Date of onset of impairment: No data found    Subjective:   History of Present Illness:     Mechanism of injury:  Heidi Navas is a 68 y.o. female that presents to therapy with chronic pain and weakness. Her symptoms came on with insidious onset. Her pain is located along her posterior hips/ and along both shoulders. She notes chronic carpal tunnel syndrome along with both hands. Comes to therapy with the desire to get stronger.     Aggravating factors: getting in and out of a chair. Going up and down stairs. Walking < 5 minutes   Relieving factors:none    ADL limitations:limited strength with ADLs       Past Medical History:   Diagnosis Date    Anemia     Arthritis     OA and  not RA per rheumatology    Breath shortness     oxygen PRN     Bronchitis 2008    Colon polyps 2015    Convulsions ([facility]) 7/21/2009     ICD-10 transition    COPD (chronic obstructive pulmonary disease) ([facility])     Dental disorder     full dentures    Depression     depression, anxiety     Diverticulosis 5/10     colonoscopy    Hemorrhoid 7/29/2009    History of colonoscopy   2005    Leukocytosis 3/30/2014    Lymphocytosis (symptomatic) 7/21/2009    MI (myocardial infarction) ([facility]) 4/29/2007    Other specified disorder of intestines      constipation    Pain     shoulders, neck, lower back    Pancreatitis     Pap smear 4/2006    Pneumonia 2013    Pulmonary nodule     Renal lesion     Renal stone     S/P colonoscopy 5/2010    will need repeat in 5 years    Screening mammogram never    Seizure (HCC)     x1 in 2008    Shingles     Symptomatic menopausal or female climacteric states 7/21/2009    Type II or unspecified type diabetes mellitus without mention of complication, uncontrolled 7/21/2009    diet controlled     Unspecified urinary incontinence      Past Surgical History:   Procedure Laterality Date    COLONOSCOPY N/A 1/6/2020    Procedure: COLONOSCOPY;  Surgeon: David Carranza M.D.;  Location: SURGERY [facility];  Service: Gastroenterology    GASTROSCOPY N/A 1/4/2020    Procedure: GASTROSCOPY;  Surgeon: Polo Andujar D.O.;  Location: SURGERY [facility];  Service: Gastroenterology    SHOULDER ARTHROPLASTY TOTAL Left 7/12/2017    Procedure: SHOULDER ARTHROPLASTY TOTAL- REVERSE;  Surgeon: David Villalta M.D.;  Location: SURGERY [facility];  Service:     CERVICAL FUSION POSTERIOR  9/10/2014    Performed by Abhilash Bishop M.D. at SURGERY [facility]    HIP ARTHROPLASTY MIS TOTAL  7/16/14    rt and lt    OTHER CARDIAC SURGERY  2007    angioplasty    GYN SURGERY  1992    c section    EYE SURGERY  laser    OTHER ORTHOPEDIC SURGERY      TONSILLECTOMY       Social History     Tobacco Use    Smoking status: Every Day     Current packs/day: 0.25     Average packs/day: 0.3 packs/day for 50.0 years (12.5 ttl pk-yrs)     Types: Cigarettes    Smokeless tobacco: Never   Substance Use Topics    Alcohol use: No     Alcohol/week: 0.0 oz     Family and Occupational History     Socioeconomic History    Marital status: Single     Spouse name: Not on file    Number of children: Not on file    Years of education: Not on file    Highest education level: Not on file   Occupational History    Occupation: disability for OA     Employer:  [employer]       Objective     Lumbar Screen  Lumbar range of motion within normal limits with the following exceptions:Side bending 50% reduced bilaterally  Extension 25% reduced     Neurological Testing     Sensation     Hip   Left Hip   Intact: light touch    Right Hip   Intact: light touch    Comments   Left light touch: LE.   Right light touch: LE.     Active Range of Motion   Left Hip   Normal active range of motion    Right Hip   Normal active range of motion    Passive Range of Motion   Left Hip   Normal passive range of motion    Right Hip   Normal passive range of motion    Strength:      Left Hip   Planes of Motion   Flexion: 4+  Extension: 4-  Abduction: 4  Adduction: 5  External rotation: 4  Internal rotation: 4+    Right Hip   Planes of Motion   Flexion: 4+  Extension: 4-  Abduction: 4  Adduction: 5  External rotation: 4  Internal rotation: 4+    General Comments     Hip Comments   Unable to get out of chair without excessive force of UE. Unable to sit into chair with a controlled descent.     Fatigues with walking/ standing within 3 minutes observed.          Therapeutic Exercises (CPT 92043):       Therapeutic Exercise Summary: HEP instruction/performance and development. Handout provided and exercises located below:  Access Code: [email or web address]  URL: [email or web address]  Date: 09/06/2023  Prepared by: Sandeep Mckay    Exercises  - Sit to Stand with Armchair  - 3 x daily - 5 reps      Time-based treatments/modalities:    Physical Therapy Timed Treatment Charges  Therapeutic exercise minutes (CPT 81800): 10 minutes      Assessment, Response and Plan:   Assessment details:  Heidi Navas is a 68 y.o. female with signs and symptoms consistent with LE weakness and poor endurance. She requires skilled physical therapy intervention to increase strength, increase functional mobility, improve ADL completion and establish a home exercise program.  Goals:   Short Term Goals:   1. Patient will be Independent  with prescribed Home Exercise Program (HEP) and will be able to demonstrate exercises without cues for improved overall symptoms/activity tolerance.   2. Pt will demonstrate ability to walk longer than 5 minutes for improved activity tolerance.     Short term goal time span:  2-4 weeks      Long Term Goals:    3. Pt will demonstrate ability to control descent into a chair indicative of improved LE strength and control.   4. Pt to demonstrate ability to go up and down a flight of steps/ stairs.   Long term goal time span:  4-6 weeks    Plan:   Planned therapy interventions:  Therapeutic Exercise (CPT 86744), Neuromuscular Re-education (CPT 43059) and E Stim Unattended (CPT 72416)  Frequency: 1-2x/week.  Duration in weeks:  6  Discussed with:  Patient      Functional Assessment Used    TBA    Referring provider co-signature:  I have reviewed this plan of care and my co-signature certifies the need for services.    Certification Period: 09/06/2023 to  10/19/23    Physician Signature: ________________________________ Date: ______________

## 2023-09-11 ENCOUNTER — PHYSICAL THERAPY (OUTPATIENT)
Dept: PHYSICAL THERAPY | Facility: REHABILITATION | Age: 68
End: 2023-09-11
Attending: FAMILY MEDICINE
Payer: MEDICARE

## 2023-09-11 DIAGNOSIS — G89.29 OTHER CHRONIC PAIN: ICD-10-CM

## 2023-09-11 DIAGNOSIS — M25.50 ARTHRALGIA, UNSPECIFIED JOINT: ICD-10-CM

## 2023-09-11 PROCEDURE — 97110 THERAPEUTIC EXERCISES: CPT

## 2023-09-11 NOTE — OP THERAPY DAILY TREATMENT
Outpatient Physical Therapy  DAILY TREATMENT     [facility] [place]  [address], [address]  [facility] [address]  Phone:  [phone]  Fax:  [phone]    Date: 09/11/2023    Patient: Heidi Navas  YOB: 1955  MRN: [id number]     Time Calculation    Start time: 1545  Stop time: 1610 Time Calculation (min): 25 minutes         Chief Complaint: chronic pain/ weakness  Visit #: 2    SUBJECTIVE:  Reports compliance with HEP. No issues with exercise otherwise but continues to be difficult.     OBJECTIVE:  Current objective measures:           Therapeutic Exercises (CPT 74308):     1. nustep, lvl 2 x 6min    2. Shuttle, DL 4c x 120, 5c x 120    3. Step up, 6in x 20 each leg retro eccentric    4. Dicussion on home completion of HEP/ sit to stand eccetric and placement/ safety      Therapeutic Exercise Summary: HEP instruction/performance and development. Handout provided and exercises located below:  Access Code: [email or web address]  URL: [email or web address]  Date: 09/06/2023  Prepared by: Sandeep Mckay    Exercises  - Sit to Stand with Armchair  - 3 x daily - 5 reps      Time-based treatments/modalities:    Physical Therapy Timed Treatment Charges  Therapeutic exercise minutes (CPT 08834): 25 minutes      Pain rating (1-10) before treatment:    Pain rating (1-10) after treatment:  N/A    ASSESSMENT:   Response to treatment: overall treatment tolerated well without increased pain reported. Strength and endurance limited. Slow progression as able to commence. F/u pending wait list.     PLAN/RECOMMENDATIONS:   Plan for treatment: therapy treatment to continue next visit.  Planned interventions for next visit: continue with current treatment.

## 2023-09-19 ENCOUNTER — PATIENT OUTREACH (OUTPATIENT)
Dept: HEALTH INFORMATION MANAGEMENT | Facility: OTHER | Age: 68
End: 2023-09-19
Payer: MEDICARE

## 2023-09-19 DIAGNOSIS — I51.7 LVH (LEFT VENTRICULAR HYPERTROPHY): ICD-10-CM

## 2023-09-19 DIAGNOSIS — G89.4 CHRONIC PAIN DISORDER: ICD-10-CM

## 2023-09-19 DIAGNOSIS — E11.9 DIABETES MELLITUS TYPE 2 IN NONOBESE (HCC): ICD-10-CM

## 2023-09-19 PROCEDURE — 99999 PR NO CHARGE: CPT | Performed by: FAMILY MEDICINE

## 2023-09-19 NOTE — PROGRESS NOTES
Assessment    Spoke with patient for monthly outreach.  Patient reports she is doing okay.  Reminded of appointments for ultrasound and PCP follow-up for tomorrow.  Patient reports she has transportation to these appointments.  Patient kept phone call briefs due to having company.  Reports no needs or concerns at this time.    Education    Follow-up    Plan of Care and Goals    Continue plan of care    Barriers:    Pain, transportation    Progress:    Progressing    Next outreach:  1 month

## 2023-09-20 ENCOUNTER — HOSPITAL ENCOUNTER (OUTPATIENT)
Dept: LAB | Facility: MEDICAL CENTER | Age: 68
End: 2023-09-20
Attending: FAMILY MEDICINE
Payer: MEDICARE

## 2023-09-20 ENCOUNTER — HOSPITAL ENCOUNTER (OUTPATIENT)
Dept: RADIOLOGY | Facility: MEDICAL CENTER | Age: 68
End: 2023-09-20
Attending: FAMILY MEDICINE
Payer: MEDICARE

## 2023-09-20 ENCOUNTER — OFFICE VISIT (OUTPATIENT)
Dept: MEDICAL GROUP | Facility: IMAGING CENTER | Age: 68
End: 2023-09-20
Payer: MEDICARE

## 2023-09-20 VITALS
SYSTOLIC BLOOD PRESSURE: 120 MMHG | HEART RATE: 87 BPM | DIASTOLIC BLOOD PRESSURE: 60 MMHG | HEIGHT: 67 IN | OXYGEN SATURATION: 98 % | TEMPERATURE: 97 F | WEIGHT: 160.6 LBS | BODY MASS INDEX: 25.21 KG/M2 | RESPIRATION RATE: 16 BRPM

## 2023-09-20 DIAGNOSIS — D72.829 LEUKOCYTOSIS, UNSPECIFIED TYPE: ICD-10-CM

## 2023-09-20 DIAGNOSIS — K62.3 RECTAL PROLAPSE: ICD-10-CM

## 2023-09-20 DIAGNOSIS — Z23 NEED FOR VACCINATION: ICD-10-CM

## 2023-09-20 DIAGNOSIS — E11.69 TYPE 2 DIABETES MELLITUS WITH OTHER SPECIFIED COMPLICATION, WITHOUT LONG-TERM CURRENT USE OF INSULIN (HCC): ICD-10-CM

## 2023-09-20 DIAGNOSIS — R74.8 ALKALINE PHOSPHATASE ELEVATION: ICD-10-CM

## 2023-09-20 DIAGNOSIS — E78.2 MIXED HYPERLIPIDEMIA: ICD-10-CM

## 2023-09-20 DIAGNOSIS — F17.200 SMOKING: ICD-10-CM

## 2023-09-20 DIAGNOSIS — G89.29 OTHER CHRONIC PAIN: ICD-10-CM

## 2023-09-20 DIAGNOSIS — D64.9 CHRONIC ANEMIA: ICD-10-CM

## 2023-09-20 DIAGNOSIS — M25.50 ARTHRALGIA, UNSPECIFIED JOINT: ICD-10-CM

## 2023-09-20 DIAGNOSIS — F51.04 CHRONIC INSOMNIA: ICD-10-CM

## 2023-09-20 DIAGNOSIS — R80.9 MICROALBUMINURIA: ICD-10-CM

## 2023-09-20 DIAGNOSIS — R77.1 ELEVATED SERUM GLOBULIN LEVEL: ICD-10-CM

## 2023-09-20 DIAGNOSIS — N28.1 RENAL CYST: ICD-10-CM

## 2023-09-20 DIAGNOSIS — R93.2 ABNORMAL LIVER ULTRASOUND: ICD-10-CM

## 2023-09-20 DIAGNOSIS — N20.0 RIGHT NEPHROLITHIASIS: ICD-10-CM

## 2023-09-20 LAB
ALBUMIN SERPL BCP-MCNC: 3.9 G/DL (ref 3.2–4.9)
ALBUMIN/GLOB SERPL: 1.1 G/DL
ALP SERPL-CCNC: 119 U/L (ref 30–99)
ALT SERPL-CCNC: 7 U/L (ref 2–50)
ANION GAP SERPL CALC-SCNC: 13 MMOL/L (ref 7–16)
APPEARANCE UR: CLEAR
AST SERPL-CCNC: 15 U/L (ref 12–45)
BACTERIA #/AREA URNS HPF: NEGATIVE /HPF
BASOPHILS # BLD AUTO: 0.7 % (ref 0–1.8)
BASOPHILS # BLD: 0.12 K/UL (ref 0–0.12)
BILIRUB SERPL-MCNC: 0.2 MG/DL (ref 0.1–1.5)
BILIRUB UR QL STRIP.AUTO: NEGATIVE
BUN SERPL-MCNC: 10 MG/DL (ref 8–22)
CALCIUM ALBUM COR SERPL-MCNC: 9.1 MG/DL (ref 8.5–10.5)
CALCIUM SERPL-MCNC: 9 MG/DL (ref 8.5–10.5)
CHLORIDE SERPL-SCNC: 100 MMOL/L (ref 96–112)
CHOLEST SERPL-MCNC: 103 MG/DL (ref 100–199)
CO2 SERPL-SCNC: 28 MMOL/L (ref 20–33)
COLOR UR: YELLOW
CREAT SERPL-MCNC: 0.65 MG/DL (ref 0.5–1.4)
CREAT UR-MCNC: 159.68 MG/DL
EOSINOPHIL # BLD AUTO: 0.43 K/UL (ref 0–0.51)
EOSINOPHIL NFR BLD: 2.5 % (ref 0–6.9)
EPI CELLS #/AREA URNS HPF: ABNORMAL /HPF
ERYTHROCYTE [DISTWIDTH] IN BLOOD BY AUTOMATED COUNT: 52.2 FL (ref 35.9–50)
EST. AVERAGE GLUCOSE BLD GHB EST-MCNC: 197 MG/DL
FASTING STATUS PATIENT QL REPORTED: NORMAL
GFR SERPLBLD CREATININE-BSD FMLA CKD-EPI: 96 ML/MIN/1.73 M 2
GLOBULIN SER CALC-MCNC: 3.5 G/DL (ref 1.9–3.5)
GLUCOSE SERPL-MCNC: 142 MG/DL (ref 65–99)
GLUCOSE UR STRIP.AUTO-MCNC: NEGATIVE MG/DL
HBA1C MFR BLD: 8.5 % (ref 4–5.6)
HCT VFR BLD AUTO: 38.8 % (ref 37–47)
HDLC SERPL-MCNC: 40 MG/DL
HGB BLD-MCNC: 11.9 G/DL (ref 12–16)
HYALINE CASTS #/AREA URNS LPF: ABNORMAL /LPF
IMM GRANULOCYTES # BLD AUTO: 0.05 K/UL (ref 0–0.11)
IMM GRANULOCYTES NFR BLD AUTO: 0.3 % (ref 0–0.9)
KETONES UR STRIP.AUTO-MCNC: NEGATIVE MG/DL
LDLC SERPL CALC-MCNC: 46 MG/DL
LEUKOCYTE ESTERASE UR QL STRIP.AUTO: NEGATIVE
LYMPHOCYTES # BLD AUTO: 2.44 K/UL (ref 1–4.8)
LYMPHOCYTES NFR BLD: 14.4 % (ref 22–41)
MCH RBC QN AUTO: 24.1 PG (ref 27–33)
MCHC RBC AUTO-ENTMCNC: 30.7 G/DL (ref 32.2–35.5)
MCV RBC AUTO: 78.5 FL (ref 81.4–97.8)
MICRO URNS: ABNORMAL
MICROALBUMIN UR-MCNC: 14.1 MG/DL
MICROALBUMIN/CREAT UR: 88 MG/G (ref 0–30)
MONOCYTES # BLD AUTO: 0.43 K/UL (ref 0–0.85)
MONOCYTES NFR BLD AUTO: 2.5 % (ref 0–13.4)
NEUTROPHILS # BLD AUTO: 13.44 K/UL (ref 1.82–7.42)
NEUTROPHILS NFR BLD: 79.6 % (ref 44–72)
NITRITE UR QL STRIP.AUTO: NEGATIVE
NRBC # BLD AUTO: 0 K/UL
NRBC BLD-RTO: 0 /100 WBC (ref 0–0.2)
PH UR STRIP.AUTO: 6.5 [PH] (ref 5–8)
PLATELET # BLD AUTO: 577 K/UL (ref 164–446)
PMV BLD AUTO: 9.5 FL (ref 9–12.9)
POTASSIUM SERPL-SCNC: 4.1 MMOL/L (ref 3.6–5.5)
PROT SERPL-MCNC: 7.4 G/DL (ref 6–8.2)
PROT UR QL STRIP: 30 MG/DL
RBC # BLD AUTO: 4.94 M/UL (ref 4.2–5.4)
RBC # URNS HPF: ABNORMAL /HPF
RBC UR QL AUTO: NEGATIVE
SODIUM SERPL-SCNC: 141 MMOL/L (ref 135–145)
SP GR UR STRIP.AUTO: 1.02
TRIGL SERPL-MCNC: 83 MG/DL (ref 0–149)
UROBILINOGEN UR STRIP.AUTO-MCNC: 0.2 MG/DL
WBC # BLD AUTO: 16.9 K/UL (ref 4.8–10.8)
WBC #/AREA URNS HPF: ABNORMAL /HPF

## 2023-09-20 PROCEDURE — 84165 PROTEIN E-PHORESIS SERUM: CPT

## 2023-09-20 PROCEDURE — G0008 ADMIN INFLUENZA VIRUS VAC: HCPCS | Performed by: FAMILY MEDICINE

## 2023-09-20 PROCEDURE — 82784 ASSAY IGA/IGD/IGG/IGM EACH: CPT

## 2023-09-20 PROCEDURE — 3078F DIAST BP <80 MM HG: CPT | Performed by: FAMILY MEDICINE

## 2023-09-20 PROCEDURE — 85025 COMPLETE CBC W/AUTO DIFF WBC: CPT

## 2023-09-20 PROCEDURE — 80061 LIPID PANEL: CPT

## 2023-09-20 PROCEDURE — 83036 HEMOGLOBIN GLYCOSYLATED A1C: CPT

## 2023-09-20 PROCEDURE — 83521 IG LIGHT CHAINS FREE EACH: CPT | Mod: 91

## 2023-09-20 PROCEDURE — 84155 ASSAY OF PROTEIN SERUM: CPT

## 2023-09-20 PROCEDURE — 3074F SYST BP LT 130 MM HG: CPT | Performed by: FAMILY MEDICINE

## 2023-09-20 PROCEDURE — 81001 URINALYSIS AUTO W/SCOPE: CPT

## 2023-09-20 PROCEDURE — 84080 ASSAY ALKALINE PHOSPHATASES: CPT

## 2023-09-20 PROCEDURE — 84075 ASSAY ALKALINE PHOSPHATASE: CPT

## 2023-09-20 PROCEDURE — 36415 COLL VENOUS BLD VENIPUNCTURE: CPT

## 2023-09-20 PROCEDURE — 76700 US EXAM ABDOM COMPLETE: CPT

## 2023-09-20 PROCEDURE — 80053 COMPREHEN METABOLIC PANEL: CPT

## 2023-09-20 PROCEDURE — 99214 OFFICE O/P EST MOD 30 MIN: CPT | Mod: 25 | Performed by: FAMILY MEDICINE

## 2023-09-20 PROCEDURE — 86334 IMMUNOFIX E-PHORESIS SERUM: CPT

## 2023-09-20 PROCEDURE — 82043 UR ALBUMIN QUANTITATIVE: CPT

## 2023-09-20 PROCEDURE — 90662 IIV NO PRSV INCREASED AG IM: CPT | Performed by: FAMILY MEDICINE

## 2023-09-20 PROCEDURE — 82570 ASSAY OF URINE CREATININE: CPT

## 2023-09-20 RX ORDER — ZOLPIDEM TARTRATE 10 MG/1
10 TABLET ORAL NIGHTLY PRN
Qty: 30 EACH | Refills: 0 | Status: CANCELLED | OUTPATIENT
Start: 2023-09-20

## 2023-09-20 RX ORDER — GABAPENTIN 100 MG/1
100-300 CAPSULE ORAL NIGHTLY PRN
Qty: 100 CAPSULE | Refills: 0 | Status: CANCELLED | OUTPATIENT
Start: 2023-09-20

## 2023-09-20 RX ORDER — GABAPENTIN 300 MG/1
CAPSULE ORAL
Qty: 100 CAPSULE | Refills: 1 | Status: SHIPPED | OUTPATIENT
Start: 2023-09-20

## 2023-09-20 ASSESSMENT — FIBROSIS 4 INDEX: FIB4 SCORE: 0.28

## 2023-09-20 ASSESSMENT — PAIN SCALES - GENERAL: PAINLEVEL: NO PAIN

## 2023-09-20 NOTE — PROGRESS NOTES
SUBJECTIVE:    Chief Complaint   Patient presents with    Results     Ultrasound     Lab Results     Leukocytosis, unspecified type    Medication Management     Glucometer reader     Sleep Problem     Pt states she has trouble sleeping, OTC melatonin not helping        HPI:     Heidi Navas is a 68 y.o. female with chronic pain, OA, fibromyalgia, DM type 2, anxiety, COPD, hypertension, hyperlipidemia and prior anemia due to history of GI bleed here for results review and review of medical issues.     Chronic insomnia-melatonin did not help.  Trazodone also did not help.     WBC- elevated, but stable. No symptoms illness. Await labs.   Chronic anemia, H/H- improved.   Has rectal prolapse- can having bleeding. If strains, pops out. Currently stable.   Doing exercises.     Chronic pain-therapy for leg strength.   Tramadol for pain as needed, when it rains.   Currently doing well with pain.   Sometimes with ankle swelling.     DM type 2- Would like continuous glucose monitor.   Await labs.     Rheumatology appointment for future- October.   Hematology appointment later in month.   Declines colonoscopy. Saw GI in past. Hx of pancreatitis.   Taking gummies make her itch.   Father- fatty liver hx and alcohol use.   She continues to smoke.     ROS:  No recent fevers or chills. No nausea or vomiting. No diarrhea. No chest pains or shortness of breath.     Current Outpatient Medications on File Prior to Visit   Medication Sig Dispense Refill    gabapentin (NEURONTIN) 100 MG Cap Take 1-3 Capsules by mouth at bedtime as needed (pain). 100 Capsule 0    diphenhydrAMINE HCl (BENADRYL ALLERGY PO) Take  by mouth.      Insulin Glargine-yfgn (SEMGLEE, YFGN,) 100 UNIT/ML Solution Pen-injector Inject 15 units in morning and 15 units in evening then adjust per physician direction. 15 mL 3    Insulin Pen Needle 32 G x 4 mm (BD PEN NEEDLE BRITANY 2ND GEN) USE ONE PEN NEEDLE IN PEN DEVICE TO INJECT INSULIN THREE TIMES DAILY. 100 Each  3    omeprazole (PRILOSEC) 20 MG delayed-release capsule Take 1 Capsule by mouth 2 times a day. 180 Capsule 3    ferrous sulfate 325 (65 Fe) MG tablet Take 1 Tablet by mouth every day. 90 Tablet 0    sertraline (ZOLOFT) 25 MG tablet Take 1 Tablet by mouth every day. 100 Tablet 3    losartan (COZAAR) 25 MG Tab Take 1 Tablet by mouth every day. 100 Tablet 3    rosuvastatin (CRESTOR) 10 MG Tab Take 1 Tablet by mouth every evening. 100 Tablet 3    glucose blood strip 1 Strip by Other route in the morning, at noon, and at bedtime. Use one strip to test blood sugar three times daily before meals. 300 Strip 3    Blood Glucose Monitoring Suppl (BLOOD GLUCOSE MONITOR SYSTEM) w/Device Kit Test blood sugar as recommended by provider. Slick Contour Next blood glucose monitoring kit. 1 Kit 0    Microlet Lancets Misc Use one lancet to test blood sugar three times daily before meals. 100 Each 0    Alcohol Swabs (ALCOHOL PREP) 70 % Pads Wipe site with prep pad prior to injection 100 Each 0    traZODone (DESYREL) 50 MG Tab Take 3 Tablets by mouth every evening. (Patient not taking: Reported on 9/20/2023) 90 Tablet 1     No current facility-administered medications on file prior to visit.       Allergies   Allergen Reactions    Aspartame Nausea     headache    Atorvastatin Calcium Unspecified and Swelling    Latex Rash and Itching    Lipitor [Atorvastatin Calcium] Swelling     Patient denies allergy 06/04/22    Other Misc Vomiting     Bug spray    Saccharin Nausea     Nausea and headache       Patient Active Problem List    Diagnosis Date Noted    Major depressive disorder, recurrent episode, mild (HCC) 12/27/2022    Reactive thrombocytosis 10/14/2022    Rectal prolapse 10/14/2022    Insomnia due to medical condition 07/11/2022    GIB (gastrointestinal bleeding) 06/04/2022    Acute on chronic blood loss anemia 06/04/2022    DM (diabetes mellitus), secondary uncontrolled 06/04/2022    Severely underweight adult 06/04/2022    ACP  (advance care planning) 06/04/2022    Hyponatremia 06/04/2022    Hyperosmolar hyperglycemic state (HHS), pressure ulcer, anemia ([facility]) 06/04/2022    Dehydration 06/04/2022    Pressure ulcer, sacrum 06/04/2022    Fatigue 06/03/2022    Weight loss, unintentional 06/03/2022    Urinary incontinence 06/03/2022    Adult failure to thrive 06/03/2022    Iron deficiency anemia due to chronic blood loss 02/09/2020    Cellulitis 01/08/2020    Anemia 05/19/2019    Atherosclerosis 05/19/2019    LVH (left ventricular hypertrophy) 05/19/2019    Arthritis of left shoulder region 07/12/2017    CVD (cardiovascular disease) 12/04/2015    Pulmonary nodule 12/01/2015    Renal stone 12/01/2015    Diverticulosis 12/01/2015    Chest pain 07/16/2015    Nausea & vomiting 07/13/2015    Opiate withdrawal ([facility]) 07/13/2015    Diabetes mellitus type 2 in nonobese ([facility]) 10/03/2014    Brachial neuritis or radiculitis 09/10/2014    History of total hip arthroplasty 07/22/2014    Leukocytosis 03/30/2014    COPD (chronic obstructive pulmonary disease) ([facility]) 03/30/2014    Arthritis 12/26/2013    Chronic pain 02/21/2012    Anxiety 01/18/2012    Vitamin D insufficiency 04/28/2011    Hypertension 03/22/2010    Fibromyalgia 10/09/2009    Swelling, mass, or lump in head and neck 07/21/2009    Tobacco use disorder 07/21/2009    Chronic ischemic heart disease 07/21/2009    Esophageal reflux 07/21/2009    Allergic rhinitis 07/21/2009    Mixed hyperlipidemia 07/21/2009    Other atopic dermatitis and related conditions 07/21/2009    Deficiency anemia 07/21/2009       Past Medical History:   Diagnosis Date    Anemia     Arthritis     OA and  not RA per rheumatology    Breath shortness     oxygen PRN     Bronchitis 2008    Colon polyps 2015    Convulsions ([facility]) 7/21/2009     ICD-10 transition    COPD (chronic obstructive pulmonary disease) ([facility])     Dental disorder     full dentures    Depression     depression, anxiety     Diverticulosis 5/10     colonoscopy     "Hemorrhoid 7/29/2009    History of colonoscopy   2005    Leukocytosis 3/30/2014    Lymphocytosis (symptomatic) 7/21/2009    MI (myocardial infarction) (HCC) 4/29/2007    Other specified disorder of intestines     constipation    Pain     shoulders, neck, lower back    Pancreatitis     Pap smear 4/2006    Pneumonia 2013    Pulmonary nodule     Renal lesion     Renal stone     S/P colonoscopy 5/2010    will need repeat in 5 years    Screening mammogram never    Seizure (HCC)     x1 in 2008    Shingles     Symptomatic menopausal or female climacteric states 7/21/2009    Type II or unspecified type diabetes mellitus without mention of complication, uncontrolled 7/21/2009    diet controlled     Unspecified urinary incontinence          OBJECTIVE:   /60 (BP Location: Left arm, Patient Position: Sitting, BP Cuff Size: Adult)   Pulse 87   Temp 36.1 °C (97 °F) (Temporal)   Resp 16   Ht 1.702 m (5' 7\")   Wt 72.8 kg (160 lb 9.6 oz)   LMP 01/01/2007   SpO2 98%   BMI 25.15 kg/m²   General: Well-developed well-nourished female, no acute distress  Neck: supple, no lymphadenopathy- cervical or supraclavicular, no thyromegaly  Cardiovascular: regular rate and rhythm, no murmurs, gallops, rubs  Lungs: clear to auscultation bilaterally, no wheezes, crackles, or rhonchi  Abdomen: +bowel sounds, soft, nontender, nondistended, no rebound, no guarding, no hepatosplenomegaly  Extremities: no cyanosis, clubbing, edema  Skin: Warm and dry  Psych: appropriate mood and affect  Narrative & Impression     9/20/2023 11:33 AM     HISTORY/REASON FOR EXAM:  Abnormal Labs  Leukocytosis.  Elevated alkaline phosphatase.     TECHNIQUE/EXAM DESCRIPTION AND NUMBER OF VIEWS:  Complete abdomen survey.     COMPARISON: CT abdomen and pelvis 6/5/2022     FINDINGS:  Liver shows diffuse increased echogenicity.  No gross mass. The liver measures 15.79 cm.     The gallbladder is normal. There is no evidence of cholelithiasis. The gallbladder wall " thickness measures 1.80 mm.  There is no pericholecystic fluid.     The common duct measures 4.60 mm.     The visualized pancreas is unremarkable.  The visualized aorta is normal in caliber with atherosclerotic changes.     Intrahepatic IVC is patent.     The portal vein is patent with hepatopetal flow. The MPV measures 1.28 cm.     The right kidney measures 10.46 cm.  Echogenic focus of the midportion measuring 5 mm.  Irregular hypoechoic structure at the upper pole with thin septation measuring 1.6 x 1.0 x 1.2 cm.  The left kidney measures 10.19 cm.  There is no hydronephrosis.     The spleen measures 8.62 cm maximally.     The bladder is decompressed.     There is no ascites.     IMPRESSION:     1.  Echogenic liver suggesting fatty infiltration or fibrosis.  2.  5 mm nonobstructing RIGHT kidney stone.  3.  RIGHT kidney cyst measuring 1.6 cm within septation (Bosniak 2).  4.  Limited evaluation of urinary bladder.              Exam Ended: 09/20/23  1:09 PM Last Resulted: 09/20/23  1:33 PM               ASSESSMENT/PLAN:    68 y.o.female     1. Chronic insomnia - continues to have issues.   Work on sleep hygiene. Discussed medication therapies, will hold on ambien therapy at this time. Consider increase in trazodone dose. Continue to monitor.        2. Leukocytosis, unspecified type - component may be associated with continued smoking.   Follow up with hematology.        3. Chronic anemia - improved. Monitor.        4. Rectal prolapse -stable, but when has issues can cause bleeding.   Recommend follow up with GI.        5. Other chronic pain -stable.   Medication refilled to use as needed.   Follow up with rheumatology.    Controlled Substance Treatment Agreement    traMADol (ULTRAM) 50 MG Tab      6. Type 2 diabetes mellitus with other specified complication, without long-term current use of insulin (HCC)   Await labs. Continue current therapy. Monitor.        7. Smoking   Encouraged smoking cessation.        8.  Need for vaccination  Influenza Vaccine, High Dose (65+ Only)        9. Arthralgia, unspecified joint   Scheduled to see rheumatology.          10. Abnormal liver ultrasound - echogenic liver noted.   Follow up with GI.        11. Renal cyst - right, appears benign.        12. Right nephrolithiasis - non obstructing.   Keep adequately hydrated.   Monitor.            Return in about 3 weeks (around 10/11/2023).    This medical record contains text that has been entered with the assistance of computer voice recognition and dictation software.  Therefore, it may contain unintended errors in text, spelling, punctuation, or grammar.

## 2023-09-22 PROCEDURE — RXMED WILLOW AMBULATORY MEDICATION CHARGE: Performed by: FAMILY MEDICINE

## 2023-09-22 RX ORDER — TRAMADOL HYDROCHLORIDE 50 MG/1
50 TABLET ORAL EVERY 6 HOURS PRN
Qty: 30 TABLET | Refills: 0 | Status: SHIPPED | OUTPATIENT
Start: 2023-09-22 | End: 2024-02-07 | Stop reason: SDUPTHER

## 2023-09-22 RX ORDER — FLASH GLUCOSE SENSOR
KIT MISCELLANEOUS
Qty: 2 EACH | Refills: 5 | Status: SHIPPED | OUTPATIENT
Start: 2023-09-22

## 2023-09-22 RX ORDER — HYDROXYCHLOROQUINE SULFATE 200 MG/1
400 TABLET, FILM COATED ORAL DAILY
Qty: 30 TABLET | Status: CANCELLED | OUTPATIENT
Start: 2023-09-22

## 2023-09-23 LAB
ALBUMIN SERPL ELPH-MCNC: 3.34 G/DL (ref 3.75–5.01)
ALPHA1 GLOB SERPL ELPH-MCNC: 0.56 G/DL (ref 0.19–0.46)
ALPHA2 GLOB SERPL ELPH-MCNC: 1.05 G/DL (ref 0.48–1.05)
B-GLOBULIN SERPL ELPH-MCNC: 1.11 G/DL (ref 0.48–1.1)
EER MONOCLONAL PROTEIN AND FLC, SERUM Q5224: ABNORMAL
GAMMA GLOB SERPL ELPH-MCNC: 1.04 G/DL (ref 0.62–1.51)
IGA SERPL-MCNC: 388 MG/DL (ref 68–408)
IGG SERPL-MCNC: 1130 MG/DL (ref 768–1632)
IGM SERPL-MCNC: 98 MG/DL (ref 35–263)
INTERPRETATION SERPL IFE-IMP: ABNORMAL
INTERPRETATION SERPL IFE-IMP: ABNORMAL
KAPPA LC FREE SER-MCNC: 42.32 MG/L (ref 3.3–19.4)
KAPPA LC FREE/LAMBDA FREE SER NEPH: 1.41 {RATIO} (ref 0.26–1.65)
LAMBDA LC FREE SERPL-MCNC: 29.97 MG/L (ref 5.71–26.3)
MONOCLONAL PROTEIN NL11656: ABNORMAL G/DL
PROT SERPL-MCNC: 7.1 G/DL (ref 6.3–8.2)

## 2023-09-24 LAB
ALP BONE SERPL-CCNC: 56 U/L (ref 0–55)
ALP ISOS SERPL HS-CCNC: 0 U/L
ALP LIVER SERPL-CCNC: 68 U/L (ref 0–94)
ALP SERPL-CCNC: 124 U/L (ref 40–120)

## 2023-09-25 ENCOUNTER — PHARMACY VISIT (OUTPATIENT)
Dept: PHARMACY | Facility: MEDICAL CENTER | Age: 68
End: 2023-09-25
Payer: COMMERCIAL

## 2023-09-25 PROCEDURE — RXMED WILLOW AMBULATORY MEDICATION CHARGE: Performed by: FAMILY MEDICINE

## 2023-09-25 PROCEDURE — RXOTC WILLOW AMBULATORY OTC CHARGE

## 2023-09-26 ENCOUNTER — HOSPITAL ENCOUNTER (OUTPATIENT)
Dept: RADIOLOGY | Facility: MEDICAL CENTER | Age: 68
End: 2023-09-26
Attending: FAMILY MEDICINE
Payer: MEDICARE

## 2023-09-26 DIAGNOSIS — R22.32 AXILLARY LUMP, LEFT: ICD-10-CM

## 2023-09-26 PROCEDURE — 76642 ULTRASOUND BREAST LIMITED: CPT | Mod: LT

## 2023-09-26 PROCEDURE — G0279 TOMOSYNTHESIS, MAMMO: HCPCS

## 2023-10-02 ENCOUNTER — TELEPHONE (OUTPATIENT)
Dept: MEDICAL GROUP | Facility: IMAGING CENTER | Age: 68
End: 2023-10-02
Payer: MEDICARE

## 2023-10-02 ENCOUNTER — OFFICE VISIT (OUTPATIENT)
Dept: RHEUMATOLOGY | Facility: MEDICAL CENTER | Age: 68
End: 2023-10-02
Attending: INTERNAL MEDICINE
Payer: MEDICARE

## 2023-10-02 VITALS
OXYGEN SATURATION: 95 % | BODY MASS INDEX: 25.69 KG/M2 | WEIGHT: 164 LBS | SYSTOLIC BLOOD PRESSURE: 104 MMHG | DIASTOLIC BLOOD PRESSURE: 58 MMHG | TEMPERATURE: 97.6 F | RESPIRATION RATE: 14 BRPM | HEART RATE: 98 BPM

## 2023-10-02 DIAGNOSIS — I10 PRIMARY HYPERTENSION: ICD-10-CM

## 2023-10-02 DIAGNOSIS — M05.79 RHEUMATOID ARTHRITIS INVOLVING MULTIPLE SITES WITH POSITIVE RHEUMATOID FACTOR (HCC): ICD-10-CM

## 2023-10-02 DIAGNOSIS — J44.9 CHRONIC OBSTRUCTIVE PULMONARY DISEASE, UNSPECIFIED COPD TYPE (HCC): ICD-10-CM

## 2023-10-02 DIAGNOSIS — I25.9 CHRONIC ISCHEMIC HEART DISEASE: ICD-10-CM

## 2023-10-02 DIAGNOSIS — D64.9 ANEMIA, UNSPECIFIED TYPE: ICD-10-CM

## 2023-10-02 DIAGNOSIS — F17.200 TOBACCO USE DISORDER: ICD-10-CM

## 2023-10-02 DIAGNOSIS — Z79.899 LONG-TERM USE OF PLAQUENIL: ICD-10-CM

## 2023-10-02 DIAGNOSIS — E11.9 DIABETES MELLITUS TYPE 2 IN NONOBESE (HCC): ICD-10-CM

## 2023-10-02 PROCEDURE — 99205 OFFICE O/P NEW HI 60 MIN: CPT | Performed by: INTERNAL MEDICINE

## 2023-10-02 PROCEDURE — 3074F SYST BP LT 130 MM HG: CPT | Performed by: INTERNAL MEDICINE

## 2023-10-02 PROCEDURE — 99212 OFFICE O/P EST SF 10 MIN: CPT | Performed by: INTERNAL MEDICINE

## 2023-10-02 PROCEDURE — 3078F DIAST BP <80 MM HG: CPT | Performed by: INTERNAL MEDICINE

## 2023-10-02 PROCEDURE — RXMED WILLOW AMBULATORY MEDICATION CHARGE: Performed by: FAMILY MEDICINE

## 2023-10-02 RX ORDER — HYDROXYCHLOROQUINE SULFATE 200 MG/1
TABLET, FILM COATED ORAL
Qty: 180 TABLET | Refills: 0 | Status: SHIPPED | OUTPATIENT
Start: 2023-10-02 | End: 2024-01-02

## 2023-10-02 ASSESSMENT — JOINT PAIN
TOTAL NUMBER OF TENDER JOINTS: 3
TOTAL NUMBER OF TENDER JOINTS: 7
TOTAL NUMBER OF SWOLLEN JOINTS: 0

## 2023-10-02 ASSESSMENT — FIBROSIS 4 INDEX: FIB4 SCORE: 0.67

## 2023-10-02 NOTE — PROGRESS NOTES
Chief Complaint-joint pain    Chief Complaint   Patient presents with    New Patient     Heidi Navas is a 68 y.o. female here as a new Rheumatology Consult referred by Viviana Thorne M.D.    This is a new patient evaluation    HPI:   This is a new patient evaluation.  Patient referred for evaluation of possible rheumatoid arthritis.  Patient states that she used to be seen by rheumatologist Dr. Wadsworth years ago who treated patient with Vioxx and Celebrex with great benefit.  Patient does not recall the particular diagnosis for which she was being treated.  Since Dr. Wadsworth has left the community patient states she has not pursued any treatment.  Patient does see orthopedics for corticosteroid injections and also has had bilateral hip replacements as well as a left shoulder replacement.  Patient states that she has bone-on-bone arthritis in the right shoulder and is reluctant to pursue a right TSA.  Patient states that she started getting deformities in her hands starting at the age of 50 years of age.  In the course of patient's work-up she was found to have a low positive rheumatoid factor subsequently referred to rheumatology for further evaluation.      PMHx  Depression  DM  CAD  COPD  Bilateral ANDERSON  Left TSA  S/p c spine laminectomy    Fam Hx  M-passed bladder cancer, DM  F-passed fatty liver  Bx6 passed bladder cancer  Sx2 bladder cancer    Soc Hx  Positive tobacco [id number] started at 13 years old  Positive MJ vape and smoke  ETOH qday with coffee  Positive blood transfusuion  Positive tattoo lips  No IVDA    Addendum 11/27/2023  G6PD 20.1 adequate 11/2023    LUCAS neg 4/2023  RF 19 (14<) 4/2023  CCP neg 4/2023  A1cb 8.5 9/2023    Bilateral Hand x-rays 1/2024-IMPRESSION:  1.  Severe osteoarthritis involving bilateral hands and wrists with fusion of the carpal bones and carpometacarpal joints bilaterally.  2.  No gross evidence for erosive osteoarthritis.    Bilateral Feet X-rays 1/2024-IMPRESSION:  1.   Severe osteoarthritis involving the radiocarpal joint.  2.  Bony fusion of the carpal bones and carpal metacarpal joints.  3.  No focal erosions or soft tissue calcifications to indicate inflammatory arthropathy.    Current medicines (including changes today)  Current Outpatient Medications   Medication Sig Dispense Refill    hydroxychloroquine (PLAQUENIL) 200 MG Tab 1 tab po bid 180 Tablet 0    Continuous Blood Gluc Sensor (FREESTYLE HAYLEY 14 DAY SENSOR) Misc Use one sensor as directed every 14 days. 2 Each 5    Continuous Blood Gluc  (FREESTYLE HAYLEY 2 READER) Device Use daily with Freestyle hayley sensor 1 Each 0    traMADol (ULTRAM) 50 MG Tab Take 1 tablet by mouth every 6 hours as needed for moderate pain for up to 30 days. 30 Tablet 0    gabapentin (NEURONTIN) 300 MG Cap Start one tab nightly and then increase to 2 tabs nightly as tolerated. 100 Capsule 1    diphenhydrAMINE HCl (BENADRYL ALLERGY PO) Take  by mouth.      Insulin Glargine-yfgn (SEMGLEE, YFZITA,) 100 UNIT/ML Solution Pen-injector Inject 15 units in morning and 15 units in evening then adjust per physician direction. 15 mL 3    Insulin Pen Needle 32 G x 4 mm (BD PEN NEEDLE BRITANY 2ND GEN) USE ONE PEN NEEDLE IN PEN DEVICE TO INJECT INSULIN THREE TIMES DAILY. 100 Each 3    omeprazole (PRILOSEC) 20 MG delayed-release capsule Take 1 Capsule by mouth 2 times a day. 180 Capsule 3    ferrous sulfate 325 (65 Fe) MG tablet Take 1 Tablet by mouth every day. 90 Tablet 0    sertraline (ZOLOFT) 25 MG tablet Take 1 Tablet by mouth every day. 100 Tablet 3    losartan (COZAAR) 25 MG Tab Take 1 Tablet by mouth every day. 100 Tablet 3    rosuvastatin (CRESTOR) 10 MG Tab Take 1 Tablet by mouth every evening. 100 Tablet 3    glucose blood strip 1 Strip by Other route in the morning, at noon, and at bedtime. Use one strip to test blood sugar three times daily before meals. 300 Strip 3    Blood Glucose Monitoring Suppl (BLOOD GLUCOSE MONITOR SYSTEM) w/Device Kit  Test blood sugar as recommended by provider. [employer] Contour Next blood glucose monitoring kit. 1 Kit 0    Microlet Lancets Misc Use one lancet to test blood sugar three times daily before meals. 100 Each 0    Alcohol Swabs (ALCOHOL PREP) 70 % Pads Wipe site with prep pad prior to injection 100 Each 0    traZODone (DESYREL) 50 MG Tab Take 3 Tablets by mouth every evening. (Patient not taking: Reported on 9/20/2023) 90 Tablet 1     No current facility-administered medications for this visit.     She  has a past medical history of Anemia, Arthritis, Breath shortness, Bronchitis (2008), Colon polyps (2015), Convulsions ([facility]) (7/21/2009), COPD (chronic obstructive pulmonary disease) (HCC), Dental disorder, Depression, Diverticulosis (5/10 ), Hemorrhoid (7/29/2009), History of colonoscopy (  2005), Leukocytosis (3/30/2014), Lymphocytosis (symptomatic) (7/21/2009), MI (myocardial infarction) ([facility]) (4/29/2007), Other specified disorder of intestines, Pain, Pancreatitis, Pap smear (4/2006), Pneumonia (2013), Pulmonary nodule, Renal lesion, Renal stone, S/P colonoscopy (5/2010), Screening mammogram (never), Seizure (HCC), Shingles, Symptomatic menopausal or female climacteric states (7/21/2009), Type II or unspecified type diabetes mellitus without mention of complication, uncontrolled (7/21/2009), and Unspecified urinary incontinence.  She  has a past surgical history that includes tonsillectomy; eye surgery (laser); other orthopedic surgery; hip arthroplasty mis total (7/16/14); cervical fusion posterior (9/10/2014); gyn surgery (1992); other cardiac surgery (2007); shoulder arthroplasty total (Left, 7/12/2017); gastroscopy (N/A, 1/4/2020); and colonoscopy (N/A, 1/6/2020).  Family History   Problem Relation Age of Onset    Diabetes Mother     Cancer Mother         bladder cancer    Diabetes Brother     Cancer Brother         bladder cancer    Diabetes Brother     Cancer Sister         bladder cancer     Family Status    Relation Name Status    Mo  Alive    Bro  Alive    Bro  Alive    Sis  (Not Specified)     Social History     Tobacco Use    Smoking status: Every Day     Current packs/day: 0.25     Average packs/day: 0.3 packs/day for 50.0 years (12.5 ttl pk-yrs)     Types: Cigarettes    Smokeless tobacco: Never   Vaping Use    Vaping Use: Never used   Substance Use Topics    Alcohol use: No     Alcohol/week: 0.0 oz    Drug use: Yes     Comment: MARIJUANA- 2 times per week      Social History     Social History Narrative    Has care provider.     Lives alone.        ROS   Other than what is mentioned in HPI or physical exam, there is no history of headaches, double vision or blurred vision. No temporal tenderness or jaw claudication.  No trouble swallowing difficulties or sore throats.  No chest complaints including chest pain, cough or sputum production. No GI complaints including nausea, vomiting, change in bowel habits, or past peptic ulcer disease. No history of blood in the stools. No urinary complaints including dysuria or frequency. No history of alopecia, photosensitivity, oral ulcerations, Raynaud's phenomena.       Objective:     /58   Pulse 98   Temp 36.4 °C (97.6 °F) (Temporal)   Resp 14   Wt 74.4 kg (164 lb)   SpO2 95%  Body mass index is 25.69 kg/m².  Physical Exam:    Constitutional: Alert and oriented X3, no distress.Skin: Warm, dry, good turgor, no rashes in visible areas.  Eye: Equal, round and reactive, conjunctiva clear, lids normal EOM intactENMT: Lips without lesions, good dentition, no oropharyngeal ulcers, moist buccal mucosa, pinna without deformityNeck: Trachea midline, no masses, no thyromegaly.Lymph:  No cervical lymphadenopathy, no axillary lymphadenopathy, no supraclavicular lymphadenopathyRespiratory: Unlabored respiratory effort, lungs clear to auscultation, no wheezes, no ronchi.Cardiovascular: Normal S1, S2, Regular rate and rhythm, no murmurs rubs or gallops  Abdomen: Soft,  non-tender, no masses, no hepatosplenomegaly.Psych: Alert and oriented x3, normal affect and mood.Neuro Cranial nerves 2-12 are grossly intact, no loss of sensation LEExt:no joint laxity noted in bilateral arms, no joint laxity noted in bilateral legs, bony hypertrophy of the right wrist, with ulnar styloid prominence, patient does have Heberden nodes bilateral index DIP joints with flexion contractures of bilateral index DIP joints, patient also has Grant's nodes on bilateral third finger PIP joints with lateral deviation of distal third fingers bilaterally.  Toes without crossover toes and without splay toes, patient does have significantly decreased range of motion of bilateral shoulders left a little better than the right the right abduction to maybe 20 degrees.  No lincoln synovitis in the shoulders.  Patient also has significantly decreased range of motion of C-spine secondary to fusion for DJD per patient report.    Lab Results   Component Value Date/Time    HEPBCORIGM Negative 10/16/2019 11:14 AM    HEPBSAG Negative 10/16/2019 11:14 AM     Lab Results   Component Value Date/Time    HEPCAB Negative 10/16/2019 11:14 AM     Lab Results   Component Value Date/Time    SODIUM 141 09/20/2023 10:32 AM    POTASSIUM 4.1 09/20/2023 10:32 AM    CHLORIDE 100 09/20/2023 10:32 AM    CO2 28 09/20/2023 10:32 AM    GLUCOSE 142 (H) 09/20/2023 10:32 AM    BUN 10 09/20/2023 10:32 AM    CREATININE 0.65 09/20/2023 10:32 AM    CREATININE 0.8 04/09/2009 07:55 AM      Lab Results   Component Value Date/Time    WBC 16.9 (H) 09/20/2023 10:32 AM    RBC 4.94 09/20/2023 10:32 AM    HEMOGLOBIN 11.9 (L) 09/20/2023 10:32 AM    HEMATOCRIT 38.8 09/20/2023 10:32 AM    MCV 78.5 (L) 09/20/2023 10:32 AM    MCH 24.1 (L) 09/20/2023 10:32 AM    MCHC 30.7 (L) 09/20/2023 10:32 AM    MPV 9.5 09/20/2023 10:32 AM    NEUTSPOLYS 79.60 (H) 09/20/2023 10:32 AM    LYMPHOCYTES 14.40 (L) 09/20/2023 10:32 AM    MONOCYTES 2.50 09/20/2023 10:32 AM    EOSINOPHILS  2.50 09/20/2023 10:32 AM    BASOPHILS 0.70 09/20/2023 10:32 AM    HYPOCHROMIA 1+ 01/13/2023 02:01 PM    ANISOCYTOSIS 1+ 01/13/2023 02:01 PM      Lab Results   Component Value Date/Time    CALCIUM 9.0 09/20/2023 10:32 AM    ASTSGOT 15 09/20/2023 10:32 AM    ALTSGPT 7 09/20/2023 10:32 AM    ALKPHOSPHAT 119 (H) 09/20/2023 10:32 AM    TBILIRUBIN 0.2 09/20/2023 10:32 AM    ALBUMIN 3.9 09/20/2023 10:32 AM    ALBUMIN 3.34 (L) 09/20/2023 10:32 AM    TOTPROTEIN 7.4 09/20/2023 10:32 AM    TOTPROTEIN 7.1 09/20/2023 10:32 AM     Lab Results   Component Value Date/Time    URICACID 6.7 10/16/2019 11:14 AM    RHEUMFACTN 19 (H) 04/28/2023 11:53 AM    CCPANTIBODY 2 04/28/2023 11:53 AM    ANTINUCAB None Detected 04/28/2023 11:53 AM     Lab Results   Component Value Date/Time    SEDRATEWES 13 04/28/2023 11:53 AM    CREACTPROT 5.68 (H) 04/28/2023 11:53 AM     Lab Results   Component Value Date/Time    ANCAIGG <1:20 01/08/2020 12:14 PM    ANTISSBSJ 1 10/16/2019 11:14 AM     Lab Results   Component Value Date/Time    COLORURINE Yellow 09/20/2023 10:31 AM    SPECGRAVITY 1.018 09/20/2023 10:31 AM    PHURINE 6.5 09/20/2023 10:31 AM    GLUCOSEUR Negative 09/20/2023 10:31 AM    KETONES Negative 09/20/2023 10:31 AM    PROTEINURIN 30 (A) 09/20/2023 10:31 AM     Lab Results   Component Value Date/Time    TOTPROTUR 11.7 10/16/2019 11:14 AM    TOTALVOLUME N/A 06/17/2010 12:10 PM     Lab Results   Component Value Date/Time    SSA60 0 10/16/2019 11:14 AM    SSA52 1 10/16/2019 11:14 AM     Lab Results   Component Value Date/Time    HBA1C 8.5 (H) 09/20/2023 10:32 AM     Lab Results   Component Value Date/Time    CPKTOTAL 33 04/28/2023 11:53 AM     Lab Results   Component Value Date/Time    ABTH72LXMI Negative 10/16/2019 11:14 AM     Lab Results   Component Value Date/Time    ACESERUM 38 10/16/2019 11:14 AM     Lab Results   Component Value Date/Time    25HYDROXY 57 09/06/2022 10:59 AM     Lab Results   Component Value Date/Time    TSHULTRASEN 1.080  06/04/2022 06:08 PM    FREET4 0.70 02/07/2017 02:33 PM     Lab Results   Component Value Date/Time     09/20/2023 10:32 AM    TTRANSIGA 4 05/16/2012 09:11 AM     Results for orders placed during the hospital encounter of 01/10/12    DX-PELVIS-1 OR 2 VIEWS    Impression  Severe bilateral hip arthritis.    Results for orders placed during the hospital encounter of 12/01/22    DX-SHOULDER 2+ RIGHT    Impression  Severe osteoarthritis of the glenohumeral joint    Results for orders placed during the hospital encounter of 01/10/12    DX-HAND 3+    Impression  Arthritic change predominately affecting wrist with bony fusion of the carpal bones.    Results for orders placed during the hospital encounter of 03/29/14    2-D ECHO W/DOPPLER (ECHOCARDIOGRAM COMP W/O CONT)    Results for orders placed in visit on 08/15/14    MR-CERVICAL SPINE-W/O    Impression  1.  Development of basilar invagination since the plain films of the 1/10/2012. The odontoid process projects about 10.8 mm above the foramen magnum and nearly effaces ventral subarachnoid space at the craniovertebral junction. One would question whether rheumatoid arthritis or other inflammatory arthropathy has created ligamentous instability.  2.  Slight reversal of usual cervical lordosis.  3.  Multilevel degenerative disc disease with disc space narrowing, spondylotic change, and disc-osteophyte changes resulting in variable central spinal stenosis and multilevel foraminal stenosis as outlined above.  4.  No pathologic cord signal abnormality at any cervical level.      Results for orders placed during the hospital encounter of 09/15/14    DX-CERVICAL SPINE-2 OR 3 VIEWS    Impression  Status post posterior fusion from C2-C7.    Assessment and Plan:  1. Rheumatoid arthritis involving multiple sites with positive rheumatoid factor (HCC)  Low positive rheumatoid factor, changes seen on physical exam compatible with probable rheumatoid arthritis i.e. especially the  right wrist, long discussion with the patient we opted to do a trial of Plaquenil 200 mg p.o. twice daily.  Today also do bilateral hand x-rays for evaluation of erosive/inflammatory arthritis as well as right wrist x-ray for same we will reevaluate in about 3 months for efficacy of the hydroxychloroquine  - IRON/TOTAL IRON BIND; Future  - G6PD QUANT + RBC; Future  - hydroxychloroquine (PLAQUENIL) 200 MG Tab; 1 tab po bid  Dispense: 180 Tablet; Refill: 0  - DX-JOINT SURVEY-HANDS SINGLE VIEW; Future  - DX-WRIST-LIMITED 2- RIGHT; Future  - Comp Metabolic Panel; Future  - CBC WITH DIFFERENTIAL; Future  - Sed Rate; Future    2. Long-term use of Plaquenil  Start hydroxychloroquine 200 mg p.o. twice daily, today check G6PD levels, if we continue hydroxychloroquine we will schedule yearly ophthalmology evaluations.  Patient needs monitoring labs every 6 months, labs ordered for patient  - IRON/TOTAL IRON BIND; Future  - G6PD QUANT + RBC; Future  - hydroxychloroquine (PLAQUENIL) 200 MG Tab; 1 tab po bid  Dispense: 180 Tablet; Refill: 0  - DX-JOINT SURVEY-HANDS SINGLE VIEW; Future  - DX-WRIST-LIMITED 2- RIGHT; Future  - Comp Metabolic Panel; Future  - CBC WITH DIFFERENTIAL; Future  - Sed Rate; Future    3. Anemia, unspecified type  From records looks like iron deficiency, patient does have a history of iron infusions, recent CBC indicates a low MCV, today check iron level may benefit from repeat iron infusions.  Patient states she does take oral iron  - IRON/TOTAL IRON BIND; Future  - G6PD QUANT + RBC; Future  - hydroxychloroquine (PLAQUENIL) 200 MG Tab; 1 tab po bid  Dispense: 180 Tablet; Refill: 0  - DX-JOINT SURVEY-HANDS SINGLE VIEW; Future  - DX-WRIST-LIMITED 2- RIGHT; Future  - Comp Metabolic Panel; Future  - CBC WITH DIFFERENTIAL; Future  - Sed Rate; Future    4. Primary hypertension  May impact the type of medications we can use for this patient's arthritis. We will have to keep this under advisement.    5. Chronic  obstructive pulmonary disease, unspecified COPD type (HCC)  Exacerbated by tobacco abuse, advised to stop smoking, patient states understanding but reluctant to pursue    6. Diabetes mellitus type 2 in nonobese (HCC)  Followed by patients PCP, high blood sugar can exacerbate inflammatory state of patient's arthritis, discussed with patient the need to monitor and control blood sugars, patient states understanding    7. Chronic ischemic heart disease    8. Tobacco use disorder  Tobacco abuse is known to exacerbate rheumatoid arthritis, strongly advised patient to stop tobacco abuse, patient states understanding.  3-minute discussion with patient discussing the risks of tobacco abuse and advised treatment options including nicotine patch.  Patient states understanding.    Records requested.  Followup: Return in about 3 months (around 1/2/2024). or sooner prn  Patient was seen 60 minutes face-to-face (excluding time for procedures)  of which more than 50% the time was spent counseling the patient regarding  rheumatological conditions and care. Therapy was discussed in detail.  Thank you for this referral.    Please note that this dictation was created using voice recognition software. I have made every reasonable attempt to correct obvious errors, but I expect that there are errors of grammar and possibly content that I did not discover before finalizing the note.

## 2023-10-02 NOTE — TELEPHONE ENCOUNTER
Pt left VM to report she is having trouble getting her insulin from the [facility] and she will run out in a couple of days. She also was unsure of who her appointment is with this afternoon. Spoke with [facility] to request insulin delivery to pt. They will have to order insulin and can get it to patient by the end of the week. Pt can  sooner and advised to call [facility] if she wants to do this and cancel delivery. Pt notified and will wait for delivery. Provided appointment details for her next couple of appointments at [facility]. Pt reports she ordered a temu watch that is supposed to help monitor blood sugar. She also is going to start taking berberine for her blood sugar. Will update pcp. No other needs at this time.

## 2023-10-02 NOTE — ASSESSMENT & PLAN NOTE
Patient here for eval of RA, s/p eval by Dr Pa who treated patient with Vioxx and celebrex with benefit  Patient stes cbone on bone right shoulder  Pain and deformity of hands starting at the age of 50 years old      Right wrist bony hypertrophy  hn bilateral index dip  Bilateral middle PIP lateral deviation    PMHx  Depression  DM  CAD  COPD  Bilateral ANDERSON  Left TSA  S/p c spine laminectomy    Fam Hx  M-passed bladder cancer, DM  F-passed fatty liver  Bx6 passed bladder cancer  Sx2 bladder cancer    Soc Hx  Positive tobacco [id number] started at 13 years old  Positive MJ vape and smoke  ETOH qday with coffee  Positive blood transfusuion  Positive tattoo lips  No IVDA    LUCAS neg 4/2023  RF 19 (14<) 4/2023  CCP neg 4/2023  A1cb 8.5 9/2023

## 2023-10-06 ENCOUNTER — PHARMACY VISIT (OUTPATIENT)
Dept: PHARMACY | Facility: MEDICAL CENTER | Age: 68
End: 2023-10-06
Payer: COMMERCIAL

## 2023-10-06 PROCEDURE — RXMED WILLOW AMBULATORY MEDICATION CHARGE: Performed by: FAMILY MEDICINE

## 2023-10-11 ENCOUNTER — OFFICE VISIT (OUTPATIENT)
Dept: MEDICAL GROUP | Facility: IMAGING CENTER | Age: 68
End: 2023-10-11
Payer: MEDICARE

## 2023-10-11 VITALS
HEART RATE: 95 BPM | OXYGEN SATURATION: 94 % | RESPIRATION RATE: 16 BRPM | TEMPERATURE: 98.1 F | HEIGHT: 67 IN | DIASTOLIC BLOOD PRESSURE: 76 MMHG | WEIGHT: 164 LBS | SYSTOLIC BLOOD PRESSURE: 122 MMHG | BODY MASS INDEX: 25.74 KG/M2

## 2023-10-11 DIAGNOSIS — F17.200 SMOKING: ICD-10-CM

## 2023-10-11 DIAGNOSIS — D72.829 LEUKOCYTOSIS, UNSPECIFIED TYPE: ICD-10-CM

## 2023-10-11 DIAGNOSIS — B37.9 YEAST INFECTION: ICD-10-CM

## 2023-10-11 DIAGNOSIS — M25.50 ARTHRALGIA, UNSPECIFIED JOINT: ICD-10-CM

## 2023-10-11 DIAGNOSIS — R74.8 ALKALINE PHOSPHATASE ELEVATION: ICD-10-CM

## 2023-10-11 DIAGNOSIS — D75.839 THROMBOCYTOSIS: ICD-10-CM

## 2023-10-11 DIAGNOSIS — F41.9 ANXIETY: ICD-10-CM

## 2023-10-11 DIAGNOSIS — F33.0 MAJOR DEPRESSIVE DISORDER, RECURRENT EPISODE, MILD (HCC): ICD-10-CM

## 2023-10-11 DIAGNOSIS — D64.9 MILD ANEMIA: ICD-10-CM

## 2023-10-11 DIAGNOSIS — E11.69 TYPE 2 DIABETES MELLITUS WITH OTHER SPECIFIED COMPLICATION, WITHOUT LONG-TERM CURRENT USE OF INSULIN (HCC): ICD-10-CM

## 2023-10-11 DIAGNOSIS — R89.9 ABNORMAL LABORATORY TEST RESULT: ICD-10-CM

## 2023-10-11 PROCEDURE — 99214 OFFICE O/P EST MOD 30 MIN: CPT | Performed by: FAMILY MEDICINE

## 2023-10-11 PROCEDURE — 3074F SYST BP LT 130 MM HG: CPT | Performed by: FAMILY MEDICINE

## 2023-10-11 PROCEDURE — 3078F DIAST BP <80 MM HG: CPT | Performed by: FAMILY MEDICINE

## 2023-10-11 PROCEDURE — 1126F AMNT PAIN NOTED NONE PRSNT: CPT | Performed by: FAMILY MEDICINE

## 2023-10-11 RX ORDER — FLUCONAZOLE 150 MG/1
150 TABLET ORAL DAILY
Qty: 1 TABLET | Refills: 0 | Status: SHIPPED | OUTPATIENT
Start: 2023-10-11

## 2023-10-11 ASSESSMENT — PAIN SCALES - GENERAL: PAINLEVEL: NO PAIN

## 2023-10-11 ASSESSMENT — FIBROSIS 4 INDEX: FIB4 SCORE: 0.67

## 2023-10-11 NOTE — PROGRESS NOTES
SUBJECTIVE:    Chief Complaint   Patient presents with    Follow-Up     On medication        HPI:     Heidi Navas is a 68 y.o. female with chronic pain, OA, fibromyalgia, DM type 2, anxiety, COPD, hypertension, hyperlipidemia and prior anemia due to history of GI bleed here for review of medical issues and medication.     Feels grumpy today, thus would like to get out from visit as soon as possible.   States she stopped some of her medications the past week.   Entirely off trazodone.   Off sertraline per report.   Wanted to see how her new arthritis medication worked.   One week on hydroxychlorquine.   Tramadol as needed, only 7 tabs.     Leukocytosis- Nov appt with hematology.   She is still in contact with RN case management.     Smoking- does not desire to quite at this time.   No rectal bleeding.   Would like to use [email or web address] delivery service.   Glucose 146- states taking insulin regularly.   Feels she has a yeast infection.        ROS:  No recent fevers or chills. No nausea or vomiting. No diarrhea. No chest pains or shortness of breath. No lower extremity edema. No other current symptoms of illness.     Current Outpatient Medications on File Prior to Visit   Medication Sig Dispense Refill    hydroxychloroquine (PLAQUENIL) 200 MG Tab 1 tab po bid 180 Tablet 0    Continuous Blood Gluc Sensor (FREESTYLE HAYLEY 14 DAY SENSOR) Misc Use one sensor as directed every 14 days. 2 Each 5    Continuous Blood Gluc  (FREESTYLE HAYLEY 2 READER) Device Use daily with Freestyle hayley sensor 1 Each 0    traMADol (ULTRAM) 50 MG Tab Take 1 tablet by mouth every 6 hours as needed for moderate pain for up to 30 days. 30 Tablet 0    gabapentin (NEURONTIN) 300 MG Cap Start one tab nightly and then increase to 2 tabs nightly as tolerated. 100 Capsule 1    diphenhydrAMINE HCl (BENADRYL ALLERGY PO) Take  by mouth.      Insulin Glargine-yfgn (SEMGLEE, YFGN,) 100 UNIT/ML Solution Pen-injector Inject 15 units in morning and 15  units in evening then adjust per physician direction. 15 mL 3    Insulin Pen Needle 32 G x 4 mm (BD PEN NEEDLE BRITANY 2ND GEN) USE ONE PEN NEEDLE IN PEN DEVICE TO INJECT INSULIN THREE TIMES DAILY. 100 Each 3    omeprazole (PRILOSEC) 20 MG delayed-release capsule Take 1 Capsule by mouth 2 times a day. 180 Capsule 3    ferrous sulfate 325 (65 Fe) MG tablet Take 1 Tablet by mouth every day. 90 Tablet 0    sertraline (ZOLOFT) 25 MG tablet Take 1 Tablet by mouth every day. 100 Tablet 3    losartan (COZAAR) 25 MG Tab Take 1 Tablet by mouth every day. 100 Tablet 3    rosuvastatin (CRESTOR) 10 MG Tab Take 1 Tablet by mouth every evening. 100 Tablet 3    glucose blood strip 1 Strip by Other route in the morning, at noon, and at bedtime. Use one strip to test blood sugar three times daily before meals. 300 Strip 3    Blood Glucose Monitoring Suppl (BLOOD GLUCOSE MONITOR SYSTEM) w/Device Kit Test blood sugar as recommended by provider. Slick Contour Next blood glucose monitoring kit. 1 Kit 0    Microlet Lancets Misc Use one lancet to test blood sugar three times daily before meals. 100 Each 0    Alcohol Swabs (ALCOHOL PREP) 70 % Pads Wipe site with prep pad prior to injection 100 Each 0    traZODone (DESYREL) 50 MG Tab Take 3 Tablets by mouth every evening. (Patient not taking: Reported on 9/20/2023) 90 Tablet 1     No current facility-administered medications on file prior to visit.       Allergies   Allergen Reactions    Aspartame Nausea     headache    Atorvastatin Calcium Unspecified and Swelling    Latex Rash and Itching    Lipitor [Atorvastatin Calcium] Swelling     Patient denies allergy 06/04/22    Other Misc Vomiting     Bug spray    Saccharin Nausea     Nausea and headache       Patient Active Problem List    Diagnosis Date Noted    Major depressive disorder, recurrent episode, mild (HCC) 12/27/2022    Reactive thrombocytosis 10/14/2022    Rectal prolapse 10/14/2022    Insomnia due to medical condition 07/11/2022     GIB (gastrointestinal bleeding) 06/04/2022    Acute on chronic blood loss anemia 06/04/2022    DM (diabetes mellitus), secondary uncontrolled 06/04/2022    Severely underweight adult 06/04/2022    ACP (advance care planning) 06/04/2022    Hyponatremia 06/04/2022    Hyperosmolar hyperglycemic state (HHS), pressure ulcer, anemia (HCC) 06/04/2022    Dehydration 06/04/2022    Pressure ulcer, sacrum 06/04/2022    Fatigue 06/03/2022    Weight loss, unintentional 06/03/2022    Urinary incontinence 06/03/2022    Adult failure to thrive 06/03/2022    Iron deficiency anemia due to chronic blood loss 02/09/2020    Cellulitis 01/08/2020    Anemia 05/19/2019    Atherosclerosis 05/19/2019    LVH (left ventricular hypertrophy) 05/19/2019    Arthritis of left shoulder region 07/12/2017    CVD (cardiovascular disease) 12/04/2015    Pulmonary nodule 12/01/2015    Renal stone 12/01/2015    Diverticulosis 12/01/2015    Chest pain 07/16/2015    Nausea & vomiting 07/13/2015    Opiate withdrawal (HCC) 07/13/2015    Diabetes mellitus type 2 in nonobese ([facility]) 10/03/2014    Brachial neuritis or radiculitis 09/10/2014    History of total hip arthroplasty 07/22/2014    Leukocytosis 03/30/2014    COPD (chronic obstructive pulmonary disease) (HCC) 03/30/2014    Arthritis 12/26/2013    Chronic pain 02/21/2012    Anxiety 01/18/2012    Vitamin D insufficiency 04/28/2011    Hypertension 03/22/2010    Fibromyalgia 10/09/2009    Swelling, mass, or lump in head and neck 07/21/2009    Tobacco use disorder 07/21/2009    Rheumatoid arthritis involving multiple sites with positive rheumatoid factor (HCC) 07/21/2009    Chronic ischemic heart disease 07/21/2009    Esophageal reflux 07/21/2009    Allergic rhinitis 07/21/2009    Mixed hyperlipidemia 07/21/2009    Other atopic dermatitis and related conditions 07/21/2009    Deficiency anemia 07/21/2009       Past Medical History:   Diagnosis Date    Anemia     Arthritis     OA and  not RA per rheumatology     "Breath shortness     oxygen PRN     Bronchitis 2008    Colon polyps 2015    Convulsions (HCC) 7/21/2009     ICD-10 transition    COPD (chronic obstructive pulmonary disease) (HCC)     Dental disorder     full dentures    Depression     depression, anxiety     Diverticulosis 5/10     colonoscopy    Hemorrhoid 7/29/2009    History of colonoscopy   2005    Leukocytosis 3/30/2014    Lymphocytosis (symptomatic) 7/21/2009    MI (myocardial infarction) (HCC) 4/29/2007    Other specified disorder of intestines     constipation    Pain     shoulders, neck, lower back    Pancreatitis     Pap smear 4/2006    Pneumonia 2013    Pulmonary nodule     Renal lesion     Renal stone     S/P colonoscopy 5/2010    will need repeat in 5 years    Screening mammogram never    Seizure (HCC)     x1 in 2008    Shingles     Symptomatic menopausal or female climacteric states 7/21/2009    Type II or unspecified type diabetes mellitus without mention of complication, uncontrolled 7/21/2009    diet controlled     Unspecified urinary incontinence          OBJECTIVE:   /76 (BP Location: Left arm, Patient Position: Sitting, BP Cuff Size: Adult)   Pulse 95   Temp 36.7 °C (98.1 °F) (Temporal)   Resp 16   Ht 1.702 m (5' 7\")   Wt 74.4 kg (164 lb)   LMP 01/01/2007   SpO2 94%   BMI 25.69 kg/m²   General: Well-developed well-nourished female, no acute distress  Neck: supple, no lymphadenopathy- cervical or supraclavicular, no thyromegaly  Cardiovascular: regular rate and rhythm, no murmurs, gallops, rubs  Lungs: clear to auscultation bilaterally, no wheezes, crackles, or rhonchi  Abdomen: +bowel sounds, soft, nontender, nondistended, no rebound, no guarding, no hepatosplenomegaly  Extremities: no cyanosis, clubbing, edema  Skin: Warm and dry  Psych: appropriate mood and affect       Latest Reference Range & Units 09/20/23 10:32   WBC 4.8 - 10.8 K/uL 16.9 (H)   RBC 4.20 - 5.40 M/uL 4.94   Hemoglobin 12.0 - 16.0 g/dL 11.9 (L)   Hematocrit 37.0 " - 47.0 % 38.8   MCV 81.4 - 97.8 fL 78.5 (L)   MCH 27.0 - 33.0 pg 24.1 (L)   MCHC 32.2 - 35.5 g/dL 30.7 (L)   RDW 35.9 - 50.0 fL 52.2 (H)   Platelet Count 164 - 446 K/uL 577 (H)   MPV 9.0 - 12.9 fL 9.5   Neutrophils-Polys 44.00 - 72.00 % 79.60 (H)   Neutrophils (Absolute) 1.82 - 7.42 K/uL 13.44 (H)   Lymphocytes 22.00 - 41.00 % 14.40 (L)   Lymphs (Absolute) 1.00 - 4.80 K/uL 2.44   Monocytes 0.00 - 13.40 % 2.50   Monos (Absolute) 0.00 - 0.85 K/uL 0.43   Eosinophils 0.00 - 6.90 % 2.50   Eos (Absolute) 0.00 - 0.51 K/uL 0.43   Basophils 0.00 - 1.80 % 0.70   Baso (Absolute) 0.00 - 0.12 K/uL 0.12   Immature Granulocytes 0.00 - 0.90 % 0.30   Immature Granulocytes (abs) 0.00 - 0.11 K/uL 0.05   Nucleated RBC 0.00 - 0.20 /100 WBC 0.00   NRBC (Absolute) K/uL 0.00   Sodium 135 - 145 mmol/L 141   Potassium 3.6 - 5.5 mmol/L 4.1   Chloride 96 - 112 mmol/L 100   Co2 20 - 33 mmol/L 28   Anion Gap 7.0 - 16.0  13.0   Glucose 65 - 99 mg/dL 142 (H)   Bun 8 - 22 mg/dL 10   Creatinine 0.50 - 1.40 mg/dL 0.65   GFR (CKD-EPI) >60 mL/min/1.73 m 2 96   Calcium 8.5 - 10.5 mg/dL 9.0   Correct Calcium 8.5 - 10.5 mg/dL 9.1   AST(SGOT) 12 - 45 U/L 15   ALT(SGPT) 2 - 50 U/L 7   Alkaline Phosphatase 30 - 99 U/L 119 (H)   Total Bilirubin 0.1 - 1.5 mg/dL 0.2   Albumin 3.2 - 4.9 g/dL 3.9   Total Protein 6.0 - 8.2 g/dL 7.4   Globulin 1.9 - 3.5 g/dL 3.5   A-G Ratio g/dL 1.1   Glycohemoglobin 4.0 - 5.6 % 8.5 (H)   Estim. Avg Glu mg/dL 197   Fasting Status  Fasting   Cholesterol,Tot 100 - 199 mg/dL 103   Triglycerides 0 - 149 mg/dL 83   HDL >=40 mg/dL 40   LDL <100 mg/dL 46   Alkaline Phosphatase 40 - 120 U/L 124 (H)   Bone Fractions 0 - 55 U/L 56 (H)   Liver Fractions 0 - 94 U/L 68   Alk Phos Other Calc U/L 0   Immunoglobulin A 68 - 408 mg/dL 388   Immunoglobulin G 768 - 1632 mg/dL 1130   Immunoglobulin M 35 - 263 mg/dL 98   Interpretation  See Note   Albumin 3.75 - 5.01 g/dL 3.34 (L)   Gamma Globulin 0.62 - 1.51 g/dL 1.04   Alpha-1 Globulin 0.19 - 0.46  g/dL 0.56 (H)   Alpha-2 Globulin 0.48 - 1.05 g/dL 1.05   Beta Globulin 0.48 - 1.10 g/dL 1.11 (H)   Free Kappa Light Chains 3.30 - 19.40 mg/L 42.32 (H)   Free Lambda Light Chains 5.71 - 26.30 mg/L 29.97 (H)   Kappa-Lambda Ratio 0.26 - 1.65  1.41   Immunofixation  MATT Done   Monoclonal Protein g/dL Not Applicable   EER Monoclonal Protein and FLC, Serum  See Note   Total Protein, Serum 6.3 - 8.2 g/dL 7.1   (H): Data is abnormally high  (L): Data is abnormally low      ASSESSMENT/PLAN:    68 y.o.female       1. Arthralgia, unspecified joint - stable, continue current medication. Follow up with rheumatology.        2. Major depressive disorder, recurrent episode, mild (HCC) - notes some mood changes, but has been of medication. Recommend restart on zoloft therapy. Monitor.        3. Anxiety - as above.        4. Leukocytosis, unspecified type - has had intermittently elevated levels.   Follow up with hematology.        5. Smoking -does not desire to quit at this time.   Encouraged smoking cessation. Monitor.        6. Type 2 diabetes mellitus with other specified complication, without long-term current use of insulin (HCC) - uncontrolled.   Restart metformin therapy. Continue insulin therapy. Monitor.  metFORMIN (GLUCOPHAGE) 500 MG Tab      7. Yeast infection  fluconazole (DIFLUCAN) 150 MG tablet      8. Thrombocytosis - likely reactive. Follow up with hematology.        9. Mild anemia - improved. Stable. Issues due to rectal prolapse and bleeding, patient has not follow up with GI. Has been on iron therapy.   Monitor.        10. Alkaline phosphatase elevation - stable. Monitor.        11. Abnormal laboratory test result   Follow up with hematology.          Follow up sooner as needed.   Return in about 2 months (around 12/11/2023).    This medical record contains text that has been entered with the assistance of computer voice recognition and dictation software.  Therefore, it may contain unintended errors in text,  spelling, punctuation, or grammar.

## 2023-10-13 ENCOUNTER — PATIENT OUTREACH (OUTPATIENT)
Dept: HEALTH INFORMATION MANAGEMENT | Facility: OTHER | Age: 68
End: 2023-10-13
Payer: MEDICARE

## 2023-10-13 ENCOUNTER — PHARMACY VISIT (OUTPATIENT)
Dept: PHARMACY | Facility: MEDICAL CENTER | Age: 68
End: 2023-10-13
Payer: COMMERCIAL

## 2023-10-13 DIAGNOSIS — G89.4 CHRONIC PAIN DISORDER: ICD-10-CM

## 2023-10-13 DIAGNOSIS — I51.7 LVH (LEFT VENTRICULAR HYPERTROPHY): ICD-10-CM

## 2023-10-13 DIAGNOSIS — E11.9 DIABETES MELLITUS TYPE 2 IN NONOBESE (HCC): ICD-10-CM

## 2023-10-13 DIAGNOSIS — I25.10 CVD (CARDIOVASCULAR DISEASE): ICD-10-CM

## 2023-10-13 PROCEDURE — RXMED WILLOW AMBULATORY MEDICATION CHARGE: Performed by: FAMILY MEDICINE

## 2023-10-13 PROCEDURE — 99490 CHRNC CARE MGMT STAFF 1ST 20: CPT | Performed by: FAMILY MEDICINE

## 2023-10-13 NOTE — PROGRESS NOTES
Assessment    Pt called with questions regarding lab work ordered by Dr. Trotter. Reviewed notes and provided clarification for pt. Monthly outreach completed. New medication started by rheumatologist. Pt reports she doesn't think her insulin is working very well right now. She has a new watch that is supposed to measure her vital signs and blood pressure. She is testing the readings against the medical equipment she already has at home. Pt reports she thinks she might have parasites and that this may be affecting her overall health. Pt reports she had parasites in the past. She is experiencing some itchiness She is unsure of where she may have gotten them from. Will review with pcp to see if she recommends any testing at this time. No other questions or concerns.     Education    DM, medication management, vital sign monitoring      Plan of Care and Goals    Continue plan of care    Barriers:    Pain transportation    Progress:    Stable     Next outreach:  1 month

## 2023-10-16 PROCEDURE — RXMED WILLOW AMBULATORY MEDICATION CHARGE: Performed by: FAMILY MEDICINE

## 2023-10-16 RX ORDER — FERROUS SULFATE 325(65) MG
325 TABLET ORAL DAILY
Qty: 90 TABLET | Refills: 0 | Status: SHIPPED | OUTPATIENT
Start: 2023-10-16

## 2023-10-16 NOTE — PROGRESS NOTES
Reviewed with Dr. Thorne. Pt should follow up with GI regarding concern for intestinal parasites. Attempted to call pt and left msg requesting return call.

## 2023-10-18 ENCOUNTER — PHARMACY VISIT (OUTPATIENT)
Dept: PHARMACY | Facility: MEDICAL CENTER | Age: 68
End: 2023-10-18
Payer: COMMERCIAL

## 2023-10-26 NOTE — PROGRESS NOTES
Spoke with pt and notified of recommendation to schedule with GI. Pt states she is going to cancel PT because copay is unaffordable at the moment and pt does not think it's helping. She will reconsider this therapy in the future.     Had a rash on her abdomen recently that looked like hives. Resolved with benadryl. Did not reoccur. Pt states the only thing that is new is the plaquenil. She has been on medication for about 3 weeks and the rash only occurred one time. Advised pt to continue to monitor and notify rheumatologist if it occurs again or if she has any concerns.

## 2023-11-01 ENCOUNTER — APPOINTMENT (OUTPATIENT)
Dept: PHYSICAL THERAPY | Facility: REHABILITATION | Age: 68
End: 2023-11-01
Payer: MEDICARE

## 2023-11-06 ENCOUNTER — APPOINTMENT (OUTPATIENT)
Dept: PHYSICAL THERAPY | Facility: REHABILITATION | Age: 68
End: 2023-11-06
Payer: MEDICARE

## 2023-11-06 PROBLEM — R93.2 ABNORMAL LIVER ULTRASOUND: Status: ACTIVE | Noted: 2023-11-06

## 2023-11-06 PROBLEM — N28.1 RENAL CYST: Status: ACTIVE | Noted: 2023-11-06

## 2023-11-20 NOTE — PROGRESS NOTES
11/28/23    Subjective    Chief Complaint:  Leukocytosis    HPI:  68 female referred for consultation by Dr. Viviana Thorne because of progressive leukocytosis. She has also had microcytic RBC indices on every CBC since 2016. In 2020 she was profoundly anemic with a Hgb as low as 3.7.felt due to GI bleeding from esophagitis and duodenitis and proctitis. In terms of her white blood count it looks like neutrophilia without immature forms described on the differential. She appears to have rheumatoid arthritis and is on Plaquenil s/p multiple joint replacements. Has chronic rectal bleeding from rectal prolapse. Does use occasional crystal metyhh.    ROS:    Constitutional: No weight loss  Skin: No rash or jaundice  HENT: No change in eyesight or hearing  Cardiovascular:No chest pain or arrythmia  Respiratory:No cough or SOB  GI:No nausea, vomiting, diarrhea, constipation  :No dysuria or frequency  Musculoskeletal:No bone or joint pain  Neuro:No sx's of neuropathy  Psych: No complaints    PMH:      Allergies   Allergen Reactions    Aspartame Nausea     headache    Atorvastatin Calcium Unspecified and Swelling    Latex Rash and Itching    Lipitor [Atorvastatin Calcium] Swelling     Patient denies allergy 06/04/22    Other Misc Vomiting     Bug spray    Saccharin Nausea     Nausea and headache       Past Medical History:   Diagnosis Date    Anemia     Arthritis     OA and  not RA per rheumatology    Breath shortness     oxygen PRN     Bronchitis 2008    Colon polyps 2015    Convulsions ([facility]) 7/21/2009     ICD-10 transition    COPD (chronic obstructive pulmonary disease) ([facility])     Dental disorder     full dentures    Depression     depression, anxiety     Diverticulosis 5/10     colonoscopy    Hemorrhoid 7/29/2009    History of colonoscopy   2005    Leukocytosis 3/30/2014    Lymphocytosis (symptomatic) 7/21/2009    MI (myocardial infarction) ([facility]) 4/29/2007    Other specified disorder of intestines     constipation    Pain      shoulders, neck, lower back    Pancreatitis     Pap smear 4/2006    Pneumonia 2013    Pulmonary nodule     Renal lesion     Renal stone     S/P colonoscopy 5/2010    will need repeat in 5 years    Screening mammogram never    Seizure (HCC)     x1 in 2008    Shingles     Symptomatic menopausal or female climacteric states 7/21/2009    Type II or unspecified type diabetes mellitus without mention of complication, uncontrolled 7/21/2009    diet controlled     Unspecified urinary incontinence         Past Surgical History:   Procedure Laterality Date    COLONOSCOPY N/A 1/6/2020    Procedure: COLONOSCOPY;  Surgeon: David Carranza M.D.;  Location: SURGERY [facility];  Service: Gastroenterology    GASTROSCOPY N/A 1/4/2020    Procedure: GASTROSCOPY;  Surgeon: Polo Andujar D.O.;  Location: SURGERY [facility];  Service: Gastroenterology    SHOULDER ARTHROPLASTY TOTAL Left 7/12/2017    Procedure: SHOULDER ARTHROPLASTY TOTAL- REVERSE;  Surgeon: David Villalta M.D.;  Location: SURGERY [facility];  Service:     CERVICAL FUSION POSTERIOR  9/10/2014    Performed by Abhilash Bishop M.D. at SURGERY [facility]    HIP ARTHROPLASTY MIS TOTAL  7/16/14    rt and lt    OTHER CARDIAC SURGERY  2007    angioplasty    GYN SURGERY  1992    c section    EYE SURGERY  laser    OTHER ORTHOPEDIC SURGERY      TONSILLECTOMY          Medications:    Current Outpatient Medications on File Prior to Encounter   Medication Sig Dispense Refill    ferrous sulfate (FEROSUL) 325 (65 Fe) MG tablet Take 1 tablet by mouth every day. 90 Tablet 0    metFORMIN (GLUCOPHAGE) 500 MG Tab Take 1 Tablet by mouth 2 times a day with meals. 180 Tablet 1    fluconazole (DIFLUCAN) 150 MG tablet Take 1 Tablet by mouth every day. 1 Tablet 0    hydroxychloroquine (PLAQUENIL) 200 MG Tab 1 tab po bid 180 Tablet 0    Continuous Blood Gluc Sensor (FREESTYLE HAYLEY 14 DAY SENSOR) Misc Use one sensor as directed every 14 days. 2 Each 5     Continuous Blood Gluc  (FREESTYLE HAYLEY 2 READER) Device Use daily with Freestyle hayley sensor 1 Each 0    gabapentin (NEURONTIN) 300 MG Cap Start one tab nightly and then increase to 2 tabs nightly as tolerated. 100 Capsule 1    diphenhydrAMINE HCl (BENADRYL ALLERGY PO) Take  by mouth.      Insulin Glargine-yfgn (SEMGLEE, YFGN,) 100 UNIT/ML Solution Pen-injector Inject 15 units in morning and 15 units in evening then adjust per physician direction. 15 mL 3    Insulin Pen Needle 32 G x 4 mm (BD PEN NEEDLE BRITANY 2ND GEN) USE ONE PEN NEEDLE IN PEN DEVICE TO INJECT INSULIN THREE TIMES DAILY. 100 Each 3    omeprazole (PRILOSEC) 20 MG delayed-release capsule Take 1 Capsule by mouth 2 times a day. 180 Capsule 3    sertraline (ZOLOFT) 25 MG tablet Take 1 Tablet by mouth every day. 100 Tablet 3    losartan (COZAAR) 25 MG Tab Take 1 Tablet by mouth every day. 100 Tablet 3    rosuvastatin (CRESTOR) 10 MG Tab Take 1 Tablet by mouth every evening. 100 Tablet 3    glucose blood strip 1 Strip by Other route in the morning, at noon, and at bedtime. Use one strip to test blood sugar three times daily before meals. 300 Strip 3    Blood Glucose Monitoring Suppl (BLOOD GLUCOSE MONITOR SYSTEM) w/Device Kit Test blood sugar as recommended by provider. Slick Contour Next blood glucose monitoring kit. 1 Kit 0    Microlet Lancets Misc Use one lancet to test blood sugar three times daily before meals. 100 Each 0    Alcohol Swabs (ALCOHOL PREP) 70 % Pads Wipe site with prep pad prior to injection 100 Each 0     No current facility-administered medications on file prior to encounter.       Social History     Tobacco Use    Smoking status: Every Day     Current packs/day: 0.25     Average packs/day: 0.3 packs/day for 50.0 years (12.5 ttl pk-yrs)     Types: Cigarettes    Smokeless tobacco: Never   Substance Use Topics    Alcohol use: No     Alcohol/week: 0.0 oz        Family History   Problem Relation Age of Onset    Diabetes Mother      "Cancer Mother         bladder cancer    Diabetes Brother     Cancer Brother         bladder cancer    Diabetes Brother     Cancer Sister         bladder cancer        Objective    Vitals:    BP (!) 142/64 (BP Location: Left arm, Patient Position: Sitting, BP Cuff Size: Adult)   Pulse 88   Temp 36.3 °C (97.4 °F) (Temporal)   Resp 16   Ht 1.702 m (5' 7\")   Wt 73.9 kg (163 lb)   LMP 01/01/2007   SpO2 92%   BMI 25.53 kg/m²     Physical Exam:    Appears well-developed and well-nourished. No distress.    Head -  Normocephalic .   Eyes - Pupils are equal. Conjunctivae normal. No scleral icterus.   Ears - normal hearing  Mouth - Throat: Oropharynx is clear and moist. No oropharyngeal exudate. Edentulous.  Neck - Neck supple. No thyromegaly  Cardiovascular - Normal rate, regular rhythm, normal heart sounds and intact distal pulses. No  gallop, murmur or rub  Pulmonary - Normal breath sounds.  No wheeze, rales or rhonchi  Breast - Not examined  Abdominal -Soft. No distension, tenderness, organomegaly or mass  Extremities-  No edema or tenderness.    Nodes - No submental, submandibular, preauricular, cervical, axillary or inguinal adenopathy.    Neurological -   Alert and oriented.  Skin - Skin is warm and dry. No rash noted. Not diaphoretic. No erythema. No pallor. No jaundice   Psychiatric -  Normal mood and affect.    Labs:     Latest Reference Range & Units 06/26/23 12:54 08/15/23 13:42 09/20/23 10:32   WBC 4.8 - 10.8 K/uL 15.5 (H) 12.4 (H) 16.9 (H)   RBC 4.20 - 5.40 M/uL 5.07 5.20 4.94   Hemoglobin 12.0 - 16.0 g/dL 11.9 (L) 12.2 11.9 (L)   Hematocrit 37.0 - 47.0 % 39.4 39.6 38.8   MCV 81.4 - 97.8 fL 77.7 (L) 76.2 (L) 78.5 (L)   MCH 27.0 - 33.0 pg 23.5 (L) 23.5 (L) 24.1 (L)   MCHC 32.2 - 35.5 g/dL 30.2 (L) 30.8 (L) 30.7 (L)   RDW 35.9 - 50.0 fL 49.2 50.4 (H) 52.2 (H)   Platelet Count 164 - 446 K/uL 590 (H) 546 (H) 577 (H)   MPV 9.0 - 12.9 fL 9.8 9.4 9.5   Neutrophils-Polys 44.00 - 72.00 % 84.20 (H) 76.30 (H) 79.60 " (H)   Neutrophils (Absolute) 1.82 - 7.42 K/uL 13.01 (H) 9.48 (H) 13.44 (H)   Lymphocytes 22.00 - 41.00 % 11.60 (L) 17.00 (L) 14.40 (L)   Lymphs (Absolute) 1.00 - 4.80 K/uL 1.79 2.11 2.44      Latest Reference Range & Units 11/22/23 11:08   Iron 40 - 170 ug/dL 23 (L)   Total Iron Binding 250 - 450 ug/dL 290   % Saturation 15 - 55 % 8 (L)     Assessment  It could be p vera with leukocytosis and thrombocytosis with iron deficiency masking a polycythemia. Could be a myeloproliferative process. Could all be reactive.   Imp:    Visit Diagnosis:    1. Leukocytosis, unspecified type  BCR-ABL1 QUAL WITH REFLEX    JAK2 GENE MUT RFLX CALR RFLX MPL    CBC WITH DIFFERENTIAL      2. Rheumatoid arthritis involving multiple sites with positive rheumatoid factor (HCC)        3. Chronic obstructive pulmonary disease, unspecified COPD type (HCC)        4. Myeloproliferative disorder (HCC)  BCR-ABL1 QUAL WITH REFLEX    JAK2 GENE MUT RFLX CALR RFLX MPL    CBC WITH DIFFERENTIAL      5. Iron deficiency anemia, unspecified iron deficiency anemia type          Plan:  Above lab  IV iron  Return in 3-4 weeks when results are in    Raymond Fitzpatrick M.D.

## 2023-11-22 ENCOUNTER — HOSPITAL ENCOUNTER (OUTPATIENT)
Dept: LAB | Facility: MEDICAL CENTER | Age: 68
End: 2023-11-22
Attending: INTERNAL MEDICINE
Payer: MEDICARE

## 2023-11-22 ENCOUNTER — PATIENT OUTREACH (OUTPATIENT)
Dept: HEALTH INFORMATION MANAGEMENT | Facility: OTHER | Age: 68
End: 2023-11-22
Payer: MEDICARE

## 2023-11-22 DIAGNOSIS — Z79.899 LONG-TERM USE OF PLAQUENIL: ICD-10-CM

## 2023-11-22 DIAGNOSIS — D64.9 ANEMIA, UNSPECIFIED TYPE: ICD-10-CM

## 2023-11-22 DIAGNOSIS — M05.79 RHEUMATOID ARTHRITIS INVOLVING MULTIPLE SITES WITH POSITIVE RHEUMATOID FACTOR (HCC): ICD-10-CM

## 2023-11-22 DIAGNOSIS — E11.9 DIABETES MELLITUS TYPE 2 IN NONOBESE (HCC): ICD-10-CM

## 2023-11-22 DIAGNOSIS — I51.7 LVH (LEFT VENTRICULAR HYPERTROPHY): ICD-10-CM

## 2023-11-22 DIAGNOSIS — G89.4 CHRONIC PAIN DISORDER: ICD-10-CM

## 2023-11-22 DIAGNOSIS — I25.10 CVD (CARDIOVASCULAR DISEASE): ICD-10-CM

## 2023-11-22 LAB
IRON SATN MFR SERPL: 8 % (ref 15–55)
IRON SERPL-MCNC: 23 UG/DL (ref 40–170)
TIBC SERPL-MCNC: 290 UG/DL (ref 250–450)
UIBC SERPL-MCNC: 267 UG/DL (ref 110–370)

## 2023-11-22 PROCEDURE — 82955 ASSAY OF G6PD ENZYME: CPT

## 2023-11-22 PROCEDURE — 83540 ASSAY OF IRON: CPT

## 2023-11-22 PROCEDURE — 99999 PR NO CHARGE: CPT | Performed by: FAMILY MEDICINE

## 2023-11-22 PROCEDURE — 36415 COLL VENOUS BLD VENIPUNCTURE: CPT

## 2023-11-22 PROCEDURE — 83550 IRON BINDING TEST: CPT

## 2023-11-22 NOTE — PROGRESS NOTES
Assessment    Spoke to patient for monthly outreach. Pt going to xray and get lab work done for rheumatologist soon, so call was kept brief. She reports she is having better luck with [facility] lately. Has all medications and taking them regularly. FBS has been around 140. She reports she is working on lowering that. Pt states that overall she's doing pretty good right now. No needs or concerns.     Education    DM, med management    Plan of Care and Goals    Continue plan of care    Barriers:    Transportation issues    Progress:    progressing    Next outreach:  1 month    Quarterly chart review completed. Pt continues to qualify for and benefit from personal care management program. Will continue to follow.

## 2023-11-26 LAB — G6PD RBC-CCNC: 20.1 U/G HB (ref 9.9–16.6)

## 2023-11-27 ENCOUNTER — TELEPHONE (OUTPATIENT)
Dept: RHEUMATOLOGY | Facility: MEDICAL CENTER | Age: 68
End: 2023-11-27

## 2023-11-27 NOTE — TELEPHONE ENCOUNTER
I called and spoke with Heidi. She stated that she had stopped her oral iron tablets. She also stated that she has an appointment tomorrow to see Dr Fitzpatrick the hematologist at 11am. I told her to please let him know that her iron is at 23 and you recommended infusions.  She will advise him.    Thank you  
Please notify patient that we received the results of her blood tests and she is anemic and her iron is low at 23, I believe patient is taking oral iron?  I believe patient stated she had to do iron infusions in the past?  Does patient have hematologist to where she can get iron infusions?  If not we can submit a referral for patient  
Sent pt a my chart message. Thank you  
Home

## 2023-11-28 ENCOUNTER — HOSPITAL ENCOUNTER (OUTPATIENT)
Dept: LAB | Facility: MEDICAL CENTER | Age: 68
End: 2023-11-28
Attending: INTERNAL MEDICINE
Payer: MEDICARE

## 2023-11-28 ENCOUNTER — HOSPITAL ENCOUNTER (OUTPATIENT)
Dept: HEMATOLOGY ONCOLOGY | Facility: MEDICAL CENTER | Age: 68
End: 2023-11-28
Attending: INTERNAL MEDICINE
Payer: MEDICARE

## 2023-11-28 VITALS
DIASTOLIC BLOOD PRESSURE: 64 MMHG | OXYGEN SATURATION: 92 % | HEART RATE: 88 BPM | WEIGHT: 163 LBS | HEIGHT: 67 IN | RESPIRATION RATE: 16 BRPM | BODY MASS INDEX: 25.58 KG/M2 | TEMPERATURE: 97.4 F | SYSTOLIC BLOOD PRESSURE: 142 MMHG

## 2023-11-28 DIAGNOSIS — D50.9 IRON DEFICIENCY ANEMIA, UNSPECIFIED IRON DEFICIENCY ANEMIA TYPE: ICD-10-CM

## 2023-11-28 DIAGNOSIS — D72.829 LEUKOCYTOSIS, UNSPECIFIED TYPE: ICD-10-CM

## 2023-11-28 DIAGNOSIS — D47.1 MYELOPROLIFERATIVE DISORDER (HCC): ICD-10-CM

## 2023-11-28 DIAGNOSIS — M05.79 RHEUMATOID ARTHRITIS INVOLVING MULTIPLE SITES WITH POSITIVE RHEUMATOID FACTOR (HCC): ICD-10-CM

## 2023-11-28 DIAGNOSIS — J44.9 CHRONIC OBSTRUCTIVE PULMONARY DISEASE, UNSPECIFIED COPD TYPE (HCC): ICD-10-CM

## 2023-11-28 LAB
BASOPHILS # BLD AUTO: 0.7 % (ref 0–1.8)
BASOPHILS # BLD: 0.06 K/UL (ref 0–0.12)
EOSINOPHIL # BLD AUTO: 0.1 K/UL (ref 0–0.51)
EOSINOPHIL NFR BLD: 1.1 % (ref 0–6.9)
ERYTHROCYTE [DISTWIDTH] IN BLOOD BY AUTOMATED COUNT: 46.7 FL (ref 35.9–50)
HCT VFR BLD AUTO: 36 % (ref 37–47)
HGB BLD-MCNC: 11.3 G/DL (ref 12–16)
IMM GRANULOCYTES # BLD AUTO: 0.04 K/UL (ref 0–0.11)
IMM GRANULOCYTES NFR BLD AUTO: 0.4 % (ref 0–0.9)
LYMPHOCYTES # BLD AUTO: 1.81 K/UL (ref 1–4.8)
LYMPHOCYTES NFR BLD: 19.7 % (ref 22–41)
MCH RBC QN AUTO: 23.7 PG (ref 27–33)
MCHC RBC AUTO-ENTMCNC: 31.4 G/DL (ref 32.2–35.5)
MCV RBC AUTO: 75.6 FL (ref 81.4–97.8)
MONOCYTES # BLD AUTO: 0.39 K/UL (ref 0–0.85)
MONOCYTES NFR BLD AUTO: 4.2 % (ref 0–13.4)
NEUTROPHILS # BLD AUTO: 6.81 K/UL (ref 1.82–7.42)
NEUTROPHILS NFR BLD: 73.9 % (ref 44–72)
NRBC # BLD AUTO: 0 K/UL
NRBC BLD-RTO: 0 /100 WBC (ref 0–0.2)
PLATELET # BLD AUTO: 522 K/UL (ref 164–446)
PMV BLD AUTO: 9.4 FL (ref 9–12.9)
RBC # BLD AUTO: 4.76 M/UL (ref 4.2–5.4)
WBC # BLD AUTO: 9.2 K/UL (ref 4.8–10.8)

## 2023-11-28 PROCEDURE — 85025 COMPLETE CBC W/AUTO DIFF WBC: CPT

## 2023-11-28 PROCEDURE — 81219 CALR GENE COM VARIANTS: CPT

## 2023-11-28 PROCEDURE — 81206 BCR/ABL1 GENE MAJOR BP: CPT

## 2023-11-28 PROCEDURE — 81208 BCR/ABL1 GENE OTHER BP: CPT

## 2023-11-28 PROCEDURE — 81207 BCR/ABL1 GENE MINOR BP: CPT

## 2023-11-28 PROCEDURE — 81338 MPL GENE COMMON VARIANTS: CPT

## 2023-11-28 PROCEDURE — 99204 OFFICE O/P NEW MOD 45 MIN: CPT | Performed by: INTERNAL MEDICINE

## 2023-11-28 PROCEDURE — 81270 JAK2 GENE: CPT

## 2023-11-28 PROCEDURE — 99212 OFFICE O/P EST SF 10 MIN: CPT | Performed by: INTERNAL MEDICINE

## 2023-11-28 PROCEDURE — 36415 COLL VENOUS BLD VENIPUNCTURE: CPT

## 2023-11-28 RX ORDER — METHYLPREDNISOLONE SODIUM SUCCINATE 125 MG/2ML
125 INJECTION, POWDER, LYOPHILIZED, FOR SOLUTION INTRAMUSCULAR; INTRAVENOUS PRN
Status: CANCELLED | OUTPATIENT
Start: 2023-12-05

## 2023-11-28 RX ORDER — SODIUM CHLORIDE 9 MG/ML
INJECTION, SOLUTION INTRAVENOUS CONTINUOUS
Status: CANCELLED | OUTPATIENT
Start: 2023-12-05

## 2023-11-28 RX ORDER — DIPHENHYDRAMINE HYDROCHLORIDE 50 MG/ML
50 INJECTION INTRAMUSCULAR; INTRAVENOUS PRN
Status: CANCELLED | OUTPATIENT
Start: 2023-12-05

## 2023-11-28 RX ORDER — EPINEPHRINE 1 MG/ML(1)
0.5 AMPUL (ML) INJECTION PRN
Status: CANCELLED | OUTPATIENT
Start: 2023-12-05

## 2023-11-28 ASSESSMENT — PAIN SCALES - GENERAL: PAINLEVEL: NO PAIN

## 2023-11-28 ASSESSMENT — FIBROSIS 4 INDEX: FIB4 SCORE: 0.67

## 2023-12-04 LAB
SPECIMEN SOURCE: NORMAL
T(9;22)(ABL1,BCR) BLD/T QL: NOT DETECTED

## 2023-12-05 LAB
JAK2 P.V617F BLD/T QL: NOT DETECTED
SPECIMEN SOURCE: NORMAL

## 2023-12-06 LAB
GENE XXX MUT ANL BLD/T: NOT DETECTED
MPL P.W515 BLD/T QL: NOT DETECTED
SPECIMEN SOURCE: NORMAL
SPECIMEN SOURCE: NORMAL

## 2023-12-11 ENCOUNTER — TELEPHONE (OUTPATIENT)
Dept: MEDICAL GROUP | Facility: IMAGING CENTER | Age: 68
End: 2023-12-11
Payer: MEDICARE

## 2023-12-11 NOTE — TELEPHONE ENCOUNTER
Pt calling to report she needs assistance locating the infusion center for her appt today. Called infusion center. Appointment is not until tomorrow. They will reach out to patient and help her with the location and remind her of scheduled date.

## 2023-12-12 ENCOUNTER — OUTPATIENT INFUSION SERVICES (OUTPATIENT)
Dept: ONCOLOGY | Facility: MEDICAL CENTER | Age: 68
End: 2023-12-12
Attending: INTERNAL MEDICINE
Payer: MEDICARE

## 2023-12-12 VITALS
HEART RATE: 68 BPM | WEIGHT: 158.73 LBS | SYSTOLIC BLOOD PRESSURE: 163 MMHG | BODY MASS INDEX: 24.91 KG/M2 | OXYGEN SATURATION: 98 % | DIASTOLIC BLOOD PRESSURE: 77 MMHG | TEMPERATURE: 97 F | HEIGHT: 67 IN | RESPIRATION RATE: 18 BRPM

## 2023-12-12 DIAGNOSIS — D50.0 IRON DEFICIENCY ANEMIA DUE TO CHRONIC BLOOD LOSS: ICD-10-CM

## 2023-12-12 PROCEDURE — 96375 TX/PRO/DX INJ NEW DRUG ADDON: CPT

## 2023-12-12 PROCEDURE — 96365 THER/PROPH/DIAG IV INF INIT: CPT

## 2023-12-12 PROCEDURE — 700105 HCHG RX REV CODE 258: Performed by: INTERNAL MEDICINE

## 2023-12-12 PROCEDURE — 700111 HCHG RX REV CODE 636 W/ 250 OVERRIDE (IP): Mod: JZ | Performed by: INTERNAL MEDICINE

## 2023-12-12 PROCEDURE — 96366 THER/PROPH/DIAG IV INF ADDON: CPT

## 2023-12-12 RX ORDER — METHYLPREDNISOLONE SODIUM SUCCINATE 125 MG/2ML
125 INJECTION, POWDER, LYOPHILIZED, FOR SOLUTION INTRAMUSCULAR; INTRAVENOUS PRN
Status: DISCONTINUED | OUTPATIENT
Start: 2023-12-12 | End: 2023-12-12 | Stop reason: HOSPADM

## 2023-12-12 RX ORDER — DIPHENHYDRAMINE HYDROCHLORIDE 50 MG/ML
50 INJECTION INTRAMUSCULAR; INTRAVENOUS PRN
OUTPATIENT
Start: 2023-12-12

## 2023-12-12 RX ORDER — SODIUM CHLORIDE 9 MG/ML
INJECTION, SOLUTION INTRAVENOUS CONTINUOUS
OUTPATIENT
Start: 2023-12-12

## 2023-12-12 RX ORDER — METHYLPREDNISOLONE SODIUM SUCCINATE 125 MG/2ML
125 INJECTION, POWDER, LYOPHILIZED, FOR SOLUTION INTRAMUSCULAR; INTRAVENOUS PRN
OUTPATIENT
Start: 2023-12-12

## 2023-12-12 RX ORDER — EPINEPHRINE 1 MG/ML(1)
0.5 AMPUL (ML) INJECTION PRN
OUTPATIENT
Start: 2023-12-12

## 2023-12-12 RX ORDER — METHYLPREDNISOLONE SODIUM SUCCINATE 125 MG/2ML
125 INJECTION, POWDER, LYOPHILIZED, FOR SOLUTION INTRAMUSCULAR; INTRAVENOUS PRN
Status: CANCELLED | OUTPATIENT
Start: 2023-12-12

## 2023-12-12 RX ADMIN — METHYLPREDNISOLONE SODIUM SUCCINATE 125 MG: 125 INJECTION, POWDER, FOR SOLUTION INTRAMUSCULAR; INTRAVENOUS at 15:23

## 2023-12-12 RX ADMIN — SODIUM CHLORIDE 1000 MG: 9 INJECTION, SOLUTION INTRAVENOUS at 15:23

## 2023-12-12 ASSESSMENT — FIBROSIS 4 INDEX: FIB4 SCORE: 0.74

## 2023-12-13 NOTE — PROGRESS NOTES
12/20/23    Subjective    Chief Complaint:  Follow up from consultation for leukocytosis and iron deficiency anemia.     HPI:  68 female with chronic GI bleeding from rectal prolapse as well as esophagitis and gastritis. Received IV iron but not until 12/12/23. In terms of the leukocytosis, BCR-ABL and EVIE 2 testing with reflex to CALR and MPL were all negative.     ROS:    Constitutional: No weight loss  Skin: No rash or jaundice  HENT: No change in eyesight or hearing  Cardiovascular:No chest pain or arrythmia  Respiratory:No cough or SOB  GI:No nausea, vomiting, diarrhea, constipation  :No dysuria or frequency  Musculoskeletal:No bone or joint pain  Neuro:No sx's of neuropathy  Psych: No complaints    PMH:      Allergies   Allergen Reactions    Aspartame Nausea     headache    Atorvastatin Calcium Unspecified and Swelling    Latex Rash and Itching    Lipitor [Atorvastatin Calcium] Swelling     Patient denies allergy 06/04/22    Other Misc Vomiting     Bug spray    Saccharin Nausea     Nausea and headache       Past Medical History:   Diagnosis Date    Anemia     Arthritis     OA and  not RA per rheumatology    Breath shortness     oxygen PRN     Bronchitis 2008    Colon polyps 2015    Convulsions ([facility]) 7/21/2009     ICD-10 transition    COPD (chronic obstructive pulmonary disease) ([facility])     Dental disorder     full dentures    Depression     depression, anxiety     Diverticulosis 5/10     colonoscopy    Hemorrhoid 7/29/2009    History of colonoscopy   2005    Leukocytosis 3/30/2014    Lymphocytosis (symptomatic) 7/21/2009    MI (myocardial infarction) ([facility]) 4/29/2007    Other specified disorder of intestines     constipation    Pain     shoulders, neck, lower back    Pancreatitis     Pap smear 4/2006    Pneumonia 2013    Pulmonary nodule     Renal lesion     Renal stone     S/P colonoscopy 5/2010    will need repeat in 5 years    Screening mammogram never    Seizure ([facility])     x1 in 2008    Shingles      Symptomatic menopausal or female climacteric states 7/21/2009    Type II or unspecified type diabetes mellitus without mention of complication, uncontrolled 7/21/2009    diet controlled     Unspecified urinary incontinence         Past Surgical History:   Procedure Laterality Date    COLONOSCOPY N/A 1/6/2020    Procedure: COLONOSCOPY;  Surgeon: David Carranza M.D.;  Location: SURGERY [facility];  Service: Gastroenterology    GASTROSCOPY N/A 1/4/2020    Procedure: GASTROSCOPY;  Surgeon: Polo Andujar D.O.;  Location: SURGERY [facility];  Service: Gastroenterology    SHOULDER ARTHROPLASTY TOTAL Left 7/12/2017    Procedure: SHOULDER ARTHROPLASTY TOTAL- REVERSE;  Surgeon: David Villalta M.D.;  Location: SURGERY [facility];  Service:     CERVICAL FUSION POSTERIOR  9/10/2014    Performed by Abhilash Bishop M.D. at SURGERY [facility]    HIP ARTHROPLASTY MIS TOTAL  7/16/14    rt and lt    OTHER CARDIAC SURGERY  2007    angioplasty    GYN SURGERY  1992    c section    EYE SURGERY  laser    OTHER ORTHOPEDIC SURGERY      TONSILLECTOMY          Medications:    Current Outpatient Medications on File Prior to Encounter   Medication Sig Dispense Refill    metFORMIN (GLUCOPHAGE) 500 MG Tab Take 1 Tablet by mouth 2 times a day with meals. 180 Tablet 1    Continuous Blood Gluc Sensor (FREESTYLE DAVID 14 DAY SENSOR) Misc Use one sensor as directed every 14 days. 2 Each 5    Continuous Blood Gluc  (FREESTYLE DAVID 2 READER) Device Use daily with Freestyle david sensor 1 Each 0    gabapentin (NEURONTIN) 300 MG Cap Start one tab nightly and then increase to 2 tabs nightly as tolerated. 100 Capsule 1    diphenhydrAMINE HCl (BENADRYL ALLERGY PO) Take  by mouth.      Insulin Glargine-yfgn (SEMGLEE, YFGN,) 100 UNIT/ML Solution Pen-injector Inject 15 units in morning and 15 units in evening then adjust per physician direction. 15 mL 3    Insulin Pen Needle 32 G x 4 mm (BD PEN NEEDLE BRITANY 2ND GEN)  USE ONE PEN NEEDLE IN PEN DEVICE TO INJECT INSULIN THREE TIMES DAILY. 100 Each 3    sertraline (ZOLOFT) 25 MG tablet Take 1 Tablet by mouth every day. 100 Tablet 3    glucose blood strip 1 Strip by Other route in the morning, at noon, and at bedtime. Use one strip to test blood sugar three times daily before meals. 300 Strip 3    Blood Glucose Monitoring Suppl (BLOOD GLUCOSE MONITOR SYSTEM) w/Device Kit Test blood sugar as recommended by provider. Slick Contour Next blood glucose monitoring kit. 1 Kit 0    Microlet Lancets Misc Use one lancet to test blood sugar three times daily before meals. 100 Each 0    Alcohol Swabs (ALCOHOL PREP) 70 % Pads Wipe site with prep pad prior to injection 100 Each 0    ferrous sulfate (FEROSUL) 325 (65 Fe) MG tablet Take 1 tablet by mouth every day. (Patient not taking: Reported on 12/12/2023) 90 Tablet 0    fluconazole (DIFLUCAN) 150 MG tablet Take 1 Tablet by mouth every day. (Patient not taking: Reported on 12/12/2023) 1 Tablet 0    hydroxychloroquine (PLAQUENIL) 200 MG Tab 1 tab po bid (Patient not taking: Reported on 12/12/2023) 180 Tablet 0    omeprazole (PRILOSEC) 20 MG delayed-release capsule Take 1 Capsule by mouth 2 times a day. (Patient not taking: Reported on 12/12/2023) 180 Capsule 3    losartan (COZAAR) 25 MG Tab Take 1 Tablet by mouth every day. (Patient not taking: Reported on 12/12/2023) 100 Tablet 3    rosuvastatin (CRESTOR) 10 MG Tab Take 1 Tablet by mouth every evening. (Patient not taking: Reported on 12/12/2023) 100 Tablet 3     No current facility-administered medications on file prior to encounter.       Social History     Tobacco Use    Smoking status: Every Day     Current packs/day: 0.25     Average packs/day: 0.3 packs/day for 50.0 years (12.5 ttl pk-yrs)     Types: Cigarettes    Smokeless tobacco: Never   Substance Use Topics    Alcohol use: No     Alcohol/week: 0.0 oz        Family History   Problem Relation Age of Onset    Diabetes Mother     Cancer  "Mother         bladder cancer    Diabetes Brother     Cancer Brother         bladder cancer    Diabetes Brother     Cancer Sister         bladder cancer        Objective    Vitals:    /70   Pulse 100   Temp 35.8 °C (96.5 °F) (Temporal)   Resp 12   Ht 1.676 m (5' 6\")   Wt 73.4 kg (161 lb 11.3 oz)   LMP 01/01/2007   SpO2 92%   BMI 26.10 kg/m²     Physical Exam:    Appears well-developed and well-nourished. No distress.    Head -  Normocephalic .   Eyes - Pupils are equal. Conjunctivae normal. No scleral icterus.   Ears - normal hearing   Neurological -   Alert and oriented.  Skin - Skin is warm and dry. No rash noted. Not diaphoretic. No erythema. No pallor. No jaundice   Psychiatric -  Normal mood and affect.    Labs:     Latest Reference Range & Units 09/20/23 10:32 11/28/23 12:36   WBC 4.8 - 10.8 K/uL 16.9 (H) 9.2   RBC 4.20 - 5.40 M/uL 4.94 4.76   Hemoglobin 12.0 - 16.0 g/dL 11.9 (L) 11.3 (L)   Hematocrit 37.0 - 47.0 % 38.8 36.0 (L)   MCV 81.4 - 97.8 fL 78.5 (L) 75.6 (L)   MCH 27.0 - 33.0 pg 24.1 (L) 23.7 (L)   MCHC 32.2 - 35.5 g/dL 30.7 (L) 31.4 (L)   RDW 35.9 - 50.0 fL 52.2 (H) 46.7   Platelet Count 164 - 446 K/uL 577 (H) 522 (H)     Assessment  Susoect the elevated platelets due to low iron  Imp:    Visit Diagnosis:    1. Leukocytosis, unspecified type        2. Rheumatoid arthritis involving multiple sites with positive rheumatoid factor (HCC)        3. Iron deficiency anemia, unspecified iron deficiency anemia type  IRON/TOTAL IRON BIND    CBC WITH DIFFERENTIAL    FERRITIN        Plan:  Recheck lab and see me in 3 months    Raymond Fitzpatrick M.D.        "

## 2023-12-20 ENCOUNTER — HOSPITAL ENCOUNTER (OUTPATIENT)
Dept: HEMATOLOGY ONCOLOGY | Facility: MEDICAL CENTER | Age: 68
End: 2023-12-20
Attending: INTERNAL MEDICINE
Payer: MEDICARE

## 2023-12-20 VITALS
HEIGHT: 66 IN | OXYGEN SATURATION: 92 % | HEART RATE: 100 BPM | SYSTOLIC BLOOD PRESSURE: 120 MMHG | RESPIRATION RATE: 12 BRPM | WEIGHT: 161.71 LBS | DIASTOLIC BLOOD PRESSURE: 70 MMHG | BODY MASS INDEX: 25.99 KG/M2 | TEMPERATURE: 96.5 F

## 2023-12-20 DIAGNOSIS — D72.829 LEUKOCYTOSIS, UNSPECIFIED TYPE: ICD-10-CM

## 2023-12-20 DIAGNOSIS — D50.9 IRON DEFICIENCY ANEMIA, UNSPECIFIED IRON DEFICIENCY ANEMIA TYPE: ICD-10-CM

## 2023-12-20 DIAGNOSIS — M05.79 RHEUMATOID ARTHRITIS INVOLVING MULTIPLE SITES WITH POSITIVE RHEUMATOID FACTOR (HCC): ICD-10-CM

## 2023-12-20 PROCEDURE — RXMED WILLOW AMBULATORY MEDICATION CHARGE: Performed by: FAMILY MEDICINE

## 2023-12-20 PROCEDURE — 99212 OFFICE O/P EST SF 10 MIN: CPT | Performed by: INTERNAL MEDICINE

## 2023-12-20 PROCEDURE — 99213 OFFICE O/P EST LOW 20 MIN: CPT | Performed by: INTERNAL MEDICINE

## 2023-12-20 ASSESSMENT — PAIN SCALES - GENERAL: PAINLEVEL: NO PAIN

## 2023-12-20 ASSESSMENT — FIBROSIS 4 INDEX: FIB4 SCORE: 0.74

## 2023-12-20 NOTE — ADDENDUM NOTE
Encounter addended by: Korinne Mitchell, Med Ass't on: 12/20/2023 1:35 PM   Actions taken: Charge Capture section accepted

## 2023-12-22 ENCOUNTER — PATIENT OUTREACH (OUTPATIENT)
Dept: HEALTH INFORMATION MANAGEMENT | Facility: OTHER | Age: 68
End: 2023-12-22
Payer: MEDICARE

## 2023-12-22 DIAGNOSIS — E11.9 DIABETES MELLITUS TYPE 2 IN NONOBESE (HCC): ICD-10-CM

## 2023-12-22 DIAGNOSIS — I25.10 CVD (CARDIOVASCULAR DISEASE): ICD-10-CM

## 2023-12-22 DIAGNOSIS — I51.7 LVH (LEFT VENTRICULAR HYPERTROPHY): ICD-10-CM

## 2023-12-22 DIAGNOSIS — G89.4 CHRONIC PAIN DISORDER: ICD-10-CM

## 2023-12-22 PROCEDURE — 99490 CHRNC CARE MGMT STAFF 1ST 20: CPT | Performed by: FAMILY MEDICINE

## 2023-12-22 NOTE — PROGRESS NOTES
Assessment    Spoke to patient for monthly outreach. Pt reports she is doing ok. Has all of her medications, but is awaiting a delivery of her insulin. FBS 130s at the highest. Has rheum scheduled for next month, then will plan to schedule follow up with dr. Thonre. No needs or concerns at the moment.     Education    Med management, DM    Plan of Care and Goals    Continue plan of care    Barriers:    transportation    Progress:    progressing    Next outreach:  1 month

## 2023-12-27 ENCOUNTER — TELEPHONE (OUTPATIENT)
Dept: MEDICAL GROUP | Facility: IMAGING CENTER | Age: 68
End: 2023-12-27
Payer: MEDICARE

## 2023-12-27 PROCEDURE — RXMED WILLOW AMBULATORY MEDICATION CHARGE: Performed by: FAMILY MEDICINE

## 2023-12-27 NOTE — TELEPHONE ENCOUNTER
Pt calling to report she hasn't received her insulin or pen needles from [facility]. She requested them on 12/20 and she is now out. She also needs a refill of gabapentin. Spoke with Jeannie at the pharmacy. Pt was not set up for delivery. Changed request to delivery. Jeannie confirmed they should be able to deliver medications and needles to pt by 8pm today. Confirmed with pt that it was ok to charge card on file for these medications. No other needs at this time.

## 2023-12-28 ENCOUNTER — PHARMACY VISIT (OUTPATIENT)
Dept: PHARMACY | Facility: MEDICAL CENTER | Age: 68
End: 2023-12-28
Payer: COMMERCIAL

## 2024-01-01 DIAGNOSIS — Z79.899 LONG-TERM USE OF PLAQUENIL: ICD-10-CM

## 2024-01-01 DIAGNOSIS — D64.9 ANEMIA, UNSPECIFIED TYPE: ICD-10-CM

## 2024-01-01 DIAGNOSIS — M05.79 RHEUMATOID ARTHRITIS INVOLVING MULTIPLE SITES WITH POSITIVE RHEUMATOID FACTOR (HCC): ICD-10-CM

## 2024-01-02 RX ORDER — HYDROXYCHLOROQUINE SULFATE 200 MG/1
TABLET, FILM COATED ORAL
Qty: 180 TABLET | Refills: 0 | Status: SHIPPED | OUTPATIENT
Start: 2024-01-02

## 2024-01-02 NOTE — TELEPHONE ENCOUNTER
Received request via: Pharmacy dec labs    Was the patient seen in the last year in this department? Yes    Does the patient have an active prescription (recently filled or refills available) for medication(s) requested? No    Does the patient have senior living [facility] and need 100 day supply (blood pressure, diabetes and cholesterol meds only)? Medication is not for cholesterol, blood pressure or diabetes

## 2024-01-03 ENCOUNTER — HOSPITAL ENCOUNTER (OUTPATIENT)
Dept: RADIOLOGY | Facility: MEDICAL CENTER | Age: 69
End: 2024-01-03
Attending: INTERNAL MEDICINE
Payer: MEDICARE

## 2024-01-03 ENCOUNTER — HOSPITAL ENCOUNTER (OUTPATIENT)
Dept: LAB | Facility: MEDICAL CENTER | Age: 69
End: 2024-01-03
Attending: INTERNAL MEDICINE
Payer: MEDICARE

## 2024-01-03 DIAGNOSIS — Z79.899 LONG-TERM USE OF PLAQUENIL: ICD-10-CM

## 2024-01-03 DIAGNOSIS — D64.9 ANEMIA, UNSPECIFIED TYPE: ICD-10-CM

## 2024-01-03 DIAGNOSIS — M05.79 RHEUMATOID ARTHRITIS INVOLVING MULTIPLE SITES WITH POSITIVE RHEUMATOID FACTOR (HCC): ICD-10-CM

## 2024-01-03 PROCEDURE — 85652 RBC SED RATE AUTOMATED: CPT

## 2024-01-03 PROCEDURE — 73100 X-RAY EXAM OF WRIST: CPT | Mod: RT

## 2024-01-03 PROCEDURE — 80053 COMPREHEN METABOLIC PANEL: CPT

## 2024-01-03 PROCEDURE — 85025 COMPLETE CBC W/AUTO DIFF WBC: CPT

## 2024-01-03 PROCEDURE — 77077 JOINT SURVEY SINGLE VIEW: CPT

## 2024-01-03 PROCEDURE — 36415 COLL VENOUS BLD VENIPUNCTURE: CPT

## 2024-01-04 LAB
ALBUMIN SERPL BCP-MCNC: 3.9 G/DL (ref 3.2–4.9)
ALBUMIN/GLOB SERPL: 1.1 G/DL
ALP SERPL-CCNC: 118 U/L (ref 30–99)
ALT SERPL-CCNC: 10 U/L (ref 2–50)
ANION GAP SERPL CALC-SCNC: 14 MMOL/L (ref 7–16)
AST SERPL-CCNC: 15 U/L (ref 12–45)
BASOPHILS # BLD AUTO: 1 % (ref 0–1.8)
BASOPHILS # BLD: 0.13 K/UL (ref 0–0.12)
BILIRUB SERPL-MCNC: 0.2 MG/DL (ref 0.1–1.5)
BUN SERPL-MCNC: 21 MG/DL (ref 8–22)
CALCIUM ALBUM COR SERPL-MCNC: 9.5 MG/DL (ref 8.5–10.5)
CALCIUM SERPL-MCNC: 9.4 MG/DL (ref 8.5–10.5)
CHLORIDE SERPL-SCNC: 97 MMOL/L (ref 96–112)
CO2 SERPL-SCNC: 28 MMOL/L (ref 20–33)
CREAT SERPL-MCNC: 1.15 MG/DL (ref 0.5–1.4)
EOSINOPHIL # BLD AUTO: 0.26 K/UL (ref 0–0.51)
EOSINOPHIL NFR BLD: 2.1 % (ref 0–6.9)
ERYTHROCYTE [DISTWIDTH] IN BLOOD BY AUTOMATED COUNT: 53.1 FL (ref 35.9–50)
ERYTHROCYTE [SEDIMENTATION RATE] IN BLOOD BY WESTERGREN METHOD: 27 MM/HOUR (ref 0–25)
GFR SERPLBLD CREATININE-BSD FMLA CKD-EPI: 52 ML/MIN/1.73 M 2
GLOBULIN SER CALC-MCNC: 3.5 G/DL (ref 1.9–3.5)
GLUCOSE SERPL-MCNC: 147 MG/DL (ref 65–99)
HCT VFR BLD AUTO: 37.5 % (ref 37–47)
HGB BLD-MCNC: 11.9 G/DL (ref 12–16)
IMM GRANULOCYTES # BLD AUTO: 0.05 K/UL (ref 0–0.11)
IMM GRANULOCYTES NFR BLD AUTO: 0.4 % (ref 0–0.9)
LYMPHOCYTES # BLD AUTO: 1.73 K/UL (ref 1–4.8)
LYMPHOCYTES NFR BLD: 13.9 % (ref 22–41)
MCH RBC QN AUTO: 24.6 PG (ref 27–33)
MCHC RBC AUTO-ENTMCNC: 31.7 G/DL (ref 32.2–35.5)
MCV RBC AUTO: 77.5 FL (ref 81.4–97.8)
MONOCYTES # BLD AUTO: 0.77 K/UL (ref 0–0.85)
MONOCYTES NFR BLD AUTO: 6.2 % (ref 0–13.4)
NEUTROPHILS # BLD AUTO: 9.53 K/UL (ref 1.82–7.42)
NEUTROPHILS NFR BLD: 76.4 % (ref 44–72)
NRBC # BLD AUTO: 0 K/UL
NRBC BLD-RTO: 0 /100 WBC (ref 0–0.2)
PLATELET # BLD AUTO: 469 K/UL (ref 164–446)
PMV BLD AUTO: 9.9 FL (ref 9–12.9)
POTASSIUM SERPL-SCNC: 4.2 MMOL/L (ref 3.6–5.5)
PROT SERPL-MCNC: 7.4 G/DL (ref 6–8.2)
RBC # BLD AUTO: 4.84 M/UL (ref 4.2–5.4)
SODIUM SERPL-SCNC: 139 MMOL/L (ref 135–145)
WBC # BLD AUTO: 12.5 K/UL (ref 4.8–10.8)

## 2024-01-08 ENCOUNTER — OFFICE VISIT (OUTPATIENT)
Dept: RHEUMATOLOGY | Facility: MEDICAL CENTER | Age: 69
End: 2024-01-08
Attending: INTERNAL MEDICINE
Payer: MEDICARE

## 2024-01-08 VITALS
DIASTOLIC BLOOD PRESSURE: 58 MMHG | HEART RATE: 94 BPM | BODY MASS INDEX: 25.18 KG/M2 | TEMPERATURE: 97.8 F | SYSTOLIC BLOOD PRESSURE: 110 MMHG | OXYGEN SATURATION: 97 % | WEIGHT: 156 LBS | RESPIRATION RATE: 14 BRPM

## 2024-01-08 DIAGNOSIS — M05.79 RHEUMATOID ARTHRITIS INVOLVING MULTIPLE SITES WITH POSITIVE RHEUMATOID FACTOR (HCC): ICD-10-CM

## 2024-01-08 DIAGNOSIS — Z79.899 LONG-TERM USE OF PLAQUENIL: ICD-10-CM

## 2024-01-08 DIAGNOSIS — I25.9 CHRONIC ISCHEMIC HEART DISEASE: ICD-10-CM

## 2024-01-08 DIAGNOSIS — E11.9 DIABETES MELLITUS TYPE 2 IN NONOBESE (HCC): ICD-10-CM

## 2024-01-08 DIAGNOSIS — J44.9 CHRONIC OBSTRUCTIVE PULMONARY DISEASE, UNSPECIFIED COPD TYPE (HCC): ICD-10-CM

## 2024-01-08 DIAGNOSIS — Z79.1 ENCOUNTER FOR LONG-TERM (CURRENT) USE OF NSAIDS: ICD-10-CM

## 2024-01-08 DIAGNOSIS — F17.200 TOBACCO USE DISORDER: ICD-10-CM

## 2024-01-08 DIAGNOSIS — I10 PRIMARY HYPERTENSION: ICD-10-CM

## 2024-01-08 PROCEDURE — 3078F DIAST BP <80 MM HG: CPT | Performed by: INTERNAL MEDICINE

## 2024-01-08 PROCEDURE — 99214 OFFICE O/P EST MOD 30 MIN: CPT | Performed by: INTERNAL MEDICINE

## 2024-01-08 PROCEDURE — 99212 OFFICE O/P EST SF 10 MIN: CPT | Performed by: INTERNAL MEDICINE

## 2024-01-08 PROCEDURE — 3074F SYST BP LT 130 MM HG: CPT | Performed by: INTERNAL MEDICINE

## 2024-01-08 RX ORDER — MELOXICAM 15 MG/1
TABLET ORAL
Qty: 90 TABLET | Refills: 1 | Status: SHIPPED | OUTPATIENT
Start: 2024-01-08

## 2024-01-08 ASSESSMENT — PATIENT HEALTH QUESTIONNAIRE - PHQ9
5. POOR APPETITE OR OVEREATING: 2 - MORE THAN HALF THE DAYS
CLINICAL INTERPRETATION OF PHQ2 SCORE: 4
SUM OF ALL RESPONSES TO PHQ QUESTIONS 1-9: 13

## 2024-01-08 ASSESSMENT — FIBROSIS 4 INDEX: FIB4 SCORE: 0.69

## 2024-01-08 NOTE — PROGRESS NOTES
Chief Complaint- joint pain     Subjective:   Heidi Navas is a 68 y.o. female here today for follow up of rheumatological issues    This is a follow-up visit, second visit with us for this patient who we see in this clinic for serologically positive rheumatoid arthritis.  Patient states that she used to be seen by rheumatologist Dr. Wadsworth years ago who would treat patient with Vioxx and Celebrex which are anti-inflammatories.  Currently we have patient on Plaquenil 200 mg p.o. twice daily, patient's not sure if it is helping, but is interested in continuing and we will switch Advil to meloxicam 15 mg p.o. daily and reevaluate.        PMHx  Depression  DM  CAD  COPD  Bilateral ANDERSON  Left TSA  S/p c spine laminectomy     Fam Hx  M-passed bladder cancer, DM  F-passed fatty liver  Bx6 passed bladder cancer  Sx2 bladder cancer     Soc Hx  Positive tobacco 9yl2aphc started at 13 years old  Positive MJ vape and smoke  ETOH qday with coffee  Positive blood transfusuion  Positive tattoo lips  No IVDA     G6PD 20.1 adequate 11/2023  LUCAS neg 4/2023  RF 19 (14<) 4/2023  CCP neg 4/2023  A1cb 8.5 9/2023     Bilateral Hand x-rays 1/2024-IMPRESSION:  1.  Severe osteoarthritis involving bilateral hands and wrists with fusion of the carpal bones and carpometacarpal joints bilaterally.  2.  No gross evidence for erosive osteoarthritis.     Bilateral Feet X-rays 1/2024-IMPRESSION:  1.  Severe osteoarthritis involving the radiocarpal joint.  2.  Bony fusion of the carpal bones and carpal metacarpal joints.  3.  No focal erosions or soft tissue calcifications to indicate inflammatory arthropathy.     Current Outpatient Medications   Medication Sig Dispense Refill    meloxicam (MOBIC) 15 MG tablet 1 tab po qday with food prn joint pain 90 Tablet 1    hydroxychloroquine (PLAQUENIL) 200 MG Tab TAKE 1 TABLET BY MOUTH TWICE A  Tablet 0    Continuous Blood Gluc Sensor (FREESTYLE HAYLEY 14 DAY SENSOR) Misc Use one sensor as  directed every 14 days. 2 Each 5    Continuous Blood Gluc  (FREESTYLE HAYLEY 2 READER) Device Use daily with Freestyle hayley sensor 1 Each 0    gabapentin (NEURONTIN) 300 MG Cap Start one tab nightly and then increase to 2 tabs nightly as tolerated. 100 Capsule 1    diphenhydrAMINE HCl (BENADRYL ALLERGY PO) Take  by mouth.      Insulin Glargine-yfgn (SEMGLEE, YFGN,) 100 UNIT/ML Solution Pen-injector Inject 15 units in morning and 15 units in evening then adjust per physician direction. 15 mL 3    Insulin Pen Needle 32 G x 4 mm (BD PEN NEEDLE BRITANY 2ND GEN) USE ONE PEN NEEDLE IN PEN DEVICE TO INJECT INSULIN THREE TIMES DAILY. 100 Each 3    omeprazole (PRILOSEC) 20 MG delayed-release capsule Take 1 Capsule by mouth 2 times a day. 180 Capsule 3    sertraline (ZOLOFT) 25 MG tablet Take 1 Tablet by mouth every day. 100 Tablet 3    glucose blood strip 1 Strip by Other route in the morning, at noon, and at bedtime. Use one strip to test blood sugar three times daily before meals. 300 Strip 3    Blood Glucose Monitoring Suppl (BLOOD GLUCOSE MONITOR SYSTEM) w/Device Kit Test blood sugar as recommended by provider. Slick Contour Next blood glucose monitoring kit. 1 Kit 0    Microlet Lancets Misc Use one lancet to test blood sugar three times daily before meals. 100 Each 0    Alcohol Swabs (ALCOHOL PREP) 70 % Pads Wipe site with prep pad prior to injection 100 Each 0    ferrous sulfate (FEROSUL) 325 (65 Fe) MG tablet Take 1 tablet by mouth every day. (Patient not taking: Reported on 12/12/2023) 90 Tablet 0    metFORMIN (GLUCOPHAGE) 500 MG Tab Take 1 Tablet by mouth 2 times a day with meals. (Patient not taking: Reported on 1/8/2024) 180 Tablet 1    fluconazole (DIFLUCAN) 150 MG tablet Take 1 Tablet by mouth every day. (Patient not taking: Reported on 12/12/2023) 1 Tablet 0    losartan (COZAAR) 25 MG Tab Take 1 Tablet by mouth every day. (Patient not taking: Reported on 12/12/2023) 100 Tablet 3    rosuvastatin (CRESTOR)  10 MG Tab Take 1 Tablet by mouth every evening. (Patient not taking: Reported on 12/12/2023) 100 Tablet 3     No current facility-administered medications for this visit.     She  has a past medical history of Anemia, Arthritis, Breath shortness, Bronchitis (2008), Colon polyps (2015), Convulsions ([facility]) (7/21/2009), COPD (chronic obstructive pulmonary disease) ([facility]), Dental disorder, Depression, Diverticulosis (5/10 ), Hemorrhoid (7/29/2009), History of colonoscopy (  2005), Leukocytosis (3/30/2014), Lymphocytosis (symptomatic) (7/21/2009), MI (myocardial infarction) ([facility]) (4/29/2007), Other specified disorder of intestines, Pain, Pancreatitis, Pap smear (4/2006), Pneumonia (2013), Pulmonary nodule, Renal lesion, Renal stone, S/P colonoscopy (5/2010), Screening mammogram (never), Seizure ([facility]), Shingles, Symptomatic menopausal or female climacteric states (7/21/2009), Type II or unspecified type diabetes mellitus without mention of complication, uncontrolled (7/21/2009), and Unspecified urinary incontinence.    ROS   Other than what is mentioned in HPI or physical exam, there is no history of headaches, double vision or blurred vision.  No trouble swallowing difficulties .  No chest complaints including chest pain, cough or sputum production. No GI complaints including nausea, vomiting, change in bowel habits, or past peptic ulcer disease. No history of blood in the stools. No urinary complaints including dysuria or frequency. No history of alopecia, photosensitivity     Objective:     /58   Pulse 94   Temp 36.6 °C (97.8 °F) (Temporal)   Resp 14   Wt 70.8 kg (156 lb)   SpO2 97%  Body mass index is 25.18 kg/m².   Physical Exam:    Constitutional: Alert and oriented X3, patient is talkative with good eye contact.Skin: Warm, dry, good turgor, no rashes in visible areas.Eye: Equal, round and reactive, conjunctiva clear, lids normal EOM intactENMT: Lips without lesions,  pinna without deformityNeck: Trachea  midline, no masses, no thyromegaly.Lymph:  No cervical lymphadenopathy, no axillary lymphadenopathy, no supraclavicular lymphadenopathyRespiratory: Unlabored respiratory effort, lungs clear to auscultation, no wheezes, no ronchi.Cardiovascular: Normal S1, S2,Regular rate and rhythm, no murmurs rubs or gallops   .Abdomen: Soft, non-distended.Psych: Alert and oriented x3, normal affect and mood.Neuro: Cranial nerves 2-12 are grossly intact Ext:no joint laxity noted in bilateral arms, no joint laxity noted in bilateral legs, toes without crossover toes and without splay toes, patient does have Heberden's nodes bilateral index DIP joints with flexor contractures of bilateral index DIP joints.  There are also Grant's nodes on bilateral third finger PIP joints with lateral deviation of the distal third fingers bilaterally.  Shoulders good range of motion without limitations, mild crepitus in knees but no lincoln synovitis    Lab Results   Component Value Date/Time    HEPBCORIGM Negative 10/16/2019 11:14 AM    HEPBSAG Negative 10/16/2019 11:14 AM     Lab Results   Component Value Date/Time    HEPCAB Negative 10/16/2019 11:14 AM     Lab Results   Component Value Date/Time    SODIUM 139 01/03/2024 11:54 AM    POTASSIUM 4.2 01/03/2024 11:54 AM    CHLORIDE 97 01/03/2024 11:54 AM    CO2 28 01/03/2024 11:54 AM    GLUCOSE 147 (H) 01/03/2024 11:54 AM    BUN 21 01/03/2024 11:54 AM    CREATININE 1.15 01/03/2024 11:54 AM    CREATININE 0.8 04/09/2009 07:55 AM      Lab Results   Component Value Date/Time    WBC 12.5 (H) 01/03/2024 11:54 AM    RBC 4.84 01/03/2024 11:54 AM    HEMOGLOBIN 11.9 (L) 01/03/2024 11:54 AM    HEMATOCRIT 37.5 01/03/2024 11:54 AM    MCV 77.5 (L) 01/03/2024 11:54 AM    MCH 24.6 (L) 01/03/2024 11:54 AM    MCHC 31.7 (L) 01/03/2024 11:54 AM    MPV 9.9 01/03/2024 11:54 AM    NEUTSPOLYS 76.40 (H) 01/03/2024 11:54 AM    LYMPHOCYTES 13.90 (L) 01/03/2024 11:54 AM    MONOCYTES 6.20 01/03/2024 11:54 AM    EOSINOPHILS 2.10  01/03/2024 11:54 AM    BASOPHILS 1.00 01/03/2024 11:54 AM    HYPOCHROMIA 1+ 01/13/2023 02:01 PM    ANISOCYTOSIS 1+ 01/13/2023 02:01 PM      Lab Results   Component Value Date/Time    CALCIUM 9.4 01/03/2024 11:54 AM    ASTSGOT 15 01/03/2024 11:54 AM    ALTSGPT 10 01/03/2024 11:54 AM    ALKPHOSPHAT 118 (H) 01/03/2024 11:54 AM    TBILIRUBIN 0.2 01/03/2024 11:54 AM    ALBUMIN 3.9 01/03/2024 11:54 AM    ALBUMIN 3.34 (L) 09/20/2023 10:32 AM    TOTPROTEIN 7.4 01/03/2024 11:54 AM    TOTPROTEIN 7.1 09/20/2023 10:32 AM     Lab Results   Component Value Date/Time    URICACID 6.7 10/16/2019 11:14 AM    RHEUMFACTN 19 (H) 04/28/2023 11:53 AM    CCPANTIBODY 2 04/28/2023 11:53 AM    ANTINUCAB None Detected 04/28/2023 11:53 AM     Lab Results   Component Value Date/Time    ANCAIGG <1:20 01/08/2020 12:14 PM    ANTISSBSJ 1 10/16/2019 11:14 AM     Lab Results   Component Value Date/Time    SEDRATEWES 27 (H) 01/03/2024 11:54 AM     Lab Results   Component Value Date/Time    HBA1C 8.5 (H) 09/20/2023 10:32 AM     Lab Results   Component Value Date/Time    AJJX61SDNH Negative 10/16/2019 11:14 AM     Lab Results   Component Value Date/Time    G6PD 20.1 (H) 11/22/2023 11:08 AM     Lab Results   Component Value Date/Time    CPKTOTAL 33 04/28/2023 11:53 AM     Assessment and Plan:     1. Rheumatoid arthritis involving multiple sites with positive rheumatoid factor (HCC)  Long discussion with patient regarding treatment options we opted to stay with the Plaquenil at 200 mg p.o. twice daily and add a prescription anti-inflammatory meloxicam 15 mg p.o. daily.  We will reevaluate in about 3 months  - meloxicam (MOBIC) 15 MG tablet; 1 tab po qday with food prn joint pain  Dispense: 90 Tablet; Refill: 1    2. Long-term use of Plaquenil  Currently on Plaquenil 200 mg p.o. twice daily of note G6PD levels are adequate, patient will need monitoring labs in about July 2024, will order at next visit, patient will also need eye exam every year if we  continue Plaquenil, will determine that at next visit.  - meloxicam (MOBIC) 15 MG tablet; 1 tab po qday with food prn joint pain  Dispense: 90 Tablet; Refill: 1    3. Encounter for long-term (current) use of NSAIDs  Start a trial of meloxicam 15 mg p.o. daily, patient only monitoring labs every 6 months, next labs will be due about July 2024  - meloxicam (MOBIC) 15 MG tablet; 1 tab po qday with food prn joint pain  Dispense: 90 Tablet; Refill: 1    4. Primary hypertension  May impact the type of medications we can use for this patient's arthritis. We will have to keep this under advisement.    5. Chronic obstructive pulmonary disease, unspecified COPD type (HCC)  Exacerbated by tobacco abuse, patient states understanding but is reluctant to stop tobacco abuse    6. Diabetes mellitus type 2 in nonobese (HCC)  Followed by patients PCP, high blood sugar can exacerbate inflammatory state of patient's arthritis, discussed with patient the need to monitor and control blood sugars, patient states understanding    7. Chronic ischemic heart disease  Stable per patient report    8. Tobacco use disorder  Tobacco abuse is known to exacerbate rheumatoid arthritis, strongly advised patient to stop tobacco abuse, patient states understanding.  3-minute discussion with patient discussing the risks of tobacco abuse and advised treatment options including nicotine patch.  Patient states understanding.    Followup: Return in about 3 months (around 4/8/2024). or sooner anais Navas  was seen 30 minutes face-to-face of which more than 50% of the time was spent counseling the patient (excluding time for procedures)  regarding  rheumatological condition and care. Therapy was discussed in detail.      Please note that this dictation was created using voice recognition software. I have made every reasonable attempt to correct obvious errors, but I expect that there are errors of grammar and possibly content that I did not  discover before finalizing the note.

## 2024-01-30 ENCOUNTER — PATIENT OUTREACH (OUTPATIENT)
Dept: HEALTH INFORMATION MANAGEMENT | Facility: OTHER | Age: 69
End: 2024-01-30
Payer: MEDICARE

## 2024-01-30 DIAGNOSIS — I51.7 LVH (LEFT VENTRICULAR HYPERTROPHY): ICD-10-CM

## 2024-01-30 DIAGNOSIS — G89.4 CHRONIC PAIN DISORDER: ICD-10-CM

## 2024-01-30 DIAGNOSIS — E11.9 DIABETES MELLITUS TYPE 2 IN NONOBESE (HCC): ICD-10-CM

## 2024-01-30 PROCEDURE — 99999 PR NO CHARGE: CPT | Performed by: FAMILY MEDICINE

## 2024-01-30 NOTE — PROGRESS NOTES
Assessment    Spoke to patient for monthly outreach. Pt reports she is doing pretty well. She is happy to report that blood sugar has been very well controlled. FBS .  Iron infusion helped with energy quite a bit. Taking all medications as prescribed. Pt reports she got a letter that her semglee will no longer be covered by insurance and that she needs to change to lantus. She has enough medication to get her through the end of the month. Requesting new rx to [facility]. No other needs or concerns at this time.     Education    DM, medication management    Plan of Care and Goals    Continue plan of care    Barriers:    Medication coverage    Progress:    progressing    Next outreach:  1 month

## 2024-02-02 RX ORDER — PEN NEEDLE, DIABETIC 32GX 5/32"
NEEDLE, DISPOSABLE MISCELLANEOUS
Qty: 100 EACH | Refills: 3 | Status: SHIPPED | OUTPATIENT
Start: 2024-02-02 | End: 2024-02-23 | Stop reason: SDUPTHER

## 2024-02-07 DIAGNOSIS — G89.29 OTHER CHRONIC PAIN: ICD-10-CM

## 2024-02-07 DIAGNOSIS — G47.01 INSOMNIA DUE TO MEDICAL CONDITION: ICD-10-CM

## 2024-02-07 DIAGNOSIS — E11.9 DIABETES MELLITUS TYPE 2 IN NONOBESE (HCC): ICD-10-CM

## 2024-02-07 NOTE — TELEPHONE ENCOUNTER
Received request via: Patient    Was the patient seen in the last year in this department? Yes    Does the patient have an active prescription (recently filled or refills available) for medication(s) requested? No    [facility]    Does the patient have [facility] and need 100 day supply (blood pressure, diabetes and cholesterol meds only)? Medication is not for cholesterol, blood pressure or diabetes    Pt would like to restart trazodone. She had stopped this medication for a while, but thinks she needs it again to help her sleep. She is also planning to restart sertraline. Requesting refill of pain medication which she uses sparingly.

## 2024-02-09 DIAGNOSIS — G89.29 OTHER CHRONIC PAIN: ICD-10-CM

## 2024-02-23 ENCOUNTER — PATIENT OUTREACH (OUTPATIENT)
Dept: HEALTH INFORMATION MANAGEMENT | Facility: OTHER | Age: 69
End: 2024-02-23
Payer: MEDICARE

## 2024-02-23 DIAGNOSIS — G89.4 CHRONIC PAIN DISORDER: ICD-10-CM

## 2024-02-23 DIAGNOSIS — E11.9 DIABETES MELLITUS TYPE 2 IN NONOBESE (HCC): ICD-10-CM

## 2024-02-23 DIAGNOSIS — I51.7 LVH (LEFT VENTRICULAR HYPERTROPHY): ICD-10-CM

## 2024-02-23 PROCEDURE — 99490 CHRNC CARE MGMT STAFF 1ST 20: CPT | Performed by: FAMILY MEDICINE

## 2024-02-24 NOTE — TELEPHONE ENCOUNTER
Pt calling in to request insulin, needles and alcohol swab refills. She would like these to be sent to [place] pharmacy. Pt reports she also mailed back her controlled substance agreement and is hoping her pain medication can now be sent in. Agreement is scanned into media. Assisted pt to schedule f/u appt with pcp.

## 2024-02-24 NOTE — PROGRESS NOTES
Assessment    Spoke to patient for monthly out reach. Pt states she is doing really well. Fbs running between  at the very highest. Not checking bp. Still needs some medication refills. Request sent to pcp. Assisted pt to schedule follow up. No other needs at this time.     Education    Medication management, DM    Plan of Care and Goals    Continue plan of care    Barriers:    transportation    Progress:    progressing    Next outreach:  1 month    Quarterly chart review completed. Pt continues to qualify for and benefit from personal care management program. Will continue to follow.

## 2024-02-26 RX ORDER — PEN NEEDLE, DIABETIC 32GX 5/32"
NEEDLE, DISPOSABLE MISCELLANEOUS
Qty: 100 EACH | Refills: 3 | Status: SHIPPED | OUTPATIENT
Start: 2024-02-26

## 2024-02-26 RX ORDER — TRAMADOL HYDROCHLORIDE 50 MG/1
50 TABLET ORAL EVERY 6 HOURS PRN
Qty: 30 TABLET | Refills: 0 | Status: SHIPPED | OUTPATIENT
Start: 2024-02-26 | End: 2024-03-27

## 2024-02-26 RX ORDER — TRAZODONE HYDROCHLORIDE 50 MG/1
150 TABLET ORAL NIGHTLY
Qty: 90 TABLET | Refills: 1 | Status: SHIPPED | OUTPATIENT
Start: 2024-02-26

## 2024-02-26 RX ORDER — GLUCOSAMINE HCL/CHONDROITIN SU 500-400 MG
CAPSULE ORAL
Qty: 100 EACH | Refills: 3 | Status: SHIPPED | OUTPATIENT
Start: 2024-02-26

## 2024-02-29 ENCOUNTER — APPOINTMENT (OUTPATIENT)
Dept: MEDICAL GROUP | Facility: IMAGING CENTER | Age: 69
End: 2024-02-29
Payer: MEDICARE

## 2024-03-06 ENCOUNTER — TELEPHONE (OUTPATIENT)
Dept: MEDICAL GROUP | Facility: IMAGING CENTER | Age: 69
End: 2024-03-06
Payer: MEDICARE

## 2024-03-06 ENCOUNTER — DOCUMENTATION (OUTPATIENT)
Dept: HEALTH INFORMATION MANAGEMENT | Facility: OTHER | Age: 69
End: 2024-03-06
Payer: MEDICARE

## 2024-03-06 PROCEDURE — RXMED WILLOW AMBULATORY MEDICATION CHARGE: Performed by: FAMILY MEDICINE

## 2024-03-06 NOTE — TELEPHONE ENCOUNTER
Pt calling in because she has not received her medications from the [facility]. Spoke with [facility]. Pt is set up for medication delivery, but she needs to request it each time she needs a delivery. Pharmacy team to reach out to pt to discuss options for delivery.

## 2024-03-07 ENCOUNTER — TELEPHONE (OUTPATIENT)
Dept: MEDICAL GROUP | Facility: IMAGING CENTER | Age: 69
End: 2024-03-07
Payer: MEDICARE

## 2024-03-07 NOTE — TELEPHONE ENCOUNTER
Called pt to make sure medication delivery was straightened out. Left VM requesting call back with any further needs.

## 2024-03-11 ENCOUNTER — PHARMACY VISIT (OUTPATIENT)
Dept: PHARMACY | Facility: MEDICAL CENTER | Age: 69
End: 2024-03-11
Payer: COMMERCIAL

## 2024-03-11 PROCEDURE — RXOTC WILLOW AMBULATORY OTC CHARGE

## 2024-03-13 NOTE — PROGRESS NOTES
Date of service 04/03/24 - rescheduled from 03/27/24    Subjective    Chief Complaint:  Follow up iron deficiency and leukocytosis    HPI:  68 female with history of iron deficiency anemia felt related to GI bleeding from rectal prolapse and/or gastritis. Also history of chronic leukocytosis with negative work up to date including BCR ABL and EVIE 2 mutation studies. Had IV iron 12/12/23. MCV is better but iron still on the low end.     ROS:    Constitutional: No weight loss  Skin: No rash or jaundice  HENT: No change in eyesight or hearing  Cardiovascular:No chest pain or arrythmia  Respiratory:No cough or SOB  GI:No nausea, vomiting, diarrhea, constipation  :No dysuria or frequency  Musculoskeletal:No bone or joint pain  Neuro:No sx's of neuropathy  Psych: No complaints    PMH:      Allergies   Allergen Reactions    Aspartame Nausea     headache    Atorvastatin Calcium Unspecified and Swelling    Latex Rash and Itching    Lipitor [Atorvastatin Calcium] Swelling     Patient denies allergy 06/04/22    Other Misc Vomiting     Bug spray    Saccharin Nausea     Nausea and headache       Past Medical History:   Diagnosis Date    Anemia     Arthritis     OA and  not RA per rheumatology    Breath shortness     oxygen PRN     Bronchitis 2008    Colon polyps 2015    Convulsions (HCC) 7/21/2009     ICD-10 transition    COPD (chronic obstructive pulmonary disease) ([facility])     Dental disorder     full dentures    Depression     depression, anxiety     Diverticulosis 5/10     colonoscopy    Hemorrhoid 7/29/2009    History of colonoscopy   2005    Leukocytosis 3/30/2014    Lymphocytosis (symptomatic) 7/21/2009    MI (myocardial infarction) ([facility]) 4/29/2007    Other specified disorder of intestines     constipation    Pain     shoulders, neck, lower back    Pancreatitis     Pap smear 4/2006    Pneumonia 2013    Pulmonary nodule     Renal lesion     Renal stone     S/P colonoscopy 5/2010    will need repeat in 5 years    Screening  mammogram never    Seizure (HCC)     x1 in 2008    Shingles     Symptomatic menopausal or female climacteric states 7/21/2009    Type II or unspecified type diabetes mellitus without mention of complication, uncontrolled 7/21/2009    diet controlled     Unspecified urinary incontinence         Past Surgical History:   Procedure Laterality Date    COLONOSCOPY N/A 1/6/2020    Procedure: COLONOSCOPY;  Surgeon: David Carranza M.D.;  Location: [facility];  Service: Gastroenterology    GASTROSCOPY N/A 1/4/2020    Procedure: GASTROSCOPY;  Surgeon: Polo Andujar D.O.;  Location: [facility]ERY [facility];  Service: Gastroenterology    SHOULDER ARTHROPLASTY TOTAL Left 7/12/2017    Procedure: SHOULDER ARTHROPLASTY TOTAL- REVERSE;  Surgeon: David Villalta M.D.;  Location: SUR[facility]ERY [facility];  Service:     CERVICAL FUSION POSTERIOR  9/10/2014    Performed by Abhilash Bishop M.D. at SURGERY [facility]    HIP ARTHROPLASTY MIS TOTAL  7/16/14    rt and lt    OTHER CARDIAC SURGERY  2007    angioplasty    GYN SURGERY  1992    c section    EYE SURGERY  laser    OTHER ORTHOPEDIC SURGERY      TONSILLECTOMY          Medications:    Current Outpatient Medications on File Prior to Encounter   Medication Sig Dispense Refill    traZODone (DESYREL) 50 MG Tab Take 3 Tablets by mouth every evening. 90 Tablet 1    insulin glargine 100 UNIT/ML SC SOPN injection Inject 15 Units under the skin 2 times a day. 15 mL 3    Insulin Pen Needle 32 G x 4 mm (BD PEN NEEDLE BRITANY 2ND GEN) USE ONE PEN NEEDLE IN PEN DEVICE TO INJECT INSULIN TWO TIMES DAILY. 100 Each 3    Alcohol Swabs Use on skin 3 times daily prior to injection with insulin with glucose check. 100 Each 3    sertraline (ZOLOFT) 25 MG tablet TAKE 1 TABLET BY MOUTH EVERY  Tablet 1    CONTOUR NEXT TEST strip 1 STRIP BY OTHER ROUTE IN THE MORNING, AT NOON, AND AT BEDTIME. USE ONE STRIP TO TEST BLOOD SUGAR THREE TIMES DAILY BEFORE MEALS. 300 Strip 3     meloxicam (MOBIC) 15 MG tablet 1 tab po qday with food prn joint pain 90 Tablet 1    hydroxychloroquine (PLAQUENIL) 200 MG Tab TAKE 1 TABLET BY MOUTH TWICE A  Tablet 0    ferrous sulfate (FEROSUL) 325 (65 Fe) MG tablet Take 1 tablet by mouth every day. 90 Tablet 0    metFORMIN (GLUCOPHAGE) 500 MG Tab Take 1 Tablet by mouth 2 times a day with meals. 180 Tablet 1    fluconazole (DIFLUCAN) 150 MG tablet Take 1 Tablet by mouth every day. 1 Tablet 0    Continuous Blood Gluc Sensor (FREESTYLE HAYLEY 14 DAY SENSOR) Misc Use one sensor as directed every 14 days. 2 Each 5    Continuous Blood Gluc  (FREESTYLE HAYLEY 2 READER) Device Use daily with Freestyle hayley sensor 1 Each 0    gabapentin (NEURONTIN) 300 MG Cap Start one tab nightly and then increase to 2 tabs nightly as tolerated. 100 Capsule 1    diphenhydrAMINE HCl (BENADRYL ALLERGY PO) Take  by mouth.      omeprazole (PRILOSEC) 20 MG delayed-release capsule Take 1 Capsule by mouth 2 times a day. 180 Capsule 3    losartan (COZAAR) 25 MG Tab Take 1 Tablet by mouth every day. 100 Tablet 3    rosuvastatin (CRESTOR) 10 MG Tab Take 1 Tablet by mouth every evening. 100 Tablet 3    Blood Glucose Monitoring Suppl (BLOOD GLUCOSE MONITOR SYSTEM) w/Device Kit Test blood sugar as recommended by provider. Xhale Contour Next blood glucose monitoring kit. 1 Kit 0    Microlet Lancets Misc Use one lancet to test blood sugar three times daily before meals. 100 Each 0    Alcohol Swabs (ALCOHOL PREP) 70 % Pads Wipe site with prep pad prior to injection 100 Each 0     No current facility-administered medications on file prior to encounter.       Social History     Tobacco Use    Smoking status: Every Day     Current packs/day: 0.25     Average packs/day: 0.3 packs/day for 50.0 years (12.5 ttl pk-yrs)     Types: Cigarettes    Smokeless tobacco: Never   Substance Use Topics    Alcohol use: No     Alcohol/week: 0.0 oz        Family History   Problem Relation Age of Onset     "Diabetes Mother     Cancer Mother         bladder cancer    Diabetes Brother     Cancer Brother         bladder cancer    Diabetes Brother     Cancer Sister         bladder cancer        Objective    Vitals:    /84 (BP Location: Left arm, Patient Position: Sitting, BP Cuff Size: Adult)   Pulse (!) 128   Temp 36.8 °C (98.3 °F) (Temporal)   Resp 14   Ht 1.676 m (5' 6\")   Wt 74.8 kg (165 lb)   LMP 01/01/2007   SpO2 98%   BMI 26.63 kg/m²     Physical Exam:    Appears well-developed and well-nourished. No distress.    Head -  Normocephalic .   Eyes - Pupils are equal. Conjunctivae normal. No scleral icterus.   Ears - normal hearing  Mouth - edentulous  Neurological -   Alert and oriented.  Skin - Skin is warm and dry. No rash noted. Not diaphoretic. No erythema. No pallor. No jaundice   Psychiatric -  Normal mood and affect.    Labs:     Latest Reference Range & Units 11/28/23 12:36 01/03/24 11:54 03/28/24 14:10   WBC 4.8 - 10.8 K/uL 9.2 12.5 (H) 9.8   RBC 4.20 - 5.40 M/uL 4.76 4.84 4.61   Hemoglobin 12.0 - 16.0 g/dL 11.3 (L) 11.9 (L) 12.3   Hematocrit 37.0 - 47.0 % 36.0 (L) 37.5 39.0   MCV 81.4 - 97.8 fL 75.6 (L) 77.5 (L) 84.6   MCH 27.0 - 33.0 pg 23.7 (L) 24.6 (L) 26.7 (L)   MCHC 32.2 - 35.5 g/dL 31.4 (L) 31.7 (L) 31.5 (L)   RDW 35.9 - 50.0 fL 46.7 53.1 (H) 52.0 (H)   Platelet Count 164 - 446 K/uL 522 (H) 469 (H) 420   MPV 9.0 - 12.9 fL 9.4 9.9 9.7   Neutrophils-Polys 44.00 - 72.00 % 73.90 (H) 76.40 (H) 77.60 (H)   Neutrophils (Absolute) 1.82 - 7.42 K/uL 6.81 9.53 (H) 7.63 (H)   Lymphocytes 22.00 - 41.00 % 19.70 (L) 13.90 (L) 14.70 (L)      Latest Reference Range & Units 11/22/23 11:08 03/28/24 14:10   Iron 40 - 170 ug/dL 23 (L) 24 (L)   Total Iron Binding 250 - 450 ug/dL 290 204 (L)   % Saturation 15 - 55 % 8 (L) 12 (L)      Latest Reference Range & Units 03/28/24 14:10   Ferritin 10.0 - 291.0 ng/mL 152.0     Assessment  Iron still low although CBC somewhat better  Imp:    Visit Diagnosis:    1. " Leukocytosis, unspecified type        2. Rheumatoid arthritis involving multiple sites with positive rheumatoid factor (HCC)        3. Iron deficiency anemia, unspecified iron deficiency anemia type            Plan:  IV iron again  See Norma in 4 months with repeat lab    Raymond Fitzpatrick M.D.

## 2024-03-22 ENCOUNTER — PATIENT OUTREACH (OUTPATIENT)
Dept: MEDICAL GROUP | Facility: LAB | Age: 69
End: 2024-03-22
Payer: MEDICARE

## 2024-03-22 DIAGNOSIS — E11.9 DIABETES MELLITUS TYPE 2 IN NONOBESE (HCC): ICD-10-CM

## 2024-03-22 DIAGNOSIS — I10 PRIMARY HYPERTENSION: ICD-10-CM

## 2024-03-22 DIAGNOSIS — J44.9 CHRONIC OBSTRUCTIVE PULMONARY DISEASE, UNSPECIFIED COPD TYPE (HCC): ICD-10-CM

## 2024-03-22 NOTE — PROGRESS NOTES
Assessment    Spoke to patient for monthly outreach. Pt reports she is doing well. She is currently in the process of moving. Her caregiver is helping with move. She continues to check her blood sguar.  FBS 88 this am. Usually around 140. Not checking bp cause can't find cuff. Pt inquired about blood work for dr. Fitzpatrick. Notified of 3 lab orders. Has all medications and is taking them as prescribed. She reports she is using her pain medication sparingly. No needs or concerns at this time. Pt did request that her address be updated in epic:    New address:  [address] [place] [address]  [place] [id number]     Education    DM, medication management    Plan of Care and Goals    Continue plan of care    Barriers:    Stress of moving    Progress:    progressing    Next outreach:  1 month

## 2024-03-28 ENCOUNTER — HOSPITAL ENCOUNTER (OUTPATIENT)
Dept: LAB | Facility: MEDICAL CENTER | Age: 69
End: 2024-03-28
Attending: INTERNAL MEDICINE
Payer: MEDICARE

## 2024-03-28 DIAGNOSIS — D50.9 IRON DEFICIENCY ANEMIA, UNSPECIFIED IRON DEFICIENCY ANEMIA TYPE: ICD-10-CM

## 2024-03-28 LAB
BASOPHILS # BLD AUTO: 0.9 % (ref 0–1.8)
BASOPHILS # BLD: 0.09 K/UL (ref 0–0.12)
EOSINOPHIL # BLD AUTO: 0.26 K/UL (ref 0–0.51)
EOSINOPHIL NFR BLD: 2.6 % (ref 0–6.9)
ERYTHROCYTE [DISTWIDTH] IN BLOOD BY AUTOMATED COUNT: 52 FL (ref 35.9–50)
FERRITIN SERPL-MCNC: 152 NG/ML (ref 10–291)
HCT VFR BLD AUTO: 39 % (ref 37–47)
HGB BLD-MCNC: 12.3 G/DL (ref 12–16)
IMM GRANULOCYTES # BLD AUTO: 0.02 K/UL (ref 0–0.11)
IMM GRANULOCYTES NFR BLD AUTO: 0.2 % (ref 0–0.9)
IRON SATN MFR SERPL: 12 % (ref 15–55)
IRON SERPL-MCNC: 24 UG/DL (ref 40–170)
LYMPHOCYTES # BLD AUTO: 1.45 K/UL (ref 1–4.8)
LYMPHOCYTES NFR BLD: 14.7 % (ref 22–41)
MCH RBC QN AUTO: 26.7 PG (ref 27–33)
MCHC RBC AUTO-ENTMCNC: 31.5 G/DL (ref 32.2–35.5)
MCV RBC AUTO: 84.6 FL (ref 81.4–97.8)
MONOCYTES # BLD AUTO: 0.39 K/UL (ref 0–0.85)
MONOCYTES NFR BLD AUTO: 4 % (ref 0–13.4)
NEUTROPHILS # BLD AUTO: 7.63 K/UL (ref 1.82–7.42)
NEUTROPHILS NFR BLD: 77.6 % (ref 44–72)
NRBC # BLD AUTO: 0 K/UL
NRBC BLD-RTO: 0 /100 WBC (ref 0–0.2)
PLATELET # BLD AUTO: 420 K/UL (ref 164–446)
PMV BLD AUTO: 9.7 FL (ref 9–12.9)
RBC # BLD AUTO: 4.61 M/UL (ref 4.2–5.4)
TIBC SERPL-MCNC: 204 UG/DL (ref 250–450)
UIBC SERPL-MCNC: 180 UG/DL (ref 110–370)
WBC # BLD AUTO: 9.8 K/UL (ref 4.8–10.8)

## 2024-03-28 PROCEDURE — 82728 ASSAY OF FERRITIN: CPT

## 2024-03-28 PROCEDURE — 83540 ASSAY OF IRON: CPT

## 2024-03-28 PROCEDURE — 85025 COMPLETE CBC W/AUTO DIFF WBC: CPT

## 2024-03-28 PROCEDURE — 83550 IRON BINDING TEST: CPT

## 2024-03-28 PROCEDURE — 36415 COLL VENOUS BLD VENIPUNCTURE: CPT

## 2024-04-03 ENCOUNTER — HOSPITAL ENCOUNTER (OUTPATIENT)
Dept: HEMATOLOGY ONCOLOGY | Facility: MEDICAL CENTER | Age: 69
End: 2024-04-03
Attending: INTERNAL MEDICINE
Payer: MEDICARE

## 2024-04-03 VITALS
RESPIRATION RATE: 14 BRPM | OXYGEN SATURATION: 98 % | TEMPERATURE: 98.3 F | HEART RATE: 128 BPM | WEIGHT: 165 LBS | HEIGHT: 66 IN | SYSTOLIC BLOOD PRESSURE: 130 MMHG | DIASTOLIC BLOOD PRESSURE: 84 MMHG | BODY MASS INDEX: 26.52 KG/M2

## 2024-04-03 DIAGNOSIS — D50.9 IRON DEFICIENCY ANEMIA, UNSPECIFIED IRON DEFICIENCY ANEMIA TYPE: ICD-10-CM

## 2024-04-03 DIAGNOSIS — D72.829 LEUKOCYTOSIS, UNSPECIFIED TYPE: ICD-10-CM

## 2024-04-03 DIAGNOSIS — M05.79 RHEUMATOID ARTHRITIS INVOLVING MULTIPLE SITES WITH POSITIVE RHEUMATOID FACTOR (HCC): ICD-10-CM

## 2024-04-03 PROCEDURE — 99212 OFFICE O/P EST SF 10 MIN: CPT | Performed by: INTERNAL MEDICINE

## 2024-04-03 PROCEDURE — 99213 OFFICE O/P EST LOW 20 MIN: CPT | Performed by: INTERNAL MEDICINE

## 2024-04-03 RX ORDER — METHYLPREDNISOLONE SODIUM SUCCINATE 125 MG/2ML
125 INJECTION, POWDER, LYOPHILIZED, FOR SOLUTION INTRAMUSCULAR; INTRAVENOUS PRN
Status: CANCELLED | OUTPATIENT
Start: 2024-04-10

## 2024-04-03 RX ORDER — DIPHENHYDRAMINE HYDROCHLORIDE 50 MG/ML
50 INJECTION INTRAMUSCULAR; INTRAVENOUS PRN
Status: CANCELLED | OUTPATIENT
Start: 2024-04-10

## 2024-04-03 RX ORDER — SODIUM CHLORIDE 9 MG/ML
INJECTION, SOLUTION INTRAVENOUS CONTINUOUS
Status: CANCELLED | OUTPATIENT
Start: 2024-04-10

## 2024-04-03 RX ORDER — EPINEPHRINE 1 MG/ML(1)
0.5 AMPUL (ML) INJECTION PRN
Status: CANCELLED | OUTPATIENT
Start: 2024-04-10

## 2024-04-03 ASSESSMENT — PAIN SCALES - GENERAL: PAINLEVEL: NO PAIN

## 2024-04-03 ASSESSMENT — FIBROSIS 4 INDEX: FIB4 SCORE: 0.77

## 2024-04-04 NOTE — ADDENDUM NOTE
Encounter addended by: Cyndy Ulloa, Med Ass't on: 4/4/2024 7:55 AM   Actions taken: Charge Capture section accepted

## 2024-04-08 ENCOUNTER — OFFICE VISIT (OUTPATIENT)
Dept: RHEUMATOLOGY | Facility: MEDICAL CENTER | Age: 69
End: 2024-04-08
Attending: INTERNAL MEDICINE
Payer: MEDICARE

## 2024-04-08 VITALS
OXYGEN SATURATION: 93 % | WEIGHT: 159 LBS | DIASTOLIC BLOOD PRESSURE: 60 MMHG | RESPIRATION RATE: 14 BRPM | BODY MASS INDEX: 25.66 KG/M2 | TEMPERATURE: 97.1 F | SYSTOLIC BLOOD PRESSURE: 140 MMHG | HEART RATE: 98 BPM

## 2024-04-08 DIAGNOSIS — Z79.1 ENCOUNTER FOR LONG-TERM (CURRENT) USE OF NSAIDS: ICD-10-CM

## 2024-04-08 DIAGNOSIS — Z79.899 LONG-TERM USE OF PLAQUENIL: ICD-10-CM

## 2024-04-08 DIAGNOSIS — E11.9 DIABETES MELLITUS TYPE 2 IN NONOBESE (HCC): ICD-10-CM

## 2024-04-08 DIAGNOSIS — J44.9 CHRONIC OBSTRUCTIVE PULMONARY DISEASE, UNSPECIFIED COPD TYPE (HCC): ICD-10-CM

## 2024-04-08 DIAGNOSIS — G56.03 BILATERAL CARPAL TUNNEL SYNDROME: ICD-10-CM

## 2024-04-08 DIAGNOSIS — D64.9 ANEMIA, UNSPECIFIED TYPE: ICD-10-CM

## 2024-04-08 DIAGNOSIS — F17.200 TOBACCO USE DISORDER: ICD-10-CM

## 2024-04-08 DIAGNOSIS — M05.79 RHEUMATOID ARTHRITIS INVOLVING MULTIPLE SITES WITH POSITIVE RHEUMATOID FACTOR (HCC): ICD-10-CM

## 2024-04-08 DIAGNOSIS — I10 PRIMARY HYPERTENSION: ICD-10-CM

## 2024-04-08 DIAGNOSIS — I25.9 CHRONIC ISCHEMIC HEART DISEASE: ICD-10-CM

## 2024-04-08 PROCEDURE — 99214 OFFICE O/P EST MOD 30 MIN: CPT | Performed by: INTERNAL MEDICINE

## 2024-04-08 PROCEDURE — 99212 OFFICE O/P EST SF 10 MIN: CPT | Performed by: INTERNAL MEDICINE

## 2024-04-08 PROCEDURE — 3078F DIAST BP <80 MM HG: CPT | Performed by: INTERNAL MEDICINE

## 2024-04-08 PROCEDURE — 3077F SYST BP >= 140 MM HG: CPT | Performed by: INTERNAL MEDICINE

## 2024-04-08 RX ORDER — HYDROXYCHLOROQUINE SULFATE 200 MG/1
TABLET, FILM COATED ORAL
Qty: 180 TABLET | Refills: 0 | Status: SHIPPED | OUTPATIENT
Start: 2024-04-08

## 2024-04-08 ASSESSMENT — FIBROSIS 4 INDEX: FIB4 SCORE: 0.77

## 2024-04-08 NOTE — PROGRESS NOTES
Chief Complaint- joint pain     Subjective:   Heidi Navas is a 68 y.o. female here today for follow up of rheumatological issues    This is a follow-up visit, third visit with us for this patient who we see in this clinic for serologically positive rheumatoid arthritis.  Patient states that she used to be seen by rheumatologist Dr. Wadsworth years ago who treated patient with NSAIDs.  Patient is currently on Plaquenil 20 mg p.o. twice daily, patient is not sure if it is helping but states there has been no swelling since starting Plaquenil.  Patient is also on meloxicam 15 mg p.o. daily.  Patient denies any side effects from the medication, denies any unexplained weight loss, denies any fevers of unknown etiology, denies any GI upset, denies any rashes, denies any new joint swelling, denies recurrent infections.  Patient states her last eye exam about 2 years ago with Dr. Simmons.    Patient does complain of exacerbation of bilateral carpal tunnel syndrome, patient states she was diagnosed with that about 3 years ago, but did not follow through with treatment because of the COVID pandemic.  Patient also states that she knows she has to have a right shoulder replacement, left shoulder x-ray of the right side done 2022 does indicate bone-on-bone degenerative arthritis.    Left TSA  Bilateral ANDERSON  C Spine fusion              PMHx  Depression  DM  CAD  COPD  Bilateral ANDERSON  Left TSA  S/p c spine laminectomy     Fam Hx  M-passed bladder cancer, DM  F-passed fatty liver  Bx6 passed bladder cancer  Sx2 bladder cancer     Soc Hx  Positive tobacco [id number] started at 13 years old  Positive MJ vape and smoke  ETOH qday with coffee  Positive blood transfusuion  Positive tattoo lips  No IVDA     G6PD 20.1 adequate 11/2023  LUCAS neg 4/2023  RF 19 (14<) 4/2023  CCP neg 4/2023  A1cb 8.5 9/2023     Bilateral Hand x-rays 1/2024-IMPRESSION:  1.  Severe osteoarthritis involving bilateral hands and wrists with fusion of the carpal  bones and carpometacarpal joints bilaterally.  2.  No gross evidence for erosive osteoarthritis.     Bilateral Feet X-rays 1/2024-IMPRESSION:  1.  Severe osteoarthritis involving the radiocarpal joint.  2.  Bony fusion of the carpal bones and carpal metacarpal joints.  3.  No focal erosions or soft tissue calcifications to indicate inflammatory arthropathy.      Addendum 4/22/2024  EMG/NCV 4/2024 Bilateral UE  IMPRESSION:  This is an abnormal study.  There is electrophysiologic evidence consistent with a chronic inactive RIGHT C5 radiculopathy.  Otherwise normal study of the bilateral median and ulnar nerves and no evidence of left C5-T1 radiculopathy.        Niurka Patel MD  Neurology - Neurophysiology     Current Outpatient Medications   Medication Sig Dispense Refill    hydroxychloroquine (PLAQUENIL) 200 MG Tab TAKE 1 TABLET BY MOUTH TWICE A  Tablet 0    traZODone (DESYREL) 50 MG Tab Take 3 Tablets by mouth every evening. 90 Tablet 1    insulin glargine 100 UNIT/ML SC SOPN injection Inject 15 Units under the skin 2 times a day. 15 mL 3    Insulin Pen Needle 32 G x 4 mm (BD PEN NEEDLE BRITANY 2ND GEN) USE ONE PEN NEEDLE IN PEN DEVICE TO INJECT INSULIN TWO TIMES DAILY. 100 Each 3    Alcohol Swabs Use on skin 3 times daily prior to injection with insulin with glucose check. 100 Each 3    sertraline (ZOLOFT) 25 MG tablet TAKE 1 TABLET BY MOUTH EVERY  Tablet 1    CONTOUR NEXT TEST strip 1 STRIP BY OTHER ROUTE IN THE MORNING, AT NOON, AND AT BEDTIME. USE ONE STRIP TO TEST BLOOD SUGAR THREE TIMES DAILY BEFORE MEALS. 300 Strip 3    meloxicam (MOBIC) 15 MG tablet 1 tab po qday with food prn joint pain 90 Tablet 1    Continuous Blood Gluc Sensor (FREESTYLE HAYLEY 14 DAY SENSOR) Misc Use one sensor as directed every 14 days. 2 Each 5    Continuous Blood Gluc  (FREESTYLE HAYLEY 2 READER) Device Use daily with Freestyle hayley sensor 1 Each 0    gabapentin (NEURONTIN) 300 MG Cap Start one tab nightly and then  increase to 2 tabs nightly as tolerated. 100 Capsule 1    diphenhydrAMINE HCl (BENADRYL ALLERGY PO) Take  by mouth.      omeprazole (PRILOSEC) 20 MG delayed-release capsule Take 1 Capsule by mouth 2 times a day. 180 Capsule 3    rosuvastatin (CRESTOR) 10 MG Tab Take 1 Tablet by mouth every evening. 100 Tablet 3    Blood Glucose Monitoring Suppl (BLOOD GLUCOSE MONITOR SYSTEM) w/Device Kit Test blood sugar as recommended by provider. Slick Contour Next blood glucose monitoring kit. 1 Kit 0    Microlet Lancets Misc Use one lancet to test blood sugar three times daily before meals. 100 Each 0    Alcohol Swabs (ALCOHOL PREP) 70 % Pads Wipe site with prep pad prior to injection 100 Each 0    ferrous sulfate (FEROSUL) 325 (65 Fe) MG tablet Take 1 tablet by mouth every day. (Patient not taking: Reported on 4/8/2024) 90 Tablet 0    metFORMIN (GLUCOPHAGE) 500 MG Tab Take 1 Tablet by mouth 2 times a day with meals. (Patient not taking: Reported on 4/8/2024) 180 Tablet 1    fluconazole (DIFLUCAN) 150 MG tablet Take 1 Tablet by mouth every day. (Patient not taking: Reported on 4/8/2024) 1 Tablet 0    losartan (COZAAR) 25 MG Tab Take 1 Tablet by mouth every day. (Patient not taking: Reported on 4/8/2024) 100 Tablet 3     No current facility-administered medications for this visit.     She  has a past medical history of Anemia, Arthritis, Breath shortness, Bronchitis (2008), Colon polyps (2015), Convulsions (formerly [facility]) (7/21/2009), COPD (chronic obstructive pulmonary disease) (formerly [facility]), Dental disorder, Depression, Diverticulosis (5/10 ), Hemorrhoid (7/29/2009), History of colonoscopy (  2005), Leukocytosis (3/30/2014), Lymphocytosis (symptomatic) (7/21/2009), MI (myocardial infarction) (formerly [facility]) (4/29/2007), Other specified disorder of intestines, Pain, Pancreatitis, Pap smear (4/2006), Pneumonia (2013), Pulmonary nodule, Renal lesion, Renal stone, S/P colonoscopy (5/2010), Screening mammogram (never), Seizure (formerly [facility]), Shingles, Symptomatic  menopausal or female climacteric states (7/21/2009), Type II or unspecified type diabetes mellitus without mention of complication, uncontrolled (7/21/2009), and Unspecified urinary incontinence.    ROS   Other than what is mentioned in HPI or physical exam, there is no history of headaches, double vision or blurred vision.  No trouble swallowing difficulties .  No chest complaints including chest pain, cough or sputum production. No GI complaints including nausea, vomiting, change in bowel habits, or past peptic ulcer disease. No history of blood in the stools. No urinary complaints including dysuria or frequency. No history of alopecia, photosensitivity     Objective:     BP (!) 140/60   Pulse 98   Temp 36.2 °C (97.1 °F) (Temporal)   Resp 14   Wt 72.1 kg (159 lb)   SpO2 93%  Body mass index is 25.66 kg/m².   Physical Exam:    Constitutional: Alert and oriented X3, patient is talkative with good eye contact.Skin: Warm, dry, good turgor, no rashes in visible areas.Eye: Equal, round and reactive, conjunctiva clear, lids normal EOM intactENMT: Lips without lesions,  pinna without deformityNeck: Trachea midline, no masses, no thyromegaly.Lymph:  No cervical lymphadenopathy, no axillary lymphadenopathy, no supraclavicular lymphadenopathyRespiratory: Unlabored respiratory effort, lungs clear to auscultation, no wheezes, no ronchi.Cardiovascular: Normal S1, S2, Regular rate and rhythm, no murmurs rubs or gallops  .Abdomen: Soft, non-distended.Psych: Alert and oriented x3, normal affect and mood.Neuro: Cranial nerves 2-12 are grossly intact Ext:no joint laxity noted in bilateral arms, no joint laxity noted in bilateral legs extensive Heberden's nodes bilateral index DIP joints, Grant's nodes on bilateral third fingers, lateral deviation of the distal third fingers bilaterally, shoulders poor range of motion bilaterally, crepitus in knees but no synovitis, toes without crossover toes and without splay toes    Lab  Results   Component Value Date/Time    HEPBCORIGM Negative 10/16/2019 11:14 AM    HEPBSAG Negative 10/16/2019 11:14 AM     Lab Results   Component Value Date/Time    HEPCAB Negative 10/16/2019 11:14 AM     Lab Results   Component Value Date/Time    SODIUM 139 01/03/2024 11:54 AM    POTASSIUM 4.2 01/03/2024 11:54 AM    CHLORIDE 97 01/03/2024 11:54 AM    CO2 28 01/03/2024 11:54 AM    GLUCOSE 147 (H) 01/03/2024 11:54 AM    BUN 21 01/03/2024 11:54 AM    CREATININE 1.15 01/03/2024 11:54 AM    CREATININE 0.8 04/09/2009 07:55 AM      Lab Results   Component Value Date/Time    WBC 9.8 03/28/2024 02:10 PM    RBC 4.61 03/28/2024 02:10 PM    HEMOGLOBIN 12.3 03/28/2024 02:10 PM    HEMATOCRIT 39.0 03/28/2024 02:10 PM    MCV 84.6 03/28/2024 02:10 PM    MCH 26.7 (L) 03/28/2024 02:10 PM    MCHC 31.5 (L) 03/28/2024 02:10 PM    MPV 9.7 03/28/2024 02:10 PM    NEUTSPOLYS 77.60 (H) 03/28/2024 02:10 PM    LYMPHOCYTES 14.70 (L) 03/28/2024 02:10 PM    MONOCYTES 4.00 03/28/2024 02:10 PM    EOSINOPHILS 2.60 03/28/2024 02:10 PM    BASOPHILS 0.90 03/28/2024 02:10 PM    HYPOCHROMIA 1+ 01/13/2023 02:01 PM    ANISOCYTOSIS 1+ 01/13/2023 02:01 PM      Lab Results   Component Value Date/Time    CALCIUM 9.4 01/03/2024 11:54 AM    ASTSGOT 15 01/03/2024 11:54 AM    ALTSGPT 10 01/03/2024 11:54 AM    ALKPHOSPHAT 118 (H) 01/03/2024 11:54 AM    TBILIRUBIN 0.2 01/03/2024 11:54 AM    ALBUMIN 3.9 01/03/2024 11:54 AM    ALBUMIN 3.34 (L) 09/20/2023 10:32 AM    TOTPROTEIN 7.4 01/03/2024 11:54 AM    TOTPROTEIN 7.1 09/20/2023 10:32 AM     Lab Results   Component Value Date/Time    URICACID 6.7 10/16/2019 11:14 AM    RHEUMFACTN 19 (H) 04/28/2023 11:53 AM    CCPANTIBODY 2 04/28/2023 11:53 AM    ANTINUCAB None Detected 04/28/2023 11:53 AM     Lab Results   Component Value Date/Time    ANCAIGG <1:20 01/08/2020 12:14 PM    ANTISSBSJ 1 10/16/2019 11:14 AM     Lab Results   Component Value Date/Time    SEDRATEWES 27 (H) 01/03/2024 11:54 AM     Lab Results   Component  Value Date/Time    HBA1C 8.5 (H) 09/20/2023 10:32 AM     Lab Results   Component Value Date/Time    [id number] Negative 10/16/2019 11:14 AM     Lab Results   Component Value Date/Time    G6PD 20.1 (H) 11/22/2023 11:08 AM     Lab Results   Component Value Date/Time    CPKTOTAL 33 04/28/2023 11:53 AM     Assessment and Plan:     1. Rheumatoid arthritis involving multiple sites with positive rheumatoid factor (HCC)  Serologically positive, currently on Plaquenil at 200 mg p.o. twice daily and meloxicam, we will continue as such for now, patient is interested in getting carpal tunnel and right shoulder surgery  - Referral to Neurodiagnostics (EEG,EP,EMG/NCS/DBS)  - Referral to Ophthalmology  - hydroxychloroquine (PLAQUENIL) 200 MG Tab; TAKE 1 TABLET BY MOUTH TWICE A DAY  Dispense: 180 Tablet; Refill: 0  - CBC WITH DIFFERENTIAL; Future  - Comp Metabolic Panel; Future    2. Long-term use of Plaquenil  Currently on Plaquenil 200 mg p.o. twice daily, of note G6PD levels are adequate, patient does need monitoring labs every 6 months next labs due July 2024, labs ordered for patient patient also needs eye exam every year, referred to ophthalmology Dr. Simmons for evaluation for Plaquenil maculopathy  - Referral to Neurodiagnostics (EEG,EP,EMG/NCS/DBS)  - Referral to Ophthalmology  - hydroxychloroquine (PLAQUENIL) 200 MG Tab; TAKE 1 TABLET BY MOUTH TWICE A DAY  Dispense: 180 Tablet; Refill: 0  - CBC WITH DIFFERENTIAL; Future  - Comp Metabolic Panel; Future    3. Bilateral carpal tunnel syndrome  Do bilateral EMG/NCV for evaluation of carpal tunnel, if positive patient wants to refer to [facility] orthopedics for surgical decompression.  - Referral to Neurodiagnostics (EEG,EP,EMG/NCS/DBS)  - Referral to Ophthalmology  - hydroxychloroquine (PLAQUENIL) 200 MG Tab; TAKE 1 TABLET BY MOUTH TWICE A DAY  Dispense: 180 Tablet; Refill: 0  - CBC WITH DIFFERENTIAL; Future  - Comp Metabolic Panel; Future    4. Anemia, unspecified  type  Followed by Dr. Fitzpatrick gets IV iron infusions on a regular basis  - hydroxychloroquine (PLAQUENIL) 200 MG Tab; TAKE 1 TABLET BY MOUTH TWICE A DAY  Dispense: 180 Tablet; Refill: 0    5. Encounter for long-term (current) use of NSAIDs  Uses meloxicam as needed, patient needs monitoring labs every 6 months, next labs due July 2024, labs ordered for patient  - Referral to Neurodiagnostics (EEG,EP,EMG/NCS/DBS)  - Referral to Ophthalmology  - hydroxychloroquine (PLAQUENIL) 200 MG Tab; TAKE 1 TABLET BY MOUTH TWICE A DAY  Dispense: 180 Tablet; Refill: 0  - CBC WITH DIFFERENTIAL; Future  - Comp Metabolic Panel; Future    6. Primary hypertension  May impact the type of medications we can use for this patient's arthritis. We will have to keep this under advisement.  - Referral to Ophthalmology  - CBC WITH DIFFERENTIAL; Future  - Comp Metabolic Panel; Future    7. Chronic obstructive pulmonary disease, unspecified COPD type (HCC)  Exacerbated by tobacco abuse, patient states understanding, gives no indication of giving up tobacco abuse  - Referral to Ophthalmology    8. Diabetes mellitus type 2 in nonobese (HCC)  Followed by patients PCP, high blood sugar can exacerbate inflammatory state of patient's arthritis, discussed with patient the need to monitor and control blood sugars, patient states understanding    9. Chronic ischemic heart disease      10. Tobacco use disorder  Tobacco abuse is known to exacerbate rheumatoid arthritis, strongly advised patient to stop tobacco abuse, patient states understanding.  3-minute discussion with patient discussing the risks of tobacco abuse and advised treatment options including nicotine patch.  Patient states understanding.    Followup: Return in about 6 months (around 10/8/2024). or sooner prn      Please note that this dictation was created using voice recognition software. I have made every reasonable attempt to correct obvious errors, but I expect that there are errors of  grammar and possibly content that I did not discover before finalizing the note.

## 2024-04-18 ENCOUNTER — OUTPATIENT INFUSION SERVICES (OUTPATIENT)
Dept: ONCOLOGY | Facility: MEDICAL CENTER | Age: 69
End: 2024-04-18
Attending: INTERNAL MEDICINE
Payer: MEDICARE

## 2024-04-18 VITALS
HEART RATE: 90 BPM | OXYGEN SATURATION: 94 % | BODY MASS INDEX: 24.95 KG/M2 | HEIGHT: 67 IN | DIASTOLIC BLOOD PRESSURE: 74 MMHG | SYSTOLIC BLOOD PRESSURE: 130 MMHG | TEMPERATURE: 96.7 F | RESPIRATION RATE: 18 BRPM | WEIGHT: 158.95 LBS

## 2024-04-18 DIAGNOSIS — D50.0 IRON DEFICIENCY ANEMIA DUE TO CHRONIC BLOOD LOSS: ICD-10-CM

## 2024-04-18 PROCEDURE — 700105 HCHG RX REV CODE 258: Performed by: INTERNAL MEDICINE

## 2024-04-18 PROCEDURE — 700111 HCHG RX REV CODE 636 W/ 250 OVERRIDE (IP): Mod: JZ | Performed by: INTERNAL MEDICINE

## 2024-04-18 PROCEDURE — 96365 THER/PROPH/DIAG IV INF INIT: CPT

## 2024-04-18 PROCEDURE — 96375 TX/PRO/DX INJ NEW DRUG ADDON: CPT

## 2024-04-18 RX ORDER — METHYLPREDNISOLONE SODIUM SUCCINATE 125 MG/2ML
125 INJECTION, POWDER, LYOPHILIZED, FOR SOLUTION INTRAMUSCULAR; INTRAVENOUS PRN
Status: DISCONTINUED | OUTPATIENT
Start: 2024-04-18 | End: 2024-04-18

## 2024-04-18 RX ORDER — EPINEPHRINE 1 MG/ML(1)
0.5 AMPUL (ML) INJECTION PRN
Status: CANCELLED | OUTPATIENT
Start: 2024-04-18

## 2024-04-18 RX ORDER — SODIUM CHLORIDE 9 MG/ML
INJECTION, SOLUTION INTRAVENOUS CONTINUOUS
Status: CANCELLED | OUTPATIENT
Start: 2024-04-18

## 2024-04-18 RX ORDER — METHYLPREDNISOLONE SODIUM SUCCINATE 125 MG/2ML
125 INJECTION, POWDER, LYOPHILIZED, FOR SOLUTION INTRAMUSCULAR; INTRAVENOUS PRN
Status: CANCELLED | OUTPATIENT
Start: 2024-04-18

## 2024-04-18 RX ORDER — SODIUM CHLORIDE 9 MG/ML
INJECTION, SOLUTION INTRAVENOUS CONTINUOUS
Status: DISCONTINUED | OUTPATIENT
Start: 2024-04-18 | End: 2024-04-18

## 2024-04-18 RX ORDER — DIPHENHYDRAMINE HYDROCHLORIDE 50 MG/ML
50 INJECTION INTRAMUSCULAR; INTRAVENOUS PRN
Status: CANCELLED | OUTPATIENT
Start: 2024-04-18

## 2024-04-18 RX ADMIN — SODIUM CHLORIDE: 9 INJECTION, SOLUTION INTRAVENOUS at 14:38

## 2024-04-18 RX ADMIN — METHYLPREDNISOLONE SODIUM SUCCINATE 125 MG: 125 INJECTION, POWDER, FOR SOLUTION INTRAMUSCULAR; INTRAVENOUS at 14:38

## 2024-04-18 RX ADMIN — SODIUM CHLORIDE 1000 MG: 9 INJECTION, SOLUTION INTRAVENOUS at 14:57

## 2024-04-18 ASSESSMENT — FIBROSIS 4 INDEX: FIB4 SCORE: 0.77

## 2024-04-18 NOTE — PROGRESS NOTES
Pt arrived ambulatory to [facility] for Iron Dextran infusion for iron deficiency anemia. POC discussed with pt and she agrees with plan.     Lab results reviewed from 3/28/2024, ok to proceed with treatment. PIV established, brisk blood return noted. Pt medicated per MAR. Pt tolerated treatment without s/s adverse reaction. PIV dc'd catheter tip intact intact, gauze and coban dressing applied.     Pt discharged to self care, [facility]. Pt will follow up with provider for future care.

## 2024-04-19 ENCOUNTER — NON-PROVIDER VISIT (OUTPATIENT)
Dept: NEUROLOGY | Facility: MEDICAL CENTER | Age: 69
End: 2024-04-19
Attending: PSYCHIATRY & NEUROLOGY
Payer: MEDICARE

## 2024-04-19 DIAGNOSIS — R20.0 NUMBNESS: ICD-10-CM

## 2024-04-19 DIAGNOSIS — G56.03 BILATERAL CARPAL TUNNEL SYNDROME: ICD-10-CM

## 2024-04-19 DIAGNOSIS — G62.9 NEUROPATHY: ICD-10-CM

## 2024-04-19 PROCEDURE — 95912 NRV CNDJ TEST 11-12 STUDIES: CPT | Performed by: PSYCHIATRY & NEUROLOGY

## 2024-04-19 PROCEDURE — 95886 MUSC TEST DONE W/N TEST COMP: CPT | Mod: 26 | Performed by: PSYCHIATRY & NEUROLOGY

## 2024-04-19 PROCEDURE — 95886 MUSC TEST DONE W/N TEST COMP: CPT | Performed by: PSYCHIATRY & NEUROLOGY

## 2024-04-19 PROCEDURE — 95912 NRV CNDJ TEST 11-12 STUDIES: CPT | Mod: 26 | Performed by: PSYCHIATRY & NEUROLOGY

## 2024-04-19 NOTE — PROCEDURES
"NERVE CONDUCTION STUDIES AND ELECTROMYOGRAPHY REPORT  [facility]  04/19/24          IMPRESSION:  This is an abnormal study.  There is electrophysiologic evidence consistent with a chronic inactive RIGHT C5 radiculopathy.  Otherwise normal study of the bilateral median and ulnar nerves and no evidence of left C5-T1 radiculopathy.      Niurka Patel MD  Neurology - Neurophysiology              REASON FOR REFERRAL:  Ms. Heidi Kimball Eloise 68 y.o. referred by Dr. Mira Trotter for evaluation of numbness in the bilateral hands of unclear etiology.  She has a known history of degenerative disc disease of the cervical spine with cervical spinal fusion.    Height: 5'7\"  Weight: 160 lbs      ELECTRODIAGNOSTIC EXAMINATION:  Nerve conduction studies (NCS) and electromyography (EMG) are utilized to evaluate direct or indirect damage to the peripheral nervous system. NCS are performed to measure the nerve(s) response(s) to electrostimulation across a given nerve segment. EMG evaluates the passive and active electrical activity of the muscle(s) in question.  Muscles are innervated by specific peripheral nerves and roots. Often times, several nerves the muscle to be examined in order to determine the presence or absence of the disease process. Furthermore, nerves and muscles may need to be tested in a [id number] comparison, as well as in additional extremities, as this may be crucial in characterizing the extent of the disease process, which may be diffuse or isolated and of varying degree of severity. The extent of the neurodiagnostic exam is justified as it may help arrive to a proper diagnosis, which ultimately may contribute to better management of the patient. Therefore, the nerves to muscles examined during the study were medically necessary.    Unless otherwise noted, temperature of the extremity(s) study was monitored before and during the examination and remained between 32 and 36 degrees C " for the upper extremities, and between 30 and 36 degrees C for the lower extremities. The patient tolerated testing well, without any complications.       NERVE CONDUCTION STUDY SUMMARY:  Selected nerves of the bilateral upper extremity are studied.    Normal bilateral median sensory and motor responses.  Normal bilateral ulnar sensory and motor responses-mild slowing on the left of doubtful clinical significance.  Normal bilateral median/ulnar palmar comparisons.      NEEDLE EMG SUMMARY:  Concentric needle study of selected bilateral upper extremity muscles is performed.     Insertion activity is normal in all muscles sampled.   Large amplitude prolonged duration motor unit potentials are appreciated in the right biceps and deltoid.  Otherwise with activation, there are normal morphology (amplitude/duration) motor unit action potentials firing with normal recruitment in remaining muscles tested.       PATIENT DATA TABLES    Nerve Conduction Studies     Stim Site NR Onset (ms) Norm Onset (ms) O-P Amp (µV) Norm O-P Amp Site1 Site2 Delta-P (ms) Dist (cm) Carlos (m/s) Norm Carlos (m/s)   Left Median Anti Sensory (2nd Digit)   Wrist    2.3 <3.8 12.6 >10 Wrist 2nd Digit 3.4 14.0 *41 >50   Right Median Anti Sensory (2nd Digit)   Wrist    2.4 <3.8 18.4 >10 Wrist 2nd Digit 3.2 14.0 *44 >50   Left Ulnar Anti Sensory (5th Digit)   Wrist    2.0 <3.2 13.0 >5 Wrist 5th Digit 3.7 14.0 *38 >50   Right Ulnar Anti Sensory (5th Digit)   Wrist    2.2 <3.2 14.9 >5 Wrist 5th Digit 2.8 14.0 50 >50        Stim Site NR Onset (ms) Norm Onset (ms) O-P Amp (mV) Norm O-P Amp Site1 Site2 Delta-0 (ms) Dist (cm) Carlos (m/s) Norm Carlos (m/s)   Left Median Motor (Abd Poll Brev)   Wrist    3.2 <4 7.0 >5 Elbow Wrist 5.1 26.0 51 >50   Elbow    8.3  5.4          Right Median Motor (Abd Poll Brev)   Wrist    3.4 <4 5.9 >5 Elbow Wrist 4.3 27.0 63 >50   Elbow    7.7  3.7          Left Ulnar Motor (Abd Dig Min)   Wrist    3.1 <3.1 7.3 >7 B Elbow Wrist 4.3 19.0 *44  >50   B Elbow    7.4  7.3  A Elbow B Elbow 2.1 10.0 48    A Elbow    9.5  6.3          Right Ulnar Motor (Abd Dig Min)   Wrist    2.5 <3.1 8.9 >7 B Elbow Wrist 3.6 18.0 50 >50   B Elbow    6.1  6.2  A Elbow B Elbow 2.0 10.0 50    A Elbow    8.1  5.9               Stim Site NR Peak (ms) Norm Peak (ms) P-T Amp (µV) Site1 Site2 Delta-P (ms) Norm Delta (ms)   Left Median/Ulnar Palm Comparison (Wrist - 8cm)   Median Palm    2.0 <2.3 28.9 Median Palm Ulnar Palm 0.3 <0.3   Ulnar Palm    2.3 <2.3 6.8       Right Median/Ulnar Palm Comparison (Wrist - 8cm)   Median Palm    1.8 <2.3 31.2 Median Palm Ulnar Palm 0.0 <0.3   Ulnar Palm    1.8 <2.3 14.5                                   Electromyography     Side Muscle Nerve Root Ins Act Fibs Psw Amp Dur Poly Recrt Int Pat Comment   Right 1stDorInt Ulnar C8-T1 Nml Nml Nml Nml Nml 0 Nml Nml    Right PronatorTeres Median C6-7 Nml Nml Nml Nml Nml 0 Nml Nml    Right Biceps Musculocut C5-6 Nml Nml Nml *Incr *>12ms 0 Nml Nml    Right Triceps Radial C6-7-8 Nml Nml Nml Nml Nml 0 Nml Nml    Right Deltoid Axillary C5-6 Nml Nml Nml *Incr *>12ms 0 Nml Nml    Left 1stDorInt Ulnar C8-T1 Nml Nml Nml Nml Nml 0 Nml Nml    Left PronatorTeres Median C6-7 Nml Nml Nml Nml Nml 0 Nml Nml    Left Biceps Musculocut C5-6 Nml Nml Nml Nml Nml 0 Nml Nml    Left Triceps Radial C6-7-8 Nml Nml Nml Nml Nml 0 Nml Nml    Left Deltoid Axillary C5-6 Nml Nml Nml Nml Nml 0 Nml Nml

## 2024-04-22 ENCOUNTER — TELEPHONE (OUTPATIENT)
Dept: RHEUMATOLOGY | Facility: MEDICAL CENTER | Age: 69
End: 2024-04-22
Payer: MEDICARE

## 2024-04-22 DIAGNOSIS — M05.79 RHEUMATOID ARTHRITIS INVOLVING MULTIPLE SITES WITH POSITIVE RHEUMATOID FACTOR (HCC): ICD-10-CM

## 2024-04-22 DIAGNOSIS — M54.2 CERVICALGIA: ICD-10-CM

## 2024-04-22 NOTE — TELEPHONE ENCOUNTER
Please notify patient that we did get the results of her nerve study and does show a pinched nerve in the neck, the neurologist also noticed that she holds her neck slightly off-center and recommends an x-ray to evaluate for any mechanical impingement, we have ordered x-rays in the computer, patient can do those anytime.  Also recommend follow-up with her C-spine surgeon, dysuria member who the surgeon was, we can put a referral in for that particular surgeon for reevaluation.

## 2024-04-23 ENCOUNTER — TELEPHONE (OUTPATIENT)
Dept: RHEUMATOLOGY | Facility: MEDICAL CENTER | Age: 69
End: 2024-04-23
Payer: MEDICARE

## 2024-04-23 NOTE — TELEPHONE ENCOUNTER
I called and spoke with Heidi and relayed your message. She stated that her surgeon that she used before is no longer in practice here so just refer her wherever you think she should go.  Thank you

## 2024-04-24 ENCOUNTER — PATIENT OUTREACH (OUTPATIENT)
Dept: HEALTH INFORMATION MANAGEMENT | Facility: OTHER | Age: 69
End: 2024-04-24
Payer: MEDICARE

## 2024-04-24 ENCOUNTER — HOSPITAL ENCOUNTER (OUTPATIENT)
Dept: RADIOLOGY | Facility: MEDICAL CENTER | Age: 69
End: 2024-04-24
Attending: INTERNAL MEDICINE
Payer: MEDICARE

## 2024-04-24 DIAGNOSIS — E11.9 DIABETES MELLITUS TYPE 2 IN NONOBESE (HCC): ICD-10-CM

## 2024-04-24 DIAGNOSIS — I25.10 CVD (CARDIOVASCULAR DISEASE): ICD-10-CM

## 2024-04-24 DIAGNOSIS — M54.2 CERVICALGIA: ICD-10-CM

## 2024-04-24 DIAGNOSIS — I51.7 LVH (LEFT VENTRICULAR HYPERTROPHY): ICD-10-CM

## 2024-04-24 DIAGNOSIS — M05.79 RHEUMATOID ARTHRITIS INVOLVING MULTIPLE SITES WITH POSITIVE RHEUMATOID FACTOR (HCC): ICD-10-CM

## 2024-04-24 PROCEDURE — 72052 X-RAY EXAM NECK SPINE 6/>VWS: CPT

## 2024-04-24 NOTE — PROGRESS NOTES
Assessment    Spoke to patient for monthly outreach. Pt reports she is doing ok. Feeling a little better after iron infusions. Due for some xrays on her spine. She will get that scheduled. Continues to monitor her blood sugar. Pt admits that it was running a little high,  at the highest, but they have lowered recently. She has all medications. Pt did mention that her plaquenil was a bit more expensive at [facility] on this last refill. Reminded that [facility] pharmacy will have best prices for her medications with [facility] insurance and she does plan to switch everything back there now that she has moved into [place]. Reminded pt to schedule follow up with pcp before she runs out of her pain medication. Pt is agreeable. No needs or concerns at this time.     Education    Medication management, DM    Plan of Care and Goals    Continue plan of care. Schedule f/u with pcp    Barriers:    Transportation, financial limitations    Progress:    progressing    Next outreach:  1 month

## 2024-04-29 ENCOUNTER — TELEPHONE (OUTPATIENT)
Dept: RHEUMATOLOGY | Facility: MEDICAL CENTER | Age: 69
End: 2024-04-29

## 2024-04-29 DIAGNOSIS — M54.2 CERVICALGIA: ICD-10-CM

## 2024-04-29 DIAGNOSIS — M05.79 RHEUMATOID ARTHRITIS INVOLVING MULTIPLE SITES WITH POSITIVE RHEUMATOID FACTOR (HCC): ICD-10-CM

## 2024-05-24 ENCOUNTER — PATIENT OUTREACH (OUTPATIENT)
Dept: HEALTH INFORMATION MANAGEMENT | Facility: OTHER | Age: 69
End: 2024-05-24
Payer: MEDICARE

## 2024-05-24 DIAGNOSIS — I25.10 CVD (CARDIOVASCULAR DISEASE): ICD-10-CM

## 2024-05-24 DIAGNOSIS — E11.9 DIABETES MELLITUS TYPE 2 IN NONOBESE (HCC): ICD-10-CM

## 2024-05-24 DIAGNOSIS — I51.7 LVH (LEFT VENTRICULAR HYPERTROPHY): ICD-10-CM

## 2024-06-17 ENCOUNTER — TELEPHONE (OUTPATIENT)
Dept: HEALTH INFORMATION MANAGEMENT | Facility: OTHER | Age: 69
End: 2024-06-17
Payer: MEDICARE

## 2024-06-17 NOTE — TELEPHONE ENCOUNTER
Pt called into the office today, was on the other line with a patient and she left a VM. I returned her call to no answer, left a VM to call me back otherwise I will call her back after 1300.     Called pt again after she left a VM this morning. Still no answer, told pt that if she was still in need of my assistance that she could call me back. left my name and number.

## 2024-06-28 ENCOUNTER — PATIENT OUTREACH (OUTPATIENT)
Dept: HEALTH INFORMATION MANAGEMENT | Facility: OTHER | Age: 69
End: 2024-06-28
Payer: MEDICARE

## 2024-06-28 DIAGNOSIS — I25.10 CVD (CARDIOVASCULAR DISEASE): ICD-10-CM

## 2024-06-28 DIAGNOSIS — I51.7 LVH (LEFT VENTRICULAR HYPERTROPHY): ICD-10-CM

## 2024-06-28 DIAGNOSIS — E11.9 DIABETES MELLITUS TYPE 2 IN NONOBESE (HCC): ICD-10-CM

## 2024-06-28 PROCEDURE — 99999 PR NO CHARGE: CPT | Performed by: FAMILY MEDICINE

## 2024-07-05 DIAGNOSIS — G56.03 BILATERAL CARPAL TUNNEL SYNDROME: ICD-10-CM

## 2024-07-05 DIAGNOSIS — Z79.899 LONG-TERM USE OF PLAQUENIL: ICD-10-CM

## 2024-07-05 DIAGNOSIS — M05.79 RHEUMATOID ARTHRITIS INVOLVING MULTIPLE SITES WITH POSITIVE RHEUMATOID FACTOR (HCC): ICD-10-CM

## 2024-07-05 DIAGNOSIS — D64.9 ANEMIA, UNSPECIFIED TYPE: ICD-10-CM

## 2024-07-05 DIAGNOSIS — Z79.1 ENCOUNTER FOR LONG-TERM (CURRENT) USE OF NSAIDS: ICD-10-CM

## 2024-07-08 ENCOUNTER — TELEPHONE (OUTPATIENT)
Dept: RHEUMATOLOGY | Facility: MEDICAL CENTER | Age: 69
End: 2024-07-08

## 2024-07-08 RX ORDER — HYDROXYCHLOROQUINE SULFATE 200 MG/1
TABLET, FILM COATED ORAL
Qty: 60 TABLET | Refills: 0 | Status: SHIPPED | OUTPATIENT
Start: 2024-07-08

## 2024-07-11 ENCOUNTER — TELEPHONE (OUTPATIENT)
Dept: RHEUMATOLOGY | Facility: MEDICAL CENTER | Age: 69
End: 2024-07-11

## 2024-07-11 DIAGNOSIS — R07.9 CHEST PAIN, UNSPECIFIED TYPE: ICD-10-CM

## 2024-07-11 DIAGNOSIS — Z79.1 ENCOUNTER FOR LONG-TERM (CURRENT) USE OF NSAIDS: ICD-10-CM

## 2024-07-11 DIAGNOSIS — M05.79 RHEUMATOID ARTHRITIS INVOLVING MULTIPLE SITES WITH POSITIVE RHEUMATOID FACTOR (HCC): ICD-10-CM

## 2024-07-11 DIAGNOSIS — Z79.899 LONG-TERM USE OF PLAQUENIL: ICD-10-CM

## 2024-07-11 DIAGNOSIS — E11.69 TYPE 2 DIABETES MELLITUS WITH OTHER SPECIFIED COMPLICATION, WITHOUT LONG-TERM CURRENT USE OF INSULIN (HCC): ICD-10-CM

## 2024-07-11 PROCEDURE — RXMED WILLOW AMBULATORY MEDICATION CHARGE: Performed by: FAMILY MEDICINE

## 2024-07-11 RX ORDER — MELOXICAM 15 MG/1
TABLET ORAL
Qty: 30 TABLET | Refills: 0 | Status: SHIPPED | OUTPATIENT
Start: 2024-07-11

## 2024-07-11 RX ORDER — SERTRALINE HYDROCHLORIDE 25 MG/1
25 TABLET, FILM COATED ORAL DAILY
Qty: 100 TABLET | Refills: 1 | Status: SHIPPED | OUTPATIENT
Start: 2024-07-11

## 2024-07-12 ENCOUNTER — PHARMACY VISIT (OUTPATIENT)
Dept: PHARMACY | Facility: MEDICAL CENTER | Age: 69
End: 2024-07-12
Payer: COMMERCIAL

## 2024-07-15 ENCOUNTER — PATIENT OUTREACH (OUTPATIENT)
Dept: HEALTH INFORMATION MANAGEMENT | Facility: OTHER | Age: 69
End: 2024-07-15
Payer: MEDICARE

## 2024-07-15 DIAGNOSIS — I51.7 LVH (LEFT VENTRICULAR HYPERTROPHY): ICD-10-CM

## 2024-07-15 DIAGNOSIS — E11.9 DIABETES MELLITUS TYPE 2 IN NONOBESE (HCC): ICD-10-CM

## 2024-07-16 ENCOUNTER — PHARMACY VISIT (OUTPATIENT)
Dept: PHARMACY | Facility: MEDICAL CENTER | Age: 69
End: 2024-07-16
Payer: COMMERCIAL

## 2024-07-16 PROCEDURE — RXMED WILLOW AMBULATORY MEDICATION CHARGE: Performed by: FAMILY MEDICINE

## 2024-08-01 ENCOUNTER — TELEPHONE (OUTPATIENT)
Dept: HEMATOLOGY ONCOLOGY | Facility: MEDICAL CENTER | Age: 69
End: 2024-08-01
Payer: MEDICARE

## 2024-08-01 NOTE — TELEPHONE ENCOUNTER
I called and spoke with the patient to remind her of her upcoming appointment with EMERY Angelo, that is scheduled on Wednesday, August 7, 2024, at 1:00 pm.  Patient reminded to get standing labs completed (CBC w/diff and TIBC) done prior to this appointment.

## 2024-08-05 ENCOUNTER — PATIENT OUTREACH (OUTPATIENT)
Dept: HEALTH INFORMATION MANAGEMENT | Facility: OTHER | Age: 69
End: 2024-08-05
Payer: MEDICARE

## 2024-08-05 DIAGNOSIS — E11.9 DIABETES MELLITUS TYPE 2 IN NONOBESE (HCC): ICD-10-CM

## 2024-08-05 DIAGNOSIS — I51.7 LVH (LEFT VENTRICULAR HYPERTROPHY): ICD-10-CM

## 2024-08-06 ENCOUNTER — HOSPITAL ENCOUNTER (OUTPATIENT)
Dept: LAB | Facility: MEDICAL CENTER | Age: 69
End: 2024-08-06
Attending: INTERNAL MEDICINE
Payer: MEDICARE

## 2024-08-06 DIAGNOSIS — G56.03 BILATERAL CARPAL TUNNEL SYNDROME: ICD-10-CM

## 2024-08-06 DIAGNOSIS — D50.9 IRON DEFICIENCY ANEMIA, UNSPECIFIED IRON DEFICIENCY ANEMIA TYPE: ICD-10-CM

## 2024-08-06 DIAGNOSIS — Z79.899 LONG-TERM USE OF PLAQUENIL: ICD-10-CM

## 2024-08-06 DIAGNOSIS — M05.79 RHEUMATOID ARTHRITIS INVOLVING MULTIPLE SITES WITH POSITIVE RHEUMATOID FACTOR (HCC): ICD-10-CM

## 2024-08-06 DIAGNOSIS — Z79.1 ENCOUNTER FOR LONG-TERM (CURRENT) USE OF NSAIDS: ICD-10-CM

## 2024-08-06 DIAGNOSIS — I10 PRIMARY HYPERTENSION: ICD-10-CM

## 2024-08-06 LAB
ALBUMIN SERPL BCP-MCNC: 3.5 G/DL (ref 3.2–4.9)
ALBUMIN/GLOB SERPL: 1 G/DL
ALP SERPL-CCNC: 104 U/L (ref 30–99)
ALT SERPL-CCNC: 12 U/L (ref 2–50)
ANION GAP SERPL CALC-SCNC: 16 MMOL/L (ref 7–16)
AST SERPL-CCNC: 16 U/L (ref 12–45)
BASOPHILS # BLD AUTO: 0.8 % (ref 0–1.8)
BASOPHILS # BLD AUTO: 0.8 % (ref 0–1.8)
BASOPHILS # BLD: 0.12 K/UL (ref 0–0.12)
BASOPHILS # BLD: 0.12 K/UL (ref 0–0.12)
BILIRUB SERPL-MCNC: <0.2 MG/DL (ref 0.1–1.5)
BUN SERPL-MCNC: 12 MG/DL (ref 8–22)
CALCIUM ALBUM COR SERPL-MCNC: 9.3 MG/DL (ref 8.5–10.5)
CALCIUM SERPL-MCNC: 8.9 MG/DL (ref 8.5–10.5)
CHLORIDE SERPL-SCNC: 101 MMOL/L (ref 96–112)
CO2 SERPL-SCNC: 22 MMOL/L (ref 20–33)
CREAT SERPL-MCNC: 0.66 MG/DL (ref 0.5–1.4)
EOSINOPHIL # BLD AUTO: 0.23 K/UL (ref 0–0.51)
EOSINOPHIL # BLD AUTO: 0.25 K/UL (ref 0–0.51)
EOSINOPHIL NFR BLD: 1.5 % (ref 0–6.9)
EOSINOPHIL NFR BLD: 1.6 % (ref 0–6.9)
ERYTHROCYTE [DISTWIDTH] IN BLOOD BY AUTOMATED COUNT: 46.7 FL (ref 35.9–50)
ERYTHROCYTE [DISTWIDTH] IN BLOOD BY AUTOMATED COUNT: 46.9 FL (ref 35.9–50)
GFR SERPLBLD CREATININE-BSD FMLA CKD-EPI: 95 ML/MIN/1.73 M 2
GLOBULIN SER CALC-MCNC: 3.4 G/DL (ref 1.9–3.5)
GLUCOSE SERPL-MCNC: 178 MG/DL (ref 65–99)
HCT VFR BLD AUTO: 40.4 % (ref 37–47)
HCT VFR BLD AUTO: 41 % (ref 37–47)
HGB BLD-MCNC: 13.1 G/DL (ref 12–16)
HGB BLD-MCNC: 13.3 G/DL (ref 12–16)
IMM GRANULOCYTES # BLD AUTO: 0.06 K/UL (ref 0–0.11)
IMM GRANULOCYTES # BLD AUTO: 0.07 K/UL (ref 0–0.11)
IMM GRANULOCYTES NFR BLD AUTO: 0.4 % (ref 0–0.9)
IMM GRANULOCYTES NFR BLD AUTO: 0.5 % (ref 0–0.9)
IRON SATN MFR SERPL: 11 % (ref 15–55)
IRON SERPL-MCNC: 22 UG/DL (ref 40–170)
LYMPHOCYTES # BLD AUTO: 1.67 K/UL (ref 1–4.8)
LYMPHOCYTES # BLD AUTO: 1.68 K/UL (ref 1–4.8)
LYMPHOCYTES NFR BLD: 11 % (ref 22–41)
LYMPHOCYTES NFR BLD: 11.2 % (ref 22–41)
MCH RBC QN AUTO: 28.1 PG (ref 27–33)
MCH RBC QN AUTO: 29 PG (ref 27–33)
MCHC RBC AUTO-ENTMCNC: 32 G/DL (ref 32.2–35.5)
MCHC RBC AUTO-ENTMCNC: 32.9 G/DL (ref 32.2–35.5)
MCV RBC AUTO: 88 FL (ref 81.4–97.8)
MCV RBC AUTO: 88.2 FL (ref 81.4–97.8)
MONOCYTES # BLD AUTO: 0.63 K/UL (ref 0–0.85)
MONOCYTES # BLD AUTO: 0.67 K/UL (ref 0–0.85)
MONOCYTES NFR BLD AUTO: 4.2 % (ref 0–13.4)
MONOCYTES NFR BLD AUTO: 4.4 % (ref 0–13.4)
NEUTROPHILS # BLD AUTO: 12.18 K/UL (ref 1.82–7.42)
NEUTROPHILS # BLD AUTO: 12.53 K/UL (ref 1.82–7.42)
NEUTROPHILS NFR BLD: 81.8 % (ref 44–72)
NEUTROPHILS NFR BLD: 81.8 % (ref 44–72)
NRBC # BLD AUTO: 0 K/UL
NRBC # BLD AUTO: 0 K/UL
NRBC BLD-RTO: 0 /100 WBC (ref 0–0.2)
NRBC BLD-RTO: 0 /100 WBC (ref 0–0.2)
PLATELET # BLD AUTO: 472 K/UL (ref 164–446)
PLATELET # BLD AUTO: 490 K/UL (ref 164–446)
PMV BLD AUTO: 10.1 FL (ref 9–12.9)
PMV BLD AUTO: 10.1 FL (ref 9–12.9)
POTASSIUM SERPL-SCNC: 4 MMOL/L (ref 3.6–5.5)
PROT SERPL-MCNC: 6.9 G/DL (ref 6–8.2)
RBC # BLD AUTO: 4.58 M/UL (ref 4.2–5.4)
RBC # BLD AUTO: 4.66 M/UL (ref 4.2–5.4)
SODIUM SERPL-SCNC: 139 MMOL/L (ref 135–145)
TIBC SERPL-MCNC: 206 UG/DL (ref 250–450)
UIBC SERPL-MCNC: 184 UG/DL (ref 110–370)
WBC # BLD AUTO: 14.9 K/UL (ref 4.8–10.8)
WBC # BLD AUTO: 15.3 K/UL (ref 4.8–10.8)

## 2024-08-06 PROCEDURE — 80053 COMPREHEN METABOLIC PANEL: CPT

## 2024-08-06 PROCEDURE — 83550 IRON BINDING TEST: CPT

## 2024-08-06 PROCEDURE — 83540 ASSAY OF IRON: CPT

## 2024-08-06 PROCEDURE — 85025 COMPLETE CBC W/AUTO DIFF WBC: CPT

## 2024-08-06 PROCEDURE — 36415 COLL VENOUS BLD VENIPUNCTURE: CPT

## 2024-08-06 PROCEDURE — 85025 COMPLETE CBC W/AUTO DIFF WBC: CPT | Mod: 91

## 2024-08-07 ENCOUNTER — APPOINTMENT (OUTPATIENT)
Dept: HEMATOLOGY ONCOLOGY | Facility: MEDICAL CENTER | Age: 69
End: 2024-08-07
Payer: MEDICARE

## 2024-08-08 ENCOUNTER — HOSPITAL ENCOUNTER (OUTPATIENT)
Dept: HEMATOLOGY ONCOLOGY | Facility: MEDICAL CENTER | Age: 69
End: 2024-08-08
Attending: NURSE PRACTITIONER
Payer: MEDICARE

## 2024-08-08 VITALS
OXYGEN SATURATION: 97 % | RESPIRATION RATE: 18 BRPM | BODY MASS INDEX: 22.03 KG/M2 | TEMPERATURE: 98.2 F | SYSTOLIC BLOOD PRESSURE: 116 MMHG | DIASTOLIC BLOOD PRESSURE: 60 MMHG | HEART RATE: 95 BPM | WEIGHT: 140.4 LBS | HEIGHT: 67 IN

## 2024-08-08 DIAGNOSIS — Z09 ENCOUNTER FOR HEMATOLOGY FOLLOW-UP: ICD-10-CM

## 2024-08-08 DIAGNOSIS — E61.1 IRON DEFICIENCY: ICD-10-CM

## 2024-08-08 DIAGNOSIS — D72.829 LEUKOCYTOSIS, UNSPECIFIED TYPE: ICD-10-CM

## 2024-08-08 PROCEDURE — 99212 OFFICE O/P EST SF 10 MIN: CPT | Performed by: NURSE PRACTITIONER

## 2024-08-08 PROCEDURE — 99214 OFFICE O/P EST MOD 30 MIN: CPT | Performed by: NURSE PRACTITIONER

## 2024-08-08 RX ORDER — METHYLPREDNISOLONE SODIUM SUCCINATE 125 MG/2ML
125 INJECTION, POWDER, LYOPHILIZED, FOR SOLUTION INTRAMUSCULAR; INTRAVENOUS PRN
Status: CANCELLED | OUTPATIENT
Start: 2024-08-08

## 2024-08-08 RX ORDER — DIPHENHYDRAMINE HYDROCHLORIDE 50 MG/ML
50 INJECTION INTRAMUSCULAR; INTRAVENOUS PRN
Status: CANCELLED | OUTPATIENT
Start: 2024-08-08

## 2024-08-08 RX ORDER — SODIUM CHLORIDE 9 MG/ML
INJECTION, SOLUTION INTRAVENOUS CONTINUOUS
Status: CANCELLED | OUTPATIENT
Start: 2024-08-08

## 2024-08-08 RX ORDER — EPINEPHRINE 1 MG/ML(1)
0.5 AMPUL (ML) INJECTION PRN
Status: CANCELLED | OUTPATIENT
Start: 2024-08-08

## 2024-08-08 ASSESSMENT — ENCOUNTER SYMPTOMS
CONSTIPATION: 1
COUGH: 0
TINGLING: 1
NAUSEA: 1
ABDOMINAL PAIN: 1
VOMITING: 0
MYALGIAS: 0
INSOMNIA: 0
SHORTNESS OF BREATH: 0
DIZZINESS: 0
WEIGHT LOSS: 1
PALPITATIONS: 0
HEADACHES: 0
FEVER: 0
CHILLS: 0
DIARRHEA: 0
WHEEZING: 0

## 2024-08-08 ASSESSMENT — LIFESTYLE VARIABLES: SUBSTANCE_ABUSE: 1

## 2024-08-08 ASSESSMENT — FIBROSIS 4 INDEX: FIB4 SCORE: 0.65

## 2024-08-08 NOTE — PROGRESS NOTES
Subjective     Heidi Navas is a 69 y.o. female who presents with Follow-Up (Amari/ ESSENCE/ 4 mo fv)            HPI  Mr. Navas presents for evaluation of iron deficiency and leukocytosis.  Patient is unaccompanied for today's visit.    Patient with remote history of rheumatoid arthritis that is managed with Plaquenil; history of multiple joint replacements; recurrent rectal bleeding associated with rectal prolapse, esophagitis, duodenitis, proctitis.  She was in her state of usual health until approximately 11/2023 when she was referred per PCP for progressive leukocytosis which dates back to at least 2016. Patient was noted to be profoundly anemic in 2020 requiring blood transfusion and continued follow-up per GI.  Hematology workup was facilitated by Dr. Fitzpatrick with BCR-ABL, JAK2, reflex testing all negative.  Leukocytosis is attributed to chronic anemia which is addressed with intermittent iron dextran.    Patient reports increased fatigue.  Patient with reported 25 pound weight loss since April which she attributes to poor appetite but may be compounded with intermittent meth use.  She does use marijuana daily for appetite and is noticing that she is using it more often.  Rectal prolapse is enlarging, intermittent constipation is addressed with intermittent meth use which stimulates BM.  She recently experienced recurrent epigastric pain which she suspects was pancreatitis and addressed with adjusted diet accordingly.  Patient is otherwise at her baseline.      Review of Systems   Constitutional:  Positive for diaphoresis (since April (moving into new place)), malaise/fatigue (most of the time - the cat get the most attention) and weight loss (25# since April, poor appetite). Negative for chills (hot flashes - has been ill recently) and fever.   Respiratory:  Negative for cough, shortness of breath and wheezing.    Cardiovascular:  Negative for chest pain, palpitations and leg swelling.   Gastrointestinal:   Positive for abdominal pain (recent suspected pancreatitis), constipation (will use meth to stimulate a BM if none for ~week) and nausea (with recent suspected pancreatitis). Negative for diarrhea and vomiting.        MJ at least daily for appetite, noticing had to use more often; rectal prolapse (enlarging)   Genitourinary:  Negative for dysuria.   Musculoskeletal:  Positive for joint pain (wrists are fused from arthritis; salonpas in place). Negative for myalgias.   Neurological:  Positive for tingling (bilateral hands, r/t arthritis; DM). Negative for dizziness and headaches.   Psychiatric/Behavioral:  Positive for substance abuse (last meth use 1 week ago; MJ daily). The patient does not have insomnia.      Allergies   Allergen Reactions    Aspartame Nausea     headache    Atorvastatin Calcium Unspecified and Swelling    Latex Rash and Itching    Lipitor [Atorvastatin Calcium] Swelling     Patient denies allergy 06/04/22    Other Misc Vomiting     Bug spray    Saccharin Nausea     Nausea and headache         Current Outpatient Medications on File Prior to Encounter   Medication Sig Dispense Refill    meloxicam (MOBIC) 15 MG tablet TAKE 1 TABLET BY MOUTH EVERY DAY WITH FOOD AS NEEDED FOR JOINT PAIN 30 Tablet 0    sertraline (ZOLOFT) 25 MG tablet Take 1 Tablet by mouth every day. 100 Tablet 1    traZODone (DESYREL) 50 MG Tab Take 3 Tablets by mouth every evening. 90 Tablet 1    insulin glargine 100 UNIT/ML SC SOPN injection Inject 15 Units under the skin 2 times a day. 15 mL 3    Insulin Pen Needle 32 G x 4 mm (BD PEN NEEDLE BRITANY 2ND GEN) USE ONE PEN NEEDLE IN PEN DEVICE TO INJECT INSULIN TWO TIMES DAILY. 100 Each 3    Alcohol Swabs Use on skin 3 times daily prior to injection with insulin with glucose check. 100 Each 3    CONTOUR NEXT TEST strip 1 STRIP BY OTHER ROUTE IN THE MORNING, AT NOON, AND AT BEDTIME. USE ONE STRIP TO TEST BLOOD SUGAR THREE TIMES DAILY BEFORE MEALS. 300 Strip 3    Continuous Blood Gluc  "Sensor (FREESTYLE HAYLEY 14 DAY SENSOR) Misc Use one sensor as directed every 14 days. 2 Each 5    Continuous Blood Gluc  (FREESTYLE HAYLEY 2 READER) Device Use daily with Freestyle hayley sensor 1 Each 0    diphenhydrAMINE HCl (BENADRYL ALLERGY PO) Take  by mouth.      omeprazole (PRILOSEC) 20 MG delayed-release capsule Take 1 Capsule by mouth 2 times a day. 180 Capsule 3    rosuvastatin (CRESTOR) 10 MG Tab Take 1 Tablet by mouth every evening. 100 Tablet 3    Blood Glucose Monitoring Suppl (BLOOD GLUCOSE MONITOR SYSTEM) w/Device Kit Test blood sugar as recommended by provider. Slick Contour Next blood glucose monitoring kit. 1 Kit 0    Microlet Lancets Misc Use one lancet to test blood sugar three times daily before meals. 100 Each 0    Alcohol Swabs (ALCOHOL PREP) 70 % Pads Wipe site with prep pad prior to injection 100 Each 0    metFORMIN (GLUCOPHAGE) 500 MG Tab Take 1 Tablet by mouth 2 times a day with meals. (Patient not taking: Reported on 8/8/2024) 200 Tablet 1    ferrous sulfate (FEROSUL) 325 (65 Fe) MG tablet Take 1 tablet by mouth every day. (Patient not taking: Reported on 4/8/2024) 90 Tablet 0    fluconazole (DIFLUCAN) 150 MG tablet Take 1 Tablet by mouth every day. (Patient not taking: Reported on 4/8/2024) 1 Tablet 0    losartan (COZAAR) 25 MG Tab Take 1 Tablet by mouth every day. (Patient not taking: Reported on 4/8/2024) 100 Tablet 3     No current facility-administered medications on file prior to encounter.              Objective     /60 (BP Location: Left arm, Patient Position: Sitting, BP Cuff Size: Adult)   Pulse 95   Temp 36.8 °C (98.2 °F) (Temporal)   Resp 18   Ht 1.69 m (5' 6.54\")   Wt 63.7 kg (140 lb 6.4 oz)   LMP 01/01/2007   SpO2 97%   BMI 22.29 kg/m²      Physical Exam  Vitals reviewed.   Constitutional:       General: She is not in acute distress.     Appearance: She is well-developed. She is not diaphoretic.   HENT:      Head: Normocephalic and atraumatic.      " Mouth/Throat:      Pharynx: No oropharyngeal exudate.   Eyes:      General: No scleral icterus.        Right eye: No discharge.         Left eye: No discharge.      Conjunctiva/sclera: Conjunctivae normal.      Pupils: Pupils are equal, round, and reactive to light.   Cardiovascular:      Rate and Rhythm: Normal rate and regular rhythm.      Heart sounds: Normal heart sounds. No murmur heard.     No friction rub. No gallop.   Pulmonary:      Effort: Pulmonary effort is normal. No respiratory distress.      Breath sounds: Normal breath sounds. No wheezing.   Abdominal:      General: Bowel sounds are normal. There is no distension.      Palpations: Abdomen is soft.      Tenderness: There is no abdominal tenderness.   Musculoskeletal:         General: Normal range of motion.      Cervical back: Normal range of motion and neck supple.   Skin:     General: Skin is warm and dry.      Coloration: Skin is not pale.      Findings: No erythema or rash.   Neurological:      Mental Status: She is alert and oriented to person, place, and time.      Sensory: Sensory deficit present.   Psychiatric:         Mood and Affect: Mood normal.         Behavior: Behavior normal.         No visits with results within 1 Day(s) from this visit.   Latest known visit with results is:   Hospital Outpatient Visit on 08/06/2024   Component Date Value Ref Range Status    Sodium 08/06/2024 139  135 - 145 mmol/L Final    Potassium 08/06/2024 4.0  3.6 - 5.5 mmol/L Final    Chloride 08/06/2024 101  96 - 112 mmol/L Final    Co2 08/06/2024 22  20 - 33 mmol/L Final    Anion Gap 08/06/2024 16.0  7.0 - 16.0 Final    Glucose 08/06/2024 178 (H)  65 - 99 mg/dL Final    Bun 08/06/2024 12  8 - 22 mg/dL Final    Creatinine 08/06/2024 0.66  0.50 - 1.40 mg/dL Final    Calcium 08/06/2024 8.9  8.5 - 10.5 mg/dL Final    Correct Calcium 08/06/2024 9.3  8.5 - 10.5 mg/dL Final    AST(SGOT) 08/06/2024 16  12 - 45 U/L Final    ALT(SGPT) 08/06/2024 12  2 - 50 U/L Final     Alkaline Phosphatase 08/06/2024 104 (H)  30 - 99 U/L Final    Total Bilirubin 08/06/2024 <0.2  0.1 - 1.5 mg/dL Final    Albumin 08/06/2024 3.5  3.2 - 4.9 g/dL Final    Total Protein 08/06/2024 6.9  6.0 - 8.2 g/dL Final    Globulin 08/06/2024 3.4  1.9 - 3.5 g/dL Final    A-G Ratio 08/06/2024 1.0  g/dL Final    WBC 08/06/2024 14.9 (H)  4.8 - 10.8 K/uL Final    RBC 08/06/2024 4.66  4.20 - 5.40 M/uL Final    Hemoglobin 08/06/2024 13.1  12.0 - 16.0 g/dL Final    Hematocrit 08/06/2024 41.0  37.0 - 47.0 % Final    MCV 08/06/2024 88.0  81.4 - 97.8 fL Final    MCH 08/06/2024 28.1  27.0 - 33.0 pg Final    MCHC 08/06/2024 32.0 (L)  32.2 - 35.5 g/dL Final    RDW 08/06/2024 46.7  35.9 - 50.0 fL Final    Platelet Count 08/06/2024 490 (H)  164 - 446 K/uL Final    MPV 08/06/2024 10.1  9.0 - 12.9 fL Final    Neutrophils-Polys 08/06/2024 81.80 (H)  44.00 - 72.00 % Final    Lymphocytes 08/06/2024 11.20 (L)  22.00 - 41.00 % Final    Monocytes 08/06/2024 4.20  0.00 - 13.40 % Final    Eosinophils 08/06/2024 1.50  0.00 - 6.90 % Final    Basophils 08/06/2024 0.80  0.00 - 1.80 % Final    Immature Granulocytes 08/06/2024 0.50  0.00 - 0.90 % Final    Nucleated RBC 08/06/2024 0.00  0.00 - 0.20 /100 WBC Final    Neutrophils (Absolute) 08/06/2024 12.18 (H)  1.82 - 7.42 K/uL Final    Includes immature neutrophils, if present.    Lymphs (Absolute) 08/06/2024 1.67  1.00 - 4.80 K/uL Final    Monos (Absolute) 08/06/2024 0.63  0.00 - 0.85 K/uL Final    Eos (Absolute) 08/06/2024 0.23  0.00 - 0.51 K/uL Final    Baso (Absolute) 08/06/2024 0.12  0.00 - 0.12 K/uL Final    Immature Granulocytes (abs) 08/06/2024 0.07  0.00 - 0.11 K/uL Final    NRBC (Absolute) 08/06/2024 0.00  K/uL Final    GFR (CKD-EPI) 08/06/2024 95  >60 mL/min/1.73 m 2 Final    Comment: Estimated Glomerular Filtration Rate is calculated using  race neutral CKD-EPI 2021 equation per NKF-ASN recommendations.                          Assessment & Plan     1. Iron deficiency  CBC WITH  DIFFERENTIAL    IRON/TOTAL IRON BIND    FERRITIN      2. Leukocytosis, unspecified type  CBC WITH DIFFERENTIAL    IRON/TOTAL IRON BIND    FERRITIN    BCR-ABL1 QUAL WITH REFLEX    LDH      3. Encounter for hematology follow-up          1.  Leukocytosis: Patient continues without fever, chills, recurrent infections although she reports recent issue of suspected pancreatitis.  Counts are otherwise intermittently elevated and correlate as reactive secondary to anemia.    2.  Iron deficiency: Patient with history of GI bleed, rectal prolapse, RA treated with Plaquinel.  CBC, TIBC have been evaluated and found to be within acceptable limits although WBC is elevated as per item #1 above, patient reports mild diaphoresis that may correlate with suspected pancreatitis.  Continue to monitor.  Iron dextran is indicated, order placed.      Patient will monitor symptoms, repeat labs and return for reevaluation in 3 months, sooner as needed.    - Consider flow cytometry or additional workup for his white count rises or B symptoms persist.        The patient verbalized agreement and understanding of current plan. All questions and concerns were addressed at time of visit.    Please note that this dictation was created using voice recognition software. I have made every reasonable attempt to correct obvious errors, but I expect that there are errors of grammar and possibly content that I did not discover before finalizing the note.

## 2024-08-09 ENCOUNTER — TELEPHONE (OUTPATIENT)
Dept: HEALTH INFORMATION MANAGEMENT | Facility: OTHER | Age: 69
End: 2024-08-09
Payer: MEDICARE

## 2024-08-09 NOTE — TELEPHONE ENCOUNTER
Pt called in and left a message for Bessy Morales to call her back.     Looks like pt was called for monthly outreach. Called pt back to let her know that Bessy would not be back in the office until Wednesday of next week but that I would let her know that she called.

## 2024-08-10 ENCOUNTER — OUTPATIENT INFUSION SERVICES (OUTPATIENT)
Dept: ONCOLOGY | Facility: MEDICAL CENTER | Age: 69
End: 2024-08-10
Attending: NURSE PRACTITIONER
Payer: MEDICARE

## 2024-08-10 VITALS
HEART RATE: 84 BPM | RESPIRATION RATE: 18 BRPM | DIASTOLIC BLOOD PRESSURE: 69 MMHG | BODY MASS INDEX: 22.53 KG/M2 | WEIGHT: 143.52 LBS | OXYGEN SATURATION: 95 % | HEIGHT: 67 IN | SYSTOLIC BLOOD PRESSURE: 130 MMHG | TEMPERATURE: 96.4 F

## 2024-08-10 DIAGNOSIS — E61.1 IRON DEFICIENCY: ICD-10-CM

## 2024-08-10 LAB — FERRITIN SERPL-MCNC: 280 NG/ML (ref 10–291)

## 2024-08-10 PROCEDURE — 96365 THER/PROPH/DIAG IV INF INIT: CPT

## 2024-08-10 PROCEDURE — 700105 HCHG RX REV CODE 258: Performed by: NURSE PRACTITIONER

## 2024-08-10 PROCEDURE — 700111 HCHG RX REV CODE 636 W/ 250 OVERRIDE (IP): Mod: JZ | Performed by: NURSE PRACTITIONER

## 2024-08-10 PROCEDURE — 82728 ASSAY OF FERRITIN: CPT

## 2024-08-10 PROCEDURE — 96374 THER/PROPH/DIAG INJ IV PUSH: CPT

## 2024-08-10 PROCEDURE — 96366 THER/PROPH/DIAG IV INF ADDON: CPT

## 2024-08-10 RX ORDER — METHYLPREDNISOLONE SODIUM SUCCINATE 125 MG/2ML
125 INJECTION, POWDER, LYOPHILIZED, FOR SOLUTION INTRAMUSCULAR; INTRAVENOUS PRN
OUTPATIENT
Start: 2024-08-10

## 2024-08-10 RX ORDER — SODIUM CHLORIDE 9 MG/ML
INJECTION, SOLUTION INTRAVENOUS CONTINUOUS
OUTPATIENT
Start: 2024-08-10

## 2024-08-10 RX ORDER — EPINEPHRINE 1 MG/ML(1)
0.5 AMPUL (ML) INJECTION PRN
OUTPATIENT
Start: 2024-08-10

## 2024-08-10 RX ORDER — METHYLPREDNISOLONE SODIUM SUCCINATE 125 MG/2ML
125 INJECTION, POWDER, LYOPHILIZED, FOR SOLUTION INTRAMUSCULAR; INTRAVENOUS PRN
Status: CANCELLED | OUTPATIENT
Start: 2024-08-10

## 2024-08-10 RX ORDER — DIPHENHYDRAMINE HYDROCHLORIDE 50 MG/ML
50 INJECTION INTRAMUSCULAR; INTRAVENOUS PRN
OUTPATIENT
Start: 2024-08-10

## 2024-08-10 RX ORDER — METHYLPREDNISOLONE SODIUM SUCCINATE 125 MG/2ML
125 INJECTION, POWDER, LYOPHILIZED, FOR SOLUTION INTRAMUSCULAR; INTRAVENOUS PRN
Status: DISCONTINUED | OUTPATIENT
Start: 2024-08-10 | End: 2024-08-10

## 2024-08-10 RX ADMIN — SODIUM CHLORIDE 1000 MG: 9 INJECTION, SOLUTION INTRAVENOUS at 12:05

## 2024-08-10 RX ADMIN — METHYLPREDNISOLONE SODIUM SUCCINATE 125 MG: 125 INJECTION, POWDER, FOR SOLUTION INTRAMUSCULAR; INTRAVENOUS at 11:55

## 2024-08-10 ASSESSMENT — FIBROSIS 4 INDEX: FIB4 SCORE: 0.65

## 2024-08-10 NOTE — PROGRESS NOTES
Pt arrived ambulatory to [facility] for Iron Dextran infusion for iron deficiency anemia. POC discussed with pt and she agrees with plan.      Lab results reviewed from 08/06/2024, ok to proceed with treatment. PIV established, brisk blood return noted. Pt medicated per MAR. Pt tolerated treatment without s/s adverse reaction. PIV dc'd catheter tip intact intact, gauze and coban dressing applied.      Pt discharged to self care, [facility]. Pt will follow up with provider for future care.

## 2024-08-11 DIAGNOSIS — Z79.1 ENCOUNTER FOR LONG-TERM (CURRENT) USE OF NSAIDS: ICD-10-CM

## 2024-08-11 DIAGNOSIS — M05.79 RHEUMATOID ARTHRITIS INVOLVING MULTIPLE SITES WITH POSITIVE RHEUMATOID FACTOR (HCC): ICD-10-CM

## 2024-08-11 DIAGNOSIS — G56.03 BILATERAL CARPAL TUNNEL SYNDROME: ICD-10-CM

## 2024-08-11 DIAGNOSIS — Z79.899 LONG-TERM USE OF PLAQUENIL: ICD-10-CM

## 2024-08-11 DIAGNOSIS — D64.9 ANEMIA, UNSPECIFIED TYPE: ICD-10-CM

## 2024-08-12 RX ORDER — HYDROXYCHLOROQUINE SULFATE 200 MG/1
TABLET, FILM COATED ORAL
Qty: 180 TABLET | Refills: 1 | Status: SHIPPED | OUTPATIENT
Start: 2024-08-12

## 2024-08-12 NOTE — TELEPHONE ENCOUNTER
Received request via: Pharmacy aug labs    Was the patient seen in the last year in this department? Yes    Does the patient have an active prescription (recently filled or refills available) for medication(s) requested? No    [facility]    Does the patient have halfway Plus and need 100-day supply? (This applies to ALL medications) Patient does not have SCP

## 2024-08-16 NOTE — PROGRESS NOTES
Assessment    Spoke with pt for monthly outreach. Pt reports she is doing ok. She had an episode of what she thinks was pancreatitis, but is feeling better now.  She reports a 25lb weight loss. Younger sister is there to help her out currently. Pt reports she has her insulin, but is requesting some assistance with the rest of her medications through the [facility]. Assisted pt to schedule follow up with pcp as she is past due.     Education and Self Management of Care    Medication management     Plan of Care and Goals    Continue plan of care    Barriers:    Financial     Progress:    Not progressing    Next outreach:  1 month

## 2024-08-19 RX ORDER — GABAPENTIN 300 MG/1
CAPSULE ORAL
Qty: 100 CAPSULE | Refills: 1 | Status: SHIPPED | OUTPATIENT
Start: 2024-08-19

## 2024-08-19 NOTE — TELEPHONE ENCOUNTER
Received request via: Patient    Was the patient seen in the last year in this department? Yes    Does the patient have an active prescription (recently filled or refills available) for medication(s) requested? No    Pharm[facility] Name: [facility]    Does the patient have longterm Plus and need 100-day supply? (This applies to ALL medications) Yes, quantity updated to 100 days

## 2024-08-25 ASSESSMENT — ENCOUNTER SYMPTOMS: DIAPHORESIS: 1

## 2024-09-17 ENCOUNTER — PATIENT OUTREACH (OUTPATIENT)
Dept: HEALTH INFORMATION MANAGEMENT | Facility: OTHER | Age: 69
End: 2024-09-17
Payer: MEDICARE

## 2024-09-17 ENCOUNTER — TELEPHONE (OUTPATIENT)
Dept: HEALTH INFORMATION MANAGEMENT | Facility: OTHER | Age: 69
End: 2024-09-17

## 2024-09-17 DIAGNOSIS — M05.79 RHEUMATOID ARTHRITIS INVOLVING MULTIPLE SITES WITH POSITIVE RHEUMATOID FACTOR (HCC): ICD-10-CM

## 2024-09-17 DIAGNOSIS — Z79.899 LONG-TERM USE OF PLAQUENIL: ICD-10-CM

## 2024-09-17 DIAGNOSIS — E11.9 DIABETES MELLITUS TYPE 2 IN NONOBESE (HCC): ICD-10-CM

## 2024-09-17 DIAGNOSIS — I25.10 CVD (CARDIOVASCULAR DISEASE): ICD-10-CM

## 2024-09-17 DIAGNOSIS — Z79.1 ENCOUNTER FOR LONG-TERM (CURRENT) USE OF NSAIDS: ICD-10-CM

## 2024-09-17 DIAGNOSIS — G89.4 CHRONIC PAIN DISORDER: ICD-10-CM

## 2024-09-17 RX ORDER — MELOXICAM 15 MG/1
TABLET ORAL
Qty: 90 TABLET | Refills: 0 | Status: SHIPPED | OUTPATIENT
Start: 2024-09-17

## 2024-09-17 NOTE — TELEPHONE ENCOUNTER
Received request via: Patient    Was the patient seen in the last year in this department? Yes    Does the patient have an active prescription (recently filled or refills available) for medication(s) requested? No    Ph[facility]: [facility]    Does the patient have custodial Plus and need 100-day supply? (This applies to ALL medications) Yes, quantity updated to 100 days

## 2024-09-17 NOTE — TELEPHONE ENCOUNTER
1400 - W received request from [facility] YOLIS Doherty to assist pt with scheduling an appt with Neurosugery. CHW contacted [facility], spoke with a , was unable to find pt in the system, was then transferred to the referral specialist to see if she had the order on file, voicemail was left to  requesting a call back.     1408 - CHW received a call back from Graciela, CHW was informed that they don't have a pt order on file, requested that the order be faxed, was provided the direct extension to fax the order to. Graciela informed CHW that she would ensure pt is contacted to schedule. W faxed order to Graciela at [facility].     1437 - CHW received a call back from Graciela requesting clarification. Graciela discussed that the diagnosis for the rheumatoid arthritis is too broad and for pt to be scheduled, it needs to be more specific as their specialists all treat different types of arthritis. After speaking with [facility] RN, JEANIE informed Graciela that pt is needing to be seen for her neck primarily, she has a rheumatologist already. Graciela informed CHW that she will forward the order to neurosurgery, she saw multiple calls came in from pt to neurosurgery but they haven't returned her calls yet. Graciela will follow pt's order to ensure she gets scheduled by neuro or ortho. W thanked Graciela for her assistance.     CHW will follow up with pt next week to ensure she got a call from [facility] scheduling department, as well as get her medication (per request of [facility] YOLIS Doherty).

## 2024-09-17 NOTE — PROGRESS NOTES
Assessment    Spoke with pt for monthly outreach. Pt reports she is doing ok. She has not filled her medications yet. She is only taking her insulin and meloxicam. She has continued to check her blood sugar daily. Pt requesting refill of meloxicam from rheumatologist, which was sent in a separate encounter. Pt stopped hydroxychloroquine because it hurt her stomach. She did not notify the prescribing provider. Pt did get her financial situation straightened out so she has her debit card so she can order her medications. Provided phone number for [facility] so she can call to request a delivery. Stressed importance of getting back on her medications. Pt is agreeable to calling to get these medications filled. Pt reports her sister is still coming by to help her out. Reminded of upcoming appointments. Pt reports she never heard back from the neurosurgeon. She reports she left a msg 3 times and never got a return call.  Her neck pain is pretty bad. Will ask CHW to assist with making this appointment. She is otherwise feeling ok.     Education and Self Management of Care    Medication management    Plan of Care and Goals    [facility], schedule with neurosurgeon    Barriers:    pain    Progress:    Not progressing    Next outreach:  1 month

## 2024-09-19 ENCOUNTER — PATIENT OUTREACH (OUTPATIENT)
Dept: HEALTH INFORMATION MANAGEMENT | Facility: OTHER | Age: 69
End: 2024-09-19
Payer: MEDICARE

## 2024-09-19 DIAGNOSIS — E11.9 DIABETES MELLITUS TYPE 2 IN NONOBESE (HCC): ICD-10-CM

## 2024-09-19 PROCEDURE — RXMED WILLOW AMBULATORY MEDICATION CHARGE: Performed by: FAMILY MEDICINE

## 2024-09-19 PROCEDURE — RXMED WILLOW AMBULATORY MEDICATION CHARGE: Performed by: INTERNAL MEDICINE

## 2024-09-19 NOTE — PROGRESS NOTES
Community Health Worker Follow-Up    Reason for outreach: Medication Assistance    CHW Interventions: CHW received request from [facility] RN Bessy to assist pt in getting her medications filled and mailed by the [facility], pt has had multiple unsuccessful attempts at contacting the pharmacy. CHW contacted the pharmacy, was able to speak with representative. Informed rep that pt is needing to fill all medication except the insulin, rep confirmed. CHW was notified of cost, CHW conferenced pt into the call with the rep so that the pt could give the approval. Pt was able to speak with representative, confirmed payment method and delivery address. Pt's medications will be mailed out to her.     Specific Resources Provided:  Housing/Shelter: n/a  Transportation: n/a  Food: n/a  Financial: n/a  Social Supports: n/a  Other: n/a    Plan: CHW will contact pt next week to ensure the medications were received as well as follow up on appt scheduling.

## 2024-09-20 ENCOUNTER — PHARMACY VISIT (OUTPATIENT)
Dept: PHARMACY | Facility: MEDICAL CENTER | Age: 69
End: 2024-09-20
Payer: COMMERCIAL

## 2024-09-24 ENCOUNTER — TELEPHONE (OUTPATIENT)
Dept: HEALTH INFORMATION MANAGEMENT | Facility: OTHER | Age: 69
End: 2024-09-24
Payer: MEDICARE

## 2024-09-24 NOTE — TELEPHONE ENCOUNTER
Pt called in to ask what day and time her appt was with Neurology in October. She could not remember the name and stated that she had not received a call back from the individual in that office yet. This provider is not within our network and I could not find any information on this, stating that I cannot see anything outside of the [facility] network, but the pt had the number and stated that she would call them and ask.

## 2024-09-24 NOTE — TELEPHONE ENCOUNTER
CHW contacted pt for medication/appt follow up. Pt didn't answer, voicemail was left. CHW will attempt to contact pt later in the week.

## 2024-09-25 ENCOUNTER — HOSPITAL ENCOUNTER (OUTPATIENT)
Facility: MEDICAL CENTER | Age: 69
End: 2024-09-25
Attending: FAMILY MEDICINE
Payer: MEDICARE

## 2024-09-25 ENCOUNTER — OFFICE VISIT (OUTPATIENT)
Dept: MEDICAL GROUP | Facility: IMAGING CENTER | Age: 69
End: 2024-09-25
Payer: MEDICARE

## 2024-09-25 VITALS
RESPIRATION RATE: 16 BRPM | HEART RATE: 90 BPM | DIASTOLIC BLOOD PRESSURE: 76 MMHG | OXYGEN SATURATION: 96 % | SYSTOLIC BLOOD PRESSURE: 130 MMHG | BODY MASS INDEX: 23.39 KG/M2 | TEMPERATURE: 97.5 F | WEIGHT: 149 LBS | HEIGHT: 67 IN

## 2024-09-25 DIAGNOSIS — D72.829 LEUKOCYTOSIS, UNSPECIFIED TYPE: ICD-10-CM

## 2024-09-25 DIAGNOSIS — R23.2 HOT FLASHES: ICD-10-CM

## 2024-09-25 DIAGNOSIS — L98.9 SCALP LESION: ICD-10-CM

## 2024-09-25 DIAGNOSIS — Z23 NEED FOR IMMUNIZATION AGAINST INFLUENZA: ICD-10-CM

## 2024-09-25 DIAGNOSIS — G47.01 INSOMNIA DUE TO MEDICAL CONDITION: ICD-10-CM

## 2024-09-25 DIAGNOSIS — I70.90 ATHEROSCLEROSIS: ICD-10-CM

## 2024-09-25 DIAGNOSIS — E11.69 TYPE 2 DIABETES MELLITUS WITH OTHER SPECIFIED COMPLICATION, WITHOUT LONG-TERM CURRENT USE OF INSULIN (HCC): ICD-10-CM

## 2024-09-25 DIAGNOSIS — Z23 NEED FOR PNEUMOCOCCAL VACCINATION: ICD-10-CM

## 2024-09-25 DIAGNOSIS — E78.2 MIXED HYPERLIPIDEMIA: ICD-10-CM

## 2024-09-25 DIAGNOSIS — G89.29 OTHER CHRONIC PAIN: ICD-10-CM

## 2024-09-25 DIAGNOSIS — H26.9 CATARACT OF BOTH EYES, UNSPECIFIED CATARACT TYPE: ICD-10-CM

## 2024-09-25 DIAGNOSIS — F17.200 SMOKING: ICD-10-CM

## 2024-09-25 DIAGNOSIS — Z78.0 POST-MENOPAUSAL: ICD-10-CM

## 2024-09-25 DIAGNOSIS — F33.0 MAJOR DEPRESSIVE DISORDER, RECURRENT EPISODE, MILD (HCC): ICD-10-CM

## 2024-09-25 DIAGNOSIS — E61.1 IRON DEFICIENCY: ICD-10-CM

## 2024-09-25 LAB
HBA1C MFR BLD: 7.8 % (ref ?–5.8)
POCT INT CON NEG: NEGATIVE
POCT INT CON POS: POSITIVE

## 2024-09-25 PROCEDURE — G0009 ADMIN PNEUMOCOCCAL VACCINE: HCPCS | Performed by: FAMILY MEDICINE

## 2024-09-25 PROCEDURE — 3078F DIAST BP <80 MM HG: CPT | Performed by: FAMILY MEDICINE

## 2024-09-25 PROCEDURE — 83036 HEMOGLOBIN GLYCOSYLATED A1C: CPT | Performed by: FAMILY MEDICINE

## 2024-09-25 PROCEDURE — G0480 DRUG TEST DEF 1-7 CLASSES: HCPCS

## 2024-09-25 PROCEDURE — 1126F AMNT PAIN NOTED NONE PRSNT: CPT | Performed by: FAMILY MEDICINE

## 2024-09-25 PROCEDURE — 82570 ASSAY OF URINE CREATININE: CPT | Mod: XU

## 2024-09-25 PROCEDURE — 99214 OFFICE O/P EST MOD 30 MIN: CPT | Mod: 25 | Performed by: FAMILY MEDICINE

## 2024-09-25 PROCEDURE — 80307 DRUG TEST PRSMV CHEM ANLYZR: CPT

## 2024-09-25 PROCEDURE — 90662 IIV NO PRSV INCREASED AG IM: CPT | Performed by: FAMILY MEDICINE

## 2024-09-25 PROCEDURE — 82043 UR ALBUMIN QUANTITATIVE: CPT

## 2024-09-25 PROCEDURE — G0008 ADMIN INFLUENZA VIRUS VAC: HCPCS | Performed by: FAMILY MEDICINE

## 2024-09-25 PROCEDURE — 90677 PCV20 VACCINE IM: CPT | Performed by: FAMILY MEDICINE

## 2024-09-25 PROCEDURE — 3075F SYST BP GE 130 - 139MM HG: CPT | Performed by: FAMILY MEDICINE

## 2024-09-25 RX ORDER — KETOCONAZOLE 20 MG/ML
SHAMPOO TOPICAL
Qty: 120 ML | Refills: 3 | Status: SHIPPED | OUTPATIENT
Start: 2024-09-25

## 2024-09-25 RX ORDER — TRAMADOL HYDROCHLORIDE 50 MG/1
50 TABLET ORAL EVERY 6 HOURS PRN
Qty: 30 TABLET | Refills: 0 | Status: SHIPPED | OUTPATIENT
Start: 2024-09-25 | End: 2024-10-25

## 2024-09-25 RX ORDER — TRAZODONE HYDROCHLORIDE 50 MG/1
50-150 TABLET, FILM COATED ORAL NIGHTLY PRN
Qty: 90 TABLET | Refills: 1 | Status: SHIPPED | OUTPATIENT
Start: 2024-09-25

## 2024-09-25 ASSESSMENT — PAIN SCALES - GENERAL: PAINLEVEL: NO PAIN

## 2024-09-25 ASSESSMENT — FIBROSIS 4 INDEX: FIB4 SCORE: 0.65

## 2024-09-25 NOTE — PROGRESS NOTES
SUBJECTIVE:    Chief Complaint   Patient presents with    Medication Management     Refills on tramadol. Questions about insulin and sensor        HPI:     Heidi Navas is a 69 y.o. female with chronic pain, OA, fibromyalgia, DM type 2, anxiety, COPD, hypertension, hyperlipidemia and prior anemia due to history of GI bleed.   Specialist: rheumatology, spine specialist.   Last visit 10/2023.     Chronic pain- She is currently seeing a spine specialist.  Notes failing hardware of cervical spine area.  She is seeing rheumatology as well.  Currently not taking hydroxychloroquine due to some side effects.  Needs refill of tramadol.  Last took medication months ago.  Takes intermittently as needed.  States she is not on any other pain medication except for Tylenol.  She does have a history of cannabis use.  She last used tramadol months ago.    MDD-stable on Zoloft therapy.  Mood has been better since living situation is better.    Insomnia-needs trazodone refilled.      Diabetes mellitus type 2-restarted on metformin recently.  States glucose at home is usually in the 100s.    Elevated white blood cell count and low iron.  She is followed by hematology.  Does have iron infusions every 3 months.  She does have some rectal issues but has not yet followed up with GI.    Has cataracts. Eye exam a month ago.     Dexa-patient is willing to complete study.  Notes occasional hot flashes, without significant symptoms during menopause.     Atherosclerosis.  She does smoke-has thought about quitting but does not think that she will do so she has been smoking for a long time.    Has had a scalp lesion on the left top of scalp.  Uses over-the-counter shampoo such as head and shoulders.      Health Maintenance Due   Topic Date Due    Bone Density Scan  Never done    Annual Pulmonary Function Test / Spirometry  Never done    Zoster (Shingles) Vaccines (1 of 2) Never done    Annual Wellness Visit  12/01/2016    Pneumococcal  Vaccine: 65+ Years (3 of 3 - PPSV23 or PCV20) 11/05/2021    Diabetes: Monofilament / LE Exam  09/14/2023    A1c Screening  03/20/2024    IMM DTaP/Tdap/Td Vaccine (3 - Td or Tdap) 06/23/2024    Influenza Vaccine (1) 09/01/2024    COVID-19 Vaccine (1 - 2023-24 season) Never done    Fasting Lipid Profile  09/20/2024    Diabetes: Urine Protein Screening  09/20/2024   Vaccine recommended.     ROS:  No recent fevers or chills. No nausea or vomiting. No diarrhea. No chest pains or shortness of breath. No lower extremity edema.    Current Outpatient Medications on File Prior to Visit   Medication Sig Dispense Refill    meloxicam (MOBIC) 15 MG tablet TAKE 1 TABLET BY MOUTH EVERY DAY WITH FOOD AS NEEDED FOR JOINT PAIN 90 Tablet 0    gabapentin (NEURONTIN) 300 MG Cap Start 1 capsule by mouth nightly and then increase to 2 caps nightly as tolerated. 100 Capsule 1    hydroxychloroquine (PLAQUENIL) 200 MG Tab TAKE 1 TABLET BY MOUTH TWICE A  Tablet 1    sertraline (ZOLOFT) 25 MG tablet Take 1 Tablet by mouth every day. 100 Tablet 1    metFORMIN (GLUCOPHAGE) 500 MG Tab Take 1 Tablet by mouth 2 times a day with meals. 200 Tablet 1    traZODone (DESYREL) 50 MG Tab Take 3 Tablets by mouth every evening. 90 Tablet 1    insulin glargine 100 UNIT/ML SC SOPN injection Inject 15 Units under the skin 2 times a day. 15 mL 3    Insulin Pen Needle 32 G x 4 mm (BD PEN NEEDLE BRITANY 2ND GEN) USE ONE PEN NEEDLE IN PEN DEVICE TO INJECT INSULIN TWO TIMES DAILY. 100 Each 3    Alcohol Swabs Use on skin 3 times daily prior to injection with insulin with glucose check. 100 Each 3    CONTOUR NEXT TEST strip 1 STRIP BY OTHER ROUTE IN THE MORNING, AT NOON, AND AT BEDTIME. USE ONE STRIP TO TEST BLOOD SUGAR THREE TIMES DAILY BEFORE MEALS. 300 Strip 3    Continuous Glucose Sensor (FREESTYLE HAYLEY 14 DAY SENSOR) Misc Use one sensor as directed every 14 days. 2 Each 5    Continuous Blood Gluc  (FREESTYLE HAYLEY 2 READER) Device Use daily with  Freestyle david sensor 1 Each 0    diphenhydrAMINE HCl (BENADRYL ALLERGY PO) Take  by mouth.      omeprazole (PRILOSEC) 20 MG delayed-release capsule Take 1 Capsule by mouth 2 times a day. 180 Capsule 3    rosuvastatin (CRESTOR) 10 MG Tab Take 1 Tablet by mouth every evening. 100 Tablet 3    Blood Glucose Monitoring Suppl (BLOOD GLUCOSE MONITOR SYSTEM) w/Device Kit Test blood sugar as recommended by provider. Slick Contour Next blood glucose monitoring kit. 1 Kit 0    Microlet Lancets Misc Use one lancet to test blood sugar three times daily before meals. 100 Each 0    Alcohol Swabs (ALCOHOL PREP) 70 % Pads Wipe site with prep pad prior to injection 100 Each 0    ferrous sulfate (FEROSUL) 325 (65 Fe) MG tablet Take 1 tablet by mouth every day. (Patient not taking: Reported on 4/8/2024) 90 Tablet 0    fluconazole (DIFLUCAN) 150 MG tablet Take 1 Tablet by mouth every day. (Patient not taking: Reported on 4/8/2024) 1 Tablet 0    losartan (COZAAR) 25 MG Tab Take 1 Tablet by mouth every day. (Patient not taking: Reported on 4/8/2024) 100 Tablet 3     No current facility-administered medications on file prior to visit.       Allergies   Allergen Reactions    Aspartame Nausea     headache    Atorvastatin Calcium Unspecified and Swelling    Latex Rash and Itching    Lipitor [Atorvastatin Calcium] Swelling     Patient denies allergy 06/04/22    Other Misc Vomiting     Bug spray    Saccharin Nausea     Nausea and headache       Patient Active Problem List    Diagnosis Date Noted    Iron deficiency 08/08/2024    Abnormal liver ultrasound 11/06/2023    Renal cyst 11/06/2023    Major depressive disorder, recurrent episode, mild (HCC) 12/27/2022    Reactive thrombocytosis 10/14/2022    Rectal prolapse 10/14/2022    Insomnia due to medical condition 07/11/2022    GIB (gastrointestinal bleeding) 06/04/2022    Acute on chronic blood loss anemia 06/04/2022    DM (diabetes mellitus), secondary uncontrolled 06/04/2022    Severely  underweight adult 06/04/2022    ACP (advance care planning) 06/04/2022    Hyponatremia 06/04/2022    Hyperosmolar hyperglycemic state (HHS), pressure ulcer, anemia (HCC) 06/04/2022    Dehydration 06/04/2022    Pressure ulcer, sacrum 06/04/2022    Fatigue 06/03/2022    Weight loss, unintentional 06/03/2022    Urinary incontinence 06/03/2022    Adult failure to thrive 06/03/2022    Iron deficiency anemia 02/09/2020    Cellulitis 01/08/2020    Anemia 05/19/2019    Atherosclerosis 05/19/2019    LVH (left ventricular hypertrophy) 05/19/2019    Arthritis of left shoulder region 07/12/2017    CVD (cardiovascular disease) 12/04/2015    Pulmonary nodule 12/01/2015    Renal stone 12/01/2015    Diverticulosis 12/01/2015    Chest pain 07/16/2015    Nausea & vomiting 07/13/2015    Opiate withdrawal ([facility]) 07/13/2015    Diabetes mellitus type 2 in nonobese ([facility]) 10/03/2014    Brachial neuritis or radiculitis 09/10/2014    History of total hip arthroplasty 07/22/2014    Leukocytosis 03/30/2014    COPD (chronic obstructive pulmonary disease) ([facility]) 03/30/2014    Arthritis 12/26/2013    Chronic pain 02/21/2012    Anxiety 01/18/2012    Vitamin D insufficiency 04/28/2011    Hypertension 03/22/2010    Fibromyalgia 10/09/2009    Swelling, mass, or lump in head and neck 07/21/2009    Tobacco use disorder 07/21/2009    Rheumatoid arthritis involving multiple sites with positive rheumatoid factor ([facility]) 07/21/2009    Chronic ischemic heart disease 07/21/2009    Esophageal reflux 07/21/2009    Allergic rhinitis 07/21/2009    Mixed hyperlipidemia 07/21/2009    Other atopic dermatitis and related conditions 07/21/2009    Deficiency anemia 07/21/2009       Past Medical History:   Diagnosis Date    Anemia     Arthritis     OA and  not RA per rheumatology    Breath shortness     oxygen PRN     Bronchitis 2008    Colon polyps 2015    Convulsions ([facility]) 7/21/2009     ICD-10 transition    COPD (chronic obstructive pulmonary disease) ([facility])     Dental  "disorder     full dentures    Depression     depression, anxiety     Diverticulosis 5/10     colonoscopy    Hemorrhoid 7/29/2009    History of colonoscopy   2005    Leukocytosis 3/30/2014    Lymphocytosis (symptomatic) 7/21/2009    MI (myocardial infarction) (HCC) 4/29/2007    Other specified disorder of intestines     constipation    Pain     shoulders, neck, lower back    Pancreatitis     Pap smear 4/2006    Pneumonia 2013    Pulmonary nodule     Renal lesion     Renal stone     S/P colonoscopy 5/2010    will need repeat in 5 years    Screening mammogram never    Seizure (HCC)     x1 in 2008    Shingles     Symptomatic menopausal or female climacteric states 7/21/2009    Type II or unspecified type diabetes mellitus without mention of complication, uncontrolled 7/21/2009    diet controlled     Unspecified urinary incontinence          OBJECTIVE:   /76 (BP Location: Left arm, Patient Position: Sitting, BP Cuff Size: Adult)   Pulse 90   Temp 36.4 °C (97.5 °F) (Temporal)   Resp 16   Ht 1.689 m (5' 6.5\")   Wt 67.6 kg (149 lb)   LMP 01/01/2007   SpO2 96%   BMI 23.69 kg/m²   General: Well-developed well-nourished female, no acute distress  HEENT: Left parietal region of scalp with about 1 cm x 1 cm area of slightly flaky white skin.  Neck: supple, no lymphadenopathy- cervical or supraclavicular, no thyromegaly  Cardiovascular: regular rate and rhythm, no murmurs, gallops, rubs  Lungs: clear to auscultation bilaterally, no wheezes, crackles, or rhonchi  Abdomen: +bowel sounds, soft, nontender, nondistended, no rebound, no guarding, no hepatosplenomegaly  Extremities: no cyanosis, clubbing, edema  Skin: Warm and dry  Psych: appropriate mood and affect  Diabetic foot exam: 2+ pedal pulses, no lesions noted, sensation intact with 10 out of 10 monofilament test.    POCa1c: 7.8%      ASSESSMENT/PLAN:    69 y.o.female       1. Other chronic pain -stable.  Tramadol as needed.  Continue precautions with medication " use. URINE DRUG SCREEN    traMADol (ULTRAM) 50 MG Tab    Controlled Substance Treatment Agreement      2. Insomnia due to medical condition -stable.  Trazodone as needed. traZODone (DESYREL) 50 MG Tab      3. Type 2 diabetes mellitus with other specified complication, without long-term current use of insulin (HCC) -A1c stable at 7.8% Diabetic Monofilament LE Exam    POCT  A1C    Comp Metabolic Panel    MICROALBUMIN CREAT RATIO URINE              4. Mixed hyperlipidemia   Recommend healthy diet and routine exercise as tolerated.   Monitor labs. Lipid Profile    Comp Metabolic Panel      5. Atherosclerosis   Recommend healthy diet and routine exercise.  Recommend adequate control of lipid and glucose.  Recommend smoking cessation.       6. Smoking   Counseled on recommendations for smoking cessation.        7. Leukocytosis, unspecified type -stable, component appears reactive.   Followed by hematology.       8. Iron deficiency -stable.  Currently on iron infusion therapy every 3 months.    Follow-up routinely with hematology.       9. Major depressive disorder, recurrent episode, mild (HCC)-controlled.    Continue on Zoloft therapy.       10. Scalp lesion-likely fungal etiology.  Topical shampoo therapy.  Monitor. ketoconazole (NIZORAL) 2 % shampoo      11. Post-menopausal  DS-BONE DENSITY STUDY (DEXA)      12. Need for immunization against influenza  Influenza Vaccine, High Dose (65+ Only)      13. Need for pneumococcal vaccination  Pneumococcal Conjugate Vaccine 20-Valent (6 wks+)      14. Cataract of both eyes, unspecified cataract type   Follow-up routinely with eye specialist.      15.     Hot flashes- intermittent symptoms. Consider therapy as needed. Monitor.    Recommend use of amlactin lotion, otc.   She will continue to follow with RN.       Follow up in 3 months.     This medical record contains text that has been entered with the assistance of computer voice recognition and dictation software.  Therefore,  it may contain unintended errors in text, spelling, punctuation, or grammar.

## 2024-09-26 DIAGNOSIS — R19.8 GI PROBLEM: ICD-10-CM

## 2024-09-26 LAB
CREAT UR-MCNC: 59.36 MG/DL
MICROALBUMIN UR-MCNC: 10.3 MG/DL
MICROALBUMIN/CREAT UR: 174 MG/G (ref 0–30)

## 2024-09-26 PROCEDURE — RXMED WILLOW AMBULATORY MEDICATION CHARGE: Performed by: FAMILY MEDICINE

## 2024-09-26 NOTE — TELEPHONE ENCOUNTER
Community Health Worker Follow-Up    Reason for outreach: Appt Follow Up and Medication Follow Up    CHW Interventions: CHW contacted pt to make sure she got a call from [employer] and received the medications from last week's conversation. Pt states that she is doing well. Pt discussed that she got a call from [employer] and is now scheduled to be seen on 10/7/24. Pt discussed that she got the medications that were ordered last week, is still waiting for the medications that were ordered yesterday and requested assistance with getting her omeprazole refilled. CHW informed pt that a request will be sent to PCP. Pt states that she has no other needs at this time and was thankful for follow up.     Specific Resources Provided:  Housing/Shelter: n/a  Transportation: n/a  Food: n/a  Financial: n/a  Social Supports: n/a  Other: n/a    Plan: CHW contacted [facility] YOLIS Issa for assistance with medication refill, request has been submitted and routed to PCP. CHW will not follow pt at this time. Encouraged pt to call with questions or concerns

## 2024-09-26 NOTE — TELEPHONE ENCOUNTER
Pt called in for refill for omeprazole. CHW had forwarded this to me to help with     Received request via: Patient    Was the patient seen in the last year in this department? Yes    Does the patient have an active prescription (recently filled or refills available) for medication(s) requested? No    Pharmacy Name: [facility] [place]    Does the patient have [facility] and need 100-day supply? (This applies to ALL medications) Yes, quantity updated to 100 days

## 2024-09-27 LAB
AMPHET CTO UR CFM-MCNC: NORMAL NG/ML
BARBITURATES CTO UR CFM-MCNC: NEGATIVE NG/ML
BENZODIAZ CTO UR CFM-MCNC: NORMAL NG/ML
CANNABINOIDS CTO UR CFM-MCNC: NORMAL NG/ML
COCAINE CTO UR CFM-MCNC: NEGATIVE NG/ML
CREAT UR-MCNC: 65.1 MG/DL (ref 20–400)
DRUG COMMENT 753798: NORMAL
METHADONE CTO UR CFM-MCNC: NEGATIVE NG/ML
OPIATES CTO UR CFM-MCNC: NORMAL NG/ML
PCP CTO UR CFM-MCNC: NEGATIVE NG/ML
PROPOXYPH CTO UR CFM-MCNC: NEGATIVE NG/ML

## 2024-09-29 LAB
1OH-MIDAZOLAM UR CFM-MCNC: <20 NG/ML
7AMINOCLONAZEPAM UR CFM-MCNC: <5 NG/ML
A-OH ALPRAZ UR CFM-MCNC: 41 NG/ML
ALPRAZ UR CFM-MCNC: 60 NG/ML
AMPHET UR CFM-MCNC: 233 NG/ML
CHLORDIAZEP UR CFM-MCNC: <20 NG/ML
CLONAZEPAM UR CFM-MCNC: <5 NG/ML
DIAZEPAM UR CFM-MCNC: <20 NG/ML
LORAZEPAM UR CFM-MCNC: <20 NG/ML
MDA UR CFM-MCNC: <200 NG/ML
MDEA UR CFM-MCNC: <200 NG/ML
MDMA UR CFM-MCNC: <200 NG/ML
METHAMPHET UR CFM-MCNC: 244 NG/ML
MIDAZOLAM UR CFM-MCNC: <20 NG/ML
NORDIAZEPAM UR CFM-MCNC: <20 NG/ML
OXAZEPAM UR CFM-MCNC: <20 NG/ML
PHENTERMINE UR CFM-MCNC: <200 NG/ML
TEMAZEPAM UR CFM-MCNC: <20 NG/ML
THC UR CFM-MCNC: 121 NG/ML

## 2024-09-30 LAB
6MAM UR CFM-MCNC: <10 NG/ML
CODEINE UR CFM-MCNC: <20 NG/ML
HYDROCODONE UR CFM-MCNC: 545 NG/ML
HYDROMORPHONE UR CFM-MCNC: <20 NG/ML
MORPHINE UR CFM-MCNC: <20 NG/ML
NORHYDROCODONE UR CFM-MCNC: 498 NG/ML
NOROXYCODONE UR CFM-MCNC: <20 NG/ML
OPIATES UR NOROXYM Q0836: <20 NG/ML
OXYCODONE UR CFM-MCNC: <20 NG/ML
OXYMORPHONE UR CFM-MCNC: <20 NG/ML

## 2024-10-11 ENCOUNTER — PHARMACY VISIT (OUTPATIENT)
Dept: PHARMACY | Facility: MEDICAL CENTER | Age: 69
End: 2024-10-11
Payer: COMMERCIAL

## 2024-10-17 ENCOUNTER — HOSPITAL ENCOUNTER (OUTPATIENT)
Dept: RADIOLOGY | Facility: MEDICAL CENTER | Age: 69
End: 2024-10-17
Attending: INTERNAL MEDICINE
Payer: MEDICARE

## 2024-10-17 ENCOUNTER — OFFICE VISIT (OUTPATIENT)
Dept: RHEUMATOLOGY | Facility: MEDICAL CENTER | Age: 69
End: 2024-10-17
Attending: INTERNAL MEDICINE
Payer: MEDICARE

## 2024-10-17 VITALS
HEART RATE: 92 BPM | SYSTOLIC BLOOD PRESSURE: 120 MMHG | TEMPERATURE: 96.7 F | RESPIRATION RATE: 14 BRPM | WEIGHT: 144 LBS | BODY MASS INDEX: 22.89 KG/M2 | OXYGEN SATURATION: 89 % | DIASTOLIC BLOOD PRESSURE: 68 MMHG

## 2024-10-17 DIAGNOSIS — E11.9 DIABETES MELLITUS TYPE 2 IN NONOBESE (HCC): ICD-10-CM

## 2024-10-17 DIAGNOSIS — F17.200 TOBACCO USE DISORDER: ICD-10-CM

## 2024-10-17 DIAGNOSIS — M05.79 RHEUMATOID ARTHRITIS INVOLVING MULTIPLE SITES WITH POSITIVE RHEUMATOID FACTOR (HCC): ICD-10-CM

## 2024-10-17 DIAGNOSIS — Z51.81 ENCOUNTER FOR METHOTREXATE MONITORING: ICD-10-CM

## 2024-10-17 DIAGNOSIS — Z79.1 ENCOUNTER FOR LONG-TERM (CURRENT) USE OF NSAIDS: ICD-10-CM

## 2024-10-17 DIAGNOSIS — Z79.631 ENCOUNTER FOR METHOTREXATE MONITORING: ICD-10-CM

## 2024-10-17 DIAGNOSIS — M54.2 CERVICALGIA: ICD-10-CM

## 2024-10-17 DIAGNOSIS — J44.9 CHRONIC OBSTRUCTIVE PULMONARY DISEASE, UNSPECIFIED COPD TYPE (HCC): ICD-10-CM

## 2024-10-17 DIAGNOSIS — I10 PRIMARY HYPERTENSION: ICD-10-CM

## 2024-10-17 PROCEDURE — 3078F DIAST BP <80 MM HG: CPT | Performed by: INTERNAL MEDICINE

## 2024-10-17 PROCEDURE — 99212 OFFICE O/P EST SF 10 MIN: CPT | Performed by: INTERNAL MEDICINE

## 2024-10-17 PROCEDURE — 3074F SYST BP LT 130 MM HG: CPT | Performed by: INTERNAL MEDICINE

## 2024-10-17 PROCEDURE — 72040 X-RAY EXAM NECK SPINE 2-3 VW: CPT

## 2024-10-17 PROCEDURE — 99214 OFFICE O/P EST MOD 30 MIN: CPT | Performed by: INTERNAL MEDICINE

## 2024-10-17 RX ORDER — CELECOXIB 200 MG/1
CAPSULE ORAL
Qty: 180 CAPSULE | Refills: 1 | Status: SHIPPED | OUTPATIENT
Start: 2024-10-17

## 2024-10-17 RX ORDER — METHOTREXATE 2.5 MG/1
TABLET ORAL
Qty: 72 TABLET | Refills: 0 | Status: SHIPPED | OUTPATIENT
Start: 2024-10-17

## 2024-10-17 RX ORDER — FOLIC ACID 1 MG/1
TABLET ORAL
Qty: 90 TABLET | Refills: 1 | Status: SHIPPED | OUTPATIENT
Start: 2024-10-17

## 2024-10-17 ASSESSMENT — FIBROSIS 4 INDEX: FIB4 SCORE: 0.65

## 2024-10-23 ENCOUNTER — PATIENT OUTREACH (OUTPATIENT)
Dept: HEALTH INFORMATION MANAGEMENT | Facility: OTHER | Age: 69
End: 2024-10-23
Payer: MEDICARE

## 2024-10-23 DIAGNOSIS — E11.9 DIABETES MELLITUS TYPE 2 IN NONOBESE (HCC): ICD-10-CM

## 2024-10-23 DIAGNOSIS — I25.10 CVD (CARDIOVASCULAR DISEASE): ICD-10-CM

## 2024-10-23 PROCEDURE — 99490 CHRNC CARE MGMT STAFF 1ST 20: CPT | Performed by: FAMILY MEDICINE

## 2024-10-23 NOTE — PROGRESS NOTES
Assessment    Spoke with pt for monthly outreach. Pt reports she is really struggling with her neck pain.  She has fallen twice. She is finally scheduled to see the specialist next week, 10/30. She admits that she isn't taking her medications consistently as prescribed. She did not receive mychart msg or VM from Dr. Thorne's MA regarding results and recommendations. Reviewed msg with pt. She will call the pharmacy now to get the new losartan. Pt thinks she has a yeast infection. She will try an OTC treatment first. She did get the continuous glucometer, but she needs assistance with setting up the irma. She continues to check blood sugar with fingerstick glucometer. Notified pt that we can set up a nurse visit to set her up. Pt would like me to call her after she sees the neck specialist next week as this is her current priority.     Education and Self Management of Care    Medication management    Plan of Care and Goals    Continue plan of care    Barriers:    Pain, financial    Progress:    Not progressing    Next outreach:  1 month

## 2024-10-28 ENCOUNTER — PHARMACY VISIT (OUTPATIENT)
Dept: PHARMACY | Facility: MEDICAL CENTER | Age: 69
End: 2024-10-28
Payer: COMMERCIAL

## 2024-10-28 PROCEDURE — RXMED WILLOW AMBULATORY MEDICATION CHARGE: Performed by: FAMILY MEDICINE

## 2024-10-30 PROCEDURE — RXMED WILLOW AMBULATORY MEDICATION CHARGE: Performed by: INTERNAL MEDICINE

## 2024-10-31 ENCOUNTER — PHARMACY VISIT (OUTPATIENT)
Dept: PHARMACY | Facility: MEDICAL CENTER | Age: 69
End: 2024-10-31
Payer: COMMERCIAL

## 2024-11-07 ENCOUNTER — PHARMACY VISIT (OUTPATIENT)
Dept: PHARMACY | Facility: MEDICAL CENTER | Age: 69
End: 2024-11-07
Payer: MEDICARE

## 2024-11-07 NOTE — PROGRESS NOTES
Quarterly chart review completed. Pt continues to qualify for and benefit from personal care management program. Will continue to follow.

## 2024-11-08 ENCOUNTER — APPOINTMENT (OUTPATIENT)
Dept: HEMATOLOGY ONCOLOGY | Facility: MEDICAL CENTER | Age: 69
End: 2024-11-08
Payer: MEDICARE

## 2024-11-12 NOTE — CARE PLAN
Problem: Adherence to prescribed diabetic medications  Goal: Educate on and improve diabetic medication adherence  Outcome: Not Progressing     Problem: Recognizing current health habits that may contribute to diabetes  Goal: In conjunction with patient, identify which health habits can be modified  Outcome: Progressing     Problem: Patient barriers to managing diabetes  Goal: Identify patient barriers to managing diabetes  Outcome: Progressing

## 2024-11-13 ENCOUNTER — HOSPITAL ENCOUNTER (OUTPATIENT)
Dept: LAB | Facility: MEDICAL CENTER | Age: 69
End: 2024-11-13
Attending: NURSE PRACTITIONER
Payer: MEDICARE

## 2024-11-13 DIAGNOSIS — D72.829 LEUKOCYTOSIS, UNSPECIFIED TYPE: ICD-10-CM

## 2024-11-13 DIAGNOSIS — E61.1 IRON DEFICIENCY: ICD-10-CM

## 2024-11-13 LAB
BASOPHILS # BLD AUTO: 0.8 % (ref 0–1.8)
BASOPHILS # BLD: 0.08 K/UL (ref 0–0.12)
EOSINOPHIL # BLD AUTO: 0.17 K/UL (ref 0–0.51)
EOSINOPHIL NFR BLD: 1.7 % (ref 0–6.9)
ERYTHROCYTE [DISTWIDTH] IN BLOOD BY AUTOMATED COUNT: 45.7 FL (ref 35.9–50)
HCT VFR BLD AUTO: 41.5 % (ref 37–47)
HGB BLD-MCNC: 13.8 G/DL (ref 12–16)
IMM GRANULOCYTES # BLD AUTO: 0.04 K/UL (ref 0–0.11)
IMM GRANULOCYTES NFR BLD AUTO: 0.4 % (ref 0–0.9)
LYMPHOCYTES # BLD AUTO: 1.48 K/UL (ref 1–4.8)
LYMPHOCYTES NFR BLD: 14.5 % (ref 22–41)
MCH RBC QN AUTO: 30.1 PG (ref 27–33)
MCHC RBC AUTO-ENTMCNC: 33.3 G/DL (ref 32.2–35.5)
MCV RBC AUTO: 90.4 FL (ref 81.4–97.8)
MONOCYTES # BLD AUTO: 0.62 K/UL (ref 0–0.85)
MONOCYTES NFR BLD AUTO: 6.1 % (ref 0–13.4)
NEUTROPHILS # BLD AUTO: 7.83 K/UL (ref 1.82–7.42)
NEUTROPHILS NFR BLD: 76.5 % (ref 44–72)
NRBC # BLD AUTO: 0 K/UL
NRBC BLD-RTO: 0 /100 WBC (ref 0–0.2)
PLATELET # BLD AUTO: 381 K/UL (ref 164–446)
PMV BLD AUTO: 9.8 FL (ref 9–12.9)
RBC # BLD AUTO: 4.59 M/UL (ref 4.2–5.4)
WBC # BLD AUTO: 10.2 K/UL (ref 4.8–10.8)

## 2024-11-13 PROCEDURE — 83615 LACTATE (LD) (LDH) ENZYME: CPT

## 2024-11-13 PROCEDURE — 82728 ASSAY OF FERRITIN: CPT

## 2024-11-13 PROCEDURE — 83540 ASSAY OF IRON: CPT

## 2024-11-13 PROCEDURE — 85025 COMPLETE CBC W/AUTO DIFF WBC: CPT

## 2024-11-13 PROCEDURE — 83550 IRON BINDING TEST: CPT

## 2024-11-13 PROCEDURE — 36415 COLL VENOUS BLD VENIPUNCTURE: CPT

## 2024-11-14 ENCOUNTER — HOSPITAL ENCOUNTER (OUTPATIENT)
Dept: HEMATOLOGY ONCOLOGY | Facility: MEDICAL CENTER | Age: 69
End: 2024-11-14
Attending: NURSE PRACTITIONER
Payer: MEDICARE

## 2024-11-14 VITALS
RESPIRATION RATE: 16 BRPM | BODY MASS INDEX: 23.32 KG/M2 | WEIGHT: 148.6 LBS | OXYGEN SATURATION: 94 % | HEART RATE: 102 BPM | TEMPERATURE: 98.4 F | DIASTOLIC BLOOD PRESSURE: 62 MMHG | HEIGHT: 67 IN | SYSTOLIC BLOOD PRESSURE: 138 MMHG

## 2024-11-14 DIAGNOSIS — D75.838 REACTIVE THROMBOCYTOSIS: ICD-10-CM

## 2024-11-14 DIAGNOSIS — D53.9 DEFICIENCY ANEMIA: ICD-10-CM

## 2024-11-14 DIAGNOSIS — Z09 ENCOUNTER FOR HEMATOLOGY FOLLOW-UP: ICD-10-CM

## 2024-11-14 DIAGNOSIS — D72.829 LEUKOCYTOSIS, UNSPECIFIED TYPE: ICD-10-CM

## 2024-11-14 LAB
FERRITIN SERPL-MCNC: 613 NG/ML (ref 10–291)
IRON SATN MFR SERPL: 18 % (ref 15–55)
IRON SERPL-MCNC: 38 UG/DL (ref 40–170)
LDH SERPL L TO P-CCNC: 230 U/L (ref 107–266)
TIBC SERPL-MCNC: 215 UG/DL (ref 250–450)
UIBC SERPL-MCNC: 177 UG/DL (ref 110–370)

## 2024-11-14 PROCEDURE — 99212 OFFICE O/P EST SF 10 MIN: CPT | Performed by: NURSE PRACTITIONER

## 2024-11-14 PROCEDURE — 99214 OFFICE O/P EST MOD 30 MIN: CPT | Performed by: NURSE PRACTITIONER

## 2024-11-14 ASSESSMENT — LIFESTYLE VARIABLES: SUBSTANCE_ABUSE: 1

## 2024-11-14 ASSESSMENT — ENCOUNTER SYMPTOMS
FEVER: 0
NAUSEA: 0
HEADACHES: 0
SHORTNESS OF BREATH: 0
DIZZINESS: 0
PALPITATIONS: 0
COUGH: 0
INSOMNIA: 0
VOMITING: 0
WHEEZING: 0
DIARRHEA: 1
WEIGHT LOSS: 0
TINGLING: 1
MYALGIAS: 0
CHILLS: 0
CONSTIPATION: 1

## 2024-11-14 ASSESSMENT — FIBROSIS 4 INDEX: FIB4 SCORE: 0.84

## 2024-11-14 NOTE — PROGRESS NOTES
"Subjective     Heidi Navas is a 69 y.o. female who presents with No chief complaint on file.            HPI  Ms. Navas presents for evaluation of iron deficiency and leukocytosis. Patient is unaccompanied for today's visit.     Patient with remote history of rheumatoid arthritis that is managed with Plaquenil; history of multiple joint replacements; recurrent rectal bleeding associated with rectal prolapse, esophagitis, duodenitis, proctitis.  She was in her state of usual health until approximately 11/2023 when she was referred per PCP for progressive leukocytosis which dates back to at least 2016. Patient was noted to be profoundly anemic in 2020 requiring blood transfusion and continued follow-up per GI. Hematology workup was facilitated by Dr. Fitzpatrick with BCR-ABL, JAK2, reflex testing all negative. Leukocytosis is attributed to chronic anemia with improvement noted following intermittent iron dextran.     Patient reports improved fatigue reports increased fatigue, \"I am not as tired as when I for started coming here\".  Appetite improved with marijuana.  Methotrexate has been ordered per rheumatology for RA/OA but she has not resumed, she is encouraged to do so.  She continues with prolapsed rectum with associated diarrhea, next colonoscopy is in 2025.  She reports constipation resolved with methamphetamine use, which was last week.  She is otherwise at her baseline.        Review of Systems   Constitutional:  Positive for malaise/fatigue (\"not as tired as i was when i first started coming here\"). Negative for chills, fever and weight loss (8# since last - had lost at previous visit).   Respiratory:  Negative for cough, shortness of breath and wheezing.    Cardiovascular:  Negative for chest pain, palpitations and leg swelling.   Gastrointestinal:  Positive for constipation (resolves with Meth) and diarrhea (prolapsed rectum; next colonoscopy 2025). Negative for nausea and vomiting.        Appetite better " "with MJ   Genitourinary:  Negative for dysuria.        \"Not a good stream\"   Musculoskeletal:  Positive for joint pain (RA/OA - MTX (Rheum) is ordered, has not resumed). Negative for myalgias.   Neurological:  Positive for tingling (consistent with baselin e- bilateral hands). Negative for dizziness and headaches.   Psychiatric/Behavioral:  Positive for substance abuse (MJ daily; last meth last week (smoking); vaping and smoking >1+ PPD). The patient does not have insomnia.      Allergies   Allergen Reactions    Aspartame Nausea     headache    Atorvastatin Calcium Unspecified and Swelling    Latex Rash and Itching    Lipitor [Atorvastatin Calcium] Swelling     Patient denies allergy 06/04/22    Other Misc Vomiting     Bug spray    Saccharin Nausea     Nausea and headache       Current Outpatient Medications on File Prior to Encounter   Medication Sig Dispense Refill    methotrexate 2.5 MG tablet Take 6 tablets by mouth once a week 72 Tablet 0    folic acid (FOLVITE) 1 MG Tab Take 1 tablet by mouth once daily 90 Tablet 1    celecoxib (CELEBREX) 200 MG Cap Take 1 capsule by mouth twice daily with food as needed for join pain 180 Capsule 1    omeprazole (PRILOSEC) 20 MG delayed-release capsule Take 1 Capsule by mouth 2 times a day. 180 Capsule 3    ketoconazole (NIZORAL) 2 % shampoo Apply 5 to 10 mL to wet scalp, lather, leave on 3 to 5 minutes, and rinse; apply twice weekly for 2 to 4 weeks. 120 mL 3    gabapentin (NEURONTIN) 300 MG Cap Start 1 capsule by mouth nightly and then increase to 2 caps nightly as tolerated. 100 Capsule 1    sertraline (ZOLOFT) 25 MG tablet Take 1 Tablet by mouth every day. 100 Tablet 1    metFORMIN (GLUCOPHAGE) 500 MG Tab Take 1 Tablet by mouth 2 times a day with meals. 200 Tablet 1    insulin glargine 100 UNIT/ML SC SOPN injection Inject 15 Units under the skin 2 times a day. 15 mL 3    Insulin Pen Needle 32 G x 4 mm (BD PEN NEEDLE BRITANY 2ND GEN) USE ONE PEN NEEDLE IN PEN DEVICE TO INJECT " INSULIN TWO TIMES DAILY. 100 Each 3    Alcohol Swabs Use on skin 3 times daily prior to injection with insulin with glucose check. 100 Each 3    CONTOUR NEXT TEST strip 1 STRIP BY OTHER ROUTE IN THE MORNING, AT NOON, AND AT BEDTIME. USE ONE STRIP TO TEST BLOOD SUGAR THREE TIMES DAILY BEFORE MEALS. 300 Strip 3    Continuous Glucose Sensor (FREESTYLE HAYLEY 14 DAY SENSOR) Misc Use one sensor as directed every 14 days. 2 Each 5    Continuous Blood Gluc  (FREESTYLE HAYLEY 2 READER) Device Use daily with Freestyle hayley sensor 1 Each 0    diphenhydrAMINE HCl (BENADRYL ALLERGY PO) Take  by mouth.      rosuvastatin (CRESTOR) 10 MG Tab Take 1 Tablet by mouth every evening. 100 Tablet 3    Blood Glucose Monitoring Suppl (BLOOD GLUCOSE MONITOR SYSTEM) w/Device Kit Test blood sugar as recommended by provider. Slick Contour Next blood glucose monitoring kit. 1 Kit 0    Microlet Lancets Misc Use one lancet to test blood sugar three times daily before meals. 100 Each 0    Alcohol Swabs (ALCOHOL PREP) 70 % Pads Wipe site with prep pad prior to injection 100 Each 0    losartan (COZAAR) 50 MG Tab Take 1 Tablet by mouth every day. (Patient not taking: Reported on 11/14/2024) 90 Tablet 3    traMADol (ULTRAM) 50 MG Tab Take 1 tablet by mouth every 6 hours as needed for moderate pain for up to 30 days. (Patient not taking: Reported on 11/14/2024) 30 Tablet 0    traZODone (DESYREL) 50 MG Tab Take 1-3 Tablets by mouth at bedtime as needed for Sleep. (Patient not taking: Reported on 11/14/2024) 90 Tablet 1    ferrous sulfate (FEROSUL) 325 (65 Fe) MG tablet Take 1 tablet by mouth every day. (Patient not taking: Reported on 11/14/2024) 90 Tablet 0    fluconazole (DIFLUCAN) 150 MG tablet Take 1 Tablet by mouth every day. (Patient not taking: Reported on 11/14/2024) 1 Tablet 0     No current facility-administered medications on file prior to encounter.              Objective     /62 (BP Location: Left arm, Patient Position:  "Sitting, BP Cuff Size: Adult)   Pulse (!) 102   Temp 36.9 °C (98.4 °F) (Temporal)   Resp 16   Ht 1.689 m (5' 6.5\")   Wt 67.4 kg (148 lb 9.6 oz)   LMP 01/01/2007   SpO2 94%   BMI 23.63 kg/m²      Physical Exam  Vitals reviewed.   Constitutional:       General: She is not in acute distress.     Appearance: She is well-developed. She is not diaphoretic.   HENT:      Head: Normocephalic and atraumatic.      Mouth/Throat:      Pharynx: No oropharyngeal exudate.   Eyes:      General: No scleral icterus.        Right eye: No discharge.         Left eye: No discharge.      Conjunctiva/sclera: Conjunctivae normal.      Pupils: Pupils are equal, round, and reactive to light.   Cardiovascular:      Rate and Rhythm: Normal rate and regular rhythm.      Heart sounds: Normal heart sounds. No murmur heard.     No friction rub. No gallop.   Pulmonary:      Effort: Pulmonary effort is normal. No respiratory distress.      Breath sounds: Normal breath sounds. No wheezing.   Abdominal:      General: Bowel sounds are normal. There is no distension.      Palpations: Abdomen is soft.      Tenderness: There is no abdominal tenderness.   Musculoskeletal:         General: Normal range of motion.      Cervical back: Normal range of motion and neck supple.   Skin:     General: Skin is warm and dry.      Coloration: Skin is not pale.      Findings: No erythema or rash.   Neurological:      Mental Status: She is alert and oriented to person, place, and time.   Psychiatric:         Mood and Affect: Mood normal.         Behavior: Behavior normal.         No visits with results within 1 Day(s) from this visit.   Latest known visit with results is:   Hospital Outpatient Visit on 11/13/2024   Component Date Value Ref Range Status    LDH Total 11/13/2024 230  107 - 266 U/L Final    Ferritin 11/13/2024 613.0 (H)  10.0 - 291.0 ng/mL Final    Iron 11/13/2024 38 (L)  40 - 170 ug/dL Final    Total Iron Binding 11/13/2024 215 (L)  250 - 450 ug/dL " Final    Unsat Iron Binding 11/13/2024 177  110 - 370 ug/dL Final    % Saturation 11/13/2024 18  15 - 55 % Final    WBC 11/13/2024 10.2  4.8 - 10.8 K/uL Final    RBC 11/13/2024 4.59  4.20 - 5.40 M/uL Final    Hemoglobin 11/13/2024 13.8  12.0 - 16.0 g/dL Final    Hematocrit 11/13/2024 41.5  37.0 - 47.0 % Final    MCV 11/13/2024 90.4  81.4 - 97.8 fL Final    MCH 11/13/2024 30.1  27.0 - 33.0 pg Final    MCHC 11/13/2024 33.3  32.2 - 35.5 g/dL Final    RDW 11/13/2024 45.7  35.9 - 50.0 fL Final    Platelet Count 11/13/2024 381  164 - 446 K/uL Final    MPV 11/13/2024 9.8  9.0 - 12.9 fL Final    Neutrophils-Polys 11/13/2024 76.50 (H)  44.00 - 72.00 % Final    Lymphocytes 11/13/2024 14.50 (L)  22.00 - 41.00 % Final    Monocytes 11/13/2024 6.10  0.00 - 13.40 % Final    Eosinophils 11/13/2024 1.70  0.00 - 6.90 % Final    Basophils 11/13/2024 0.80  0.00 - 1.80 % Final    Immature Granulocytes 11/13/2024 0.40  0.00 - 0.90 % Final    Nucleated RBC 11/13/2024 0.00  0.00 - 0.20 /100 WBC Final    Neutrophils (Absolute) 11/13/2024 7.83 (H)  1.82 - 7.42 K/uL Final    Includes immature neutrophils, if present.    Lymphs (Absolute) 11/13/2024 1.48  1.00 - 4.80 K/uL Final    Monos (Absolute) 11/13/2024 0.62  0.00 - 0.85 K/uL Final    Eos (Absolute) 11/13/2024 0.17  0.00 - 0.51 K/uL Final    Baso (Absolute) 11/13/2024 0.08  0.00 - 0.12 K/uL Final    Immature Granulocytes (abs) 11/13/2024 0.04  0.00 - 0.11 K/uL Final    NRBC (Absolute) 11/13/2024 0.00  K/uL Final                            Assessment & Plan     1. Deficiency anemia  CBC WITH DIFFERENTIAL    IRON/TOTAL IRON BIND    FERRITIN      2. Reactive thrombocytosis        3. Leukocytosis, unspecified type        4. Encounter for hematology follow-up             1.  Leukocytosis/thrombocytosis: WBC and plts are improved with iron infusion although increased neutrophils, ANC, decreased lymphocytes persists.     2.  Iron deficiency: Patient with history of GI bleed, rectal prolapse, RA  treated with Plaquinel (MTX pending).    CBC, TIBC, ferritin have been evaluated and found to be within acceptable limits. TIBC low normal; Ferritin is elevated since iron infusion 8/10/24.  Patient will continue to monitor symptoms, repeat labs every 3 months, return for reevaluation in 9 months, sooner as needed.     - Consider flow cytometry or additional workup for his white count rises or B symptoms persist.      The patient verbalized agreement and understanding of current plan. All questions and concerns were addressed at time of visit.    Please note that this dictation was created using voice recognition software. I have made every reasonable attempt to correct obvious errors, but I expect that there are errors of grammar and possibly content that I did not discover before finalizing the note.

## 2024-11-25 ENCOUNTER — PATIENT OUTREACH (OUTPATIENT)
Dept: HEALTH INFORMATION MANAGEMENT | Facility: OTHER | Age: 69
End: 2024-11-25

## 2024-11-25 DIAGNOSIS — E11.9 DIABETES MELLITUS TYPE 2 IN NONOBESE (HCC): ICD-10-CM

## 2024-11-25 DIAGNOSIS — I25.10 CVD (CARDIOVASCULAR DISEASE): ICD-10-CM

## 2025-01-09 ENCOUNTER — APPOINTMENT (OUTPATIENT)
Dept: MEDICAL GROUP | Facility: IMAGING CENTER | Age: 70
End: 2025-01-09
Payer: MEDICARE

## 2025-01-27 ENCOUNTER — APPOINTMENT (OUTPATIENT)
Dept: RADIOLOGY | Facility: MEDICAL CENTER | Age: 70
End: 2025-01-27
Attending: FAMILY MEDICINE
Payer: MEDICARE

## 2025-01-29 ENCOUNTER — PATIENT OUTREACH (OUTPATIENT)
Dept: HEALTH INFORMATION MANAGEMENT | Facility: OTHER | Age: 70
End: 2025-01-29
Payer: MEDICARE

## 2025-01-29 DIAGNOSIS — E11.9 DIABETES MELLITUS TYPE 2 IN NONOBESE (HCC): ICD-10-CM

## 2025-01-29 DIAGNOSIS — I25.10 CVD (CARDIOVASCULAR DISEASE): ICD-10-CM

## 2025-02-11 ENCOUNTER — TELEPHONE (OUTPATIENT)
Dept: HEMATOLOGY ONCOLOGY | Facility: MEDICAL CENTER | Age: 70
End: 2025-02-11
Payer: MEDICARE

## 2025-02-11 NOTE — TELEPHONE ENCOUNTER
"2/10/-Pt LVM asking for a call back to discuss getting scheduled for another appointment with infusion services.  Patient states that they are \"exhausted\".  Call back number [phone].    I called the patient back and let her know that EMERY Angelo, has put in standing lab orders for a CBC w/diff, Total Iron, and Ferritin.  Last labs completed November 2024.  Informed patient that, according to Norma's last office visit note, she is to get these labs repeated this month.  Patient stated that she will get these done as soon as possible through [facility] outpatient lab.  Informed patient that, once we receive these lab results, our office will give her a call with next steps.  "

## 2025-02-12 NOTE — TELEPHONE ENCOUNTER
I called and spoke with the patient and recommended Hemaplex iron supplement per EMERY Angelo.  Patient stated that she is going to get her labs completed the same day that she gets her bone density scan.  She will call the office if she has any additional questions or concerns.

## 2025-02-18 PROCEDURE — RXMED WILLOW AMBULATORY MEDICATION CHARGE: Performed by: FAMILY MEDICINE

## 2025-02-19 ENCOUNTER — PHARMACY VISIT (OUTPATIENT)
Dept: PHARMACY | Facility: MEDICAL CENTER | Age: 70
End: 2025-02-19
Payer: COMMERCIAL

## 2025-02-26 ENCOUNTER — PATIENT OUTREACH (OUTPATIENT)
Dept: HEALTH INFORMATION MANAGEMENT | Facility: OTHER | Age: 70
End: 2025-02-26
Payer: MEDICARE

## 2025-02-26 DIAGNOSIS — E55.9 VITAMIN D DEFICIENCY: ICD-10-CM

## 2025-02-26 DIAGNOSIS — E11.9 DIABETES MELLITUS TYPE 2 IN NONOBESE (HCC): ICD-10-CM

## 2025-02-26 DIAGNOSIS — E78.2 MIXED HYPERLIPIDEMIA: ICD-10-CM

## 2025-02-26 PROCEDURE — 99490 CHRNC CARE MGMT STAFF 1ST 20: CPT | Performed by: FAMILY MEDICINE

## 2025-02-26 NOTE — PROGRESS NOTES
Reason for Follow-Up:    Pt calling in with questions about upcoming appointments. Completed monthly outreach for personal care management program.     Current Health Status:    Following patient for DM, HTN, COPD, CVD.  RN care coordinator unable to contact patient for last few months.  When last in contact, patient's sister was in town assisting her with her health.  Reports this is no longer the case unfortunately.  She took a month off of her insulin and blood sugars were elevated up to 500.  She is back on her medication now and blood sugars have come down to the 200s.  She is calling in today to request assistance with appointments and lab orders.  Not checking her blood pressure.  She had taken a break from many of her medications due to concerns around her kidney function.    Medical Updates: No urgent care, ER visits or hospitalizations    Medication Updates:  pt admits to noncompliance with medications over the last month. She is ready to get back on track.     Symptoms: increased depression due to loss of a couple of friends and disagreements with her sister    Plan of Care Goals and Progress:    Goal 1.  Resume routine follow-up appointments and recommended imaging and lab work to manage chronic diseases     Progress:  will assess progress over the next two months      Barriers:  transportation     Interventions:  assist pt with scheduling and orders     Education:  n/a    Goal 2. 100% compliance with prescribed medications     Progress:  will assess progress over the next two months      Barriers:  motivation, financial     Interventions:  reviewed medication list, scheduled appointment with pcp     Education:  n/a      Patient's Concerns and Feedback (Self Management of Care):     Pt out of contact for a couple of months, but ready to get back on track with her health and health goals. Encouraged pt to reach out with any questions or concerns. No immediate needs.     Next Steps:     Follow-Up Plan:  1  week, march 2025 in person at pcp visit      Appointments:  2/27/25 dexa and lab, 3/6/25 appt with pcp     Contact Information:  Call [phone] with any questions or concerns.

## 2025-02-27 ENCOUNTER — HOSPITAL ENCOUNTER (OUTPATIENT)
Dept: RADIOLOGY | Facility: MEDICAL CENTER | Age: 70
End: 2025-02-27
Attending: FAMILY MEDICINE
Payer: MEDICARE

## 2025-02-27 DIAGNOSIS — Z78.0 POST-MENOPAUSAL: ICD-10-CM

## 2025-02-27 PROCEDURE — 77080 DXA BONE DENSITY AXIAL: CPT

## 2025-03-03 ENCOUNTER — PATIENT OUTREACH (OUTPATIENT)
Dept: HEALTH INFORMATION MANAGEMENT | Facility: OTHER | Age: 70
End: 2025-03-03
Payer: MEDICARE

## 2025-03-03 DIAGNOSIS — E11.9 DIABETES MELLITUS TYPE 2 IN NONOBESE (HCC): ICD-10-CM

## 2025-03-03 NOTE — PROGRESS NOTES
CHW received pt chart to assist with scheduling visit with PCP. CHW contacted, pt, pt states that she is doing well. CHW discussed available appts, pt would like an afternoon appt. None available at this time. Pt states she has no further needs. CHW will contact pt once an afternoon visit is available. Encouraged pt to call with questions or concerns.

## 2025-03-04 ENCOUNTER — HOSPITAL ENCOUNTER (OUTPATIENT)
Dept: LAB | Facility: MEDICAL CENTER | Age: 70
End: 2025-03-04
Attending: NURSE PRACTITIONER
Payer: MEDICARE

## 2025-03-04 ENCOUNTER — HOSPITAL ENCOUNTER (OUTPATIENT)
Dept: LAB | Facility: MEDICAL CENTER | Age: 70
End: 2025-03-04
Attending: FAMILY MEDICINE
Payer: MEDICARE

## 2025-03-04 ENCOUNTER — PATIENT OUTREACH (OUTPATIENT)
Dept: HEALTH INFORMATION MANAGEMENT | Facility: OTHER | Age: 70
End: 2025-03-04
Payer: MEDICARE

## 2025-03-04 ENCOUNTER — HOSPITAL ENCOUNTER (OUTPATIENT)
Dept: LAB | Facility: MEDICAL CENTER | Age: 70
End: 2025-03-04
Attending: INTERNAL MEDICINE
Payer: MEDICARE

## 2025-03-04 DIAGNOSIS — Z79.631 ENCOUNTER FOR METHOTREXATE MONITORING: ICD-10-CM

## 2025-03-04 DIAGNOSIS — E55.9 VITAMIN D DEFICIENCY: ICD-10-CM

## 2025-03-04 DIAGNOSIS — M54.2 CERVICALGIA: ICD-10-CM

## 2025-03-04 DIAGNOSIS — E11.9 DIABETES MELLITUS TYPE 2 IN NONOBESE (HCC): ICD-10-CM

## 2025-03-04 DIAGNOSIS — Z51.81 ENCOUNTER FOR METHOTREXATE MONITORING: ICD-10-CM

## 2025-03-04 DIAGNOSIS — M05.79 RHEUMATOID ARTHRITIS INVOLVING MULTIPLE SITES WITH POSITIVE RHEUMATOID FACTOR (HCC): ICD-10-CM

## 2025-03-04 DIAGNOSIS — I25.10 CVD (CARDIOVASCULAR DISEASE): ICD-10-CM

## 2025-03-04 DIAGNOSIS — I10 PRIMARY HYPERTENSION: ICD-10-CM

## 2025-03-04 DIAGNOSIS — E78.2 MIXED HYPERLIPIDEMIA: ICD-10-CM

## 2025-03-04 DIAGNOSIS — Z79.1 ENCOUNTER FOR LONG-TERM (CURRENT) USE OF NSAIDS: ICD-10-CM

## 2025-03-04 DIAGNOSIS — D53.9 DEFICIENCY ANEMIA: ICD-10-CM

## 2025-03-04 LAB
BASOPHILS # BLD AUTO: 0.7 % (ref 0–1.8)
BASOPHILS # BLD AUTO: 0.9 % (ref 0–1.8)
BASOPHILS # BLD: 0.09 K/UL (ref 0–0.12)
BASOPHILS # BLD: 0.12 K/UL (ref 0–0.12)
EOSINOPHIL # BLD AUTO: 0.12 K/UL (ref 0–0.51)
EOSINOPHIL # BLD AUTO: 0.15 K/UL (ref 0–0.51)
EOSINOPHIL NFR BLD: 0.9 % (ref 0–6.9)
EOSINOPHIL NFR BLD: 1.1 % (ref 0–6.9)
ERYTHROCYTE [DISTWIDTH] IN BLOOD BY AUTOMATED COUNT: 45.1 FL (ref 35.9–50)
ERYTHROCYTE [DISTWIDTH] IN BLOOD BY AUTOMATED COUNT: 46 FL (ref 35.9–50)
ERYTHROCYTE [SEDIMENTATION RATE] IN BLOOD BY WESTERGREN METHOD: 9 MM/HOUR (ref 0–25)
HCT VFR BLD AUTO: 45 % (ref 37–47)
HCT VFR BLD AUTO: 45.7 % (ref 37–47)
HGB BLD-MCNC: 14.7 G/DL (ref 12–16)
HGB BLD-MCNC: 14.9 G/DL (ref 12–16)
IMM GRANULOCYTES # BLD AUTO: 0.06 K/UL (ref 0–0.11)
IMM GRANULOCYTES # BLD AUTO: 0.07 K/UL (ref 0–0.11)
IMM GRANULOCYTES NFR BLD AUTO: 0.4 % (ref 0–0.9)
IMM GRANULOCYTES NFR BLD AUTO: 0.5 % (ref 0–0.9)
LYMPHOCYTES # BLD AUTO: 1.76 K/UL (ref 1–4.8)
LYMPHOCYTES # BLD AUTO: 1.85 K/UL (ref 1–4.8)
LYMPHOCYTES NFR BLD: 13.1 % (ref 22–41)
LYMPHOCYTES NFR BLD: 13.4 % (ref 22–41)
MCH RBC QN AUTO: 29.6 PG (ref 27–33)
MCH RBC QN AUTO: 29.9 PG (ref 27–33)
MCHC RBC AUTO-ENTMCNC: 32.2 G/DL (ref 32.2–35.5)
MCHC RBC AUTO-ENTMCNC: 33.1 G/DL (ref 32.2–35.5)
MCV RBC AUTO: 90.2 FL (ref 81.4–97.8)
MCV RBC AUTO: 92 FL (ref 81.4–97.8)
MONOCYTES # BLD AUTO: 0.44 K/UL (ref 0–0.85)
MONOCYTES # BLD AUTO: 0.53 K/UL (ref 0–0.85)
MONOCYTES NFR BLD AUTO: 3.3 % (ref 0–13.4)
MONOCYTES NFR BLD AUTO: 3.8 % (ref 0–13.4)
NEUTROPHILS # BLD AUTO: 10.99 K/UL (ref 1.82–7.42)
NEUTROPHILS # BLD AUTO: 11.11 K/UL (ref 1.82–7.42)
NEUTROPHILS NFR BLD: 80.4 % (ref 44–72)
NEUTROPHILS NFR BLD: 81.5 % (ref 44–72)
NRBC # BLD AUTO: 0 K/UL
NRBC # BLD AUTO: 0 K/UL
NRBC BLD-RTO: 0 /100 WBC (ref 0–0.2)
NRBC BLD-RTO: 0 /100 WBC (ref 0–0.2)
PLATELET # BLD AUTO: 471 K/UL (ref 164–446)
PLATELET # BLD AUTO: 487 K/UL (ref 164–446)
PMV BLD AUTO: 9.5 FL (ref 9–12.9)
PMV BLD AUTO: 9.6 FL (ref 9–12.9)
RBC # BLD AUTO: 4.97 M/UL (ref 4.2–5.4)
RBC # BLD AUTO: 4.99 M/UL (ref 4.2–5.4)
WBC # BLD AUTO: 13.5 K/UL (ref 4.8–10.8)
WBC # BLD AUTO: 13.8 K/UL (ref 4.8–10.8)

## 2025-03-04 PROCEDURE — 85025 COMPLETE CBC W/AUTO DIFF WBC: CPT | Mod: 91

## 2025-03-04 PROCEDURE — 82728 ASSAY OF FERRITIN: CPT

## 2025-03-04 PROCEDURE — 84443 ASSAY THYROID STIM HORMONE: CPT

## 2025-03-04 PROCEDURE — 36415 COLL VENOUS BLD VENIPUNCTURE: CPT

## 2025-03-04 PROCEDURE — 83540 ASSAY OF IRON: CPT

## 2025-03-04 PROCEDURE — 85652 RBC SED RATE AUTOMATED: CPT

## 2025-03-04 PROCEDURE — 80061 LIPID PANEL: CPT

## 2025-03-04 PROCEDURE — 80053 COMPREHEN METABOLIC PANEL: CPT

## 2025-03-04 PROCEDURE — 83550 IRON BINDING TEST: CPT

## 2025-03-04 PROCEDURE — 85025 COMPLETE CBC W/AUTO DIFF WBC: CPT

## 2025-03-04 PROCEDURE — 82306 VITAMIN D 25 HYDROXY: CPT

## 2025-03-04 NOTE — PROGRESS NOTES
Community Health Worker Follow-Up    Reason for outreach: Appt scheduling    CHW Interventions: CHW found an afternoon appt available for pt on 03/19 at 1:40pm. CHW saved appt on pt chart. CHW contacted pt, pt states she is doing well. CHW discussed reason for call. Pt states the appt works well, wrote down the appt details. Pt states she has no other needs at this time.     Specific Resources Provided:  Housing/Shelter: n/a  Transportation: n/an/a  Food: n/a  Financial: n/a  Social Supports: n/a  Other: n/a    Plan: CHW will not follow pt at this time. Encouraged pt to call with questions or concerns.

## 2025-03-05 LAB
25(OH)D3 SERPL-MCNC: 36 NG/ML (ref 30–100)
ALBUMIN SERPL BCP-MCNC: 3.9 G/DL (ref 3.2–4.9)
ALBUMIN/GLOB SERPL: 1.1 G/DL
ALP SERPL-CCNC: 151 U/L (ref 30–99)
ALT SERPL-CCNC: 13 U/L (ref 2–50)
ANION GAP SERPL CALC-SCNC: 12 MMOL/L (ref 7–16)
AST SERPL-CCNC: 19 U/L (ref 12–45)
BILIRUB SERPL-MCNC: 0.3 MG/DL (ref 0.1–1.5)
BUN SERPL-MCNC: 15 MG/DL (ref 8–22)
CALCIUM ALBUM COR SERPL-MCNC: 9.3 MG/DL (ref 8.5–10.5)
CALCIUM SERPL-MCNC: 9.2 MG/DL (ref 8.5–10.5)
CHLORIDE SERPL-SCNC: 97 MMOL/L (ref 96–112)
CHOLEST SERPL-MCNC: 176 MG/DL (ref 100–199)
CO2 SERPL-SCNC: 28 MMOL/L (ref 20–33)
CREAT SERPL-MCNC: 0.8 MG/DL (ref 0.5–1.4)
FASTING STATUS PATIENT QL REPORTED: NORMAL
FERRITIN SERPL-MCNC: 465 NG/ML (ref 10–291)
GFR SERPLBLD CREATININE-BSD FMLA CKD-EPI: 79 ML/MIN/1.73 M 2
GLOBULIN SER CALC-MCNC: 3.6 G/DL (ref 1.9–3.5)
GLUCOSE SERPL-MCNC: 199 MG/DL (ref 65–99)
HDLC SERPL-MCNC: 53 MG/DL
IRON SATN MFR SERPL: 18 % (ref 15–55)
IRON SERPL-MCNC: 42 UG/DL (ref 40–170)
LDLC SERPL CALC-MCNC: 104 MG/DL
POTASSIUM SERPL-SCNC: 4.9 MMOL/L (ref 3.6–5.5)
PROT SERPL-MCNC: 7.5 G/DL (ref 6–8.2)
SODIUM SERPL-SCNC: 137 MMOL/L (ref 135–145)
TIBC SERPL-MCNC: 238 UG/DL (ref 250–450)
TRIGL SERPL-MCNC: 96 MG/DL (ref 0–149)
TSH SERPL DL<=0.005 MIU/L-ACNC: 0.9 UIU/ML (ref 0.38–5.33)
UIBC SERPL-MCNC: 196 UG/DL (ref 110–370)

## 2025-03-07 ENCOUNTER — RESULTS FOLLOW-UP (OUTPATIENT)
Dept: MEDICAL GROUP | Facility: IMAGING CENTER | Age: 70
End: 2025-03-07
Payer: MEDICARE

## 2025-03-17 NOTE — CARE PLAN
Problem: Recognizing current health habits that may contribute to diabetes  Goal: In conjunction with patient, identify which health habits can be modified  Outcome: Not Progressing     Problem: Patient barriers to managing diabetes  Goal: Identify patient barriers to managing diabetes  Outcome: Not Progressing     Problem: Adherence to prescribed diabetic medications  Goal: Educate on and improve diabetic medication adherence  Outcome: Not Progressing

## 2025-03-19 ENCOUNTER — OFFICE VISIT (OUTPATIENT)
Dept: MEDICAL GROUP | Facility: IMAGING CENTER | Age: 70
End: 2025-03-19
Payer: MEDICARE

## 2025-03-19 VITALS
HEART RATE: 103 BPM | BODY MASS INDEX: 21.66 KG/M2 | DIASTOLIC BLOOD PRESSURE: 60 MMHG | OXYGEN SATURATION: 93 % | SYSTOLIC BLOOD PRESSURE: 114 MMHG | TEMPERATURE: 97.1 F | WEIGHT: 138 LBS | RESPIRATION RATE: 16 BRPM | HEIGHT: 67 IN

## 2025-03-19 DIAGNOSIS — M79.7 FIBROMYALGIA: ICD-10-CM

## 2025-03-19 DIAGNOSIS — M05.79 RHEUMATOID ARTHRITIS INVOLVING MULTIPLE SITES WITH POSITIVE RHEUMATOID FACTOR (HCC): ICD-10-CM

## 2025-03-19 DIAGNOSIS — J44.9 CHRONIC OBSTRUCTIVE PULMONARY DISEASE, UNSPECIFIED COPD TYPE (HCC): ICD-10-CM

## 2025-03-19 DIAGNOSIS — F17.200 SMOKING: ICD-10-CM

## 2025-03-19 DIAGNOSIS — E78.2 MIXED HYPERLIPIDEMIA: ICD-10-CM

## 2025-03-19 DIAGNOSIS — I10 PRIMARY HYPERTENSION: ICD-10-CM

## 2025-03-19 DIAGNOSIS — E61.1 IRON DEFICIENCY: ICD-10-CM

## 2025-03-19 DIAGNOSIS — D72.829 LEUKOCYTOSIS, UNSPECIFIED TYPE: ICD-10-CM

## 2025-03-19 DIAGNOSIS — R80.9 TYPE 2 DIABETES MELLITUS WITH DIABETIC MICROALBUMINURIA, WITHOUT LONG-TERM CURRENT USE OF INSULIN (HCC): ICD-10-CM

## 2025-03-19 DIAGNOSIS — E55.9 VITAMIN D DEFICIENCY: ICD-10-CM

## 2025-03-19 DIAGNOSIS — R77.1 ELEVATED SERUM GLOBULIN LEVEL: ICD-10-CM

## 2025-03-19 DIAGNOSIS — R74.8 ALKALINE PHOSPHATASE ELEVATION: ICD-10-CM

## 2025-03-19 DIAGNOSIS — R80.9 MICROALBUMINURIA: ICD-10-CM

## 2025-03-19 DIAGNOSIS — E11.29 TYPE 2 DIABETES MELLITUS WITH DIABETIC MICROALBUMINURIA, WITHOUT LONG-TERM CURRENT USE OF INSULIN (HCC): ICD-10-CM

## 2025-03-19 DIAGNOSIS — K62.3 RECTAL PROLAPSE: ICD-10-CM

## 2025-03-19 PROBLEM — E11.69 TYPE 2 DIABETES MELLITUS WITH OTHER SPECIFIED COMPLICATION (HCC): Status: ACTIVE | Noted: 2025-03-19

## 2025-03-19 PROCEDURE — 3078F DIAST BP <80 MM HG: CPT | Performed by: FAMILY MEDICINE

## 2025-03-19 PROCEDURE — RXMED WILLOW AMBULATORY MEDICATION CHARGE: Performed by: FAMILY MEDICINE

## 2025-03-19 PROCEDURE — 3074F SYST BP LT 130 MM HG: CPT | Performed by: FAMILY MEDICINE

## 2025-03-19 PROCEDURE — 99214 OFFICE O/P EST MOD 30 MIN: CPT | Performed by: FAMILY MEDICINE

## 2025-03-19 RX ORDER — DULOXETIN HYDROCHLORIDE 30 MG/1
30 CAPSULE, DELAYED RELEASE ORAL DAILY
Qty: 100 CAPSULE | Refills: 1 | Status: SHIPPED | OUTPATIENT
Start: 2025-03-19

## 2025-03-19 RX ORDER — LOSARTAN POTASSIUM 25 MG/1
25 TABLET ORAL DAILY
Qty: 100 TABLET | Refills: 3 | Status: SHIPPED | OUTPATIENT
Start: 2025-03-19 | End: 2026-04-23

## 2025-03-19 ASSESSMENT — FIBROSIS 4 INDEX: FIB4 SCORE: 0.77

## 2025-03-19 ASSESSMENT — PATIENT HEALTH QUESTIONNAIRE - PHQ9
1. LITTLE INTEREST OR PLEASURE IN DOING THINGS: SEVERAL DAYS
SUM OF ALL RESPONSES TO PHQ9 QUESTIONS 1 AND 2: 2
2. FEELING DOWN, DEPRESSED, IRRITABLE, OR HOPELESS: SEVERAL DAYS

## 2025-03-19 NOTE — PROGRESS NOTES
SUBJECTIVE:    Chief Complaint   Patient presents with    Follow-Up     Lab results 3/4 and imaging 2/27/2025    Other     Pt report having prolapse in the rectum x 2022  Bleeding   Medication consult on duloxetine refill       HPI:     Heidi Navas is a 69 y.o. female with diabetes mellitus type 2, hyperlipidemia, hypertension, iron deficiency, and rheumatoid arthritis here for lab review.  Last visit 9/2024.      Diabetes mellitus type 2  7.8% A1c- glargine 15 units twice daily. Glucose varies, lowest 70, highest 300. Avg 157.   Microalb/creatinine elevated.  Patient has had prior history of rectal prolapse.  History of intermittent bleeding from area.  Started insulin- then had more prolapse. More noticeable. Moves in and out, not out at the moment but can come out.  Denies any hard nodules.  No other significant changes.  She does not desire examination today.  She is ready for referral for the prolapse.    Hypertension-Notes BP on lower end. Losartan 50 mg, not taking.   Hyperlipidemia-she is not taking rosuvastatin 10 mg daily.  Does not want to take medications.  She has Metamucil at home and will consider taking it.    Rheumatoid arthritis-states she is off of methotrexate therapy.  Notes urine was discolored thus stopped medication.  She is followed by rheumatology.  She is only taking meloxicam and Celebrex.    Iron deficiency-prior anemia associated.  Has been improved.  Receiving iron infusions with hematology.  Elevated white blood cell count-stable.  Fatigue. Not depressed.     Elevated globulin and alkaline phosphatase levels on last labs.    Fibromyalgia- has been affecting her.  Has been diagnosed with that in the past.  She was on duloxetine and would like to start medication again.  Notes she takes trazodone maybe 1-2 times a week as needed for sleep.    Smoking - does not desire to quit.   COPD-stable.  No medications.  Does not desire a spirometry.    Mammogram 6 months ago -notes  completed at [facility].      RN coordinator, Bessy.   Health Maintenance Due   Topic Date Due    Annual Pulmonary Function Test / Spirometry  Never done    Zoster (Shingles) Vaccines (1 of 2) Never done    Annual Wellness Visit  12/01/2016    IMM DTaP/Tdap/Td Vaccine (3 - Td or Tdap) 06/23/2024    COVID-19 Vaccine (1 - 2024-25 season) Never done    Mammogram  09/26/2024    A1c Screening  03/25/2025   Reviewed recommendations above. Declined at this time.       ROS:  No recent fevers or chills. No nausea or vomiting. No diarrhea. No chest pains or shortness of breath. No lower extremity edema.    Current Outpatient Medications on File Prior to Visit   Medication Sig Dispense Refill    Continuous Glucose Sensor (FREESTYLE HAYLEY 14 DAY SENSOR) Misc Use one sensor as directed every 14 days. 2 Each 5    celecoxib (CELEBREX) 200 MG Cap Take 1 capsule by mouth twice daily with food as needed for join pain 180 Capsule 1    omeprazole (PRILOSEC) 20 MG delayed-release capsule Take 1 Capsule by mouth 2 times a day. 180 Capsule 3    traZODone (DESYREL) 50 MG Tab Take 1-3 Tablets by mouth at bedtime as needed for Sleep. 90 Tablet 1    insulin glargine 100 UNIT/ML SC SOPN injection Inject 15 Units under the skin 2 times a day. 15 mL 3    Insulin Pen Needle 32 G x 4 mm (BD PEN NEEDLE BRITANY 2ND GEN) USE ONE PEN NEEDLE IN PEN DEVICE TO INJECT INSULIN TWO TIMES DAILY. 100 Each 3    Alcohol Swabs Use on skin 3 times daily prior to injection with insulin with glucose check. 100 Each 3    CONTOUR NEXT TEST strip 1 STRIP BY OTHER ROUTE IN THE MORNING, AT NOON, AND AT BEDTIME. USE ONE STRIP TO TEST BLOOD SUGAR THREE TIMES DAILY BEFORE MEALS. 300 Strip 3    Continuous Blood Gluc  (FREESTYLE HAYLEY 2 READER) Device Use daily with Freestyle hayley sensor 1 Each 0    diphenhydrAMINE HCl (BENADRYL ALLERGY PO) Take  by mouth.      Blood Glucose Monitoring Suppl (BLOOD GLUCOSE MONITOR SYSTEM) w/Device Kit Test blood sugar as recommended by  provider. Slick Contour Next blood glucose monitoring kit. 1 Kit 0    Microlet Lancets Misc Use one lancet to test blood sugar three times daily before meals. 100 Each 0    Alcohol Swabs (ALCOHOL PREP) 70 % Pads Wipe site with prep pad prior to injection 100 Each 0    methotrexate 2.5 MG tablet Take 6 tablets by mouth once a week (Patient not taking: Reported on 3/19/2025) 72 Tablet 0    folic acid (FOLVITE) 1 MG Tab Take 1 tablet by mouth once daily (Patient not taking: Reported on 3/19/2025) 90 Tablet 1    losartan (COZAAR) 50 MG Tab Take 1 Tablet by mouth every day. (Patient not taking: Reported on 10/17/2024) 90 Tablet 3    ketoconazole (NIZORAL) 2 % shampoo Apply 5 to 10 mL to wet scalp, lather, leave on 3 to 5 minutes, and rinse; apply twice weekly for 2 to 4 weeks. (Patient not taking: Reported on 3/19/2025) 120 mL 3    sertraline (ZOLOFT) 25 MG tablet Take 1 Tablet by mouth every day. (Patient not taking: Reported on 3/19/2025) 100 Tablet 1    metFORMIN (GLUCOPHAGE) 500 MG Tab Take 1 Tablet by mouth 2 times a day with meals. (Patient not taking: Reported on 3/19/2025) 200 Tablet 1    rosuvastatin (CRESTOR) 10 MG Tab Take 1 Tablet by mouth every evening. (Patient not taking: Reported on 3/19/2025) 100 Tablet 3     No current facility-administered medications on file prior to visit.       Allergies   Allergen Reactions    Aspartame Nausea     headache    Atorvastatin Calcium Unspecified and Swelling    Latex Rash and Itching    Lipitor [Atorvastatin Calcium] Swelling     Patient denies allergy 06/04/22    Other Misc Vomiting     Bug spray    Saccharin Nausea     Nausea and headache       Patient Active Problem List    Diagnosis Date Noted    Iron deficiency 08/08/2024    Abnormal liver ultrasound 11/06/2023    Renal cyst 11/06/2023    Major depressive disorder, recurrent episode, mild (HCC) 12/27/2022    Reactive thrombocytosis 10/14/2022    Rectal prolapse 10/14/2022    Insomnia due to medical condition  07/11/2022    GIB (gastrointestinal bleeding) 06/04/2022    Acute on chronic blood loss anemia 06/04/2022    DM (diabetes mellitus), secondary uncontrolled 06/04/2022    Severely underweight adult 06/04/2022    ACP (advance care planning) 06/04/2022    Hyponatremia 06/04/2022    Hyperosmolar hyperglycemic state (HHS), pressure ulcer, anemia (HCC) 06/04/2022    Dehydration 06/04/2022    Pressure ulcer, sacrum 06/04/2022    Fatigue 06/03/2022    Weight loss, unintentional 06/03/2022    Urinary incontinence 06/03/2022    Adult failure to thrive 06/03/2022    Iron deficiency anemia 02/09/2020    Cellulitis 01/08/2020    Anemia 05/19/2019    Atherosclerosis 05/19/2019    LVH (left ventricular hypertrophy) 05/19/2019    Arthritis of left shoulder region 07/12/2017    CVD (cardiovascular disease) 12/04/2015    Pulmonary nodule 12/01/2015    Renal stone 12/01/2015    Diverticulosis 12/01/2015    Chest pain 07/16/2015    Nausea & vomiting 07/13/2015    Opiate withdrawal (HCC) 07/13/2015    Diabetes mellitus type 2 in nonobese ([facility]) 10/03/2014    Brachial neuritis or radiculitis 09/10/2014    History of total hip arthroplasty 07/22/2014    Leukocytosis 03/30/2014    COPD (chronic obstructive pulmonary disease) (HCC) 03/30/2014    Arthritis 12/26/2013    Chronic pain 02/21/2012    Anxiety 01/18/2012    Vitamin D insufficiency 04/28/2011    Hypertension 03/22/2010    Fibromyalgia 10/09/2009    Swelling, mass, or lump in head and neck 07/21/2009    Tobacco use disorder 07/21/2009    Rheumatoid arthritis involving multiple sites with positive rheumatoid factor (HCC) 07/21/2009    Chronic ischemic heart disease 07/21/2009    Esophageal reflux 07/21/2009    Allergic rhinitis 07/21/2009    Mixed hyperlipidemia 07/21/2009    Other atopic dermatitis and related conditions 07/21/2009    Deficiency anemia 07/21/2009       Past Medical History:   Diagnosis Date    Anemia     Arthritis     OA and  not RA per rheumatology    Breath  "shortness     oxygen PRN     Bronchitis 2008    Colon polyps 2015    Convulsions (HCC) 7/21/2009     ICD-10 transition    COPD (chronic obstructive pulmonary disease) (HCC)     Dental disorder     full dentures    Depression     depression, anxiety     Diverticulosis 5/10     colonoscopy    Hemorrhoid 7/29/2009    History of colonoscopy   2005    Leukocytosis 3/30/2014    Lymphocytosis (symptomatic) 7/21/2009    MI (myocardial infarction) (HCC) 4/29/2007    Other specified disorder of intestines     constipation    Pain     shoulders, neck, lower back    Pancreatitis     Pap smear 4/2006    Pneumonia 2013    Pulmonary nodule     Renal lesion     Renal stone     S/P colonoscopy 5/2010    will need repeat in 5 years    Screening mammogram never    Seizure (HCC)     x1 in 2008    Shingles     Symptomatic menopausal or female climacteric states 7/21/2009    Type II or unspecified type diabetes mellitus without mention of complication, uncontrolled 7/21/2009    diet controlled     Unspecified urinary incontinence          OBJECTIVE:   /60 (BP Location: Left arm, Patient Position: Sitting, BP Cuff Size: Adult)   Pulse (!) 103   Temp 36.2 °C (97.1 °F) (Temporal)   Resp 16   Ht 1.689 m (5' 6.5\")   Wt 62.6 kg (138 lb)   LMP 01/01/2007   SpO2 93%   BMI 21.94 kg/m²   General: Well-developed well-nourished female, no acute distress  Neck: supple, no lymphadenopathy- cervical or supraclavicular, no thyromegaly  Cardiovascular: regular rate and rhythm, no murmurs, gallops, rubs  Lungs: clear to auscultation bilaterally, no wheezes, crackles, or rhonchi  Abdomen: +bowel sounds, soft, nontender, nondistended, no rebound, no guarding, no hepatosplenomegaly  Extremities: no cyanosis, clubbing, edema  Skin: Warm and dry  Psych: appropriate mood and affect     Latest Reference Range & Units 03/04/25 14:13   Sodium 135 - 145 mmol/L 137   Potassium 3.6 - 5.5 mmol/L 4.9   Chloride 96 - 112 mmol/L 97   Co2 20 - 33 mmol/L 28 "   Anion Gap 7.0 - 16.0  12.0   Glucose 65 - 99 mg/dL 199 (H)   Bun 8 - 22 mg/dL 15   Creatinine 0.50 - 1.40 mg/dL 0.80   GFR (CKD-EPI) >60 mL/min/1.73 m 2 79   Calcium 8.5 - 10.5 mg/dL 9.2   Correct Calcium 8.5 - 10.5 mg/dL 9.3   AST(SGOT) 12 - 45 U/L 19   ALT(SGPT) 2 - 50 U/L 13   Alkaline Phosphatase 30 - 99 U/L 151 (H)   Total Bilirubin 0.1 - 1.5 mg/dL 0.3   Albumin 3.2 - 4.9 g/dL 3.9   Total Protein 6.0 - 8.2 g/dL 7.5   Globulin 1.9 - 3.5 g/dL 3.6 (H)   A-G Ratio g/dL 1.1   (H): Data is abnormally high      ASSESSMENT/PLAN:    69 y.o.female       1. Type 2 diabetes mellitus with diabetic microalbuminuria, without long-term current use of insulin ([facility]) -stable A1c.  Advised if multiple glucose levels under 100, then recommend reduce insulin therapy by 2 units.  She will otherwise continue on glargine 15 units twice daily at this time.  Continue to monitor.  Recommend diabetic diet and routine exercise as tolerated.  Continue to make contact with RN coordination. Comp Metabolic Panel    HEMOGLOBIN A1C      2. Microalbuminuria   Recommend losartan 25 mg daily.  Repeat labs in future. losartan (COZAAR) 25 MG Tab    MICROALBUMIN CREAT RATIO URINE      3. Primary hypertension -notes blood pressure has been on lower end.  She is off losartan therapy.  Recommend restart losartan 25 mg daily for renal protection.  Continue to monitor.       4. Mixed hyperlipidemia   Patient recommended to restart rosuvastatin therapy.  Patient declined medication refill at this time.  Recommend low-cholesterol, high-fiber diet and routine exercise.  Recommend Metamucil supplement therapy.  Monitor in future. Comp Metabolic Panel    Lipid Profile    TSH WITH REFLEX TO FT4      5. Rheumatoid arthritis involving multiple sites with positive rheumatoid factor (HCC) -stable.  Recommend limit to either Celebrex or meloxicam, avoid taking both.  Follow-up with rheumatology.       6. Iron deficiency -stable.  She is receiving iron infusions.   Follow-up with hematology.       7. Leukocytosis, unspecified type -stable.  Follow-up with hematology.       8. Fibromyalgia   Will start on duloxetine 30 mg daily.  Reviewed potential side effects medication.  Monitor. DULoxetine (CYMBALTA) 30 MG Cap DR Particles      9. Smoking   Patient does not desire to quit.  Counseled on recommendations for smoking cessation.        10. Chronic obstructive pulmonary disease, unspecified COPD type (HCC)-stable.  Not on medication therapy.  Declined spirometry at this time.  Monitor at this time.       11. Rectal prolapse -chronic.  Patient declined exam today.  Notes more prominence of symptoms recently.  Will refer to surgeon. Referral to General Surgery      12. Vitamin D deficiency-stable on last labs.  Monitor in future       13. Alkaline phosphatase elevation-mild.  Monitor. Comp Metabolic Panel      14. Elevated serum globulin level-mild.  Monitor. Comp Metabolic Panel          Return in about 4 months (around 7/19/2025).    This medical record contains text that has been entered with the assistance of computer voice recognition and dictation software.  Therefore, it may contain unintended errors in text, spelling, punctuation, or grammar.

## 2025-03-20 ENCOUNTER — PHARMACY VISIT (OUTPATIENT)
Dept: PHARMACY | Facility: MEDICAL CENTER | Age: 70
End: 2025-03-20
Payer: COMMERCIAL

## 2025-03-28 ENCOUNTER — PATIENT OUTREACH (OUTPATIENT)
Dept: HEALTH INFORMATION MANAGEMENT | Facility: OTHER | Age: 70
End: 2025-03-28
Payer: MEDICARE

## 2025-03-28 DIAGNOSIS — E11.9 DIABETES MELLITUS TYPE 2 IN NONOBESE (HCC): ICD-10-CM

## 2025-03-28 DIAGNOSIS — I51.7 LVH (LEFT VENTRICULAR HYPERTROPHY): ICD-10-CM

## 2025-03-28 DIAGNOSIS — J44.9 CHRONIC OBSTRUCTIVE PULMONARY DISEASE, UNSPECIFIED COPD TYPE (HCC): ICD-10-CM

## 2025-03-28 DIAGNOSIS — I25.10 CVD (CARDIOVASCULAR DISEASE): ICD-10-CM

## 2025-03-28 PROCEDURE — RXMED WILLOW AMBULATORY MEDICATION CHARGE: Performed by: FAMILY MEDICINE

## 2025-03-28 NOTE — PROGRESS NOTES
Reason for Follow-Up:    Spoke with pt for routine monthly outreach for personal care management program.     Current Health Status:    Following pt for DM, HTN, COPD. Pt reports she is doing ok. Doing ok. Using her Continuous glucose monitor again, which is showing bs has been in goal range 70% of the time. She has resumed taking most of her medications and was able to get them from the [facility]. She has not been checking bp at home.     Medical Updates:  no urgent care/er visits. No hospitalizations     Medication Updates:  not taking metformin, restarted other medications to include duloxetine    Symptoms:  uri-productive cough, feverish, body aches x 3 days    Plan of Care Goals and Progress:    Goal 1.  Resume routine follow-up appointments and recommended imaging and lab work to manage chronic diseases                 Progress:  pt completed labs and followed up with pcp as scheduled. Pt is motivated to get health back on track                            Barriers:  transportation                 Interventions:  provided contact info for referral                 Education:  n/a     Goal 2. 100% compliance with prescribed medications                 Progress:  improvement made                            Barriers:  motivation, financial                 Interventions:  reviewed medication list                 Education:  n/a         Patient's Concerns and Feedback (Self Management of Care):     Pt reports respiratory symptoms are improving and does not feel like she needs a visit to assess. Advised to monitor oxygen. Provided info for doctoroo if symptoms worsen or do not improve over the weekend. Pt had questions about surgery referral. She received a VM, but was unable to reach the department when she tried to call back. Provided referral info and advised pt to call back if she continues to have trouble. Pt is eager to get prolapse fixed as this is affecting her quality of life.     Next  Steps:     Follow-Up Plan:  1 month      Appointments:  pt to call to schedule surgery consult     Contact Information:  Call [phone] with any questions or concerns.

## 2025-03-31 ENCOUNTER — PHARMACY VISIT (OUTPATIENT)
Dept: PHARMACY | Facility: MEDICAL CENTER | Age: 70
End: 2025-03-31
Payer: COMMERCIAL

## 2025-04-01 ENCOUNTER — TELEPHONE (OUTPATIENT)
Dept: HEMATOLOGY ONCOLOGY | Facility: MEDICAL CENTER | Age: 70
End: 2025-04-01
Payer: MEDICARE

## 2025-04-01 NOTE — TELEPHONE ENCOUNTER
Patient left a voicemail asking for a call back in regards to her recent lab results and scheduling infusion appointment.  Call back number [phone].

## 2025-04-02 NOTE — TELEPHONE ENCOUNTER
I called and spoke with the patient.  Per DEIDRA Angelo, patient's last labs done on March 4, 2025, are stable and she does not need iron infusion at this time.  Patient stated that she is having a consultation with surgery, Dr. Moralez, on April 28, 2025, which she hopes will help with her bleeding issue.  Patient advised to follow up with our office as scheduled on August 14, 2025.

## 2025-04-03 NOTE — CARE PLAN
Problem: Recognizing current health habits that may contribute to diabetes  Goal: In conjunction with patient, identify which health habits can be modified  Outcome: Progressing     Problem: Patient barriers to managing diabetes  Goal: Identify patient barriers to managing diabetes  Outcome: Progressing     Problem: Adherence to prescribed diabetic medications  Goal: Educate on and improve diabetic medication adherence  Outcome: Progressing

## 2025-04-21 ENCOUNTER — TELEPHONE (OUTPATIENT)
Dept: HEALTH INFORMATION MANAGEMENT | Facility: OTHER | Age: 70
End: 2025-04-21
Payer: MEDICARE

## 2025-04-22 ENCOUNTER — TELEPHONE (OUTPATIENT)
Dept: MEDICAL GROUP | Facility: IMAGING CENTER | Age: 70
End: 2025-04-22
Payer: MEDICARE

## 2025-04-22 DIAGNOSIS — R30.0 DYSURIA: ICD-10-CM

## 2025-04-22 NOTE — TELEPHONE ENCOUNTER
Pt calling in to report symptoms of a uti. Pt spoke with consulting rn yesterday for same reason. Pt reports she is having pain with urination and left flank pain and is requesting antibiotic be sent in to her pharmacy. She believes this is a result of her fecal incontinence. She has never had a uti before. She thought it might be a yeast infection and took treatment for that, but symptoms persisted. Azo also did not hlep. Offered appointment with pcp office for this afternoon, pt declined because she does not have a ride. Advised doctoroo for in home assessment and advised pt that she should not wait to get treated. Pt declined this option and hung up the phone.

## 2025-04-22 NOTE — TELEPHONE ENCOUNTER
Spoke with Dr. Thorne. Recommendation for pt to get urinalysis and urine culture through the lab if pt refusing visit. Left VM for pt.

## 2025-04-23 NOTE — TELEPHONE ENCOUNTER
Spoke with pt to check in. She continues to have dysuria and pain in her left flank. She does feel feverish as she has been experiencing some chills. She is unable and unwilling to get to a lab to provide a urine sample or use in home urgent care option. Pt took home uti test which was positive a few days ago. Pt would like antibiotic sent to [facility] pharmacy so it can be delivered to her home.

## 2025-04-24 ENCOUNTER — TELEPHONE (OUTPATIENT)
Dept: HEALTH INFORMATION MANAGEMENT | Facility: OTHER | Age: 70
End: 2025-04-24
Payer: MEDICARE

## 2025-04-24 DIAGNOSIS — R30.0 DYSURIA: ICD-10-CM

## 2025-04-24 DIAGNOSIS — E11.69 TYPE 2 DIABETES MELLITUS WITH OTHER SPECIFIED COMPLICATION, WITHOUT LONG-TERM CURRENT USE OF INSULIN (HCC): ICD-10-CM

## 2025-04-24 RX ORDER — ACYCLOVIR 800 MG/1
TABLET ORAL
Qty: 2 EACH | Refills: 5 | Status: SHIPPED | OUTPATIENT
Start: 2025-04-24

## 2025-04-24 RX ORDER — SULFAMETHOXAZOLE AND TRIMETHOPRIM 800; 160 MG/1; MG/1
1 TABLET ORAL 2 TIMES DAILY
Qty: 14 TABLET | Refills: 0 | Status: CANCELLED | OUTPATIENT
Start: 2025-04-24

## 2025-04-24 NOTE — TELEPHONE ENCOUNTER
Called pt, no answer.   Please advise pt that UA and culture should be done prior to starting antibiotic therapy as previously recommended.   Due to patient symptoms other medical conditions and possible medication interactions, antibiotic options are limited, thus augmentin prescribed. Follow up if no improvements in 2-3 days.     MA, please notify pt of above.

## 2025-04-24 NOTE — TELEPHONE ENCOUNTER
Received request via: Patient    Was the patient seen in the last year in this department? Yes    Does the patient have an active prescription (recently filled or refills available) for medication(s) requested? No    Pharmacy Name: [facility]    Does the patient have assisted Plus and need 100-day supply? (This applies to ALL medications) Provider discretion       Requested Prescriptions     Pending Prescriptions Disp Refills    Continuous Glucose Sensor (FREESTYLE HAYLEY 14 DAY SENSOR) Misc 2 Each 5     Sig: Use one sensor as directed every 14 days.

## 2025-04-24 NOTE — TELEPHONE ENCOUNTER
Pt informed with your message. Patient states she  will follow up after 2 to 3 days if no improvement

## 2025-04-24 NOTE — TELEPHONE ENCOUNTER
Patient states she believes she has a UTI and that Dr. Thorne was supposed to call in an antibiotic. I do not see any new medication ordered. [facility] RN documentation reviewed from yesterday regarding this patient request.     I told the patient that I will send a message to Dr. Thorne and advised urgent care if she does receive a response.

## 2025-04-28 ENCOUNTER — PHARMACY VISIT (OUTPATIENT)
Dept: PHARMACY | Facility: MEDICAL CENTER | Age: 70
End: 2025-04-28
Payer: COMMERCIAL

## 2025-04-28 ENCOUNTER — PATIENT OUTREACH (OUTPATIENT)
Dept: HEALTH INFORMATION MANAGEMENT | Facility: OTHER | Age: 70
End: 2025-04-28
Payer: MEDICARE

## 2025-04-28 DIAGNOSIS — J44.9 CHRONIC OBSTRUCTIVE PULMONARY DISEASE, UNSPECIFIED COPD TYPE (HCC): ICD-10-CM

## 2025-04-28 DIAGNOSIS — E11.69 TYPE 2 DIABETES MELLITUS WITH OTHER SPECIFIED COMPLICATION, WITHOUT LONG-TERM CURRENT USE OF INSULIN (HCC): ICD-10-CM

## 2025-04-28 DIAGNOSIS — I25.10 CVD (CARDIOVASCULAR DISEASE): ICD-10-CM

## 2025-04-28 PROCEDURE — RXMED WILLOW AMBULATORY MEDICATION CHARGE: Performed by: FAMILY MEDICINE

## 2025-04-28 NOTE — PROGRESS NOTES
Reason for Follow-Up:    Spoke with pt for routine monthly outreach for personal care management program. Maira reports bs in goal 70% of the time. Not checking bp. She is trying to drink more water.     Current Health Status:    Following pt for DM, HTN, COPD. Pt reports she is doing ok.     Medical Updates:  no urgent care/er visits, no hospitalizations     Medication Updates:  only taking duloxetine and insulin right now    Symptoms:  urinary symptoms about the same    Plan of Care Goals and Progress:    Goal 1.  Resume routine follow-up appointments and recommended imaging and lab work to manage chronic diseases                 Progress:  pt completed labs and followed up with pcp as scheduled. Pt is motivated to get health back on track                            Barriers:  transportation                 Interventions:  provided contact info for referral                 Education:  n/a     Goal 2. 100% compliance with prescribed medications                 Progress:  pt reports hesitancy to restart some medications                            Barriers:  motivation, financial                 Interventions:  reviewed medication list and purpose of prescription                 Education:  n/a      Patient's Concerns and Feedback (Self Management of Care):     Pt has not yet received antibiotic for presumed uti. Hoping to get it delivered today, she did speak with the [facility] today. Discussed restarting losartan. Pt does have this medication at home.     Next Steps:     Follow-Up Plan:  1 month, may 2025      Appointments:  5/19/25 with surgeon     Contact Information:  Call [phone] with any questions or concerns.

## 2025-05-06 ENCOUNTER — TELEPHONE (OUTPATIENT)
Dept: HEALTH INFORMATION MANAGEMENT | Facility: OTHER | Age: 70
End: 2025-05-06
Payer: MEDICARE

## 2025-05-06 NOTE — TELEPHONE ENCOUNTER
Pt's PCM RN Bessy PADRON is currently out of office. Patient called and LVM stating she cannot take Augmentin - it makes her nauseous. Patient requesting Keflex (cephalexin) instead. Will send message to PCP.     Patient states she did not receive her Maira 3 meter yet. This RN reached out to [facility] to see what the issue is. Per pharmacy staff, patient does need a prior authorization for that device. Will send message to MA for assistance.

## 2025-05-07 DIAGNOSIS — R30.0 DYSURIA: ICD-10-CM

## 2025-05-07 RX ORDER — CEPHALEXIN 500 MG/1
500 CAPSULE ORAL 2 TIMES DAILY
Qty: 14 CAPSULE | Refills: 0 | Status: SHIPPED | OUTPATIENT
Start: 2025-05-07

## 2025-05-08 NOTE — TELEPHONE ENCOUNTER
[employer] message sent to patient regarding request of antibiotic change.  Message forwarded to MA regarding glucose monitor and supplies.

## 2025-05-09 PROCEDURE — RXMED WILLOW AMBULATORY MEDICATION CHARGE: Performed by: FAMILY MEDICINE

## 2025-05-14 PROCEDURE — RXMED WILLOW AMBULATORY MEDICATION CHARGE: Performed by: FAMILY MEDICINE

## 2025-05-15 ENCOUNTER — PHARMACY VISIT (OUTPATIENT)
Dept: PHARMACY | Facility: MEDICAL CENTER | Age: 70
End: 2025-05-15
Payer: COMMERCIAL

## 2025-05-16 ENCOUNTER — TELEPHONE (OUTPATIENT)
Dept: HEALTH INFORMATION MANAGEMENT | Facility: OTHER | Age: 70
End: 2025-05-16
Payer: MEDICARE

## 2025-05-16 NOTE — TELEPHONE ENCOUNTER
Patient called and left voicemail stating she has still not received her cephalexin or david 3 sensor from the [facility] pharmacy.  This RN reached out to the [facility] pharmacy and was informed that those medications were mailed yesterday and patient should be receiving them on Monday.  This RN then reached out to patient to inform her that she should be getting them on Monday, but if she does not to give this RN a call back while she is covering for her RN care coordinator Maegan PADRON  Patient stated she would call back if she did not receive the medications.

## 2025-05-19 ENCOUNTER — APPOINTMENT (OUTPATIENT)
Facility: MEDICAL CENTER | Age: 70
End: 2025-05-19
Payer: MEDICARE

## 2025-05-19 VITALS
OXYGEN SATURATION: 96 % | HEART RATE: 78 BPM | DIASTOLIC BLOOD PRESSURE: 72 MMHG | BODY MASS INDEX: 21.94 KG/M2 | HEIGHT: 67 IN | SYSTOLIC BLOOD PRESSURE: 126 MMHG | TEMPERATURE: 98 F

## 2025-05-19 DIAGNOSIS — E11.29 TYPE 2 DIABETES MELLITUS WITH DIABETIC MICROALBUMINURIA, WITHOUT LONG-TERM CURRENT USE OF INSULIN (HCC): ICD-10-CM

## 2025-05-19 DIAGNOSIS — R80.9 TYPE 2 DIABETES MELLITUS WITH DIABETIC MICROALBUMINURIA, WITHOUT LONG-TERM CURRENT USE OF INSULIN (HCC): ICD-10-CM

## 2025-05-19 DIAGNOSIS — I10 PRIMARY HYPERTENSION: ICD-10-CM

## 2025-05-19 DIAGNOSIS — J44.9 CHRONIC OBSTRUCTIVE PULMONARY DISEASE, UNSPECIFIED COPD TYPE (HCC): ICD-10-CM

## 2025-05-19 DIAGNOSIS — I51.7 LVH (LEFT VENTRICULAR HYPERTROPHY): ICD-10-CM

## 2025-05-19 DIAGNOSIS — K62.3 RECTAL PROLAPSE: Primary | ICD-10-CM

## 2025-05-19 DIAGNOSIS — Z72.0 TOBACCO ABUSE: ICD-10-CM

## 2025-05-19 PROCEDURE — 3078F DIAST BP <80 MM HG: CPT | Performed by: SURGERY

## 2025-05-19 PROCEDURE — RXMED WILLOW AMBULATORY MEDICATION CHARGE: Performed by: SURGERY

## 2025-05-19 PROCEDURE — 99204 OFFICE O/P NEW MOD 45 MIN: CPT | Performed by: SURGERY

## 2025-05-19 PROCEDURE — 3074F SYST BP LT 130 MM HG: CPT | Performed by: SURGERY

## 2025-05-19 RX ORDER — NEOMYCIN SULFATE 500 MG/1
TABLET ORAL
Qty: 6 TABLET | Refills: 0 | Status: SHIPPED | OUTPATIENT
Start: 2025-05-19

## 2025-05-19 RX ORDER — METRONIDAZOLE 500 MG/1
TABLET ORAL
Qty: 3 TABLET | Refills: 0 | Status: SHIPPED | OUTPATIENT
Start: 2025-05-19

## 2025-05-19 RX ORDER — BISACODYL 5 MG/1
TABLET, DELAYED RELEASE ORAL
Qty: 2 TABLET | Refills: 0 | Status: SHIPPED | OUTPATIENT
Start: 2025-05-19

## 2025-05-19 RX ORDER — POLYETHYLENE GLYCOL 3350 17 G/17G
POWDER, FOR SOLUTION ORAL
Qty: 510 G | Refills: 0 | Status: SHIPPED | OUTPATIENT
Start: 2025-05-19

## 2025-05-19 NOTE — PATIENT INSTRUCTIONS
Please complete bowel prep before surgery, following all instructions.    Clear Liquid Diet for 2 days, drink Miralax 17Tbsp in 500cc fluid day before surgery  Take 10mg Duculax   Take Antibiotics as prescribed at 1:00pm, 2:00pm and 11:00pm  No Eating after midnight the night before surgery.    After surgery, you will be admitted to the hospital for 1-3 nights.    You will be discharged home when you are eating, walking, and bowel function has returned.    You may eat and drink a normal diet after surgery.  It is ok to go for walks and go up and down stairs.  NO LIFTING, MOVING OR CARRYING MORE THAN 10LBS for 6 weeks post-op.  Ok to shower with wounds uncovered after surgery.  You may return to work 5-7 days after surgery on light duty.  No lifting or straining as above.

## 2025-05-19 NOTE — PROGRESS NOTES
Subjective:   5/19/2025  1:26 PM  Primary care physician:Viviana Thorne M.D.      Chief Complaint:   Chief Complaint   Patient presents with    New Patient     rectal prolapse  colonoscopy 01/06/2020          History of presenting illness:  Heidi Navas  is a pleasant 70 y.o. year old female who presented with full thickness rectal prolapse which has been present for several years now.  More recently though, she feels like the problem is getting worse and starting to cause her increasing discomfort.  She notes the prolapse with and between bowel movements, although it will spontaneously reduce as well.  She denies any prior abdominal or anorectal surgery.  She does have a hx of multiple medical issues and had a colonoscopy last in 2020.  She smokes daily (cigarettes and marijuana)        Past Medical History:   Diagnosis Date    Anemia     Arthritis     OA and  not RA per rheumatology    Breath shortness     oxygen PRN     Bronchitis 2008    Colon polyps 2015    Convulsions ([facility]) 7/21/2009     ICD-10 transition    COPD (chronic obstructive pulmonary disease) ([facility])     Dental disorder     full dentures    Depression     depression, anxiety     Diverticulosis 5/10     colonoscopy    Hemorrhoid 7/29/2009    History of colonoscopy   2005    Leukocytosis 3/30/2014    Lymphocytosis (symptomatic) 7/21/2009    MI (myocardial infarction) ([facility]) 4/29/2007    Other specified disorder of intestines     constipation    Pain     shoulders, neck, lower back    Pancreatitis     Pap smear 4/2006    Pneumonia 2013    Pulmonary nodule     Renal lesion     Renal stone     S/P colonoscopy 5/2010    will need repeat in 5 years    Screening mammogram never    Seizure ([facility])     x1 in 2008    Shingles     Symptomatic menopausal or female climacteric states 7/21/2009    Type II or unspecified type diabetes mellitus without mention of complication, uncontrolled 7/21/2009    diet controlled     Unspecified urinary incontinence      Past  Surgical History:   Procedure Laterality Date    COLONOSCOPY N/A 1/6/2020    Procedure: COLONOSCOPY;  Surgeon: David Carranza M.D.;  Location: SURGERY [facility];  Service: Gastroenterology    GASTROSCOPY N/A 1/4/2020    Procedure: GASTROSCOPY;  Surgeon: Polo Andujar D.O.;  Location: SURGERY [facility];  Service: Gastroenterology    SHOULDER ARTHROPLASTY TOTAL Left 7/12/2017    Procedure: SHOULDER ARTHROPLASTY TOTAL- REVERSE;  Surgeon: David Villalta M.D.;  Location: SURGERY [facility];  Service:     CERVICAL FUSION POSTERIOR  9/10/2014    Performed by Abhilash Bishop M.D. at SURGERY [facility]    HIP ARTHROPLASTY MIS TOTAL  7/16/14    rt and lt    OTHER CARDIAC SURGERY  2007    angioplasty    GYN SURGERY  1992    c section    EYE SURGERY  laser    OTHER ORTHOPEDIC SURGERY      TONSILLECTOMY       Allergies   Allergen Reactions    Aspartame Nausea     headache    Atorvastatin Calcium Unspecified and Swelling    Augmentin [Amoxicillin-Pot Clavulanate] Nausea     Pt reports feeling sick to her stomach    Latex Rash and Itching    Lipitor [Atorvastatin Calcium] Swelling     Patient denies allergy 06/04/22    Other Misc Vomiting     Bug spray    Saccharin Nausea     Nausea and headache     Current Outpatient Medications   Medication Sig    bisacodyl (DULCOLAX) 5 MG EC tablet Take 2 tabs in the morning the day before surgery    metroNIDAZOLE (FLAGYL) 500 MG Tab Take day before surgery.  Take 1 tab at 1:00pm, 2:00pm & 11:00pm    polyethylene glycol 3350 (MIRALAX) 17 GM/SCOOP Powder Take 17 Tbsp or 1/2 bottle mixed in 500ml of fluid ONCE for bowel prep.  Take in the morning the day before surgery    neomycin (MYCIFRADIN) 500 MG Tab Take day before surgery.  Take two tabs at 1:00pm, 2:00pm & 11:00pm    cephALEXin (KEFLEX) 500 MG Cap Take 1 Capsule by mouth 2 times a day.    Continuous Glucose Sensor (FREESTYLE HAYLEY 3 SENSOR) Misc Change 1 sensor every 14 days    losartan (COZAAR) 25  MG Tab Take 1 Tablet by mouth every day.    DULoxetine (CYMBALTA) 30 MG Cap DR Particles Take 1 Capsule by mouth every day.    insulin glargine (LANTUS SOLOSTAR) 100 UNIT/ML Solution Pen-injector injection Inject 15 Units under the skin 2 times a day.    celecoxib (CELEBREX) 200 MG Cap Take 1 capsule by mouth twice daily with food as needed for join pain    omeprazole (PRILOSEC) 20 MG delayed-release capsule Take 1 Capsule by mouth 2 times a day.    traZODone (DESYREL) 50 MG Tab Take 1-3 Tablets by mouth at bedtime as needed for Sleep.    Insulin Pen Needle 32 G x 4 mm (BD PEN NEEDLE BRITANY 2ND GEN) USE ONE PEN NEEDLE IN PEN DEVICE TO INJECT INSULIN TWO TIMES DAILY.    Alcohol Swabs Use on skin 3 times daily prior to injection with insulin with glucose check.    CONTOUR NEXT TEST strip 1 STRIP BY OTHER ROUTE IN THE MORNING, AT NOON, AND AT BEDTIME. USE ONE STRIP TO TEST BLOOD SUGAR THREE TIMES DAILY BEFORE MEALS.    Continuous Blood Gluc  (FREESTYLE HAYLEY 2 READER) Device Use daily with Freestyle hayley sensor    diphenhydrAMINE HCl (BENADRYL ALLERGY PO) Take  by mouth.    Blood Glucose Monitoring Suppl (BLOOD GLUCOSE MONITOR SYSTEM) w/Device Kit Test blood sugar as recommended by provider. Slick Contour Next blood glucose monitoring kit.    Microlet Lancets Misc Use one lancet to test blood sugar three times daily before meals.    Alcohol Swabs (ALCOHOL PREP) 70 % Pads Wipe site with prep pad prior to injection    methotrexate 2.5 MG tablet Take 6 tablets by mouth once a week (Patient not taking: Reported on 5/19/2025)    folic acid (FOLVITE) 1 MG Tab Take 1 tablet by mouth once daily (Patient not taking: Reported on 5/19/2025)    ketoconazole (NIZORAL) 2 % shampoo Apply 5 to 10 mL to wet scalp, lather, leave on 3 to 5 minutes, and rinse; apply twice weekly for 2 to 4 weeks. (Patient not taking: Reported on 5/19/2025)    metFORMIN (GLUCOPHAGE) 500 MG Tab Take 1 Tablet by mouth 2 times a day with meals.  (Patient not taking: Reported on 5/19/2025)    rosuvastatin (CRESTOR) 10 MG Tab Take 1 Tablet by mouth every evening. (Patient not taking: Reported on 5/19/2025)     Social History     Socioeconomic History    Marital status: Single     Spouse name: Not on file    Number of children: Not on file    Years of education: Not on file    Highest education level: Not on file   Occupational History    Occupation: disability for OA     Employer: [employer]   Tobacco Use    Smoking status: Every Day     Current packs/day: 0.25     Average packs/day: 0.3 packs/day for 50.0 years (12.5 ttl pk-yrs)     Types: Cigarettes    Smokeless tobacco: Never   Vaping Use    Vaping status: Never Used   Substance and Sexual Activity    Alcohol use: No     Alcohol/week: 0.0 oz    Drug use: Yes     Comment: MARIJUANA- 2 times per week     Sexual activity: Never   Other Topics Concern    Not on file   Social History Narrative    Has care provider.     Lives alone.      Social Drivers of Health     Financial Resource Strain: Low Risk  (11/29/2022)    Overall Financial Resource Strain (CARDIA)     Difficulty of Paying Living Expenses: Not hard at all   Food Insecurity: No Food Insecurity (11/29/2022)    Hunger Vital Sign     Worried About Running Out of Food in the Last Year: Never true     Ran Out of Food in the Last Year: Never true   Transportation Needs: No Transportation Needs (11/29/2022)    PRAPARE - Transportation     Lack of Transportation (Medical): No     Lack of Transportation (Non-Medical): No   Physical Activity: Inactive (11/29/2022)    Exercise Vital Sign     Days of Exercise per Week: 0 days     Minutes of Exercise per Session: 0 min   Stress: No Stress Concern Present (11/29/2022)    [place] [employer] - Occupational Stress Questionnaire     Feeling of Stress : Not at all   Social Connections: Socially Isolated (11/29/2022)    Social Connection and Isolation Panel [NHANES]     Frequency of Communication with  "Friends and Family: Never     Frequency of Social Gatherings with Friends and Family: Once a week     Attends Faith Services: Never     Active Member of Clubs or Organizations: No     Attends Club or Organization Meetings: Never     Marital Status:    Intimate Partner Violence: Not on file   Housing Stability: Low Risk  (11/29/2022)    Housing Stability Vital Sign     Unable to Pay for Housing in the Last Year: No     Number of Places Lived in the Last Year: 2     Unstable Housing in the Last Year: No      Family History   Problem Relation Age of Onset    Diabetes Mother     Cancer Mother         bladder cancer    Diabetes Brother     Cancer Brother         bladder cancer    Diabetes Brother     Cancer Sister         bladder cancer       ROS: Negative except as detailed in HPI.     Objective:   /72 (BP Location: Right arm, Patient Position: Sitting, BP Cuff Size: Adult)   Pulse 78   Temp 36.7 °C (98 °F) (Temporal)   Ht 1.689 m (5' 6.5\")   LMP 01/01/2007   SpO2 96%   BMI 21.94 kg/m²     Physical Exam:  Constitutional: Well-developed and well-nourished. Not diaphoretic. No distress.   Skin: Skin is warm and dry. No rash noted.  Head: Atraumatic without lesions.  Eyes: Conjunctivae and extraocular motions are normal. No scleral icterus.   Nose: Nares patent. Septum midline. No discharge.   Neck: Supple, trachea midline. Normal range of motion. No thyromegaly present. No lymphadenopathy--cervical or supraclavicular.  Cardiovascular: Regular rate and rhythm, 2+ pulses   Lungs: Effort normal. No chest wall masses.   Abdomen: Soft, non tender, and without distention. No rebound, guarding, masses.  Rectal: No infection or external lesions noted.  No masses or lesions on THOMPSON.  Hypotonic Sphincter tone.  Full thickness rectal prolapse present with valsalva  Extremities: Warm and well perfused, no pitting edema  Musculoskeletal: All major joints AROM full in all directions without pain.  Neurological: " Alert and oriented x 3.   Psychiatric:  Behavior, mood, and affect are appropriate.          Diagnosis:     Assessment & Plan  Rectal prolapse         Type 2 diabetes mellitus with diabetic microalbuminuria, without long-term current use of insulin (HCC)         LVH (left ventricular hypertrophy)         Primary hypertension         Chronic obstructive pulmonary disease, unspecified COPD type (HCC)         Tobacco abuse               Medical Decision Making:  Today's Assessment / Status / Plan:     70y F with full thickness rectal prolapse present.  She wants to proceed with surgical repair at this point.  I think her best option would be a ventral mesh rectopexy as she is still fairly active at baseline.  We discussed long-term risks of recurrence as well as need to complete a bowel prep before the planned surgery.  If she is able to stop smoking, this would be of great benefit to her healing as well.  Risks/benefits/alternatives of planned surgery were discussed with the patient.  They understand issues involved and agree with the surgical treatment plan.  Will arrange for surgery at the next available time.    Rectal Prolapse   Robotic Ventral Mesh Rectopexy

## 2025-05-21 ENCOUNTER — PHARMACY VISIT (OUTPATIENT)
Dept: PHARMACY | Facility: MEDICAL CENTER | Age: 70
End: 2025-05-21
Payer: COMMERCIAL

## 2025-05-28 ENCOUNTER — TELEPHONE (OUTPATIENT)
Facility: MEDICAL CENTER | Age: 70
End: 2025-05-28
Payer: MEDICARE

## 2025-05-29 ENCOUNTER — PATIENT OUTREACH (OUTPATIENT)
Dept: HEALTH INFORMATION MANAGEMENT | Facility: OTHER | Age: 70
End: 2025-05-29
Payer: MEDICARE

## 2025-05-29 DIAGNOSIS — I25.10 CVD (CARDIOVASCULAR DISEASE): ICD-10-CM

## 2025-05-29 DIAGNOSIS — E11.69 TYPE 2 DIABETES MELLITUS WITH OTHER SPECIFIED COMPLICATION, WITHOUT LONG-TERM CURRENT USE OF INSULIN (HCC): Primary | ICD-10-CM

## 2025-05-29 DIAGNOSIS — J44.9 CHRONIC OBSTRUCTIVE PULMONARY DISEASE, UNSPECIFIED COPD TYPE (HCC): ICD-10-CM

## 2025-05-29 NOTE — PROGRESS NOTES
Reason for Follow-Up:    Spoke to patient for routine monthly outreach for personal care management program    Current Health Status:    Following pt for DM, HTN, COPD. Pt reports she is feeling better after completing antibiotic. Blood sugars are running a bit higher lately. Today's reading 329. She is unsure of why this might be changing. She is taking her insulin. She is having some trouble with her david sensor. Pt is getting around ok. Breathing has been fine.     Medical Updates:  no urgent care or er visits. No hospitalizations.     Medication Updates:  Only taking insulin and losartan right now.     Symptoms:  no new symptoms to report    Plan of Care Goals and Progress:    Goal 1.  Resume routine follow-up appointments and recommended imaging and lab work to manage chronic diseases                 Progress:  pt has followed up with pcp. Needs to schedule surgery                            Barriers:  transportation                 Interventions:  provided contact info for referral                 Education:  n/a     Goal 2. 100% compliance with prescribed medications                 Progress:  pt did restart losartan. Still hesistant to start some of the other medications                            Barriers:  motivation, financial                 Interventions:  reviewed medication list and purpose of prescription                 Education:  n/a      Patient's Concerns and Feedback (Self Management of Care):     Pt is in contact with [employer] to help with getting her sensor working. She will continue to monitor bs with glucometer for now to get better control. Feeling much better now that urinary symptoms have resolved. Reminded to schedule surgery. Pt discussed need to quit smoking prior to surgery for prolapse. This is her next goal. Recommended sooner visit with pcp to discuss medications as pt is reluctant to resume some of them. Pt declined. No immediate needs or concerns.     Next  Steps:     Follow-Up Plan:  1 month, June 2025      Appointments:  7/23/25 pcp     Contact Information:  Call [phone] with any questions or concerns.

## 2025-06-04 ENCOUNTER — TELEPHONE (OUTPATIENT)
Facility: MEDICAL CENTER | Age: 70
End: 2025-06-04
Payer: MEDICARE

## 2025-06-05 ENCOUNTER — TELEPHONE (OUTPATIENT)
Facility: MEDICAL CENTER | Age: 70
End: 2025-06-05
Payer: MEDICARE

## 2025-06-05 NOTE — TELEPHONE ENCOUNTER
3rd message left to schedule surgery **HF    Reji Moralez M.D.  [facility]  Robotic Ventral Mesh Rectopexy for Prolapse  2 hours

## 2025-06-06 NOTE — CARE PLAN
Problem: Recognizing current health habits that may contribute to diabetes  Goal: In conjunction with patient, identify which health habits can be modified  Outcome: Not Progressing     Problem: Patient barriers to managing diabetes  Goal: Identify patient barriers to managing diabetes  Outcome: Not Progressing     Problem: Adherence to prescribed diabetic medications  Goal: Educate on and improve diabetic medication adherence  Outcome: Progressing

## 2025-06-25 PROCEDURE — RXMED WILLOW AMBULATORY MEDICATION CHARGE: Performed by: FAMILY MEDICINE

## 2025-06-27 ENCOUNTER — PHARMACY VISIT (OUTPATIENT)
Dept: PHARMACY | Facility: MEDICAL CENTER | Age: 70
End: 2025-06-27
Payer: COMMERCIAL

## 2025-06-30 ENCOUNTER — PATIENT OUTREACH (OUTPATIENT)
Dept: HEALTH INFORMATION MANAGEMENT | Facility: OTHER | Age: 70
End: 2025-06-30
Payer: MEDICARE

## 2025-06-30 DIAGNOSIS — I25.10 CVD (CARDIOVASCULAR DISEASE): ICD-10-CM

## 2025-06-30 DIAGNOSIS — E11.69 TYPE 2 DIABETES MELLITUS WITH OTHER SPECIFIED COMPLICATION, WITHOUT LONG-TERM CURRENT USE OF INSULIN (HCC): Primary | ICD-10-CM

## 2025-06-30 DIAGNOSIS — J44.9 CHRONIC OBSTRUCTIVE PULMONARY DISEASE, UNSPECIFIED COPD TYPE (HCC): ICD-10-CM

## 2025-06-30 NOTE — PROGRESS NOTES
Reason for Follow-Up:    Spoke with pt for routine monthly outreach for personal care management program    Current Health Status:    Following pt for DM, HTN, COPD. Pt states she is having a good day. Got freestyle david working. Blood sugars have been running higher after meals-300s. Pt cannot pinpoint a cause. Duloxetine helping with pain. No further urinary symptoms.     Medical Updates:  no urgent care/er visits    Medication Updates:  taking insulin, losartan and duloxetine    Symptoms:  no new symptoms    Plan of Care Goals and Progress:    Goal 1.  Resume routine follow-up appointments and recommended imaging and lab work to manage chronic diseases                 Progress:  pt has upcoming pcp appoint and will do labs prior. Needs to schedule surgery                            Barriers:  transportation                 Interventions:  provided contact info for referral                 Education:  n/a     Goal 2. 100% compliance with prescribed medications                 Progress:  started duloxetine. Compliant with insulin and losartan. Still hesistant to start some of the other medications                            Barriers:  motivation, financial                 Interventions:  reviewed medication list and purpose of prescription                 Education:  encouraged pt to discuss medication concerns at next pcp appointment      Patient's Concerns and Feedback (Self Management of Care):     No immediate needs or concerns. Pt still needs to schedule surgery. She has contact info to schedule but is a bit nervous about the procedure. She will call soon. Reminded of lab work. Pt states she will get this done 1 week prior to pcp follow up.     Next Steps:     Follow-Up Plan:  1 month, July 2025      Appointments:  7/23/25 with pcp     Contact Information:  Call [phone] with any questions or concerns.

## 2025-07-22 ENCOUNTER — HOSPITAL ENCOUNTER (OUTPATIENT)
Dept: LAB | Facility: MEDICAL CENTER | Age: 70
End: 2025-07-22
Attending: FAMILY MEDICINE
Payer: MEDICARE

## 2025-07-22 DIAGNOSIS — R80.9 TYPE 2 DIABETES MELLITUS WITH DIABETIC MICROALBUMINURIA, WITHOUT LONG-TERM CURRENT USE OF INSULIN (HCC): ICD-10-CM

## 2025-07-22 DIAGNOSIS — R74.8 ALKALINE PHOSPHATASE ELEVATION: ICD-10-CM

## 2025-07-22 DIAGNOSIS — R80.9 MICROALBUMINURIA: ICD-10-CM

## 2025-07-22 DIAGNOSIS — R30.0 DYSURIA: ICD-10-CM

## 2025-07-22 DIAGNOSIS — E11.29 TYPE 2 DIABETES MELLITUS WITH DIABETIC MICROALBUMINURIA, WITHOUT LONG-TERM CURRENT USE OF INSULIN (HCC): ICD-10-CM

## 2025-07-22 DIAGNOSIS — R77.1 ELEVATED SERUM GLOBULIN LEVEL: ICD-10-CM

## 2025-07-22 DIAGNOSIS — E78.2 MIXED HYPERLIPIDEMIA: ICD-10-CM

## 2025-07-22 LAB
ALBUMIN SERPL BCP-MCNC: 3.9 G/DL (ref 3.2–4.9)
ALBUMIN/GLOB SERPL: 1.1 G/DL
ALP SERPL-CCNC: 152 U/L (ref 30–99)
ALT SERPL-CCNC: 12 U/L (ref 2–50)
ANION GAP SERPL CALC-SCNC: 10 MMOL/L (ref 7–16)
APPEARANCE UR: CLEAR
AST SERPL-CCNC: 20 U/L (ref 12–45)
BACTERIA #/AREA URNS HPF: ABNORMAL /HPF
BILIRUB SERPL-MCNC: 0.3 MG/DL (ref 0.1–1.5)
BILIRUB UR QL STRIP.AUTO: NEGATIVE
BUN SERPL-MCNC: 9 MG/DL (ref 8–22)
CALCIUM ALBUM COR SERPL-MCNC: 9.1 MG/DL (ref 8.5–10.5)
CALCIUM SERPL-MCNC: 9 MG/DL (ref 8.5–10.5)
CASTS URNS QL MICRO: ABNORMAL /LPF (ref 0–2)
CHLORIDE SERPL-SCNC: 98 MMOL/L (ref 96–112)
CHOLEST SERPL-MCNC: 165 MG/DL (ref 100–199)
CO2 SERPL-SCNC: 32 MMOL/L (ref 20–33)
COLOR UR: YELLOW
CREAT SERPL-MCNC: 0.58 MG/DL (ref 0.5–1.4)
CREAT UR-MCNC: 94.4 MG/DL
EPITHELIAL CELLS 1715: ABNORMAL /HPF (ref 0–5)
EPITHELIAL CELLS RENAL  1715R: PRESENT /HPF
EST. AVERAGE GLUCOSE BLD GHB EST-MCNC: 235 MG/DL
FASTING STATUS PATIENT QL REPORTED: NORMAL
GFR SERPLBLD CREATININE-BSD FMLA CKD-EPI: 97 ML/MIN/1.73 M 2
GLOBULIN SER CALC-MCNC: 3.6 G/DL (ref 1.9–3.5)
GLUCOSE SERPL-MCNC: 129 MG/DL (ref 65–99)
GLUCOSE UR STRIP.AUTO-MCNC: NEGATIVE MG/DL
HBA1C MFR BLD: 9.8 % (ref 4–5.6)
HDLC SERPL-MCNC: 56 MG/DL
KETONES UR STRIP.AUTO-MCNC: NEGATIVE MG/DL
LDLC SERPL CALC-MCNC: 95 MG/DL
LEUKOCYTE ESTERASE UR QL STRIP.AUTO: NEGATIVE
MICRO URNS: ABNORMAL
MICROALBUMIN UR-MCNC: 178 MG/DL
MICROALBUMIN/CREAT UR: 1886 MG/G (ref 0–30)
NITRITE UR QL STRIP.AUTO: NEGATIVE
PH UR STRIP.AUTO: 7.5 [PH] (ref 5–8)
POTASSIUM SERPL-SCNC: 3.7 MMOL/L (ref 3.6–5.5)
PROT SERPL-MCNC: 7.5 G/DL (ref 6–8.2)
PROT UR QL STRIP: 300 MG/DL
RBC # URNS HPF: ABNORMAL /HPF (ref 0–2)
RBC UR QL AUTO: NEGATIVE
SODIUM SERPL-SCNC: 140 MMOL/L (ref 135–145)
SP GR UR STRIP.AUTO: 1.02
TRIGL SERPL-MCNC: 69 MG/DL (ref 0–149)
TSH SERPL DL<=0.005 MIU/L-ACNC: 2.97 UIU/ML (ref 0.38–5.33)
UROBILINOGEN UR STRIP.AUTO-MCNC: 1 EU/DL
WBC #/AREA URNS HPF: ABNORMAL /HPF

## 2025-07-22 PROCEDURE — 82043 UR ALBUMIN QUANTITATIVE: CPT

## 2025-07-22 PROCEDURE — 83036 HEMOGLOBIN GLYCOSYLATED A1C: CPT

## 2025-07-22 PROCEDURE — 81001 URINALYSIS AUTO W/SCOPE: CPT

## 2025-07-22 PROCEDURE — 84443 ASSAY THYROID STIM HORMONE: CPT

## 2025-07-22 PROCEDURE — 80061 LIPID PANEL: CPT

## 2025-07-22 PROCEDURE — 87086 URINE CULTURE/COLONY COUNT: CPT

## 2025-07-22 PROCEDURE — 82570 ASSAY OF URINE CREATININE: CPT

## 2025-07-22 PROCEDURE — 80053 COMPREHEN METABOLIC PANEL: CPT

## 2025-07-22 PROCEDURE — 36415 COLL VENOUS BLD VENIPUNCTURE: CPT

## 2025-07-23 ENCOUNTER — OFFICE VISIT (OUTPATIENT)
Dept: MEDICAL GROUP | Facility: IMAGING CENTER | Age: 70
End: 2025-07-23
Payer: MEDICARE

## 2025-07-23 ENCOUNTER — PATIENT OUTREACH (OUTPATIENT)
Dept: HEALTH INFORMATION MANAGEMENT | Facility: OTHER | Age: 70
End: 2025-07-23

## 2025-07-23 VITALS
WEIGHT: 141.6 LBS | BODY MASS INDEX: 22.76 KG/M2 | DIASTOLIC BLOOD PRESSURE: 80 MMHG | HEIGHT: 66 IN | SYSTOLIC BLOOD PRESSURE: 126 MMHG | HEART RATE: 91 BPM | RESPIRATION RATE: 16 BRPM | TEMPERATURE: 97.3 F | OXYGEN SATURATION: 92 %

## 2025-07-23 DIAGNOSIS — R77.1 ELEVATED SERUM GLOBULIN LEVEL: ICD-10-CM

## 2025-07-23 DIAGNOSIS — E61.1 IRON DEFICIENCY: ICD-10-CM

## 2025-07-23 DIAGNOSIS — M79.7 FIBROMYALGIA: ICD-10-CM

## 2025-07-23 DIAGNOSIS — J44.9 CHRONIC OBSTRUCTIVE PULMONARY DISEASE, UNSPECIFIED COPD TYPE (HCC): ICD-10-CM

## 2025-07-23 DIAGNOSIS — M05.79 RHEUMATOID ARTHRITIS INVOLVING MULTIPLE SITES WITH POSITIVE RHEUMATOID FACTOR (HCC): ICD-10-CM

## 2025-07-23 DIAGNOSIS — L98.9 SKIN LESIONS: ICD-10-CM

## 2025-07-23 DIAGNOSIS — R80.9 MICROALBUMINURIA: ICD-10-CM

## 2025-07-23 DIAGNOSIS — N90.89 VULVAR LESION: ICD-10-CM

## 2025-07-23 DIAGNOSIS — R80.9 TYPE 2 DIABETES MELLITUS WITH DIABETIC MICROALBUMINURIA, WITHOUT LONG-TERM CURRENT USE OF INSULIN (HCC): ICD-10-CM

## 2025-07-23 DIAGNOSIS — G89.29 OTHER CHRONIC PAIN: ICD-10-CM

## 2025-07-23 DIAGNOSIS — R74.8 ELEVATED ALKALINE PHOSPHATASE LEVEL: ICD-10-CM

## 2025-07-23 DIAGNOSIS — E11.69 TYPE 2 DIABETES MELLITUS WITH OTHER SPECIFIED COMPLICATION, WITHOUT LONG-TERM CURRENT USE OF INSULIN (HCC): Primary | ICD-10-CM

## 2025-07-23 DIAGNOSIS — E11.29 TYPE 2 DIABETES MELLITUS WITH DIABETIC MICROALBUMINURIA, WITHOUT LONG-TERM CURRENT USE OF INSULIN (HCC): ICD-10-CM

## 2025-07-23 DIAGNOSIS — I25.10 CVD (CARDIOVASCULAR DISEASE): ICD-10-CM

## 2025-07-23 DIAGNOSIS — F33.0 MAJOR DEPRESSIVE DISORDER, RECURRENT EPISODE, MILD (HCC): ICD-10-CM

## 2025-07-23 DIAGNOSIS — D72.829 LEUKOCYTOSIS, UNSPECIFIED TYPE: ICD-10-CM

## 2025-07-23 PROBLEM — E86.0 DEHYDRATION: Status: RESOLVED | Noted: 2022-06-04 | Resolved: 2025-07-23

## 2025-07-23 PROBLEM — E11.00 HYPEROSMOLAR HYPERGLYCEMIC STATE (HHS) (HCC): Status: RESOLVED | Noted: 2022-06-04 | Resolved: 2025-07-23

## 2025-07-23 PROCEDURE — 99214 OFFICE O/P EST MOD 30 MIN: CPT | Performed by: FAMILY MEDICINE

## 2025-07-23 PROCEDURE — 3074F SYST BP LT 130 MM HG: CPT | Performed by: FAMILY MEDICINE

## 2025-07-23 PROCEDURE — 3079F DIAST BP 80-89 MM HG: CPT | Performed by: FAMILY MEDICINE

## 2025-07-23 RX ORDER — LOSARTAN POTASSIUM 50 MG/1
50 TABLET ORAL DAILY
Qty: 100 TABLET | Refills: 3 | Status: SHIPPED | OUTPATIENT
Start: 2025-07-23 | End: 2026-08-27

## 2025-07-23 RX ORDER — ACYCLOVIR 800 MG/1
TABLET ORAL
Qty: 2 EACH | Refills: 5 | Status: SHIPPED | OUTPATIENT
Start: 2025-07-23

## 2025-07-23 RX ORDER — DULOXETIN HYDROCHLORIDE 60 MG/1
60 CAPSULE, DELAYED RELEASE ORAL DAILY
Qty: 90 CAPSULE | Refills: 3 | Status: SHIPPED | OUTPATIENT
Start: 2025-07-23

## 2025-07-23 ASSESSMENT — FIBROSIS 4 INDEX: FIB4 SCORE: 0.86

## 2025-07-23 NOTE — PROGRESS NOTES
Reason for Follow-Up:    Met with pt at pcp visit for monthly outreach for personal care management program    Current Health Status:    Following pt for DM, HTN, COPD. Pt reports she is doing ok. Still some depression and fatigue. Blood sugars have been elevated 200s-300s post prandial. Not interetesed in starting metformin. Admits there is room to work on a healthier diet. She will consider the metformin, but will start with working on her diet. CGM has been very helpful with tracking blood sugars more regularly. No more urinary symptoms. Neck stiffness getting worse.     Medical Updates:  no urgent care/er visits, no hospitalizations    Medication Updates:  only using insulin, losartan and duloxetine    Symptoms:  lesion assessed by pcp at today's visit    Plan of Care Goals and Progress:    Goal 1.  Resume routine follow-up appointments and recommended imaging and lab work to manage chronic diseases                 Progress:  pt completed required lab work and has made it to her appointments. She has not yet scheduled her surgery                            Barriers:  transportation, depression                 Interventions:  n/a                 Education:  n/a     Goal 2. 100% compliance with prescribed medications                 Progress:  Still hesistant to start some of the other medications                            Barriers:  motivation, financial,                  Interventions:  discussed with pcp today                 Education:  n/a         Patient's Concerns and Feedback (Self Management of Care):     Medication changes recommended by pcp at today's visit. Pt is hesitant to restart metformin due to side effects. She will consider this though. She will focus on following a diabetic diet. She is willing to increase her duloxetine dose as recommended as she has found this medication helpful. She was provided information to schedule with dermatology to assess lesion. No immediate needs or concerns.      Next Steps:     Follow-Up Plan:  1 month, August 2025      Appointments:  8/14/25 hem/onc     Contact Information:  Call [phone] with any questions or concerns.

## 2025-07-23 NOTE — PROGRESS NOTES
SUBJECTIVE:    Chief Complaint   Patient presents with    Follow-Up     Lab results 7/22/25    Other     Lump in her vignal x 1 week         HPI:     Heidi Navas is a 70 y.o. female here for lab review.   Also notes right vulvar lesion.  Diabetes mellitus type 2-blood sugar has been  at times.  Her freestyle david monitor will alarm if too low or too high.  She has not been taking metformin therapy.  She has been on losartan therapy 25 mg daily.  Blood pressure has been stable.  Has not been on rosuvastatin therapy, uncertain how she tolerated it in the past.    She has a right vulvar lesion for past couple weeks, which she has noticed.  Area is not painful. Last Pap smear in 2017.    Also notes left-sided chest wall skin lesion.    Mood has been stable.  She is on duloxetine 30 mg daily.  Pain symptoms stable.  She sees rheumatology.  Currently off Celebrex therapy well on duloxetine.  Sometimes hard to walk due to pain symptoms.      ROS:  No recent fevers or chills. No nausea or vomiting. No diarrhea. No chest pains or shortness of breath. No lower extremity edema.    Medications Ordered Prior to Encounter[1]    Allergies[2]    Patient Active Problem List    Diagnosis Date Noted    Tobacco abuse 05/19/2025    Type 2 diabetes mellitus with diabetic microalbuminuria, without long-term current use of insulin (HCC) 03/19/2025    Type 2 diabetes mellitus with other specified complication (HCC) 03/19/2025    Iron deficiency 08/08/2024    Abnormal liver ultrasound 11/06/2023    Renal cyst 11/06/2023    Major depressive disorder, recurrent episode, mild (HCC) 12/27/2022    Reactive thrombocytosis 10/14/2022    Rectal prolapse 10/14/2022    Insomnia due to medical condition 07/11/2022    GIB (gastrointestinal bleeding) 06/04/2022    Acute on chronic blood loss anemia 06/04/2022    DM (diabetes mellitus), secondary uncontrolled 06/04/2022    Severely underweight adult 06/04/2022    ACP (advance care  "planning) 06/04/2022    Hyponatremia 06/04/2022    Hyperosmolar hyperglycemic state (HHS), pressure ulcer, anemia ([facility]) 06/04/2022    Dehydration 06/04/2022    Pressure ulcer, sacrum 06/04/2022    Fatigue 06/03/2022    Weight loss, unintentional 06/03/2022    Urinary incontinence 06/03/2022    Adult failure to thrive 06/03/2022    Iron deficiency anemia 02/09/2020    Cellulitis 01/08/2020    Anemia 05/19/2019    Atherosclerosis 05/19/2019    LVH (left ventricular hypertrophy) 05/19/2019    Arthritis of left shoulder region 07/12/2017    CVD (cardiovascular disease) 12/04/2015    Pulmonary nodule 12/01/2015    Renal stone 12/01/2015    Diverticulosis 12/01/2015    Chest pain 07/16/2015    Nausea & vomiting 07/13/2015    Opiate withdrawal ([facility]) 07/13/2015    Diabetes mellitus type 2 in nonobese ([facility]) 10/03/2014    Brachial neuritis or radiculitis 09/10/2014    History of total hip arthroplasty 07/22/2014    Leukocytosis 03/30/2014    COPD (chronic obstructive pulmonary disease) ([facility]) 03/30/2014    Arthritis 12/26/2013    Chronic pain 02/21/2012    Anxiety 01/18/2012    Vitamin D insufficiency 04/28/2011    Hypertension 03/22/2010    Fibromyalgia 10/09/2009    Swelling, mass, or lump in head and neck 07/21/2009    Tobacco use disorder 07/21/2009    Rheumatoid arthritis involving multiple sites with positive rheumatoid factor ([facility]) 07/21/2009    Chronic ischemic heart disease 07/21/2009    Esophageal reflux 07/21/2009    Allergic rhinitis 07/21/2009    Mixed hyperlipidemia 07/21/2009    Other atopic dermatitis and related conditions 07/21/2009    Deficiency anemia 07/21/2009       Past Medical History[3]      OBJECTIVE:   /80 (BP Location: Left arm, Patient Position: Sitting, BP Cuff Size: Adult)   Pulse 91   Temp 36.3 °C (97.3 °F) (Temporal)   Resp 16   Ht 1.689 m (5' 6.5\")   Wt 64.2 kg (141 lb 9.6 oz)   LMP 01/01/2007   SpO2 92%   BMI 22.52 kg/m²   General: Well-developed well-nourished female, no acute " distress  Neck: supple, no lymphadenopathy- cervical or supraclavicular, no thyromegaly  Cardiovascular: regular rate and rhythm, no murmurs, gallops, rubs  Lungs: clear to auscultation bilaterally, no wheezes, crackles, or rhonchi  Abdomen: +bowel sounds, soft, nontender, nondistended, no rebound, no guarding, no hepatosplenomegaly  Extremities: no cyanosis, clubbing, edema  Skin: Warm and dry.  Left anterior chest wall with 2 mm hyperpigmented macular lesion.  Psych: appropriate mood and affect  : Right side posterior vulvar region with about 1.5 cm slightly irregular surface with somewhat stuck on appearance.       Latest Reference Range & Units 07/22/25 15:51   Sodium 135 - 145 mmol/L 140   Potassium 3.6 - 5.5 mmol/L 3.7   Chloride 96 - 112 mmol/L 98   Co2 20 - 33 mmol/L 32   Anion Gap 7.0 - 16.0  10.0   Glucose 65 - 99 mg/dL 129 (H)   Bun 8 - 22 mg/dL 9   Creatinine 0.50 - 1.40 mg/dL 0.58   GFR (CKD-EPI) >60 mL/min/1.73 m 2 97   Calcium 8.5 - 10.5 mg/dL 9.0   Correct Calcium 8.5 - 10.5 mg/dL 9.1   AST(SGOT) 12 - 45 U/L 20   ALT(SGPT) 2 - 50 U/L 12   Alkaline Phosphatase 30 - 99 U/L 152 (H)   Total Bilirubin 0.1 - 1.5 mg/dL 0.3   Albumin 3.2 - 4.9 g/dL 3.9   Total Protein 6.0 - 8.2 g/dL 7.5   Globulin 1.9 - 3.5 g/dL 3.6 (H)   A-G Ratio g/dL 1.1   Glycohemoglobin 4.0 - 5.6 % 9.8 (H)   Estim. Avg Glu mg/dL 235   Fasting Status  Fasting   Cholesterol,Tot 100 - 199 mg/dL 165   Triglycerides 0 - 149 mg/dL 69   HDL >=40 mg/dL 56   LDL <100 mg/dL 95   Micro Alb Creat Ratio 0 - 30 mg/g 1886 (H)   Creatinine, Urine mg/dL 94.40   Microalbumin, Urine Random mg/dL 178.0   Urobilinogen, Urine <=1.0 EU/dL 1.0   Color  Yellow   Character  Clear   Specific Gravity <1.035  1.017   Ph 5.0 - 8.0  7.5   Glucose Negative mg/dL Negative   Ketones Negative mg/dL Negative   Bilirubin Negative  Negative   Occult Blood Negative  Negative   Protein Negative mg/dL 300 !   Nitrite Negative  Negative   Leukocyte Esterase Negative  Negative    Micro Urine Req  Microscopic   WBC /hpf 0-2   RBC 0 - 2 /hpf 0-2   Epithelial Cells 0 - 5 /hpf 11-20 !   Epithelial Cells Renal Absent /hpf Present !   Bacteria None /hpf Few !   Urine Casts 0 - 2 /lpf 0-2   TSH 0.380 - 5.330 uIU/mL 2.970   (H): Data is abnormally high  !: Data is abnormal        ASSESSMENT/PLAN:    70 y.o.female       1. Type 2 diabetes mellitus with diabetic microalbuminuria, without long-term current use of insulin ([facility])  Continuous Glucose Sensor (FREESTYLE HAYLEY 3 SENSOR) Misc      2. Microalbuminuria  losartan (COZAAR) 50 MG Tab      3. Skin lesions  Referral to Dermatology      4. Vulvar lesion  Referral to Dermatology      5. Elevated serum globulin level  Monoclonal Protein Study    PROTEIN ELECTROPHORESIS, UR      6. Elevated alkaline phosphatase level  ALKALINE PHOSPHATASE ISOENZYMES    5' NUCLEOTIDASE      7. Major depressive disorder, recurrent episode, mild ([facility])  DULoxetine (CYMBALTA) 60 MG Cap DR Particles delayed-release capsule      8. Rheumatoid arthritis involving multiple sites with positive rheumatoid factor ([facility])        9. Iron deficiency        10. Leukocytosis, unspecified type        11. Fibromyalgia  DULoxetine (CYMBALTA) 60 MG Cap DR Particles delayed-release capsule      12. Other chronic pain  DULoxetine (CYMBALTA) 60 MG Cap DR Particles delayed-release capsule      -Diabetes mellitus type 2-uncontrolled.    She will continue on Lantus 15 units nightly.  Reviewed recommendation for restarting metformin therapy which she has available at home.  Continue working on diabetic diet and routine exercise as tolerated.  -Microalbuminuria-she has been on losartan 25 mg therapy.    Plan to increase to losartan 50 mg daily as tolerated.-Skin lesions-left upper chest wall about 2 mm hyperpigmented lesion.  She will plan to see dermatology.  -Skin lesions-left chest wall skin lesion.  Stable.  Plan for follow-up with dermatology.  -Right side vulvar lesion-appearance appears  similar to SK.  Will refer to dermatology for further evaluation and management.  -Elevated globulin-assess failure the lab work.  -Elevated alkaline phosphatase levels-Continue to monitor.  -MDD-stable.  Will optimize duloxetine therapy.  Increase to 60 mg daily on duloxetine.  Monitor.  -RA-stable.  She has been off Celebrex therapy.  Stable.  She will follow-up with rheumatology.  -Iron deficiency and leukocytosis-stable.  Has been on iron fusions.  Labs per hematology.  Follow-up with hematology routinely.  -Fibromyalgia/other chronic pain-optimize duloxetine therapy, increase to duloxetine 60 mg daily as tolerated.  Monitor.  RN coordinator present during visit.    Return in about 3 months (around 10/23/2025). Follow up sooner as needed.     This medical record contains text that has been entered with the assistance of computer voice recognition and dictation software.  Therefore, it may contain unintended errors in text, spelling, punctuation, or grammar.                                 [1]   Current Outpatient Medications on File Prior to Visit   Medication Sig Dispense Refill    metroNIDAZOLE (FLAGYL) 500 MG Tab Take day before surgery.  Take 1 tablet at 1:00pm, 2:00pm & 11:00pm 3 Tablet 0    polyethylene glycol 3350 (MIRALAX) 17 GM/SCOOP Powder Take 17 Tablespoon or 1/2 bottle mixed in 500ml of fluid ONCE for bowel prep.  Take in the morning the day before surgery 510 g 0    cephALEXin (KEFLEX) 500 MG Cap Take 1 Capsule by mouth 2 times a day. 14 Capsule 0    Continuous Glucose Sensor (FREESTYLE HAYLEY 3 SENSOR) Misc Change 1 sensor every 14 days 2 Each 5    DULoxetine (CYMBALTA) 30 MG Cap DR Particles Take 1 Capsule by mouth every day. 100 Capsule 1    celecoxib (CELEBREX) 200 MG Cap Take 1 capsule by mouth twice daily with food as needed for join pain 180 Capsule 1    omeprazole (PRILOSEC) 20 MG delayed-release capsule Take 1 Capsule by mouth 2 times a day. 180 Capsule 3    traZODone (DESYREL) 50 MG Tab  Take 1-3 Tablets by mouth at bedtime as needed for Sleep. 90 Tablet 1    Alcohol Swabs Use on skin 3 times daily prior to injection with insulin with glucose check. 100 Each 3    CONTOUR NEXT TEST strip 1 STRIP BY OTHER ROUTE IN THE MORNING, AT NOON, AND AT BEDTIME. USE ONE STRIP TO TEST BLOOD SUGAR THREE TIMES DAILY BEFORE MEALS. 300 Strip 3    Continuous Blood Gluc  (FREESTYLE HAYLEY 2 READER) Device Use daily with Freestyle hayley sensor 1 Each 0    diphenhydrAMINE HCl (BENADRYL ALLERGY PO) Take  by mouth.      Blood Glucose Monitoring Suppl (BLOOD GLUCOSE MONITOR SYSTEM) w/Device Kit Test blood sugar as recommended by provider. Slick Contour Next blood glucose monitoring kit. 1 Kit 0    Microlet Lancets Misc Use one lancet to test blood sugar three times daily before meals. 100 Each 0    Alcohol Swabs (ALCOHOL PREP) 70 % Pads Wipe site with prep pad prior to injection 100 Each 0    Insulin Pen Needle 32 G x 4 mm (UNIFINE PENTIPS 32G X 4 MM) USE ONE PEN NEEDLE IN PEN DEVICE TO INJECT INSULIN TWO TIMES DAILY. 100 Each 3    bisacodyl (DULCOLAX) 5 MG EC tablet Take 2 tabs in the morning the day before surgery (Patient not taking: Reported on 7/23/2025) 2 Tablet 0    neomycin (MYCIFRADIN) 500 MG Tab Take day before surgery.  Take two tabs at 1:00pm, 2:00pm & 11:00pm (Patient not taking: Reported on 7/23/2025) 6 Tablet 0    losartan (COZAAR) 25 MG Tab Take 1 Tablet by mouth every day. 100 Tablet 3    insulin glargine (LANTUS SOLOSTAR) 100 UNIT/ML Solution Pen-injector injection PEN Inject 15 Units under the skin 2 times a day. 15 mL 3    methotrexate 2.5 MG tablet Take 6 tablets by mouth once a week (Patient not taking: Reported on 5/19/2025) 72 Tablet 0    folic acid (FOLVITE) 1 MG Tab Take 1 tablet by mouth once daily (Patient not taking: Reported on 5/19/2025) 90 Tablet 1    ketoconazole (NIZORAL) 2 % shampoo Apply 5 to 10 mL to wet scalp, lather, leave on 3 to 5 minutes, and rinse; apply twice weekly for 2  to 4 weeks. (Patient not taking: Reported on 5/19/2025) 120 mL 3    metFORMIN (GLUCOPHAGE) 500 MG Tab Take 1 Tablet by mouth 2 times a day with meals. (Patient not taking: Reported on 5/19/2025) 200 Tablet 1    rosuvastatin (CRESTOR) 10 MG Tab Take 1 Tablet by mouth every evening. (Patient not taking: Reported on 5/19/2025) 100 Tablet 3     No current facility-administered medications on file prior to visit.   [2]   Allergies  Allergen Reactions    Aspartame Nausea     headache    Atorvastatin Calcium Unspecified and Swelling    Augmentin [Amoxicillin-Pot Clavulanate] Nausea     Pt reports feeling sick to her stomach    Latex Rash and Itching    Lipitor [Atorvastatin Calcium] Swelling     Patient denies allergy 06/04/22    Other Misc Vomiting     Bug spray    Saccharin Nausea     Nausea and headache   [3]   Past Medical History:  Diagnosis Date    Anemia     Arthritis     OA and  not RA per rheumatology    Breath shortness     oxygen PRN     Bronchitis 2008    Colon polyps 2015    Convulsions ([facility]) 7/21/2009     ICD-10 transition    COPD (chronic obstructive pulmonary disease) ([facility])     Dental disorder     full dentures    Depression     depression, anxiety     Diverticulosis 5/10     colonoscopy    Hemorrhoid 7/29/2009    History of colonoscopy   2005    Leukocytosis 3/30/2014    Lymphocytosis (symptomatic) 7/21/2009    MI (myocardial infarction) ([facility]) 4/29/2007    Other specified disorder of intestines     constipation    Pain     shoulders, neck, lower back    Pancreatitis     Pap smear 4/2006    Pneumonia 2013    Pulmonary nodule     Renal lesion     Renal stone     S/P colonoscopy 5/2010    will need repeat in 5 years    Screening mammogram never    Seizure ([facility])     x1 in 2008    Shingles     Symptomatic menopausal or female climacteric states 7/21/2009    Type II or unspecified type diabetes mellitus without mention of complication, uncontrolled 7/21/2009    diet controlled     Unspecified urinary  incontinence

## 2025-07-23 NOTE — LETTER
Critical access hospital  Viviana Thorne M.D.  [address]   [place] [address]  Fax: [phone]   Authorization for Release/Disclosure of   Protected Health Information   Name: HEIDI NAVAS : 1955 SSN: [id number]   Address: [place] [address] Phone:    There are no phone numbers on file.   I authorize the entity listed below to release/disclose the PHI below to:   Critical access hospital/Viviana Thorne M.D. and Viviana Thorne M.D.   Provider or Entity Name:     Address   City, State, Zip   Phone:      Fax:     Reason for request: continuity of care   Information to be released:    [  ] LAST COLONOSCOPY,  including any PATH REPORT and follow-up  [  ] LAST FIT/COLOGUARD RESULT [  ] LAST DEXA  [  ] LAST MAMMOGRAM  [  ] LAST PAP  [  ] LAST LABS [  ] RETINA EXAM REPORT  [  ] IMMUNIZATION RECORDS  [  ] Release all info      [  ] Check here and initial the line next to each item to release ALL health information INCLUDING  _____ Care and treatment for drug and / or alcohol abuse  _____ HIV testing, infection status, or AIDS  _____ Genetic Testing    DATES OF SERVICE OR TIME PERIOD TO BE DISCLOSED: _____________  I understand and acknowledge that:  * This Authorization may be revoked at any time by you in writing, except if your health information has already been used or disclosed.  * Your health information that will be used or disclosed as a result of you signing this authorization could be re-disclosed by the recipient. If this occurs, your re-disclosed health information may no longer be protected by State or Federal laws.  * You may refuse to sign this Authorization. Your refusal will not affect your ability to obtain treatment.  * This Authorization becomes effective upon signing and will  on (date) __________.      If no date is indicated, this Authorization will  one (1) year from the signature date.    Name: Heidi Navas  Signature: Date:   2025     PLEASE FAX REQUESTED  RECORDS BACK TO: [phone]

## 2025-07-23 NOTE — Clinical Note
REFERRAL APPROVAL NOTICE         Sent on July 23, 2025                   Heidimiko Navas  [address]  [place] [address]                   Dear Ms. Navas,    After a careful review of the medical information and benefit coverage, [facility] has processed your referral. See below for additional details.    If applicable, you must be actively enrolled with your insurance for coverage of the authorized service. If you have any questions regarding your coverage, please contact your insurance directly.    REFERRAL INFORMATION   Referral #:  [id number]  Referred-To Department    Referred-By Provider:  Dermatology    Viviana Thorne M.D.   [facility]      [address] [place] [address]  [phone] [address]  [place] [id number] [phone]    Referral Start Date:  07/23/2025  Referral End Date:   07/23/2026             SCHEDULING  If you do not already have an appointment, please call [phone] to make an appointment.     MORE INFORMATION  If you do not already have a [employer] account, sign up at: [email or web address]  You can access your medical information, make appointments, see lab results, billing information, and more.  If you have questions regarding this referral, please contact  the [facility] Referrals department at:             [phone]. Monday - Friday 8:00AM - 5:00PM.     Sincerely,    [facility]

## 2025-07-25 LAB
BACTERIA UR CULT: NORMAL
SIGNIFICANT IND 70042: NORMAL
SITE SITE: NORMAL
SOURCE SOURCE: NORMAL

## 2025-07-29 ENCOUNTER — HOSPITAL ENCOUNTER (OUTPATIENT)
Dept: LAB | Facility: MEDICAL CENTER | Age: 70
End: 2025-07-29
Attending: FAMILY MEDICINE
Payer: MEDICARE

## 2025-07-29 ENCOUNTER — HOSPITAL ENCOUNTER (OUTPATIENT)
Dept: LAB | Facility: MEDICAL CENTER | Age: 70
End: 2025-07-29
Attending: NURSE PRACTITIONER
Payer: MEDICARE

## 2025-07-29 DIAGNOSIS — R77.1 ELEVATED SERUM GLOBULIN LEVEL: ICD-10-CM

## 2025-07-29 DIAGNOSIS — D53.9 DEFICIENCY ANEMIA: ICD-10-CM

## 2025-07-29 DIAGNOSIS — R74.8 ELEVATED ALKALINE PHOSPHATASE LEVEL: ICD-10-CM

## 2025-07-29 LAB
BASOPHILS # BLD AUTO: 1.1 % (ref 0–1.8)
BASOPHILS # BLD: 0.1 K/UL (ref 0–0.12)
COLLECT DURATION TIME SPEC: NORMAL HRS
EOSINOPHIL # BLD AUTO: 0.13 K/UL (ref 0–0.51)
EOSINOPHIL NFR BLD: 1.4 % (ref 0–6.9)
ERYTHROCYTE [DISTWIDTH] IN BLOOD BY AUTOMATED COUNT: 54.4 FL (ref 35.9–50)
FERRITIN SERPL-MCNC: 99.9 NG/ML (ref 10–291)
HCT VFR BLD AUTO: 44.5 % (ref 37–47)
HGB BLD-MCNC: 13.6 G/DL (ref 12–16)
IMM GRANULOCYTES # BLD AUTO: 0.03 K/UL (ref 0–0.11)
IMM GRANULOCYTES NFR BLD AUTO: 0.3 % (ref 0–0.9)
IRON SATN MFR SERPL: 11 % (ref 15–55)
IRON SERPL-MCNC: 33 UG/DL (ref 40–170)
LYMPHOCYTES # BLD AUTO: 0.95 K/UL (ref 1–4.8)
LYMPHOCYTES NFR BLD: 10.2 % (ref 22–41)
MCH RBC QN AUTO: 26.8 PG (ref 27–33)
MCHC RBC AUTO-ENTMCNC: 30.6 G/DL (ref 32.2–35.5)
MCV RBC AUTO: 87.6 FL (ref 81.4–97.8)
MONOCYTES # BLD AUTO: 0.6 K/UL (ref 0–0.85)
MONOCYTES NFR BLD AUTO: 6.5 % (ref 0–13.4)
NEUTROPHILS # BLD AUTO: 7.47 K/UL (ref 1.82–7.42)
NEUTROPHILS NFR BLD: 80.5 % (ref 44–72)
NRBC # BLD AUTO: 0 K/UL
NRBC BLD-RTO: 0 /100 WBC (ref 0–0.2)
PLATELET # BLD AUTO: 392 K/UL (ref 164–446)
PMV BLD AUTO: 9.2 FL (ref 9–12.9)
RBC # BLD AUTO: 5.08 M/UL (ref 4.2–5.4)
SPECIMEN VOL ?TM UR: NORMAL ML
TIBC SERPL-MCNC: 296 UG/DL (ref 250–450)
UIBC SERPL-MCNC: 263 UG/DL (ref 110–370)
WBC # BLD AUTO: 9.3 K/UL (ref 4.8–10.8)

## 2025-07-29 PROCEDURE — 84075 ASSAY ALKALINE PHOSPHATASE: CPT

## 2025-07-29 PROCEDURE — 83540 ASSAY OF IRON: CPT

## 2025-07-29 PROCEDURE — 82728 ASSAY OF FERRITIN: CPT

## 2025-07-29 PROCEDURE — 85025 COMPLETE CBC W/AUTO DIFF WBC: CPT

## 2025-07-29 PROCEDURE — 84080 ASSAY ALKALINE PHOSPHATASES: CPT

## 2025-07-29 PROCEDURE — 84156 ASSAY OF PROTEIN URINE: CPT

## 2025-07-29 PROCEDURE — RXMED WILLOW AMBULATORY MEDICATION CHARGE: Performed by: FAMILY MEDICINE

## 2025-07-29 PROCEDURE — 83915 ASSAY OF NUCLEOTIDASE: CPT

## 2025-07-29 PROCEDURE — 83550 IRON BINDING TEST: CPT

## 2025-07-29 PROCEDURE — 36415 COLL VENOUS BLD VENIPUNCTURE: CPT

## 2025-07-29 PROCEDURE — 84165 PROTEIN E-PHORESIS SERUM: CPT

## 2025-07-29 PROCEDURE — 84155 ASSAY OF PROTEIN SERUM: CPT

## 2025-07-29 PROCEDURE — 86334 IMMUNOFIX E-PHORESIS SERUM: CPT

## 2025-07-30 ENCOUNTER — PHARMACY VISIT (OUTPATIENT)
Dept: PHARMACY | Facility: MEDICAL CENTER | Age: 70
End: 2025-07-30
Payer: COMMERCIAL

## 2025-07-30 NOTE — CARE PLAN
Problem: Patient barriers to managing diabetes  Goal: Identify patient barriers to managing diabetes  Outcome: Not Progressing     Problem: Adherence to prescribed diabetic medications  Goal: Educate on and improve diabetic medication adherence  Outcome: Not Progressing     Problem: Recognizing current health habits that may contribute to diabetes  Goal: In conjunction with patient, identify which health habits can be modified  Outcome: Progressing

## 2025-07-31 LAB
5NT SERPL-CCNC: 11 U/L (ref 0–15)
ALP BONE SERPL-CCNC: 71 U/L (ref 0–55)
ALP ISOS SERPL HS-CCNC: 0 U/L
ALP LIVER SERPL-CCNC: 87 U/L (ref 0–94)
ALP SERPL-CCNC: 158 U/L (ref 40–120)

## 2025-07-31 PROCEDURE — RXMED WILLOW AMBULATORY MEDICATION CHARGE: Performed by: FAMILY MEDICINE

## 2025-08-01 LAB
ALBUMIN SERPL ELPH-MCNC: 3.51 G/DL (ref 3.75–5.01)
ALPHA1 GLOB SERPL ELPH-MCNC: 0.4 G/DL (ref 0.19–0.46)
ALPHA2 GLOB SERPL ELPH-MCNC: 0.83 G/DL (ref 0.48–1.05)
B-GLOBULIN SERPL ELPH-MCNC: 0.93 G/DL (ref 0.48–1.1)
GAMMA GLOB SERPL ELPH-MCNC: 1.23 G/DL (ref 0.62–1.51)
INTERPRETATION SERPL IFE-IMP: ABNORMAL
INTERPRETATION SERPL IFE-IMP: ABNORMAL
MONOCLONAL PROTEIN NL11656: ABNORMAL G/DL
PATHOLOGY STUDY: ABNORMAL
PROT SERPL-MCNC: 6.9 G/DL (ref 6.3–8.2)

## 2025-08-03 LAB
ALBUMIN 24H MFR UR ELPH: 52.1 %
ALPHA1 GLOB 24H MFR UR ELPH: 9.9 %
ALPHA2 GLOB 24H MFR UR ELPH: 11.6 %
B-GLOBULIN 24H MFR UR ELPH: 15.8 %
COLLECT DURATION TIME SPEC: NORMAL HR
EER MONOCLONAL PROTEIN STUDY, 24 HOUR U Q5964: NORMAL
GAMMA GLOB 24H MFR UR ELPH: 10.6 %
INTERPRETATION UR IFE-IMP: NORMAL
M PROTEIN 24H MFR UR ELPH: 0 %
M PROTEIN 24H UR ELPH-MRATE: NORMAL MG/24 HRS
PROT 24H UR-MRATE: 220 MG/DL
PROT 24H UR-MRATE: NORMAL G/(24.H) (ref 40–150)
SPECIMEN VOL ?TM UR: NORMAL ML

## 2025-08-04 ENCOUNTER — PHARMACY VISIT (OUTPATIENT)
Dept: PHARMACY | Facility: MEDICAL CENTER | Age: 70
End: 2025-08-04
Payer: COMMERCIAL

## 2025-08-14 ENCOUNTER — HOSPITAL ENCOUNTER (OUTPATIENT)
Dept: HEMATOLOGY ONCOLOGY | Facility: MEDICAL CENTER | Age: 70
End: 2025-08-14
Attending: NURSE PRACTITIONER
Payer: MEDICARE

## 2025-08-14 VITALS
TEMPERATURE: 99 F | BODY MASS INDEX: 22.82 KG/M2 | RESPIRATION RATE: 14 BRPM | OXYGEN SATURATION: 92 % | DIASTOLIC BLOOD PRESSURE: 76 MMHG | WEIGHT: 145.4 LBS | HEART RATE: 81 BPM | SYSTOLIC BLOOD PRESSURE: 128 MMHG | HEIGHT: 67 IN

## 2025-08-14 DIAGNOSIS — D72.829 LEUKOCYTOSIS, UNSPECIFIED TYPE: ICD-10-CM

## 2025-08-14 DIAGNOSIS — Z09 ENCOUNTER FOR HEMATOLOGY FOLLOW-UP: ICD-10-CM

## 2025-08-14 DIAGNOSIS — E61.1 IRON DEFICIENCY: ICD-10-CM

## 2025-08-14 PROCEDURE — 99213 OFFICE O/P EST LOW 20 MIN: CPT | Performed by: NURSE PRACTITIONER

## 2025-08-14 RX ORDER — MEPERIDINE HYDROCHLORIDE 25 MG/ML
25 INJECTION INTRAMUSCULAR; INTRAVENOUS; SUBCUTANEOUS PRN
Status: CANCELLED | OUTPATIENT
Start: 2025-08-14

## 2025-08-14 RX ORDER — SODIUM CHLORIDE 9 MG/ML
1000 INJECTION, SOLUTION INTRAVENOUS PRN
Status: CANCELLED | OUTPATIENT
Start: 2025-08-14

## 2025-08-14 RX ORDER — LORAZEPAM 0.5 MG/1
0.5 TABLET ORAL PRN
Status: CANCELLED | OUTPATIENT
Start: 2025-08-14

## 2025-08-14 RX ORDER — METHYLPREDNISOLONE SODIUM SUCCINATE 125 MG/2ML
125 INJECTION, POWDER, LYOPHILIZED, FOR SOLUTION INTRAMUSCULAR; INTRAVENOUS PRN
Status: CANCELLED | OUTPATIENT
Start: 2025-08-14

## 2025-08-14 RX ORDER — ONDANSETRON 2 MG/ML
8 INJECTION INTRAMUSCULAR; INTRAVENOUS PRN
Status: CANCELLED | OUTPATIENT
Start: 2025-08-14

## 2025-08-14 RX ORDER — ACETAMINOPHEN 325 MG/1
650 TABLET ORAL PRN
Status: CANCELLED | OUTPATIENT
Start: 2025-08-14

## 2025-08-14 RX ORDER — ONDANSETRON 8 MG/1
8 TABLET, ORALLY DISINTEGRATING ORAL PRN
Status: CANCELLED | OUTPATIENT
Start: 2025-08-14

## 2025-08-14 RX ORDER — ALBUTEROL SULFATE 5 MG/ML
2.5 SOLUTION RESPIRATORY (INHALATION) PRN
Status: CANCELLED | OUTPATIENT
Start: 2025-08-14

## 2025-08-14 RX ORDER — SODIUM CHLORIDE 9 MG/ML
INJECTION, SOLUTION INTRAVENOUS CONTINUOUS
Status: CANCELLED | OUTPATIENT
Start: 2025-08-14

## 2025-08-14 RX ORDER — DIPHENHYDRAMINE HYDROCHLORIDE 50 MG/ML
25 INJECTION, SOLUTION INTRAMUSCULAR; INTRAVENOUS PRN
Status: CANCELLED | OUTPATIENT
Start: 2025-08-14

## 2025-08-14 RX ORDER — EPINEPHRINE 1 MG/ML(1)
0.5 AMPUL (ML) INJECTION PRN
Status: CANCELLED | OUTPATIENT
Start: 2025-08-14

## 2025-08-14 ASSESSMENT — ENCOUNTER SYMPTOMS
WHEEZING: 0
CHILLS: 1
TINGLING: 0
DIAPHORESIS: 1
FEVER: 0
CONSTIPATION: 0
DIZZINESS: 0
HEADACHES: 0
WEIGHT LOSS: 1
PALPITATIONS: 0
NAUSEA: 0
SHORTNESS OF BREATH: 0
MYALGIAS: 0
COUGH: 0
VOMITING: 0
DIARRHEA: 0
INSOMNIA: 0

## 2025-08-14 ASSESSMENT — FIBROSIS 4 INDEX: FIB4 SCORE: 1.030982623552903151

## 2025-08-14 ASSESSMENT — LIFESTYLE VARIABLES: SUBSTANCE_ABUSE: 1

## 2025-08-15 ENCOUNTER — TELEPHONE (OUTPATIENT)
Dept: HEALTH INFORMATION MANAGEMENT | Facility: OTHER | Age: 70
End: 2025-08-15
Payer: MEDICARE

## 2025-08-18 PROCEDURE — RXMED WILLOW AMBULATORY MEDICATION CHARGE: Performed by: FAMILY MEDICINE

## 2025-08-19 PROCEDURE — RXMED WILLOW AMBULATORY MEDICATION CHARGE: Performed by: FAMILY MEDICINE

## 2025-08-21 ENCOUNTER — PHARMACY VISIT (OUTPATIENT)
Dept: PHARMACY | Facility: MEDICAL CENTER | Age: 70
End: 2025-08-21
Payer: COMMERCIAL

## 2025-08-22 ENCOUNTER — OUTPATIENT INFUSION SERVICES (OUTPATIENT)
Dept: ONCOLOGY | Facility: MEDICAL CENTER | Age: 70
End: 2025-08-22
Attending: NURSE PRACTITIONER
Payer: MEDICARE

## 2025-08-22 VITALS
OXYGEN SATURATION: 90 % | BODY MASS INDEX: 23.03 KG/M2 | SYSTOLIC BLOOD PRESSURE: 153 MMHG | WEIGHT: 143.3 LBS | TEMPERATURE: 96.9 F | HEIGHT: 66 IN | RESPIRATION RATE: 16 BRPM | HEART RATE: 93 BPM | DIASTOLIC BLOOD PRESSURE: 80 MMHG

## 2025-08-22 DIAGNOSIS — E61.1 IRON DEFICIENCY: Primary | ICD-10-CM

## 2025-08-22 PROCEDURE — 96375 TX/PRO/DX INJ NEW DRUG ADDON: CPT

## 2025-08-22 PROCEDURE — 700105 HCHG RX REV CODE 258: Performed by: NURSE PRACTITIONER

## 2025-08-22 PROCEDURE — 96365 THER/PROPH/DIAG IV INF INIT: CPT

## 2025-08-22 PROCEDURE — 700111 HCHG RX REV CODE 636 W/ 250 OVERRIDE (IP): Mod: JZ,TB | Performed by: NURSE PRACTITIONER

## 2025-08-22 RX ORDER — SODIUM CHLORIDE 9 MG/ML
1000 INJECTION, SOLUTION INTRAVENOUS PRN
OUTPATIENT
Start: 2025-08-22

## 2025-08-22 RX ORDER — DIPHENHYDRAMINE HYDROCHLORIDE 50 MG/ML
25 INJECTION, SOLUTION INTRAMUSCULAR; INTRAVENOUS PRN
OUTPATIENT
Start: 2025-08-22

## 2025-08-22 RX ORDER — MEPERIDINE HYDROCHLORIDE 25 MG/ML
25 INJECTION INTRAMUSCULAR; INTRAVENOUS; SUBCUTANEOUS PRN
OUTPATIENT
Start: 2025-08-22

## 2025-08-22 RX ORDER — METHYLPREDNISOLONE SODIUM SUCCINATE 125 MG/2ML
125 INJECTION, POWDER, LYOPHILIZED, FOR SOLUTION INTRAMUSCULAR; INTRAVENOUS PRN
OUTPATIENT
Start: 2025-08-22

## 2025-08-22 RX ORDER — EPINEPHRINE 1 MG/ML(1)
0.5 AMPUL (ML) INJECTION PRN
OUTPATIENT
Start: 2025-08-22

## 2025-08-22 RX ORDER — METHYLPREDNISOLONE SODIUM SUCCINATE 125 MG/2ML
125 INJECTION, POWDER, LYOPHILIZED, FOR SOLUTION INTRAMUSCULAR; INTRAVENOUS PRN
Status: DISCONTINUED | OUTPATIENT
Start: 2025-08-22 | End: 2025-08-22 | Stop reason: HOSPADM

## 2025-08-22 RX ORDER — METHYLPREDNISOLONE SODIUM SUCCINATE 125 MG/2ML
125 INJECTION, POWDER, LYOPHILIZED, FOR SOLUTION INTRAMUSCULAR; INTRAVENOUS PRN
Status: CANCELLED | OUTPATIENT
Start: 2025-08-22

## 2025-08-22 RX ORDER — ONDANSETRON 2 MG/ML
8 INJECTION INTRAMUSCULAR; INTRAVENOUS PRN
OUTPATIENT
Start: 2025-08-22

## 2025-08-22 RX ORDER — ACETAMINOPHEN 325 MG/1
650 TABLET ORAL PRN
OUTPATIENT
Start: 2025-08-22

## 2025-08-22 RX ORDER — LORAZEPAM 1 MG/1
0.5 TABLET ORAL PRN
OUTPATIENT
Start: 2025-08-22

## 2025-08-22 RX ORDER — ALBUTEROL SULFATE 5 MG/ML
2.5 SOLUTION RESPIRATORY (INHALATION) PRN
OUTPATIENT
Start: 2025-08-22

## 2025-08-22 RX ORDER — ONDANSETRON 8 MG/1
8 TABLET, ORALLY DISINTEGRATING ORAL PRN
OUTPATIENT
Start: 2025-08-22

## 2025-08-22 RX ORDER — SODIUM CHLORIDE 9 MG/ML
INJECTION, SOLUTION INTRAVENOUS CONTINUOUS
OUTPATIENT
Start: 2025-08-22

## 2025-08-22 RX ADMIN — SODIUM CHLORIDE 1000 MG: 0.9 INJECTION, SOLUTION INTRAVENOUS at 15:14

## 2025-08-22 RX ADMIN — METHYLPREDNISOLONE SODIUM SUCCINATE 125 MG: 125 INJECTION, POWDER, FOR SOLUTION INTRAMUSCULAR; INTRAVENOUS at 14:35

## 2025-08-22 ASSESSMENT — FIBROSIS 4 INDEX: FIB4 SCORE: 1.030982623552903151

## 2025-08-26 ENCOUNTER — OFFICE VISIT (OUTPATIENT)
Dept: DERMATOLOGY | Facility: IMAGING CENTER | Age: 70
End: 2025-08-26
Payer: MEDICARE

## 2025-08-26 DIAGNOSIS — D48.5 NEOPLASM OF UNCERTAIN BEHAVIOR OF SKIN: ICD-10-CM

## 2025-08-26 PROCEDURE — RXMED WILLOW AMBULATORY MEDICATION CHARGE: Performed by: FAMILY MEDICINE

## 2025-08-26 RX ORDER — GENTAMICIN SULFATE 1 MG/G
OINTMENT TOPICAL
Qty: 30 G | Refills: 0 | Status: SHIPPED | OUTPATIENT
Start: 2025-08-26

## 2025-08-27 ENCOUNTER — PHARMACY VISIT (OUTPATIENT)
Dept: PHARMACY | Facility: MEDICAL CENTER | Age: 70
End: 2025-08-27
Payer: COMMERCIAL

## 2025-08-30 PROBLEM — M25.50 JOINT PAIN: Status: ACTIVE | Noted: 2025-08-30

## 2025-08-30 PROBLEM — R79.89 POSITIVE SEROLOGICAL TEST RESULT: Status: ACTIVE | Noted: 2025-08-30

## 2025-08-30 PROBLEM — R79.89 ABNORMAL C-REACTIVE PROTEIN: Status: ACTIVE | Noted: 2025-08-30

## 2025-08-30 PROBLEM — R70.0 ELEVATED ERYTHROCYTE SEDIMENTATION RATE: Status: ACTIVE | Noted: 2025-08-30

## (undated) DEVICE — SUTURE 2-0 MONOCRYL PLUS UNDYED CT-1 1 X 36 (36EA/BX)"

## (undated) DEVICE — FOAM FACEHOLDER SPIDER (8EA/BX)

## (undated) DEVICE — KIT ROOM DECONTAMINATION

## (undated) DEVICE — ELECTRODE 850 FOAM ADHESIVE - HYDROGEL RADIOTRNSPRNT (50/PK)

## (undated) DEVICE — ELECTRODE DUAL RETURN W/ CORD - (50/PK)

## (undated) DEVICE — HEAD HOLDER JUNIOR/ADULT

## (undated) DEVICE — TIP INTPLS HFLO ML ORFC BTRY - (12/CS)  FOR SURGILAV

## (undated) DEVICE — KIT ANESTHESIA W/CIRCUIT & 3/LT BAG W/FILTER (20EA/CA)

## (undated) DEVICE — LEAD SET 6 DISP. EKG NIHON KOHDEN

## (undated) DEVICE — SET EXTENSION WITH 2 PORTS (48EA/CA) ***PART #2C8610 IS A SUBSTITUTE*****

## (undated) DEVICE — SUTURE 5 TI-CRON HOS-14 - (36/BX)

## (undated) DEVICE — KIT EVACUATER 3 SPRING PVC LF 1/8 DRAIN SIZE (10EA/CA)"

## (undated) DEVICE — GLOVE BIOGEL PI INDICATOR SZ 7.0 SURGICAL PF LF - (50/BX 4BX/CA)

## (undated) DEVICE — SENSOR SPO2 NEO LNCS ADHESIVE (20/BX) SEE USER NOTES

## (undated) DEVICE — DRAPE SURGICAL U 77X120 - (10/CA)

## (undated) DEVICE — SUCTION INSTRUMENT YANKAUER BULBOUS TIP W/O VENT (50EA/CA)

## (undated) DEVICE — GOWN WARMING STANDARD FLEX - (30/CA)

## (undated) DEVICE — DRAPE STRLE REG TOWEL 18X24 - (10/BX 4BX/CA)"

## (undated) DEVICE — PROTECTOR ULNA NERVE - (36PR/CA)

## (undated) DEVICE — FILM CASSETTE ENDO

## (undated) DEVICE — PACK TOTAL HIP - (1/CA)

## (undated) DEVICE — HANDPIECE 10FT INTPLS SCT PLS IRRIGATION HAND CONTROL SET (6/PK)

## (undated) DEVICE — SODIUM CHL. IRRIGATION 0.9% 3000ML (4EA/CA 65CA/PF)

## (undated) DEVICE — CONTAINER, SPECIMEN, STERILE

## (undated) DEVICE — DRAPE U ORTHOPEDIC - (10/BX)

## (undated) DEVICE — CANISTER SUCTION 3000ML MECHANICAL FILTER AUTO SHUTOFF MEDI-VAC NONSTERILE LF DISP  (40EA/CA)

## (undated) DEVICE — BLOCK

## (undated) DEVICE — CHLORAPREP 26 ML APPLICATOR - ORANGE TINT(25/CA)

## (undated) DEVICE — BITE BLOCK ADULT 60FR (100EA/CA)

## (undated) DEVICE — MASK ANESTHESIA ADULT  - (100/CA)

## (undated) DEVICE — GLOVE BIOGEL PI INDICATOR SZ 7.5 SURGICAL PF LF -(50/BX 4BX/CA)

## (undated) DEVICE — TUBING CLEARLINK DUO-VENT - C-FLO (48EA/CA)

## (undated) DEVICE — SUTURE 0 ETHIBOND CT-1 - (12/BX) 18 INCH

## (undated) DEVICE — DRAPE U SPLIT IMP 54 X 76 - (24/CA)

## (undated) DEVICE — SUTURE GENERAL

## (undated) DEVICE — GLOVE BIOGEL PI INDICATOR SZ 8.0 SURGICAL PF LF -(50/BX 4BX/CA)

## (undated) DEVICE — SET LEADWIRE 5 LEAD BEDSIDE DISPOSABLE ECG (1SET OF 5/EA)

## (undated) DEVICE — BLADE SAGITTAL SAW DUAL CUT 75.0 X 25.0MM (1/EA)

## (undated) DEVICE — DRAPE LARGE 3 QUARTER - (20/CA)

## (undated) DEVICE — KIT CUSTOM PROCEDURE SINGLE FOR ENDO  (15/CA)

## (undated) DEVICE — LACTATED RINGERS INJ 1000 ML - (14EA/CA 60CA/PF)

## (undated) DEVICE — GVL 3 STAT DISPOSABLE - (10/BX)

## (undated) DEVICE — SODIUM CHL IRRIGATION 0.9% 1000ML (12EA/CA)

## (undated) DEVICE — DETERGENT RENUZYME PLUS 10 OZ PACKET (50/BX)

## (undated) DEVICE — NEPTUNE 4 PORT MANIFOLD - (20/PK)